# Patient Record
Sex: MALE | Race: WHITE | NOT HISPANIC OR LATINO | Employment: OTHER | URBAN - METROPOLITAN AREA
[De-identification: names, ages, dates, MRNs, and addresses within clinical notes are randomized per-mention and may not be internally consistent; named-entity substitution may affect disease eponyms.]

---

## 2018-05-09 ENCOUNTER — OFFICE VISIT (OUTPATIENT)
Dept: FAMILY MEDICINE CLINIC | Facility: CLINIC | Age: 73
End: 2018-05-09
Payer: MEDICARE

## 2018-05-09 ENCOUNTER — TELEPHONE (OUTPATIENT)
Dept: FAMILY MEDICINE CLINIC | Facility: CLINIC | Age: 73
End: 2018-05-09

## 2018-05-09 VITALS
HEIGHT: 67 IN | BODY MASS INDEX: 25.93 KG/M2 | DIASTOLIC BLOOD PRESSURE: 62 MMHG | TEMPERATURE: 98 F | HEART RATE: 60 BPM | SYSTOLIC BLOOD PRESSURE: 98 MMHG | WEIGHT: 165.2 LBS

## 2018-05-09 DIAGNOSIS — L85.3 DRY SKIN: ICD-10-CM

## 2018-05-09 DIAGNOSIS — M54.50 LUMBAR BACK PAIN: ICD-10-CM

## 2018-05-09 DIAGNOSIS — I10 ESSENTIAL HYPERTENSION: ICD-10-CM

## 2018-05-09 DIAGNOSIS — N40.0 BENIGN PROSTATIC HYPERPLASIA WITHOUT LOWER URINARY TRACT SYMPTOMS: ICD-10-CM

## 2018-05-09 DIAGNOSIS — E78.00 HYPERCHOLESTEROLEMIA: Primary | ICD-10-CM

## 2018-05-09 PROCEDURE — 99203 OFFICE O/P NEW LOW 30 MIN: CPT | Performed by: FAMILY MEDICINE

## 2018-05-09 RX ORDER — ATORVASTATIN CALCIUM 80 MG/1
80 TABLET, FILM COATED ORAL DAILY
COMMUNITY
End: 2018-05-14 | Stop reason: SDUPTHER

## 2018-05-09 RX ORDER — MIRTAZAPINE 15 MG/1
15 TABLET, FILM COATED ORAL
Status: ON HOLD | COMMUNITY
End: 2018-11-14

## 2018-05-09 RX ORDER — AMOXICILLIN 500 MG
CAPSULE ORAL
COMMUNITY
End: 2018-12-06 | Stop reason: HOSPADM

## 2018-05-09 RX ORDER — BUSPIRONE HYDROCHLORIDE 10 MG/1
10 TABLET ORAL 2 TIMES DAILY
Status: ON HOLD | COMMUNITY
End: 2018-11-14

## 2018-05-09 RX ORDER — OLANZAPINE 15 MG/1
15 TABLET ORAL
Status: ON HOLD | COMMUNITY
End: 2018-11-14

## 2018-05-09 RX ORDER — LANOLIN ALCOHOL/MO/W.PET/CERES
1 CREAM (GRAM) TOPICAL 3 TIMES DAILY
COMMUNITY
End: 2018-12-06 | Stop reason: HOSPADM

## 2018-05-09 RX ORDER — SERTRALINE HYDROCHLORIDE 100 MG/1
100 TABLET, FILM COATED ORAL DAILY
COMMUNITY

## 2018-05-09 NOTE — TELEPHONE ENCOUNTER
Patients son called stating he wanted to verify if the patient should continue taking his medication until his next weeks bp check or should he stop taking the medication? Patients son stated if he does not answer the phone to please leave a detailed message

## 2018-05-09 NOTE — TELEPHONE ENCOUNTER
He should cut metoprolol dose in half and keep taking atorvastatin until I call  He should also keep on same dose of zyprexa

## 2018-05-10 NOTE — PROGRESS NOTES
Patient ID: Mary Ann Bains is a 68 y o  male  HPI: 68 y o male presents to get established  He currently follows with psychiatry for OCD and depression  He has hypertension (bp is low and pt is dizzy with positional changes), chronic low back pain and dry skin on legs  He needs updated labs  A year ago he was hospitalized when brought to [place] from [place] and had uncontrolled htn  He had a negative cardiac cath, but was left of 80 mg of atorvastatin and metoprolol 25mg  SUBJECTIVE    Family History   Problem Relation Age of Onset    Diabetes type II Father     Glaucoma Son     Panic disorder Son      Social History     Social History    Marital status:      Spouse name: N/A    Number of children: N/A    Years of education: N/A     Occupational History    Not on file       Social History Main Topics    Smoking status: Not on file    Smokeless tobacco: Not on file    Alcohol use Not on file    Drug use: Unknown    Sexual activity: Not on file     Other Topics Concern    Not on file     Social History Narrative    No narrative on file     Past Medical History:   Diagnosis Date    Anxiety     Chronic low back pain     Depression     Hypercholesterolemia     Hypertension     OCD (obsessive compulsive disorder)      Past Surgical History:   Procedure Laterality Date    HERNIA REPAIR       No Known Allergies    Current Outpatient Prescriptions:     atorvastatin (LIPITOR) 80 mg tablet, Take 80 mg by mouth daily, Disp: , Rfl:     busPIRone (BUSPAR) 10 mg tablet, Take 10 mg by mouth 2 (two) times a day, Disp: , Rfl:     glucosamine-chondroitin 500-400 MG tablet, Take 1 tablet by mouth 3 (three) times a day, Disp: , Rfl:     metoprolol tartrate (LOPRESSOR) 25 mg tablet, Take 25 mg by mouth, Disp: , Rfl:     mirtazapine (REMERON) 15 mg tablet, Take 15 mg by mouth daily at bedtime, Disp: , Rfl:     Multiple Vitamins-Minerals (MULTIVITAMIN MEN 50+) TABS, Take by mouth, Disp: , Rfl:    OLANZapine (ZyPREXA) 15 mg tablet, Take 15 mg by mouth daily at bedtime, Disp: , Rfl:     Omega-3 Fatty Acids (FISH OIL) 1200 MG CAPS, Take by mouth, Disp: , Rfl:     sertraline (ZOLOFT) 100 mg tablet, Take 150 mg by mouth daily, Disp: , Rfl:     Review of Systems  Constitutional:     Denies fever, chills ,fatigue ,weakness ,weight loss, weight gain     ENT: Denies earache ,loss of hearing ,nosebleed, nasal discharge,nasal congestion ,sore throat ,hoarseness  Pulmonary: Denies shortness of breath ,cough  ,dyspnea on exertion, orthopnea  ,PND   Cardiovascular:  Denies bradycardia , tachycardia  ,palpations, lower extremity edema leg, claudication  Breast:  Denies new or changing breast lumps ,nipple discharge ,nipple changes  Abdomen:  Denies abdominal pain , anorexia , indigestion, nausea, vomiting, constipation, diarrhea  Musculoskeletal: Denies myalgias, , joint swelling, joint stiffness , limb pain, limb swelling+ chronic lumbar back pain and weakness of his legs bilaterally  Gu: denies dysuria, polyuria  Skin: Denies skin rash, skin lesion, skin wound, +itchy dry skin  Neuro: Denies headache, numbness, tingling, confusion, loss of consciousness, dizziness, vertigo  Psychiatric: Denies feelings of depression, suicidal ideation, anxiety, sleep disturbances + OCD tendencies not fully controlled    OBJECTIVE    Constitutional:   NAD, well appearing and well nourished      ENT:   Conjunctiva and lids: no injection, edema, or discharge     Pupils and iris: TOOTIE bilaterally    External inspection of ears and nose: normal without deformities or discharge  Otoscopic exam: Canals patent without erythema  Nasal mucosa, septum and turbinates: Normal or edema or discharge         Oropharynx:  Moist mucosa, normal tongue and tonsils without lesions  No erythema        Pulmonary:Respiratory effort normal rate and rhythm, no increased work of breathing   Auscultation of lungs:  Clear bilaterally with no adventitious breath sounds       Cardiovascular: regular rate and rhythm, S1 and S2, no murmur, no edema and/or varicosities of LE      Abdomen: Soft and non-distended     Positive bowel sounds      No heptomegaly or splenomegaly      Gu: no suprapubic tenderness or CVA tenderness, no urethral discharge  Lymphatic:  No anterior or posterior cervical lymphadenopathy         Musculoskeletal:  Gait and station: Normal gait      Digits and nails normal without clubbing or cyanosis       Inspection/palpation of joints, bones, and muscles:  No joint tenderness, swelling, full active and passive range of motion       Skin: Normal skin turgor and no rashes      Neuro:    Normal  CN 2-12     Normal reflexes     Normal sensation    Small tremor visualized at rest in left thumb only; no cogwheel rigidity, no abnormal gait  Psych:   alert and oriented to person, place and time     Obsessive/compulsive behavior visualized  Assessment/Plan:Diagnoses and all orders for this visit:    Hypercholesterolemia  -     Lipid Panel with Direct LDL reflex; Future    Benign prostatic hyperplasia without lower urinary tract symptoms  -     PSA Total, Diagnostic; Future    Essential hypertension  -     Comprehensive metabolic panel; Future    Lumbar back pain  -     XR spine lumbar minimum 4 views non injury; Future    Dry skin  -     TSH, 3rd generation; Future    Other orders  -     metoprolol tartrate (LOPRESSOR) 25 mg tablet; Take 12 5 mg by mouth   -     Omega-3 Fatty Acids (FISH OIL) 1200 MG CAPS; Take by mouth  -     Multiple Vitamins-Minerals (MULTIVITAMIN MEN 50+) TABS; Take by mouth  -     glucosamine-chondroitin 500-400 MG tablet; Take 1 tablet by mouth 3 (three) times a day  -     busPIRone (BUSPAR) 10 mg tablet; Take 10 mg by mouth 2 (two) times a day  -     mirtazapine (REMERON) 15 mg tablet; Take 15 mg by mouth daily at bedtime  -     sertraline (ZOLOFT) 100 mg tablet;  Take 150 mg by mouth daily  -     OLANZapine (ZyPREXA) 15 mg tablet; Take 15 mg by mouth daily at bedtime  -     atorvastatin (LIPITOR) 80 mg tablet; Take 80 mg by mouth daily        Reviewed with patient plan to await labs before deciding to discontinue lipitor  Also, I halved his metoprolol and will recheck in 2 weeks  Also, if bs elevated, will speak to neuro about decreasing zyprexa dose for both tremor and bs reasons  Advised son to use eucerin lotion on patie's legs      Patient instructed to call in 72 hours if not feeling better or if symptoms worsen

## 2018-05-14 DIAGNOSIS — E78.00 PURE HYPERCHOLESTEROLEMIA: Primary | ICD-10-CM

## 2018-05-14 RX ORDER — ATORVASTATIN CALCIUM 80 MG/1
80 TABLET, FILM COATED ORAL DAILY
Qty: 90 TABLET | Refills: 0 | Status: SHIPPED | OUTPATIENT
Start: 2018-05-14 | End: 2018-05-18 | Stop reason: ALTCHOICE

## 2018-05-17 ENCOUNTER — APPOINTMENT (OUTPATIENT)
Dept: LAB | Facility: CLINIC | Age: 73
End: 2018-05-17
Payer: MEDICARE

## 2018-05-17 ENCOUNTER — OFFICE VISIT (OUTPATIENT)
Dept: FAMILY MEDICINE CLINIC | Facility: CLINIC | Age: 73
End: 2018-05-17
Payer: MEDICARE

## 2018-05-17 VITALS — DIASTOLIC BLOOD PRESSURE: 68 MMHG | SYSTOLIC BLOOD PRESSURE: 110 MMHG

## 2018-05-17 DIAGNOSIS — N40.0 BENIGN PROSTATIC HYPERPLASIA WITHOUT LOWER URINARY TRACT SYMPTOMS: ICD-10-CM

## 2018-05-17 DIAGNOSIS — L85.3 DRY SKIN: ICD-10-CM

## 2018-05-17 DIAGNOSIS — E78.00 HYPERCHOLESTEROLEMIA: ICD-10-CM

## 2018-05-17 DIAGNOSIS — I10 ESSENTIAL HYPERTENSION: ICD-10-CM

## 2018-05-17 DIAGNOSIS — I10 HYPERTENSION, UNSPECIFIED TYPE: Primary | ICD-10-CM

## 2018-05-17 LAB
ALBUMIN SERPL BCP-MCNC: 3.8 G/DL (ref 3.5–5)
ALP SERPL-CCNC: 76 U/L (ref 46–116)
ALT SERPL W P-5'-P-CCNC: 45 U/L (ref 12–78)
ANION GAP SERPL CALCULATED.3IONS-SCNC: 6 MMOL/L (ref 4–13)
AST SERPL W P-5'-P-CCNC: 25 U/L (ref 5–45)
BILIRUB SERPL-MCNC: 0.59 MG/DL (ref 0.2–1)
BUN SERPL-MCNC: 8 MG/DL (ref 5–25)
CALCIUM SERPL-MCNC: 8.9 MG/DL (ref 8.3–10.1)
CHLORIDE SERPL-SCNC: 98 MMOL/L (ref 100–108)
CHOLEST SERPL-MCNC: 86 MG/DL (ref 50–200)
CO2 SERPL-SCNC: 28 MMOL/L (ref 21–32)
CREAT SERPL-MCNC: 0.76 MG/DL (ref 0.6–1.3)
GFR SERPL CREATININE-BSD FRML MDRD: 91 ML/MIN/1.73SQ M
GLUCOSE P FAST SERPL-MCNC: 90 MG/DL (ref 65–99)
HDLC SERPL-MCNC: 57 MG/DL (ref 40–60)
LDLC SERPL CALC-MCNC: 11 MG/DL (ref 0–100)
POTASSIUM SERPL-SCNC: 4.3 MMOL/L (ref 3.5–5.3)
PROT SERPL-MCNC: 7.4 G/DL (ref 6.4–8.2)
PSA SERPL-MCNC: 0.2 NG/ML (ref 0–4)
SODIUM SERPL-SCNC: 132 MMOL/L (ref 136–145)
TRIGL SERPL-MCNC: 92 MG/DL
TSH SERPL DL<=0.05 MIU/L-ACNC: 1.42 UIU/ML (ref 0.36–3.74)

## 2018-05-17 PROCEDURE — 84153 ASSAY OF PSA TOTAL: CPT

## 2018-05-17 PROCEDURE — 99211 OFF/OP EST MAY X REQ PHY/QHP: CPT | Performed by: FAMILY MEDICINE

## 2018-05-17 PROCEDURE — 84443 ASSAY THYROID STIM HORMONE: CPT

## 2018-05-17 PROCEDURE — 80061 LIPID PANEL: CPT

## 2018-05-17 PROCEDURE — 36415 COLL VENOUS BLD VENIPUNCTURE: CPT

## 2018-05-17 PROCEDURE — 80053 COMPREHEN METABOLIC PANEL: CPT

## 2018-05-17 NOTE — PROGRESS NOTES
Patient is here for BP check, was low at last office visit and reduced medication  Currently on Metoprolol 12 5mg BID per Dr Amado, patient instructed to continue current dose

## 2018-06-14 ENCOUNTER — OFFICE VISIT (OUTPATIENT)
Dept: FAMILY MEDICINE CLINIC | Facility: CLINIC | Age: 73
End: 2018-06-14
Payer: MEDICARE

## 2018-06-14 VITALS
WEIGHT: 164.4 LBS | DIASTOLIC BLOOD PRESSURE: 68 MMHG | TEMPERATURE: 98.8 F | SYSTOLIC BLOOD PRESSURE: 110 MMHG | HEIGHT: 67 IN | HEART RATE: 60 BPM | BODY MASS INDEX: 25.8 KG/M2

## 2018-06-14 DIAGNOSIS — K59.00 CONSTIPATION, UNSPECIFIED CONSTIPATION TYPE: Primary | ICD-10-CM

## 2018-06-14 DIAGNOSIS — K59.00 CONSTIPATION, UNSPECIFIED CONSTIPATION TYPE: ICD-10-CM

## 2018-06-14 PROBLEM — K59.04 CHRONIC IDIOPATHIC CONSTIPATION: Status: ACTIVE | Noted: 2017-01-09

## 2018-06-14 PROBLEM — F42.9 OCD (OBSESSIVE COMPULSIVE DISORDER): Status: ACTIVE | Noted: 2017-01-09

## 2018-06-14 PROCEDURE — 99213 OFFICE O/P EST LOW 20 MIN: CPT | Performed by: NURSE PRACTITIONER

## 2018-06-14 RX ORDER — POLYETHYLENE GLYCOL 3350 17 G/17G
17 POWDER, FOR SOLUTION ORAL DAILY
Qty: 14 EACH | Refills: 0 | Status: ON HOLD
Start: 2018-06-14 | End: 2018-12-06

## 2018-06-14 NOTE — PROGRESS NOTES
Patient ID: Linda Castillo is a 68 y o  male  HPI: 68 y o male presenting with his son for constipation for one week  Patient denies abdominal pain or discomfort but he knows that he should have a bowel movement every other day and it needs correction  Patient and his son denies any recent medication or diet changes and report that patient keeps himself well hydrated  Patient's son reports that his father is obsessive on his bowel function  SUBJECTIVE    Family History   Problem Relation Age of Onset    Diabetes type II Father     Glaucoma Son     Panic disorder Son      Social History     Social History    Marital status:      Spouse name: N/A    Number of children: N/A    Years of education: N/A     Occupational History    Not on file       Social History Main Topics    Smoking status: Not on file    Smokeless tobacco: Not on file    Alcohol use Not on file    Drug use: Unknown    Sexual activity: Not on file     Other Topics Concern    Not on file     Social History Narrative    No narrative on file     Past Medical History:   Diagnosis Date    Anxiety     Chronic low back pain     Depression     Hypercholesterolemia     Hypertension     OCD (obsessive compulsive disorder)      Past Surgical History:   Procedure Laterality Date    HERNIA REPAIR       No Known Allergies    Current Outpatient Prescriptions:     busPIRone (BUSPAR) 10 mg tablet, Take 10 mg by mouth 2 (two) times a day, Disp: , Rfl:     glucosamine-chondroitin 500-400 MG tablet, Take 1 tablet by mouth 3 (three) times a day, Disp: , Rfl:     metoprolol tartrate (LOPRESSOR) 25 mg tablet, Take 12 5 mg by mouth , Disp: , Rfl:     mirtazapine (REMERON) 15 mg tablet, Take 15 mg by mouth daily at bedtime, Disp: , Rfl:     Multiple Vitamins-Minerals (MULTIVITAMIN MEN 50+) TABS, Take by mouth, Disp: , Rfl:     OLANZapine (ZyPREXA) 15 mg tablet, Take 15 mg by mouth daily at bedtime, Disp: , Rfl:     Omega-3 Fatty Acids (FISH OIL) 1200 MG CAPS, Take by mouth, Disp: , Rfl:     sertraline (ZOLOFT) 100 mg tablet, Take 150 mg by mouth daily, Disp: , Rfl:     docusate sodium (COLACE) 50 mg capsule, Take 1 capsule (50 mg total) by mouth daily for 1 day, Disp: , Rfl: 0    polyethylene glycol (MIRALAX) 17 g packet, Take 17 g by mouth daily, Disp: 14 each, Rfl: 0    Review of Systems    Consitutional:  Denies chills, fatigue, fever, weight gain and weight loss   Pulmonary:  Denies cough, shortness of breath or dyspnea on exertion    Cardiovascular:  Denies chest pain/pressure or peripheral edema   Abdomen:   Positive for constipation for 7 days and was having some problems with it being hard prior to this issue  Denies abdominal pain, nausea or vomiting  Integumentary:  Denies ecchymosis, petechiae, rash or lesions   Neurological:  Denies headaches, dizziness, confusion, loss of consciousness or behavioral changes  Psychological:  Denies anxiety, depression or sleep disturbances      OBJECTIVE    /68   Pulse 60   Temp 98 8 °F (37 1 °C)   Ht 5' 7" (1 702 m)   Wt 74 6 kg (164 lb 6 4 oz)   BMI 25 75 kg/m²     Constitutional:  Well appearing and in no acute distress  ENT:     Pulmonary:  clear to auscultation bilaterally and no crackles, no wheezes, chest expansion normal  Cardiovascular:  S1S2, regular rate and rhythm  Gastrointestinal:  abdomen is soft without significant tenderness with no masses or organomegaly   Lymphatic:  no lymphadenopathy   Skin:  skin color, texture and turgor are normal; no bruising, rashes or lesions noted  Neurologic:  Alert and oriented x 4 and Affect and mood normal      Assessment/Plan:  Diagnoses and all orders for this visit:    Constipation, unspecified constipation type  -     polyethylene glycol (MIRALAX) 17 g packet; Take 17 g by mouth daily  -     docusate sodium (COLACE) 50 mg capsule;  Take 1 capsule (50 mg total) by mouth daily for 1 day      Constipation  Reviewed with patient and his son to take a Colace OTC one tablet daily until bowel movement occurs - usually effective in 12 to 72 hours  Discussed with patient and his son to start using Miralax 17 gram dissolved in 4 to 8 ounces of liquid daily as needed for occasional constipation    Patient instructed to call in 72 hours if not feeling better or if symptoms worsen

## 2018-08-14 ENCOUNTER — HOSPITAL ENCOUNTER (OUTPATIENT)
Dept: RADIOLOGY | Facility: HOSPITAL | Age: 73
Discharge: HOME | End: 2018-08-14
Payer: MEDICARE

## 2018-08-14 DIAGNOSIS — M54.50 LUMBAR BACK PAIN: ICD-10-CM

## 2018-08-14 PROCEDURE — 72110 X-RAY EXAM L-2 SPINE 4/>VWS: CPT

## 2018-08-21 ENCOUNTER — OFFICE VISIT (OUTPATIENT)
Dept: FAMILY MEDICINE CLINIC | Facility: CLINIC | Age: 73
End: 2018-08-21
Payer: MEDICARE

## 2018-08-21 VITALS
HEIGHT: 67 IN | TEMPERATURE: 98 F | WEIGHT: 164.8 LBS | DIASTOLIC BLOOD PRESSURE: 52 MMHG | SYSTOLIC BLOOD PRESSURE: 109 MMHG | HEART RATE: 60 BPM | BODY MASS INDEX: 25.87 KG/M2

## 2018-08-21 DIAGNOSIS — G25.1 TREMOR DUE TO MULTIPLE DRUGS: ICD-10-CM

## 2018-08-21 DIAGNOSIS — M54.16 BILATERAL LUMBAR RADICULOPATHY: Primary | ICD-10-CM

## 2018-08-21 PROCEDURE — 99214 OFFICE O/P EST MOD 30 MIN: CPT | Performed by: FAMILY MEDICINE

## 2018-08-21 NOTE — PROGRESS NOTES
Patient ID: Fauzia Espino is a 68 y o  male  HPI: 68 y o male presents to review results of his xrays of lumbar spine which show bulging discs at two levels and degenerative disk disease of spine  At this time, he has lumbar back pain with bilateral leg radiculopathy right >left  He gets neurogenic claudication as well  He has agreed to have an MRI of lumbar spine  The  Tremor he has is still problematic and he has not spoken with his psychiatrist about this  SUBJECTIVE    Family History   Problem Relation Age of Onset    Diabetes type II Father     Glaucoma Son     Panic disorder Son      Social History     Social History    Marital status:      Spouse name: N/A    Number of children: N/A    Years of education: N/A     Occupational History    Not on file       Social History Main Topics    Smoking status: Not on file    Smokeless tobacco: Not on file    Alcohol use Not on file    Drug use: Unknown    Sexual activity: Not on file     Other Topics Concern    Not on file     Social History Narrative    No narrative on file     Past Medical History:   Diagnosis Date    Anxiety     Chronic low back pain     Depression     Hypercholesterolemia     Hypertension     OCD (obsessive compulsive disorder)      Past Surgical History:   Procedure Laterality Date    HERNIA REPAIR       No Known Allergies    Current Outpatient Prescriptions:     busPIRone (BUSPAR) 10 mg tablet, Take 10 mg by mouth 2 (two) times a day, Disp: , Rfl:     glucosamine-chondroitin 500-400 MG tablet, Take 1 tablet by mouth 3 (three) times a day, Disp: , Rfl:     metoprolol tartrate (LOPRESSOR) 25 mg tablet, Take 12 5 mg by mouth daily , Disp: , Rfl:     mirtazapine (REMERON) 15 mg tablet, Take 15 mg by mouth daily at bedtime, Disp: , Rfl:     Multiple Vitamins-Minerals (MULTIVITAMIN MEN 50+) TABS, Take by mouth, Disp: , Rfl:     OLANZapine (ZyPREXA) 15 mg tablet, Take 15 mg by mouth daily at bedtime, Disp: , Rfl:     Omega-3 Fatty Acids (FISH OIL) 1200 MG CAPS, Take by mouth, Disp: , Rfl:     sertraline (ZOLOFT) 100 mg tablet, Take 100 mg by mouth daily 200 mg daily , Disp: , Rfl:     docusate sodium (COLACE) 50 mg capsule, 1 qd (Patient not taking: Reported on 8/21/2018 ), Disp: 10 capsule, Rfl: 0    polyethylene glycol (MIRALAX) 17 g packet, Take 17 g by mouth daily (Patient not taking: Reported on 8/21/2018 ), Disp: 14 each, Rfl: 0    Review of Systems  Constitutional:     Denies fever, chills ,fatigue ,weakness ,weight loss, weight gain     ENT: Denies earache ,loss of hearing ,nosebleed, nasal discharge,nasal congestion ,sore throat ,hoarseness  Pulmonary: Denies shortness of breath ,cough  ,dyspnea on exertion, orthopnea  ,PND   Cardiovascular:  Denies bradycardia , tachycardia  ,palpations, lower extremity edema leg, claudication  Breast:  Denies new or changing breast lumps ,nipple discharge ,nipple changes  Abdomen:  Denies abdominal pain , anorexia , indigestion, nausea, vomiting, constipation, diarrhea  Musculoskeletal: Denies myalgias, arthralgias, joint swelling, joint stiffness , limb pain, limb swellingLumbar back pain with bilateral leg radiculopathy right >left  [place]: denies dysuria, polyuria  Skin: Denies skin rash, skin lesion, skin wound, itching, dry skin  Neuro: Denies headache, numbness, tingling, confusion, loss of consciousness, dizziness, vertigo + bilat leg weakness with walking + tremor of upper extremities bilaterally  Psychiatric: Denies feelings of depression, suicidal ideation, anxiety, sleep disturbances    OBJECTIVE  /52   Pulse 60   Temp 98 °F (36 7 °C)   Ht 5' 7" (1 702 m)   Wt 74 8 kg (164 lb 12 8 oz)   BMI 25 81 kg/m²   Constitutional:   NAD, well appearing and well nourished      ENT:   Conjunctiva and lids: no injection, edema, or discharge     Pupils and iris: TOOTIE bilaterally    External inspection of ears and nose: normal without deformities or discharge  Otoscopic exam: Canals patent without erythema  Nasal mucosa, septum and turbinates: Normal or edema or discharge         Oropharynx:  Moist mucosa, normal tongue and tonsils without lesions  No erythema        Pulmonary:Respiratory effort normal rate and rhythm, no increased work of breathing  Auscultation of lungs:  Clear bilaterally with no adventitious breath sounds       Cardiovascular: regular rate and rhythm, S1 and S2, no murmur, no edema and/or varicosities of LE      Abdomen: Soft and non-distended     Positive bowel sounds      No heptomegaly or splenomegaly      Gu: no suprapubic tenderness or CVA tenderness, no urethral discharge  Lymphatic:  No anterior or posterior cervical lymphadenopathy         Musculoskeletal:  Gait and station: Normal gait      Digits and nails normal without clubbing or cyanosis       Inspection/palpation of joints, bones, and muscles:  No joint tenderness, swelling, full active and passive range of motion with pain in lumbar spine + SLR on right at 25 degrees       Skin: Normal skin turgor and no rashes      Neuro:      Normal reflexes     Psych:   alert and oriented to person, place and time     normal mood and affect       Assessment/Plan:Diagnoses and all orders for this visit:    Bilateral lumbar radiculopathy  -     MRI lumbar spine wo contrast; Future    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

## 2018-09-27 ENCOUNTER — HOSPITAL ENCOUNTER (OUTPATIENT)
Dept: MRI IMAGING | Facility: HOSPITAL | Age: 73
Discharge: HOME | End: 2018-09-27
Payer: MEDICARE

## 2018-09-27 DIAGNOSIS — M54.16 BILATERAL LUMBAR RADICULOPATHY: ICD-10-CM

## 2018-09-27 PROCEDURE — 72148 MRI LUMBAR SPINE W/O DYE: CPT

## 2018-09-28 ENCOUNTER — TELEPHONE (OUTPATIENT)
Dept: FAMILY MEDICINE CLINIC | Facility: CLINIC | Age: 73
End: 2018-09-28

## 2018-09-28 DIAGNOSIS — M51.26 LUMBAR HERNIATED DISC: Primary | ICD-10-CM

## 2018-10-02 ENCOUNTER — OFFICE VISIT (OUTPATIENT)
Dept: FAMILY MEDICINE CLINIC | Facility: CLINIC | Age: 73
End: 2018-10-02
Payer: MEDICARE

## 2018-10-02 VITALS
DIASTOLIC BLOOD PRESSURE: 60 MMHG | WEIGHT: 163.8 LBS | TEMPERATURE: 98.1 F | HEART RATE: 60 BPM | HEIGHT: 67 IN | BODY MASS INDEX: 25.71 KG/M2 | SYSTOLIC BLOOD PRESSURE: 90 MMHG

## 2018-10-02 DIAGNOSIS — M54.50 LUMBAR BACK PAIN: Primary | ICD-10-CM

## 2018-10-02 DIAGNOSIS — Z23 NEED FOR VACCINATION: ICD-10-CM

## 2018-10-02 DIAGNOSIS — I10 ESSENTIAL HYPERTENSION: ICD-10-CM

## 2018-10-02 PROCEDURE — 90662 IIV NO PRSV INCREASED AG IM: CPT | Performed by: FAMILY MEDICINE

## 2018-10-02 PROCEDURE — 99213 OFFICE O/P EST LOW 20 MIN: CPT | Performed by: FAMILY MEDICINE

## 2018-10-02 PROCEDURE — G0008 ADMIN INFLUENZA VIRUS VAC: HCPCS | Performed by: FAMILY MEDICINE

## 2018-10-03 NOTE — PROGRESS NOTES
Patient ID: Tomas Lamb is a 68 y o  male  HPI: 68 y o male presents for follow up of lumbar back pan  MRI showed bulging discs at L2-3, L3-4, and L4-5 with formainal stenosis  He has elected to try chiropractic treatment first   A copy of the MRI has been provided to the chiropractor so he is aware of the nerve impingement  I have explained to the son and patient that he may need to see pain management for possible epidural treatment if he does not respond to chiropractics  SUBJECTIVE    Family History   Problem Relation Age of Onset    Diabetes type II Father     Glaucoma Son     Panic disorder Son      Social History     Social History    Marital status:      Spouse name: N/A    Number of children: N/A    Years of education: N/A     Occupational History    Not on file       Social History Main Topics    Smoking status: Never Smoker    Smokeless tobacco: Never Used    Alcohol use No    Drug use: No    Sexual activity: Not on file     Other Topics Concern    Not on file     Social History Narrative    No narrative on file     Past Medical History:   Diagnosis Date    Anxiety     Chronic low back pain     Depression     Hypercholesterolemia     Hypertension     OCD (obsessive compulsive disorder)      Past Surgical History:   Procedure Laterality Date    HERNIA REPAIR       No Known Allergies    Current Outpatient Prescriptions:     busPIRone (BUSPAR) 10 mg tablet, Take 10 mg by mouth 2 (two) times a day, Disp: , Rfl:     docusate sodium (COLACE) 50 mg capsule, 1 qd, Disp: 10 capsule, Rfl: 0    glucosamine-chondroitin 500-400 MG tablet, Take 1 tablet by mouth 3 (three) times a day, Disp: , Rfl:     metoprolol tartrate (LOPRESSOR) 25 mg tablet, Take 12 5 mg by mouth daily , Disp: , Rfl:     mirtazapine (REMERON) 15 mg tablet, Take 15 mg by mouth daily at bedtime, Disp: , Rfl:     Multiple Vitamins-Minerals (MULTIVITAMIN MEN 50+) TABS, Take by mouth, Disp: , Rfl:    OLANZapine (ZyPREXA) 15 mg tablet, Take 15 mg by mouth daily at bedtime, Disp: , Rfl:     Omega-3 Fatty Acids (FISH OIL) 1200 MG CAPS, Take by mouth, Disp: , Rfl:     polyethylene glycol (MIRALAX) 17 g packet, Take 17 g by mouth daily, Disp: 14 each, Rfl: 0    sertraline (ZOLOFT) 100 mg tablet, Take 100 mg by mouth daily 200 mg daily , Disp: , Rfl:     Review of Systems  Constitutional:     Denies fever, chills ,fatigue ,weakness ,weight loss, weight gain     ENT: Denies earache ,loss of hearing ,nosebleed, nasal discharge,nasal congestion ,sore throat ,hoarseness  Pulmonary: Denies shortness of breath ,cough  ,dyspnea on exertion, orthopnea  ,PND   Cardiovascular:  Denies bradycardia , tachycardia  ,palpations, lower extremity edema leg, claudication  Breast:  Denies new or changing breast lumps ,nipple discharge ,nipple changes  Abdomen:  Denies abdominal pain , anorexia , indigestion, nausea, vomiting, constipation, diarrhea  Musculoskeletal: Denies myalgias, joint swelling, joint stiffness , limb pain, limb swelling; + chronic lumbar back pain with bilateral leg radiculopathy  Gu: denies dysuria, polyuria  Skin: Denies skin rash, skin lesion, skin wound, itching, dry skin  Neuro: Denies headache, numbness, tingling, confusion, loss of consciousness, dizziness, vertigo  Psychiatric: Denies feelings of depression, suicidal ideation, anxiety, sleep disturbances    OBJECTIVE  BP 90/60   Pulse 60   Temp 98 1 °F (36 7 °C)   Ht 5' 7" (1 702 m)   Wt 74 3 kg (163 lb 12 8 oz)   BMI 25 65 kg/m²   Constitutional:   NAD, well appearing and well nourished      ENT:   Conjunctiva and lids: no injection, edema, or discharge     Pupils and iris: TOOTIE bilaterally    External inspection of ears and nose: normal without deformities or discharge  Otoscopic exam: Canals patent without erythema         Nasal mucosa, septum and turbinates: Normal or edema or discharge         Oropharynx:  Moist mucosa, normal tongue and tonsils without lesions  No erythema        Pulmonary:Respiratory effort normal rate and rhythm, no increased work of breathing  Auscultation of lungs:  Clear bilaterally with no adventitious breath sounds       Cardiovascular: regular rate and rhythm, S1 and S2, no murmur, no edema and/or varicosities of LE      Abdomen: Soft and non-distended     Positive bowel sounds      No heptomegaly or splenomegaly      Gu: no suprapubic tenderness or CVA tenderness, no urethral discharge  Lymphatic:  No anterior or posterior cervical lymphadenopathy         Musculoskeletal:  Gait and station: Normal gait      Digits and nails normal without clubbing or cyanosis     Inspection/palpation of joints, bones, and muscles:  No joint tenderness, swelling, full active and passive range of motion       Skin: Normal skin turgor and no rashes      Neuro:      Normal reflexes     Psych:   alert and oriented to person, place and time     normal mood and affect       Assessment/Plan:Diagnoses and all orders for this visit:    Lumbar back pain    Need for vaccination  -     influenza vaccine, [id number], high-dose, PF 0 5 mL, for patients 65 yr+ (FLUZONE HIGH-DOSE)        Reviewed with patient plan to treat his lumbar back pain with the chiropractor and if symptoms do not improve, he will let me know  His blood pressure is running low , so I have asked pt to wean off his beta blocker by taking 1/2 tab every other day for 10 days and stop  He will come back in 2 weeks for a nurse bp check off meds      Patient instructed to call in 72 hours if not feeling better or if symptoms worsen

## 2018-10-16 ENCOUNTER — CLINICAL SUPPORT (OUTPATIENT)
Dept: FAMILY MEDICINE CLINIC | Facility: CLINIC | Age: 73
End: 2018-10-16
Payer: MEDICARE

## 2018-10-16 VITALS — DIASTOLIC BLOOD PRESSURE: 62 MMHG | SYSTOLIC BLOOD PRESSURE: 104 MMHG

## 2018-10-16 DIAGNOSIS — M47.816 SPONDYLOSIS OF LUMBAR SPINE: Primary | ICD-10-CM

## 2018-10-16 DIAGNOSIS — I95.9 HYPOTENSION, UNSPECIFIED HYPOTENSION TYPE: Primary | ICD-10-CM

## 2018-10-16 PROCEDURE — 99211 OFF/OP EST MAY X REQ PHY/QHP: CPT

## 2018-10-16 NOTE — PROGRESS NOTES
Pt here today for 2 week  BP check , after discontinuing his BP med , Pt's BP is good and to call if any problems ,and to schedule 4 month well check with DR Eduardo Rivera and also the Pt would like to see pain management as suggested by Dr Eduardo Rivera ,I will put order in P'ts chart

## 2018-11-13 ENCOUNTER — HOSPITAL ENCOUNTER (INPATIENT)
Facility: HOSPITAL | Age: 73
LOS: 8 days | DRG: 472 | End: 2018-11-21
Attending: EMERGENCY MEDICINE | Admitting: INTERNAL MEDICINE
Payer: MEDICARE

## 2018-11-13 ENCOUNTER — APPOINTMENT (EMERGENCY)
Dept: RADIOLOGY | Facility: HOSPITAL | Age: 73
DRG: 472 | End: 2018-11-13
Payer: MEDICARE

## 2018-11-13 ENCOUNTER — APPOINTMENT (INPATIENT)
Dept: RADIOLOGY | Facility: HOSPITAL | Age: 73
DRG: 472 | End: 2018-11-13
Payer: MEDICARE

## 2018-11-13 ENCOUNTER — APPOINTMENT (INPATIENT)
Dept: NON INVASIVE DIAGNOSTICS | Facility: HOSPITAL | Age: 73
DRG: 472 | End: 2018-11-13
Payer: MEDICARE

## 2018-11-13 DIAGNOSIS — R94.31 ABNORMAL EKG: ICD-10-CM

## 2018-11-13 DIAGNOSIS — R55 SYNCOPE: ICD-10-CM

## 2018-11-13 DIAGNOSIS — S00.03XA HEMATOMA OF FRONTAL SCALP, INITIAL ENCOUNTER: ICD-10-CM

## 2018-11-13 DIAGNOSIS — I63.9 STROKE (HCC): Primary | ICD-10-CM

## 2018-11-13 DIAGNOSIS — M48.02 CERVICAL STENOSIS OF SPINE: ICD-10-CM

## 2018-11-13 DIAGNOSIS — F32.A DEPRESSION: ICD-10-CM

## 2018-11-13 LAB
ABO GROUP BLD: NORMAL
ANION GAP SERPL CALCULATED.3IONS-SCNC: 4 MMOL/L (ref 4–13)
APTT PPP: 26 SECONDS (ref 26–38)
ATRIAL RATE: 63 BPM
ATRIAL RATE: 74 BPM
BACTERIA UR QL AUTO: NORMAL /HPF
BILIRUB UR QL STRIP: NEGATIVE
BLD GP AB SCN SERPL QL: NEGATIVE
BUN SERPL-MCNC: 9 MG/DL (ref 5–25)
CALCIUM SERPL-MCNC: 7.8 MG/DL (ref 8.3–10.1)
CHLORIDE SERPL-SCNC: 99 MMOL/L (ref 100–108)
CLARITY UR: CLEAR
CO2 SERPL-SCNC: 29 MMOL/L (ref 21–32)
COLOR UR: YELLOW
CREAT SERPL-MCNC: 0.84 MG/DL (ref 0.6–1.3)
ERYTHROCYTE [DISTWIDTH] IN BLOOD BY AUTOMATED COUNT: 13.3 % (ref 11.6–15.1)
GFR SERPL CREATININE-BSD FRML MDRD: 87 ML/MIN/1.73SQ M
GLUCOSE SERPL-MCNC: 78 MG/DL (ref 65–140)
GLUCOSE SERPL-MCNC: 88 MG/DL (ref 65–140)
GLUCOSE UR STRIP-MCNC: NEGATIVE MG/DL
HCT VFR BLD AUTO: 38.1 % (ref 36.5–49.3)
HGB BLD-MCNC: 12.7 G/DL (ref 12–17)
HGB UR QL STRIP.AUTO: ABNORMAL
HYALINE CASTS #/AREA URNS LPF: NORMAL /LPF
INR PPP: 1.01 (ref 0.86–1.17)
KETONES UR STRIP-MCNC: NEGATIVE MG/DL
LEUKOCYTE ESTERASE UR QL STRIP: NEGATIVE
MCH RBC QN AUTO: 31 PG (ref 26.8–34.3)
MCHC RBC AUTO-ENTMCNC: 33.3 G/DL (ref 31.4–37.4)
MCV RBC AUTO: 93 FL (ref 82–98)
NITRITE UR QL STRIP: NEGATIVE
NON-SQ EPI CELLS URNS QL MICRO: NORMAL /HPF
P AXIS: 0 DEGREES
P AXIS: 79 DEGREES
PH UR STRIP.AUTO: 8.5 [PH] (ref 4.5–8)
PLATELET # BLD AUTO: 225 THOUSANDS/UL (ref 149–390)
PMV BLD AUTO: 10.6 FL (ref 8.9–12.7)
POTASSIUM SERPL-SCNC: 4.6 MMOL/L (ref 3.5–5.3)
PR INTERVAL: 200 MS
PR INTERVAL: 228 MS
PROT UR STRIP-MCNC: NEGATIVE MG/DL
PROTHROMBIN TIME: 13.4 SECONDS (ref 11.8–14.2)
QRS AXIS: 53 DEGREES
QRS AXIS: 58 DEGREES
QRSD INTERVAL: 94 MS
QRSD INTERVAL: 96 MS
QT INTERVAL: 424 MS
QT INTERVAL: 446 MS
QTC INTERVAL: 433 MS
QTC INTERVAL: 456 MS
RBC # BLD AUTO: 4.1 MILLION/UL (ref 3.88–5.62)
RBC #/AREA URNS AUTO: NORMAL /HPF
RH BLD: POSITIVE
SODIUM SERPL-SCNC: 132 MMOL/L (ref 136–145)
SP GR UR STRIP.AUTO: 1.01 (ref 1–1.03)
SPECIMEN EXPIRATION DATE: NORMAL
T WAVE AXIS: 102 DEGREES
T WAVE AXIS: 118 DEGREES
TROPONIN I SERPL-MCNC: 0.04 NG/ML
TROPONIN I SERPL-MCNC: 0.08 NG/ML
TROPONIN I SERPL-MCNC: 0.08 NG/ML
UROBILINOGEN UR QL STRIP.AUTO: 0.2 E.U./DL
VENTRICULAR RATE: 63 BPM
VENTRICULAR RATE: 63 BPM
WBC # BLD AUTO: 8.23 THOUSAND/UL (ref 4.31–10.16)
WBC #/AREA URNS AUTO: NORMAL /HPF

## 2018-11-13 PROCEDURE — 70450 CT HEAD/BRAIN W/O DYE: CPT

## 2018-11-13 PROCEDURE — 84484 ASSAY OF TROPONIN QUANT: CPT | Performed by: EMERGENCY MEDICINE

## 2018-11-13 PROCEDURE — 93005 ELECTROCARDIOGRAM TRACING: CPT

## 2018-11-13 PROCEDURE — 70496 CT ANGIOGRAPHY HEAD: CPT

## 2018-11-13 PROCEDURE — 86850 RBC ANTIBODY SCREEN: CPT

## 2018-11-13 PROCEDURE — 86900 BLOOD TYPING SEROLOGIC ABO: CPT

## 2018-11-13 PROCEDURE — 85730 THROMBOPLASTIN TIME PARTIAL: CPT

## 2018-11-13 PROCEDURE — 80048 BASIC METABOLIC PNL TOTAL CA: CPT

## 2018-11-13 PROCEDURE — 99285 EMERGENCY DEPT VISIT HI MDM: CPT

## 2018-11-13 PROCEDURE — 93306 TTE W/DOPPLER COMPLETE: CPT

## 2018-11-13 PROCEDURE — 99291 CRITICAL CARE FIRST HOUR: CPT | Performed by: INTERNAL MEDICINE

## 2018-11-13 PROCEDURE — 36415 COLL VENOUS BLD VENIPUNCTURE: CPT

## 2018-11-13 PROCEDURE — 99291 CRITICAL CARE FIRST HOUR: CPT | Performed by: PSYCHIATRY & NEUROLOGY

## 2018-11-13 PROCEDURE — 93010 ELECTROCARDIOGRAM REPORT: CPT | Performed by: INTERNAL MEDICINE

## 2018-11-13 PROCEDURE — 85027 COMPLETE CBC AUTOMATED: CPT

## 2018-11-13 PROCEDURE — 71045 X-RAY EXAM CHEST 1 VIEW: CPT

## 2018-11-13 PROCEDURE — 82553 CREATINE MB FRACTION: CPT | Performed by: EMERGENCY MEDICINE

## 2018-11-13 PROCEDURE — 92610 EVALUATE SWALLOWING FUNCTION: CPT

## 2018-11-13 PROCEDURE — 81001 URINALYSIS AUTO W/SCOPE: CPT

## 2018-11-13 PROCEDURE — 86901 BLOOD TYPING SEROLOGIC RH(D): CPT

## 2018-11-13 PROCEDURE — 82550 ASSAY OF CK (CPK): CPT | Performed by: EMERGENCY MEDICINE

## 2018-11-13 PROCEDURE — G8997 SWALLOW GOAL STATUS: HCPCS

## 2018-11-13 PROCEDURE — 85610 PROTHROMBIN TIME: CPT

## 2018-11-13 PROCEDURE — 70498 CT ANGIOGRAPHY NECK: CPT

## 2018-11-13 PROCEDURE — 82948 REAGENT STRIP/BLOOD GLUCOSE: CPT

## 2018-11-13 PROCEDURE — G8996 SWALLOW CURRENT STATUS: HCPCS

## 2018-11-13 PROCEDURE — 3E03317 INTRODUCTION OF OTHER THROMBOLYTIC INTO PERIPHERAL VEIN, PERCUTANEOUS APPROACH: ICD-10-PCS | Performed by: EMERGENCY MEDICINE

## 2018-11-13 PROCEDURE — G8998 SWALLOW D/C STATUS: HCPCS

## 2018-11-13 PROCEDURE — 73503 X-RAY EXAM HIP UNI 4/> VIEWS: CPT

## 2018-11-13 PROCEDURE — 84484 ASSAY OF TROPONIN QUANT: CPT | Performed by: NURSE PRACTITIONER

## 2018-11-13 RX ORDER — OXYCODONE HYDROCHLORIDE 5 MG/1
5 TABLET ORAL EVERY 4 HOURS PRN
Status: DISCONTINUED | OUTPATIENT
Start: 2018-11-13 | End: 2018-11-14

## 2018-11-13 RX ORDER — OXYCODONE HYDROCHLORIDE 10 MG/1
10 TABLET ORAL EVERY 4 HOURS PRN
Status: DISCONTINUED | OUTPATIENT
Start: 2018-11-13 | End: 2018-11-14

## 2018-11-13 RX ORDER — AMOXICILLIN 250 MG
1 CAPSULE ORAL 2 TIMES DAILY
Status: DISCONTINUED | OUTPATIENT
Start: 2018-11-13 | End: 2018-11-21 | Stop reason: HOSPADM

## 2018-11-13 RX ORDER — CHLORHEXIDINE GLUCONATE 0.12 MG/ML
15 RINSE ORAL EVERY 12 HOURS SCHEDULED
Status: DISCONTINUED | OUTPATIENT
Start: 2018-11-13 | End: 2018-11-13 | Stop reason: SDUPTHER

## 2018-11-13 RX ORDER — HYDROMORPHONE HCL/PF 1 MG/ML
0.5 SYRINGE (ML) INJECTION
Status: DISCONTINUED | OUTPATIENT
Start: 2018-11-13 | End: 2018-11-14

## 2018-11-13 RX ORDER — ATORVASTATIN CALCIUM 40 MG/1
40 TABLET, FILM COATED ORAL EVERY EVENING
Status: DISCONTINUED | OUTPATIENT
Start: 2018-11-13 | End: 2018-11-13

## 2018-11-13 RX ORDER — OXYCODONE HYDROCHLORIDE 5 MG/1
TABLET ORAL
Status: COMPLETED
Start: 2018-11-13 | End: 2018-11-13

## 2018-11-13 RX ORDER — GINSENG 100 MG
1 CAPSULE ORAL 2 TIMES DAILY
Status: DISCONTINUED | OUTPATIENT
Start: 2018-11-14 | End: 2018-11-21 | Stop reason: HOSPADM

## 2018-11-13 RX ORDER — SERTRALINE HYDROCHLORIDE 100 MG/1
100 TABLET, FILM COATED ORAL DAILY
Status: DISCONTINUED | OUTPATIENT
Start: 2018-11-14 | End: 2018-11-21 | Stop reason: HOSPADM

## 2018-11-13 RX ORDER — ACETAMINOPHEN 325 MG/1
650 TABLET ORAL EVERY 6 HOURS PRN
Status: DISCONTINUED | OUTPATIENT
Start: 2018-11-13 | End: 2018-11-21 | Stop reason: HOSPADM

## 2018-11-13 RX ORDER — ATORVASTATIN CALCIUM 40 MG/1
40 TABLET, FILM COATED ORAL EVERY EVENING
Status: DISCONTINUED | OUTPATIENT
Start: 2018-11-13 | End: 2018-11-13 | Stop reason: SDUPTHER

## 2018-11-13 RX ORDER — CHLORHEXIDINE GLUCONATE 0.12 MG/ML
15 RINSE ORAL EVERY 12 HOURS SCHEDULED
Status: DISCONTINUED | OUTPATIENT
Start: 2018-11-13 | End: 2018-11-14

## 2018-11-13 RX ADMIN — SENNOSIDES AND DOCUSATE SODIUM 1 TABLET: 8.6; 5 TABLET ORAL at 22:01

## 2018-11-13 RX ADMIN — CHLORHEXIDINE GLUCONATE 15 ML: 1.2 RINSE ORAL at 22:02

## 2018-11-13 RX ADMIN — ACETAMINOPHEN 650 MG: 325 TABLET, FILM COATED ORAL at 22:02

## 2018-11-13 RX ADMIN — OXYCODONE HYDROCHLORIDE 5 MG: 5 TABLET ORAL at 22:17

## 2018-11-13 RX ADMIN — ATORVASTATIN CALCIUM 40 MG: 40 TABLET, FILM COATED ORAL at 22:00

## 2018-11-13 RX ADMIN — ALTEPLASE 60.2 MG: KIT at 11:17

## 2018-11-13 RX ADMIN — IOHEXOL 85 ML: 350 INJECTION, SOLUTION INTRAVENOUS at 11:00

## 2018-11-13 RX ADMIN — ALTEPLASE 6.7 MG: KIT at 11:11

## 2018-11-13 RX ADMIN — ACETAMINOPHEN 650 MG: 325 TABLET, FILM COATED ORAL at 15:09

## 2018-11-13 NOTE — ED PROVIDER NOTES
History  Chief Complaint   Patient presents with    STROKE Alert     patient got out of bed and "didnt feel right"  Patient's legs gave out, patient fell  Patient unsure if he syncopized, "hit the floor hard"  As per EMS, patient has right sided weakness  68year-old man with a history of hypertension and hyperlipidemia presents as a prehospital stroke alert  Patient reports getting up from bed to look at the clock at [facility]  He subsequently fell without preceding symptoms, striking his left forehead without reported LOC  No blood thinner use  EMS was called and he was found hypotensive in the 69H systolic with right-sided weakness  Fingerstick >100  Patient says he has been otherwise well without recent fevers, chills, URI symptoms, N/V/D, chest pain, or dyspnea  On arrival, he is normotensive at 113/59 and intermittently bradycardic in the 40s with otherwise normal vitals  Physical exam shows 4/5 strength RUE/RLE with NIHSS of 2  He has a left frontal hematoma without laceration and no other external signs of trauma  Neurology immediately evaluated the patient and recommended thrombolysis with negative CT/A head  Will perform cardiac workup given possible syncope and bradycardia with hypotension responsive to IVF  Will admit patient to the ICU for further management  Prior to Admission Medications   Prescriptions Last Dose Informant Patient Reported? Taking?    Multiple Vitamins-Minerals (MULTIVITAMIN MEN 50+) TABS  Child Yes No   Sig: Take by mouth   OLANZapine (ZyPREXA) 15 mg tablet  Child Yes No   Sig: Take 15 mg by mouth daily at bedtime   Omega-3 Fatty Acids (FISH OIL) 1200 MG CAPS  Child Yes No   Sig: Take by mouth   busPIRone (BUSPAR) 10 mg tablet  Child Yes No   Sig: Take 10 mg by mouth 2 (two) times a day   docusate sodium (COLACE) 50 mg capsule  Child No No   Si qd   glucosamine-chondroitin 500-400 MG tablet  Child Yes No   Sig: Take 1 tablet by mouth 3 (three) times a day metoprolol tartrate (LOPRESSOR) 25 mg tablet  Child Yes No   Sig: Take 12 5 mg by mouth daily    mirtazapine (REMERON) 15 mg tablet  Child Yes No   Sig: Take 15 mg by mouth daily at bedtime   polyethylene glycol (MIRALAX) 17 g packet  Child No No   Sig: Take 17 g by mouth daily   sertraline (ZOLOFT) 100 mg tablet  Child Yes No   Sig: Take 100 mg by mouth daily 200 mg daily       Facility-Administered Medications: None       Past Medical History:   Diagnosis Date    Anxiety     Chronic low back pain     Depression     Hypercholesterolemia     Hypertension     OCD (obsessive compulsive disorder)        Past Surgical History:   Procedure Laterality Date    HERNIA REPAIR         Family History   Problem Relation Age of Onset    Diabetes type II Father     Glaucoma Son     Panic disorder Son      I have reviewed and agree with the history as documented  Social History   Substance Use Topics    Smoking status: Never Smoker    Smokeless tobacco: Never Used    Alcohol use No        Review of Systems   Constitutional: Negative for chills and fever  HENT: Negative for rhinorrhea and sore throat  Eyes: Negative for photophobia and visual disturbance  Respiratory: Negative for cough and shortness of breath  Cardiovascular: Negative for chest pain and leg swelling  Gastrointestinal: Negative for abdominal pain, diarrhea, nausea and vomiting  Genitourinary: Negative for dysuria, frequency and hematuria  Musculoskeletal: Negative for back pain and neck pain  Skin: Negative for rash and wound  Neurological: Positive for tremors and weakness  Negative for dizziness, seizures, syncope, facial asymmetry, speech difficulty, light-headedness, numbness and headaches  Hematological: Does not bruise/bleed easily         Physical Exam  ED Triage Vitals   Temperature Pulse Respirations Blood Pressure SpO2   11/13/18 1101 11/13/18 1054 11/13/18 1054 11/13/18 1054 11/13/18 1054   (!) 97 4 °F (36 3 °C) 55 16 123/66 98 %      Temp Source Heart Rate Source Patient Position - Orthostatic VS BP Location FiO2 (%)   11/13/18 1101 11/13/18 1115 11/13/18 1054 11/13/18 1054 --   Oral Monitor Lying Right arm       Pain Score       11/13/18 1054       No Pain           Orthostatic Vital Signs  Vitals:    11/13/18 1530 11/13/18 1600 11/13/18 1630 11/13/18 1700   BP: 111/66 139/63 125/65 117/61   Pulse: 64 68 66 68   Patient Position - Orthostatic VS:           Physical Exam   Constitutional: He is oriented to person, place, and time  He appears well-developed and well-nourished  No distress  HENT:   Left frontal hematoma without laceration, no signs of basilar skull fracture   Eyes: Conjunctivae and EOM are normal  No scleral icterus  Anisocoria with right 2mm and left 3mm, reactive bilaterally, visual fields intact   Neck: Neck supple  No JVD present  C-collar in place, no midline tenderness   Cardiovascular: Normal rate, regular rhythm and normal heart sounds  Exam reveals no gallop and no friction rub  No murmur heard  Pulmonary/Chest: Effort normal and breath sounds normal  No respiratory distress  He has no wheezes  He has no rales  Abdominal: Soft  He exhibits no distension  There is no tenderness  There is no rebound and no guarding  Musculoskeletal: He exhibits no edema or tenderness (No T/L/S spine tenderness, no pain with pelvic rock, no ant/post chest wall tenderness, no extremity tenderness)  Neurological: He is alert and oriented to person, place, and time  No cranial nerve deficit  GCS 15, motor 4/5 RUE/RLE worst with hand intrinsics, motor 5/5 LUE/LLE, no gross sensory deficit to light touch, no cerebellar signs, rapid alternating movements limited secondary to right hand weakness, no neglect   Skin: Skin is warm and dry  He is not diaphoretic  Psychiatric: He has a normal mood and affect  His behavior is normal  Thought content normal    Vitals reviewed        ED Medications  Medications atorvastatin (LIPITOR) tablet 40 mg (not administered)   chlorhexidine (PERIDEX) 0 12 % oral rinse 15 mL (not administered)   sertraline (ZOLOFT) tablet 100 mg (not administered)   senna-docusate sodium (SENOKOT S) 8 6-50 mg per tablet 1 tablet (not administered)   acetaminophen (TYLENOL) tablet 650 mg (650 mg Oral Given 11/13/18 1509)   iohexol (OMNIPAQUE) 350 MG/ML injection (MULTI-DOSE) 85 mL (85 mL Intravenous Given 11/13/18 1100)   alteplase (ACTIVASE) bolus 6 7 mg (6 7 mg Intravenous Given 11/13/18 1111)   alteplase (ACTIVASE) infusion 60 2 mg (60 2 mg Intravenous Given 11/13/18 1117)       Diagnostic Studies  Results Reviewed     Procedure Component Value Units Date/Time    Troponin I [[id number]]  (Abnormal) Collected:  11/13/18 1609    Lab Status:  Final result Specimen:  Blood from Arm, Right Updated:  11/13/18 1640     Troponin I 0 08 (H) ng/mL     Troponin I [[id number]]     Lab Status:  No result Specimen:  Blood     Urine Microscopic [[id number]]  (Normal) Collected:  11/13/18 1202    Lab Status:  Final result Specimen:  Urine from Urine, Other Updated:  11/13/18 1233     RBC, UA None Seen /hpf      WBC, UA None Seen /hpf      Epithelial Cells None Seen /hpf      Bacteria, UA None Seen /hpf      Hyaline Casts, UA None Seen /lpf     Troponin I [[id number]]  (Normal) Collected:  11/13/18 1127    Lab Status:  Final result Specimen:  Blood from Arm, Right Updated:  11/13/18 1208     Troponin I 0 04 ng/mL     POCT urinalysis dipstick [[id number]]  (Abnormal) Resulted:  11/13/18 1206    Lab Status:  Final result Specimen:  Urine Updated:  11/13/18 1206    ED Urine Macroscopic [[id number]]  (Abnormal) Collected:  11/13/18 1202    Lab Status:  Final result Specimen:  Urine Updated:  11/13/18 1202     Color, UA Yellow     Clarity, UA Clear     pH, UA 8 5 (H)     Leukocytes, UA Negative     Nitrite, UA Negative     Protein, UA Negative mg/dl      Glucose, UA Negative mg/dl      Ketones, UA Negative mg/dl Urobilinogen, UA 0 2 E U /dl      Bilirubin, UA Negative     Blood, UA Trace (A)     Specific [place], UA 1 015    Narrative:       CLINITEK RESULT    Fingerstick Glucose (POCT) [[id number]]  (Normal) Collected:  11/13/18 1131    Lab Status:  Final result Updated:  11/13/18 1132     POC Glucose 78 mg/dl     Basic metabolic panel [[id number]]  (Abnormal) Collected:  11/13/18 1055    Lab Status:  Final result Specimen:  Blood from Arm, Right Updated:  11/13/18 1129     Sodium 132 (L) mmol/L      Potassium 4 6 mmol/L      Chloride 99 (L) mmol/L      CO2 29 mmol/L      ANION GAP 4 mmol/L      BUN 9 mg/dL      Creatinine 0 84 mg/dL      Glucose 88 mg/dL      Calcium 7 8 (L) mg/dL      eGFR 87 ml/min/1 73sq m     Narrative:         National Kidney Disease Education Program recommendations are as follows:  GFR calculation is accurate only with a steady state creatinine  Chronic Kidney disease less than 60 ml/min/1 73 sq  meters  Kidney failure less than 15 ml/min/1 73 sq  meters  APTT [[id number]]  (Normal) Collected:  11/13/18 1055    Lab Status:  Final result Specimen:  Blood from Arm, Right Updated:  11/13/18 1111     PTT 26 seconds     Protime-INR [[id number]]  (Normal) Collected:  11/13/18 1055    Lab Status:  Final result Specimen:  Blood from Arm, Right Updated:  11/13/18 1111     Protime 13 4 seconds      INR 1 01    CBC [[id number]]  (Normal) Collected:  11/13/18 1055    Lab Status:  Final result Specimen:  Blood from Arm, Right Updated:  11/13/18 1103     WBC 8 23 Thousand/uL      RBC 4 10 Million/uL      Hemoglobin 12 7 g/dL      Hematocrit 38 1 %      MCV 93 fL      MCH 31 0 pg      MCHC 33 3 g/dL      RDW 13 3 %      Platelets 941 Thousands/uL      MPV 10 6 fL                  XR hip/pelv 4+ vw left if performed   Final Result by Delia Manning MD (11/13 1557)      No acute osseous abnormality              Workstation performed: [id number]         XR hip/pelvis 4+ vw right if performed   Final Result by Delia Manning MD (11/13 1555)      No acute osseous abnormality  Workstation performed: [id number]         XR stroke alert portable chest   ED Interpretation by Naila Mckeon MD (11/13 1210)   No acute disease      Final Result by Jayden Lopez DO (11/13 1253)   Cardiomegaly  Diminished inspiration  No acute cardiopulmonary disease  Workstation performed: [id number]         CTA stroke alert (head/neck)   Final Result by Geni Mena MD (11/13 1131)      Normal CTA of the head and neck  No large vessel flow restrictive disease within the head or neck  * I personally telephoned this result to Elly Mingo on 11/13/2018 11:04 AM                   Workstation performed: [id number]         CT stroke alert brain   Final Result by Geni Mena MD (11/13 1112)      No acute intracranial disease  Supraorbital left frontal scalp hematoma      Findings were directly discussed with Mary Beth Fernandez on 11/13/2018 11:04 AM       Workstation performed: [id number]         MRI Inpatient Order    (Results Pending)   CT head wo contrast    (Results Pending)         Procedures  Procedures      Phone Consults  ED Phone Contact    ED Course  ED Course as of Nov 13 1758   Tue Nov 13, 2018   1208 Patient's son reports he was recently taken off of Lopressor given low blood pressure which has been running in low 100s  1219 Procedure Note: EKG  Date/Time: 11/13/18 12:19 PM   Indications / Diagnosis: Bradycardia  ECG reviewed by me, the ED Provider: yes   The EKG demonstrates:  Rhythm: normal sinus  Intervals: DC interval 228ms  Axis: normal axis  QRS/Blocks: 1st degree AV block  ST Changes: No acute ST Changes, no STD/EMMA      Biphasic T wave lead V3, T wave inversions lateral leads                                  MDM  Number of Diagnoses or Management Options  Abnormal EKG:   Hematoma of frontal scalp, initial encounter:   Stroke [facility]):   Syncope:   Diagnosis management comments: Initial CT/A head unremarkable  Patient with persistent right upper and lower extremity weakness with NIHSS of 2  The decision was made in conjunction with neurology to administer thrombolysis  Patient was counseled regarding risks including bleeding and chose to undergo treatment  Remainder of workup remarkable for biphasic T waves in the precordium and flipped T waves in the lateral leads  Troponin negative and asymptomatic  Hypotension resolved with IVF  Patient was admitted to the ICU for further management  CritCare Time    Disposition  Final diagnoses:   Syncope   Stroke ([facility] [address] )   Abnormal EKG   Hematoma of frontal scalp, initial encounter     Time reflects when diagnosis was documented in both MDM as applicable and the Disposition within this note     Time User Action Codes Description Comment    11/13/2018 10:48 AM Claudene Keener A Add [R55] Syncope     11/13/2018 12:29 PM Roro Dueñas [I63 9] Stroke ([facility] [address] )     11/13/2018 12:29 PM Henok Dueñas Add [R94 31] Abnormal EKG     11/13/2018 12:30 PM Henok Dueñas Add [S00 03XA] Hematoma of frontal scalp, initial encounter     11/13/2018 12:30 PM Henok Dueñas Modify [R55] Syncope     11/13/2018 12:30 PM Deleonguerrero, Stefano Felty [I63 9] Stroke [facility])       ED Disposition     ED Disposition Condition Comment    Admit  Case was discussed with Dr Maura Chu and the patient's admission status was agreed to be Admission Status: inpatient status to the service of Dr Maura Chu  Follow-up Information    None         Patient's Medications   Discharge Prescriptions    No medications on file     No discharge procedures on file  ED Provider  Attending physically available and evaluated August Laquita FARRELL managed the patient along with the ED Attending      Electronically Signed by         Bharti Riggins MD  11/13/18 [id number]

## 2018-11-13 NOTE — ED NOTES
Ace wrap applied over dressing to forehead as hematoma has increased in size   Ice pack also applied       José Miguel Bae RN  11/13/18 9602

## 2018-11-13 NOTE — CONSULTS
Duplicate order, please refer to initial neurology consult during stroke alert earlier today (11/13/2018) and subsequent progress notes forthcoming

## 2018-11-13 NOTE — H&P
History and Physical - Critical Care   Maurizio Higuera 68 y o  male MRN: [id number]  Unit/Bed#: ED 13 Encounter: [id number]    Reason for Admission / Chief Complaint: stroke    History of Present Illness:  Maurizio Higuera is a 68 y o  male w/ PMH of HTN, HLD, MDD/SHAQUILLE and OCD, Lumbar stenosis, tremors 2/2 polypharmacy brought to \[facility]\"" by AMS with his oldest son for stroke alert  Per son, the son heard a noise in his father's room at 10am today, came to the patient's room and found the patient down on the floor with left face and right sided of body/arm on the floor  The patient did not lose consciousness, was not confused but was anxious and did not know what happened with him  Per pt, he just woke up, tried to  some stuff on the table and suddenly found himself on the floor  Denies aura, seizures, CP, palpitation, SOB, HA, abd pain, weakness, numbness/tingling prior to the fall  Denies BM and urinary incontinence  The son called 46 and brought the pt to B  No h/o infection POA  Currently, pt c/o Right hand  weak, Right arm pain, Right hip and thigh pain  Chronic low back pain  No blurry vision, HA  History obtained from chart review and the patient      Past Medical History:  Past Medical History:   Diagnosis Date    Anxiety     Chronic low back pain     Depression     Hypercholesterolemia     Hypertension     OCD (obsessive compulsive disorder)      Past Surgical History:  Past Surgical History:   Procedure Laterality Date    HERNIA REPAIR         Past Family History:  Family History   Problem Relation Age of Onset    Diabetes type II Father     Glaucoma Son     Panic disorder Son        Social History:  History   Smoking Status    Never Smoker   Smokeless Tobacco    Never Used     History   Alcohol Use No     History   Drug Use No     Marital Status:     Medications:  Current Facility-Administered Medications   Medication Dose Route Frequency    acetaminophen (TYLENOL) tablet 650 mg  650 mg Oral Q6H PRN    atorvastatin (LIPITOR) tablet 40 mg  40 mg Oral QPM    chlorhexidine (PERIDEX) 0 12 % oral rinse 15 mL  15 mL Swish & Spit Q12H [facility] & longterm    senna-docusate sodium (SENOKOT S) 8 6-50 mg per tablet 1 tablet  1 tablet Oral BID    [START ON 11/14/2018] sertraline (ZOLOFT) tablet 100 mg  100 mg Oral Daily     Home medications:  Prior to Admission medications    Medication Sig Start Date End Date Taking? Authorizing Provider   busPIRone (BUSPAR) 10 mg tablet Take 10 mg by mouth 2 (two) times a day    Historical Provider, MD   docusate sodium (COLACE) 50 mg capsule 1 qd 6/14/18   TAZ Workman   glucosamine-chondroitin 500-400 MG tablet Take 1 tablet by mouth 3 (three) times a day    Historical Provider, MD   metoprolol tartrate (LOPRESSOR) 25 mg tablet Take 12 5 mg by mouth daily  1/10/17   Historical Provider, MD   mirtazapine (REMERON) 15 mg tablet Take 15 mg by mouth daily at bedtime    Historical Provider, MD   Multiple Vitamins-Minerals (MULTIVITAMIN MEN 50+) TABS Take by mouth    Historical Provider, MD   OLANZapine (ZyPREXA) 15 mg tablet Take 15 mg by mouth daily at bedtime    Historical Provider, MD   Omega-3 Fatty Acids (FISH OIL) 1200 MG CAPS Take by mouth    Historical Provider, MD   polyethylene glycol (MIRALAX) 17 g packet Take 17 g by mouth daily 6/14/18   TAZ Workman   sertraline (ZOLOFT) 100 mg tablet Take 100 mg by mouth daily 200 mg daily     Historical Provider, MD     Allergies:  No Known Allergies    ROS:   Review of Systems   Constitutional: Negative for chills, diaphoresis, fatigue and fever  Eyes: Negative for visual disturbance  Respiratory: Negative for cough, shortness of breath and wheezing  Cardiovascular: Negative for chest pain and palpitations  Gastrointestinal: Negative for abdominal pain, constipation, diarrhea, nausea and vomiting  Genitourinary: Negative for difficulty urinating     Musculoskeletal: Positive for back pain, gait problem and myalgias  Negative for joint swelling, neck pain and neck stiffness  Skin: Negative for pallor and rash  Neurological: Positive for tremors and weakness  Negative for dizziness, seizures, facial asymmetry, speech difficulty, light-headedness, numbness and headaches  Psychiatric/Behavioral: Negative for agitation, confusion and hallucinations  The patient is nervous/anxious  Vitals:  Vitals:    18 1218 18 1230 18 1245 18 1300   BP: 119/50 120/58 119/60 114/59   BP Location:       Pulse: 62 64 64 64   Resp:    Temp:       TempSrc:       SpO2: 97% 96% 98% 98%   Weight:         Temperature:   Temp (24hrs), Av 4 °F (36 3 °C), Min:97 4 °F (36 3 °C), Max:97 4 °F (36 3 °C)    Current Temperature: (!) 97 4 °F (36 3 °C)    Weights:   IBW: -88 kg  Body mass index is 26 45 kg/m²  Non-Invasive/Invasive Ventilation Settings:  Respiratory    Lab Data (Last 4 hours)    None         O2/Vent Data (Last 4 hours)    None                   Physical Exam:  Physical Exam   Constitutional: He is oriented to person, place, and time  He appears well-developed and well-nourished  He appears distressed (mild distress)  HENT:   Left-sided of frontal hematoma, extending to lateral and superior of left periorbital  Difficulty to spontaneously open left eye due to pain and hematoma/edema of soft tissue  Right pupils reactive 2-3mm   Neck: Normal range of motion  Cardiovascular: Normal rate, regular rhythm and normal heart sounds  Exam reveals no friction rub  No murmur heard  Pulmonary/Chest: Effort normal and breath sounds normal  No respiratory distress  He has no wheezes  He has no rales  He exhibits no tenderness  Abdominal: Soft  Bowel sounds are normal  He exhibits no distension  There is no tenderness  There is no rebound and no guarding     Musculoskeletal:   Left hip tenderness and lateral-sided of left upper thigh mild tenderness  Homans' signs negative b/l  Pulses strong all exts   Cold in touch all exts  No bleeding or lesion/ exts   Neurological: He is alert and oriented to person, place, and time  No cranial nerve deficit (except for left -sided vision unable to access due to left-frontal and periorital hematoma)  Right hand  3/5, Left hand  5/5  Right LE 4/5, Left LE 5/5   Skin: He is not diaphoretic  Psychiatric:   anxious   Nursing note and vitals reviewed  Labs:    Results from last 7 days  Lab Units 11/13/18  1055   WBC Thousand/uL 8 23   HEMOGLOBIN g/dL 12 7   HEMATOCRIT % 38 1   PLATELETS Thousands/uL 225      Results from last 7 days  Lab Units 11/13/18  1055   POTASSIUM mmol/L 4 6   CHLORIDE mmol/L 99*   CO2 mmol/L 29   BUN mg/dL 9   CREATININE mg/dL 0 84   CALCIUM mg/dL 7 8*                Results from last 7 days  Lab Units 11/13/18  1055   INR  1 01   PTT seconds 26           0  Lab Value Date/Time   TROPONINI 0 04 11/13/2018 1127       Imaging:  I have personally reviewed pertinent reports  EKG: no acute changes    ______________________________________________________________________    Assessment:   69 yo M w/ PMH of HTN, HLD, MDD/SHAQUILLE, Lumbar stenosis w/ stroke-like symptoms s/p tPA  Hemodynamically stable  Plan:      Neuro:   1  Stroke-like symptoms s/p tPA  · CT/CTA head showed no hemorrhage or pathology, started tPA at 11a today  · Pending Neurology c/s, stroke alert pathway  NIHSS 2-3  · Will start Lipitor 40 mg QD after passing swallowing test, swallowing test, neurocheck Q1, tele x48 hours  · Pending MRI, repeated CT head 24 hours after tPA, pending Echo, Lipid panel, A1C  2  MDD/SHAQUILLE  · Resume Zoloft 100 mg QD after passing swallowing test  3  Sedation/Analgesics: no sedation; Tylenol PRN for pain  4  H/O tremor from polypharmacy and frequent fall:  Need full geriatric A/P as appropriate, fall precautions   5  Delirium precautions     CV:   1   H/O HTN not on PTA antihypertensive medications  · Permissive , Tele x48 hours  2  HLD:  · Pending Lipid panel  Will start Lipitor 40 mg QD after passing swallowing test     Pulm: no acute issues     GI: no acute issues  H/O constipation: sennokot and miralax prn     : no acute issues, monitor I/O's     F/E/N: no acute issues, replace electrolytes prn, I/O's     ID: no acute issues     Heme:   1  Left periorbital hematoma 2/2 trauma: pain mgmt, consider to consult Ophthalmology     Endo: no acute issues, accucheck     Msk/Skin:   1  Right hip pain s/p a fall  · Pending XR hip b/l, pain mgmt  2  PT/OT/OOB as appropriate     Disposition: ICU stay    ________________________________________    VTE Pharmacologic Prophylaxis: s/p tPA   VTE Mechanical Prophylaxis: sequential compression device    Invasive lines and devices: Invasive Devices     Peripheral Intravenous Line            Peripheral IV Left Hand -- days    Peripheral IV 11/13/18 Right Antecubital less than 1 day                Code Status: Level 1 - Full Code  POA:   POLST:  pending, Ashley Campos - oldest son, per pt    Given critical illness, patient length of stay will require greater than two midnights  Portions of the record may have been created with voice recognition software  Occasional wrong word or "sound a like" substitutions may have occurred due to the inherent limitations of voice recognition software  Read the chart carefully and recognize, using context, where substitutions have occurred        Anika Suarez MD

## 2018-11-13 NOTE — SPEECH THERAPY NOTE
Speech Language/Pathology  Speech/Language Pathology  Assessment    Patient Name: Dominic Daly  QYCIN'W Date: 11/13/2018     Problem List  Patient Active Problem List   Diagnosis    Chronic idiopathic constipation    OCD (obsessive compulsive disorder)     Past Medical History  Past Medical History:   Diagnosis Date    Anxiety     Chronic low back pain     Depression     Hypercholesterolemia     Hypertension     OCD (obsessive compulsive disorder)      Past Surgical History  Past Surgical History:   Procedure Laterality Date    HERNIA REPAIR          Bedside Swallow Evaluation:    History:  Dominic Daly is a 68 y o  male w/ PMH of HTN, HLD, MDD/SHAQUILLE and OCD, Lumbar stenosis, tremors 2/2 polypharmacy brought to B by AMS with his oldest son for stroke alert  Per son, the son heard a noise in his father's room at 10am today, came to the patient's room and found the patient down on the floor with left face and right sided of body/arm on the floor  The patient did not lose consciousness, was not confused but was anxious and did not know what happened with him  Per pt, he just woke up, tried to  some stuff on the table and suddenly found himself on the floor  Denies aura, seizures, CP, palpitation, SOB, HA, abd pain, weakness, numbness/tingling prior to the fall  Denies BM and urinary incontinence  The son called 46 and brought the pt to [facility]  No h/o infection POA      Currently, pt c/o Right hand  weak, Right arm pain, Right hip and thigh pain  Chronic low back pain  No blurry vision, HA  History obtained from chart review and the patient  CTA stroke alert (head & neck):  Normal CTA of the head and neck   No large vessel flow restrictive disease within the head or neck  CT head and MRI ordered    Summary:  Pt presents w/ functional oropharyngeal swallow  No coughing or throat clear noted   Pt's son present in room & stated his father will not eat past 3pm  He also stated his father only wants to eat fiber b/c he is concerned about having bowel movements daily  Recommendations:  Diet: regular   Liquid: thin   Meds: whole w/ thin   Supervision: assist w/ feeding   Positioning:Upright  Strategies: Pt to take PO/Meds only when fully alert and upright  Oral care: frequently     Eval only, No f/u tx indicated  Patient's goal: not stated    Reason for consult:  R/o aspiration  Determine safest and least restrictive diet  New neuro event      Current diet:  NPO    Premorbid diet[de-identified]  Regular w/ thin     Previous VBS:  -  O2 requirement:  RA  Voice/Speech:  Intelligible & clear    Social:  Lives at home independently, however son stated hes at his house 90% of the time  Follows commands:   yes                    Cognitive Status:  Alert & cooperative    Oral mech exam:  Full dentition  Full symmetry      Items administered:  Puree, soft solid, hard solid, nectar thick liquid, thin liquids, Liquids were taken by straw/cup  Oral stage:  Lip closure: wfl   Mastication: wfl  Bolus formation: wfl  Bolus control: wfl  Transfer: wfl  Oral residue: not noted  Pocketing: not noted    Pharyngeal stage:  Swallow promptness: prompt    Laryngeal rise: judged to be adequate per palpation  Wet voice: not noted  Throat clear: not noted  Cough: not noted  Secondary swallows: not noted  Audible swallows: not noted  No s/s aspiration: w/ all consistencies    Esophageal stage:  No s/s reported    Results d/w:  Pt, nursing, family    Goal(s):  Pt will tolerate least restrictive diet w/out s/s aspiration or oral/pharyngeal difficulties

## 2018-11-13 NOTE — CONSULTS
Consultation - Stroke   Robe Allred 68 y o  male MRN: [id number]  Unit/Bed#: ED 13 Encounter: [id number]      Assessment/Plan   Assessment:   68year old male with history of hypertension, hyperlipidemia, chronic low back pain with lumbar disc desiccation/disc herniations who presents as stroke alert this morning following acute onset of right arm/leg weakness upon beginning to ambulate out of bed  Pre-hospital stroke alert activated  NIH of 2-3 given symptoms (right facial asymmetry, distal right upper extremity weakness, right lower extremity weakness)  Patient overall low risk for tPA administration given his low NIH (and given patient is right-handed with impaired manual dexterity upon initial evaluation); given this he was deemed appropriate candidate for tPA which was given  CT head and CTA head/neck negative for acute hemorrhage or large vessel occlusion for endovascular intervention respectively  Concern for possible subcortical left hemisphere infarction  TPA Decision: TPA given this admission  After a discussion of risks and benefits reviewing inclusion and exclusion criteria the decision was made to proceed with thrombolytic therapy  Verbal consent was obtained from  the patient  Plan:   1  Initiate stroke pathway with post tPA protocol   -CT head during stroke alert negative for acute intracranial pathology   -CTA head/neck with no large vessel occlusion for endovascular intervention   -Repeat CT head at 24 hours post tPA   -MRI brain   -Echocardiogram   -Holding antiplatelet and anticoagulation since tPA given    If repeat neuroimaging at 24 hours post tPA is negative for hemorrhagic conversion, can initiate antiplatelet therapy and pharmacologic DVT prophylaxis    -Check hemoglobin A1C and lipid panel   -Frequent neurologic checks   -Telemetry monitoring   -PT/OT/Speech therapy/PMR evaluations  Discussed plan of care with attending neurologist   Continued close monitoring of neurologic status during workup      History of Present Illness     Reason for Consult / Principal Problem: Stroke alert, R facial droop, R sided weakness  Patient last known well: Between 8:00 and 9:00, closer to 9 AM per patient  Stroke alert called: 10:39 (prehospital)  Neurology time of arrival: 10:40  HPI: Aj Fall is a 68 y o  male who presents with acute L hemisphere stroke symptoms  This morning patient awoke and was able to get out of bed without symptoms, he took several steps and suddenly felt "like I got shot"  He experienced acute onset of R arm and leg weakness  Due to these symptoms, he ultimately fell down at home (patient denies LOC with fall, no clear syncope)  EMS was called and due to symptoms pre-hospital stroke alert was activated  Was hypotensive initially prior to arrival  NIH initially calculated as 3 due to R facial asymmetry, R distal UE weakness, R LE weakness  CT head and CTA head/neck negative for intracranial pathology and large vessel occlusion respectively  Following discusion with patient (reviewed contraindications, risk/benefit of thrombolytics), was deemed an appropriate candidate for tPA which was administered  Patient is not a current smoker, no history of cardiac arrhythmia, no family history of stoke or hypercoagulable states  Consult to Neurology  Consult performed by: Joel Dougherty ordered by: EMERGENCY, TRIAGE PROTOCOL          Review of Systems  ROS not formally assessed due to acuity of evaluation/treatment  Denies any headache, vision or speech changes, focal sensory changes, chest pain, SOB, abdominal pain, N/V       Historical Information   Past Medical History:   Diagnosis Date    Anxiety     Chronic low back pain     Depression     Hypercholesterolemia     Hypertension     OCD (obsessive compulsive disorder)      Past Surgical History:   Procedure Laterality Date    HERNIA REPAIR       Social History   History   Alcohol Use No     History Drug Use No     History   Smoking Status    Never Smoker   Smokeless Tobacco    Never Used     Family History:   Family History   Problem Relation Age of Onset    Diabetes type II Father     Glaucoma Son     Panic disorder Son        Review of previous medical records was completed  Meds/Allergies   current meds:   Current Facility-Administered Medications   Medication Dose Route Frequency    alteplase (ACTIVASE) infusion 60 2 mg  0 81 mg/kg Intravenous Once    and PTA meds:   Prior to Admission Medications   Prescriptions Last Dose Informant Patient Reported? Taking? Multiple Vitamins-Minerals (MULTIVITAMIN MEN 50+) TABS  Child Yes No   Sig: Take by mouth   OLANZapine (ZyPREXA) 15 mg tablet  Child Yes No   Sig: Take 15 mg by mouth daily at bedtime   Omega-3 Fatty Acids (FISH OIL) 1200 MG CAPS  Child Yes No   Sig: Take by mouth   busPIRone (BUSPAR) 10 mg tablet  Child Yes No   Sig: Take 10 mg by mouth 2 (two) times a day   docusate sodium (COLACE) 50 mg capsule  Child No No   Si qd   glucosamine-chondroitin 500-400 MG tablet  Child Yes No   Sig: Take 1 tablet by mouth 3 (three) times a day   metoprolol tartrate (LOPRESSOR) 25 mg tablet  Child Yes No   Sig: Take 12 5 mg by mouth daily    mirtazapine (REMERON) 15 mg tablet  Child Yes No   Sig: Take 15 mg by mouth daily at bedtime   polyethylene glycol (MIRALAX) 17 g packet  Child No No   Sig: Take 17 g by mouth daily   sertraline (ZOLOFT) 100 mg tablet  Child Yes No   Sig: Take 100 mg by mouth daily 200 mg daily       Facility-Administered Medications: None       No Known Allergies    Objective   Vitals:Blood pressure 123/66, pulse 55, temperature (!) 97 4 °F (36 3 °C), temperature source Oral, resp  rate 16, weight 76 6 kg (168 lb 14 oz), SpO2 98 %  ,Body mass index is 26 45 kg/m²  No intake or output data in the 24 hours ending 18 1141    Invasive Devices:    Invasive Devices     Peripheral Intravenous Line            Peripheral IV Left Hand -- days    Peripheral IV 11/13/18 Right Antecubital less than 1 day                Physical Exam   Constitutional: He is oriented to person, place, and time  He appears well-developed and well-nourished  HENT:   Head: Normocephalic and atraumatic  R periorbital swelling  Forehead abrasion   Eyes: Pupils are equal, round, and reactive to light  Conjunctivae and EOM are normal    Neck: Normal range of motion  Neck supple  Cervical collar placed   Cardiovascular: Normal rate and regular rhythm  Pulmonary/Chest: Effort normal and breath sounds normal    Abdominal: Soft  Bowel sounds are normal    Neurological: He is alert and oriented to person, place, and time  He has a normal Finger-Nose-Finger Test    Skin: Skin is warm and dry  Neurologic Exam     Mental Status   Oriented to person, place, and time  Cranial Nerves     CN III, IV, VI   Pupils are equal, round, and reactive to light  Extraocular motions are normal    Diplopia: none  Ophthalmoparesis: none    CN V   Facial sensation intact  CN VII   Right facial weakness: R NL Fold decrase with smile  CN VIII   CN VIII normal      CN IX, X   CN IX normal    CN X normal      CN XI   CN XI normal      CN XII   CN XII normal      Motor Exam   No gross drift with R proximal UE, but 4-,4/5 weakness with  strength/bicep/tricep on R  Minimal drift downwards with R leg against gravity  4+ R hip flexor, slightly weaker than full L hip flexor  4, 4+ with R dorsi/plantarflexion  Full L UE/L LE strength  Sensory Exam   Light touch normal      No extinction with double simultaneous tactile stimulation  Gait, Coordination, and Reflexes     Coordination   Finger to nose coordination: normal    Tremor   Resting tremor: absent  Intention tremor: absent    Reflexes   Right plantar: upgoing  Left plantar: normal  Right ankle clonus: absent  Left ankle clonus: absent      NIHSS:  1a Level of Consciousness: 0 = Alert   1b   LOC Questions: 0 = Answers both correctly   1c  LOC Commands: 0 = Obeys both correctly   2  Best Gaze: 0 = Normal   3  Visual: 0 = No visual field loss   4  Facial Palsy: 1=Minor paralysis (flattened nasolabial fold, asymmetric on smiling)   5a  Motor Right Arm: 1=Drift, limb holds 90 (or 45) degrees but drifts down before full 10 seconds: does not hit bed   5b  Motor Left Arm: 0=No drift, limb holds 90 (or 45) degrees for full 10 seconds   6a  Motor Right Le=Drift, limb holds 90 (or 45) degrees but drifts down before full 10 seconds: does not hit bed   6b  Motor Left Le=No drift, limb holds 90 (or 45) degrees for full 10 seconds   7  Limb Ataxia:  0=Absent   8  Sensory: 0=Normal; no sensory loss   9  Best Language:  0=No aphasia, normal   10  Dysarthria: 0=Normal articulation   11  Extinction and Inattention (formerly Neglect): 0=No abnormality   Total Score: 3     Time NIHSS was completed: 10:46    Lab Results:   CBC:   Results from last 7 days  Lab Units 18  1055   WBC Thousand/uL 8 23   RBC Million/uL 4 10   HEMOGLOBIN g/dL 12 7   HEMATOCRIT % 38 1   MCV fL 93   PLATELETS Thousands/uL 225   , BMP/CMP:   Results from last 7 days  Lab Units 18  1055   SODIUM mmol/L 132*   POTASSIUM mmol/L 4 6   CHLORIDE mmol/L 99*   CO2 mmol/L 29   BUN mg/dL 9   CREATININE mg/dL 0 84   CALCIUM mg/dL 7 8*   EGFR ml/min/1 73sq m 87   , Vitamin B12:   , Coagulation:   Results from last 7 days  Lab Units 18  1055   INR  1 01   , Lipid Profile:     Imaging Studies: I have personally reviewed pertinent films in PACS   CT head stroke alert 18:No acute intracranial disease  Supraorbital left frontal scalp hematoma    CTA head/neck 18: Normal CTA of the head and neck  No large vessel flow restrictive disease within the head or neck  EKG, Pathology, and Other Studies: I have personally reviewed pertinent reports         VTE Prophylaxis: Reason for no pharmacologic prophylaxis tPA given    Code Status: No Order    Counseling / Coordination of Care  Total time spent today 60 minutes  Greater than 50% of total time was spent with the patient and / or family counseling and / or coordination of care   A description of the counseling / coordination of care: Discussed plan of care with patient and ED team

## 2018-11-14 ENCOUNTER — APPOINTMENT (INPATIENT)
Dept: RADIOLOGY | Facility: HOSPITAL | Age: 73
DRG: 472 | End: 2018-11-14
Payer: MEDICARE

## 2018-11-14 PROBLEM — F32.A DEPRESSION: Status: ACTIVE | Noted: 2018-11-14

## 2018-11-14 PROBLEM — F41.9 ANXIETY: Status: ACTIVE | Noted: 2018-11-14

## 2018-11-14 LAB
ALBUMIN SERPL BCP-MCNC: 2.9 G/DL (ref 3.5–5)
ALP SERPL-CCNC: 66 U/L (ref 46–116)
ALT SERPL W P-5'-P-CCNC: 31 U/L (ref 12–78)
ANION GAP SERPL CALCULATED.3IONS-SCNC: 4 MMOL/L (ref 4–13)
AST SERPL W P-5'-P-CCNC: 29 U/L (ref 5–45)
ATRIAL RATE: 59 BPM
BASOPHILS # BLD AUTO: 0 THOUSANDS/ΜL (ref 0–0.1)
BASOPHILS NFR BLD AUTO: 0 % (ref 0–1)
BILIRUB SERPL-MCNC: 0.51 MG/DL (ref 0.2–1)
BUN SERPL-MCNC: 8 MG/DL (ref 5–25)
CALCIUM SERPL-MCNC: 8 MG/DL (ref 8.3–10.1)
CHLORIDE SERPL-SCNC: 104 MMOL/L (ref 100–108)
CHOLEST SERPL-MCNC: 148 MG/DL (ref 50–200)
CK MB SERPL-MCNC: 1.5 % (ref 0–2.5)
CK MB SERPL-MCNC: 7.2 NG/ML (ref 0–5)
CK SERPL-CCNC: 466 U/L (ref 39–308)
CO2 SERPL-SCNC: 27 MMOL/L (ref 21–32)
CREAT SERPL-MCNC: 0.79 MG/DL (ref 0.6–1.3)
EOSINOPHIL # BLD AUTO: 0.06 THOUSAND/ΜL (ref 0–0.61)
EOSINOPHIL NFR BLD AUTO: 1 % (ref 0–6)
ERYTHROCYTE [DISTWIDTH] IN BLOOD BY AUTOMATED COUNT: 13.4 % (ref 11.6–15.1)
EST. AVERAGE GLUCOSE BLD GHB EST-MCNC: 88 MG/DL
GFR SERPL CREATININE-BSD FRML MDRD: 89 ML/MIN/1.73SQ M
GLUCOSE SERPL-MCNC: 85 MG/DL (ref 65–140)
HBA1C MFR BLD: 4.7 % (ref 4.2–6.3)
HCT VFR BLD AUTO: 35.7 % (ref 36.5–49.3)
HDLC SERPL-MCNC: 51 MG/DL (ref 40–60)
HGB BLD-MCNC: 12.1 G/DL (ref 12–17)
IMM GRANULOCYTES # BLD AUTO: 0.03 THOUSAND/UL (ref 0–0.2)
IMM GRANULOCYTES NFR BLD AUTO: 0 % (ref 0–2)
LDLC SERPL CALC-MCNC: 84 MG/DL (ref 0–100)
LYMPHOCYTES # BLD AUTO: 1.47 THOUSANDS/ΜL (ref 0.6–4.47)
LYMPHOCYTES NFR BLD AUTO: 16 % (ref 14–44)
MAGNESIUM SERPL-MCNC: 2.2 MG/DL (ref 1.6–2.6)
MCH RBC QN AUTO: 31.3 PG (ref 26.8–34.3)
MCHC RBC AUTO-ENTMCNC: 33.9 G/DL (ref 31.4–37.4)
MCV RBC AUTO: 92 FL (ref 82–98)
MONOCYTES # BLD AUTO: 0.9 THOUSAND/ΜL (ref 0.17–1.22)
MONOCYTES NFR BLD AUTO: 10 % (ref 4–12)
NEUTROPHILS # BLD AUTO: 6.57 THOUSANDS/ΜL (ref 1.85–7.62)
NEUTS SEG NFR BLD AUTO: 73 % (ref 43–75)
NRBC BLD AUTO-RTO: 0 /100 WBCS
P AXIS: 92 DEGREES
PHOSPHATE SERPL-MCNC: 2.9 MG/DL (ref 2.3–4.1)
PLATELET # BLD AUTO: 187 THOUSANDS/UL (ref 149–390)
PMV BLD AUTO: 11 FL (ref 8.9–12.7)
POTASSIUM SERPL-SCNC: 4.5 MMOL/L (ref 3.5–5.3)
PR INTERVAL: 188 MS
PROT SERPL-MCNC: 6.2 G/DL (ref 6.4–8.2)
QRS AXIS: 59 DEGREES
QRSD INTERVAL: 100 MS
QT INTERVAL: 421 MS
QTC INTERVAL: 417 MS
RBC # BLD AUTO: 3.87 MILLION/UL (ref 3.88–5.62)
SODIUM SERPL-SCNC: 135 MMOL/L (ref 136–145)
T WAVE AXIS: 168 DEGREES
TRIGL SERPL-MCNC: 63 MG/DL
TROPONIN I SERPL-MCNC: 0.09 NG/ML
VENTRICULAR RATE: 59 BPM
WBC # BLD AUTO: 9.03 THOUSAND/UL (ref 4.31–10.16)

## 2018-11-14 PROCEDURE — G8978 MOBILITY CURRENT STATUS: HCPCS

## 2018-11-14 PROCEDURE — G8987 SELF CARE CURRENT STATUS: HCPCS

## 2018-11-14 PROCEDURE — 72141 MRI NECK SPINE W/O DYE: CPT

## 2018-11-14 PROCEDURE — 93005 ELECTROCARDIOGRAM TRACING: CPT

## 2018-11-14 PROCEDURE — 84484 ASSAY OF TROPONIN QUANT: CPT | Performed by: EMERGENCY MEDICINE

## 2018-11-14 PROCEDURE — G8988 SELF CARE GOAL STATUS: HCPCS

## 2018-11-14 PROCEDURE — 70450 CT HEAD/BRAIN W/O DYE: CPT

## 2018-11-14 PROCEDURE — G8979 MOBILITY GOAL STATUS: HCPCS

## 2018-11-14 PROCEDURE — 70551 MRI BRAIN STEM W/O DYE: CPT

## 2018-11-14 PROCEDURE — 83735 ASSAY OF MAGNESIUM: CPT | Performed by: NURSE PRACTITIONER

## 2018-11-14 PROCEDURE — 80061 LIPID PANEL: CPT | Performed by: NURSE PRACTITIONER

## 2018-11-14 PROCEDURE — 84100 ASSAY OF PHOSPHORUS: CPT | Performed by: NURSE PRACTITIONER

## 2018-11-14 PROCEDURE — 97167 OT EVAL HIGH COMPLEX 60 MIN: CPT

## 2018-11-14 PROCEDURE — 80053 COMPREHEN METABOLIC PANEL: CPT | Performed by: NURSE PRACTITIONER

## 2018-11-14 PROCEDURE — 99233 SBSQ HOSP IP/OBS HIGH 50: CPT | Performed by: PSYCHIATRY & NEUROLOGY

## 2018-11-14 PROCEDURE — 97163 PT EVAL HIGH COMPLEX 45 MIN: CPT

## 2018-11-14 PROCEDURE — 99233 SBSQ HOSP IP/OBS HIGH 50: CPT | Performed by: INTERNAL MEDICINE

## 2018-11-14 PROCEDURE — 93010 ELECTROCARDIOGRAM REPORT: CPT | Performed by: INTERNAL MEDICINE

## 2018-11-14 PROCEDURE — 99222 1ST HOSP IP/OBS MODERATE 55: CPT | Performed by: PHYSICAL MEDICINE & REHABILITATION

## 2018-11-14 PROCEDURE — 85025 COMPLETE CBC W/AUTO DIFF WBC: CPT | Performed by: NURSE PRACTITIONER

## 2018-11-14 PROCEDURE — 93306 TTE W/DOPPLER COMPLETE: CPT | Performed by: INTERNAL MEDICINE

## 2018-11-14 PROCEDURE — 83036 HEMOGLOBIN GLYCOSYLATED A1C: CPT | Performed by: NURSE PRACTITIONER

## 2018-11-14 RX ORDER — OXYCODONE HYDROCHLORIDE 5 MG/1
5 TABLET ORAL EVERY 4 HOURS PRN
Status: DISCONTINUED | OUTPATIENT
Start: 2018-11-14 | End: 2018-11-21 | Stop reason: HOSPADM

## 2018-11-14 RX ORDER — SODIUM CHLORIDE, SODIUM GLUCONATE, SODIUM ACETATE, POTASSIUM CHLORIDE, MAGNESIUM CHLORIDE, SODIUM PHOSPHATE, DIBASIC, AND POTASSIUM PHOSPHATE .53; .5; .37; .037; .03; .012; .00082 G/100ML; G/100ML; G/100ML; G/100ML; G/100ML; G/100ML; G/100ML
75 INJECTION, SOLUTION INTRAVENOUS CONTINUOUS
Status: DISCONTINUED | OUTPATIENT
Start: 2018-11-14 | End: 2018-11-14

## 2018-11-14 RX ORDER — HEPARIN SODIUM 5000 [USP'U]/ML
5000 INJECTION, SOLUTION INTRAVENOUS; SUBCUTANEOUS EVERY 8 HOURS SCHEDULED
Status: DISCONTINUED | OUTPATIENT
Start: 2018-11-14 | End: 2018-11-18

## 2018-11-14 RX ORDER — ASPIRIN 81 MG/1
81 TABLET, CHEWABLE ORAL DAILY
Status: DISCONTINUED | OUTPATIENT
Start: 2018-11-14 | End: 2018-11-18

## 2018-11-14 RX ORDER — BUSPIRONE HYDROCHLORIDE 10 MG/1
10 TABLET ORAL 2 TIMES DAILY
Status: DISCONTINUED | OUTPATIENT
Start: 2018-11-14 | End: 2018-11-14

## 2018-11-14 RX ORDER — POLYETHYLENE GLYCOL 3350 17 G/17G
17 POWDER, FOR SOLUTION ORAL DAILY
Status: DISCONTINUED | OUTPATIENT
Start: 2018-11-14 | End: 2018-11-21 | Stop reason: HOSPADM

## 2018-11-14 RX ORDER — ATORVASTATIN CALCIUM 40 MG/1
40 TABLET, FILM COATED ORAL
Status: DISCONTINUED | OUTPATIENT
Start: 2018-11-14 | End: 2018-11-21 | Stop reason: HOSPADM

## 2018-11-14 RX ORDER — GINSENG 100 MG
CAPSULE ORAL
Status: COMPLETED
Start: 2018-11-14 | End: 2018-11-14

## 2018-11-14 RX ADMIN — HEPARIN SODIUM 5000 UNITS: 5000 INJECTION INTRAVENOUS; SUBCUTANEOUS at 13:20

## 2018-11-14 RX ADMIN — SERTRALINE HYDROCHLORIDE 100 MG: 100 TABLET ORAL at 13:25

## 2018-11-14 RX ADMIN — OXYCODONE HYDROCHLORIDE 5 MG: 5 TABLET ORAL at 03:15

## 2018-11-14 RX ADMIN — BACITRACIN ZINC 1 LARGE APPLICATION: 500 OINTMENT TOPICAL at 09:34

## 2018-11-14 RX ADMIN — ACETAMINOPHEN 650 MG: 325 TABLET, FILM COATED ORAL at 06:26

## 2018-11-14 RX ADMIN — OXYCODONE HYDROCHLORIDE 5 MG: 5 TABLET ORAL at 05:10

## 2018-11-14 RX ADMIN — BACITRACIN ZINC 1 LARGE APPLICATION: 500 OINTMENT TOPICAL at 00:05

## 2018-11-14 RX ADMIN — SENNOSIDES AND DOCUSATE SODIUM 1 TABLET: 8.6; 5 TABLET ORAL at 09:33

## 2018-11-14 RX ADMIN — POLYETHYLENE GLYCOL 3350 17 G: 17 POWDER, FOR SOLUTION ORAL at 11:22

## 2018-11-14 RX ADMIN — HEPARIN SODIUM 5000 UNITS: 5000 INJECTION INTRAVENOUS; SUBCUTANEOUS at 21:29

## 2018-11-14 RX ADMIN — ATORVASTATIN CALCIUM 40 MG: 40 TABLET, FILM COATED ORAL at 16:20

## 2018-11-14 RX ADMIN — ACETAMINOPHEN 650 MG: 325 TABLET, FILM COATED ORAL at 21:34

## 2018-11-14 RX ADMIN — SODIUM CHLORIDE, SODIUM GLUCONATE, SODIUM ACETATE, POTASSIUM CHLORIDE, MAGNESIUM CHLORIDE, SODIUM PHOSPHATE, DIBASIC, AND POTASSIUM PHOSPHATE 75 ML/HR: .53; .5; .37; .037; .03; .012; .00082 INJECTION, SOLUTION INTRAVENOUS at 01:33

## 2018-11-14 RX ADMIN — OXYCODONE HYDROCHLORIDE 5 MG: 5 TABLET ORAL at 09:33

## 2018-11-14 RX ADMIN — ACETAMINOPHEN 650 MG: 325 TABLET, FILM COATED ORAL at 15:34

## 2018-11-14 RX ADMIN — OXYCODONE HYDROCHLORIDE 5 MG: 5 TABLET ORAL at 21:34

## 2018-11-14 RX ADMIN — ASPIRIN 81 MG 81 MG: 81 TABLET ORAL at 13:20

## 2018-11-14 RX ADMIN — OXYCODONE HYDROCHLORIDE 5 MG: 5 TABLET ORAL at 13:19

## 2018-11-14 NOTE — PROGRESS NOTES
Progress Note - Neurology   Ailyn Pacheco 68 y o  male MRN: [id number]  Unit/Bed#: CW -01 Encounter: [id number]    Assessment/Plan  68year old male with history of hypertension, hyperlipidemia, chronic low back pain with lumbar disc desiccation/disc herniations who presents as stroke alert yesterday following acute onset of right arm/leg weakness upon beginning to ambulate out of bed  Pre-hospital stroke alert activated  NIH of 2-3 given symptoms (right facial asymmetry, distal right upper extremity weakness, right lower extremity weakness)  Status post TPA  CT head and CTA head/neck negative for acute hemorrhage or large vessel occlusion for endovascular intervention respectively  Concern for possible subcortical left hemisphere infarction      Plan:   1  Continue stroke pathway with post tPA protocol              -CT head during stroke alert negative for acute intracranial pathology              -CTA head/neck with no large vessel occlusion for endovascular intervention              -Repeat CT head at 24 hours post tPA              -MRI brain, MRI of cervical spine              -Echocardiogram - pending              -Holding antiplatelet and anticoagulation since tPA given, repeat CT of head is pending                -Check hemoglobin A1C and lipid panel              -Frequent neurologic checks, stat CT of head with any change in examination              -Telemetry monitoring              -PT/OT/Speech therapy/PMR evaluations    Subjective:  Leg and right arm pain reported  The patient reports that he awoken yesterday, he felt weakness in both legs, went to turn around, and feel to the ground, he did not have loss of consciousness, he now reports right sided weakness, however, pain through out both limbs  No paraesthesia, no neck pain, no saddle anesthesia, no problems with speech or vision, no trouble swallowing, he does report he feels foggy with his thinking       He is somewhat a poor historian  ROS:    As per hpi  Vitals: Blood pressure 107/55, pulse 60, temperature 98 7 °F (37 1 °C), temperature source Oral, resp  rate 13, height 5' 7" (1 702 m), weight 75 3 kg (166 lb 0 1 oz), SpO2 98 %  ,Body mass index is 26 kg/m²  Current Facility-Administered Medications:  acetaminophen 650 mg Oral Q6H PRN TAZ Saldana    bacitracin 1 large application Topical BID Can L Espland, DO    chlorhexidine 15 mL Swish & Spit Q12H [address] Can L Espland, DO    HYDROmorphone 0 5 mg Intravenous Q3H PRN Can L Espland, DO    multi-electrolyte 75 mL/hr Intravenous Continuous Can L Espland, DO Last Rate: 75 mL/hr (11/14/18 0133)   oxyCODONE 10 mg Oral Q4H PRN Can L Espland, DO    oxyCODONE 5 mg Oral Q4H PRN Can L Espland, DO    senna-docusate sodium 1 tablet Oral BID TAZ Beltran    sertraline 100 mg Oral Daily TAZ Saldana      Physical Exam:     Constitutional - in NAD, lying in bed, pleasant and cooperative  HEENT - + left face echymosis, pupils are equal, tongue midline with no exudate noted  Cardiac - No murmur noted, rate regular  Lungs - Clear to auscultation bilaterally, no wheezing noted  Abdomen - Soft and non tender, non distended  Extremities - No edema noted    Neurological    Mental status - the patient is awake alert oriented x 3, with no evidence of aphasia or dysarthria, patient is able to follow simple commands, is able to follow complex commands  No paraphasic errors noted  He is a poor historian  Cranial nerves 2 through 12 are intact, mild decrease in left nasolabial fold     Motor - the strength in the right upper extremity is 4/5,  strength is 4/5, he does have weakness in finger abduction abduction, finger flexion and extension, right lower extremity is 5/5 except for decreased range of motion in the dorsi and plantar flexion 3-4/5, left upper appears to be full strength, left lower appears to be full strength      No tremor or fixed deficit noted    Sensation - nonfocal to touch (grossly)  Coordination -  no ataxia noted on finger-to-nose or heel-to-shin    Reflexes are equal - in uppers, brisk in lowers    Toes are downgoing bilaterally    No evidence of clonus  No evidence of seizure activity    Lab, Imaging and other studies:   I have personally reviewed pertinent reports  , CBC:   Results from last 7 days  Lab Units 11/14/18  0519 11/13/18  1055   WBC Thousand/uL 9 03 8 23   RBC Million/uL 3 87* 4 10   HEMOGLOBIN g/dL 12 1 12 7   HEMATOCRIT % 35 7* 38 1   MCV fL 92 93   PLATELETS Thousands/uL 187 225   , BMP/CMP:   Results from last 7 days  Lab Units 11/14/18  0519 11/13/18  1055   SODIUM mmol/L 135* 132*   POTASSIUM mmol/L 4 5 4 6   CHLORIDE mmol/L 104 99*   CO2 mmol/L 27 29   BUN mg/dL 8 9   CREATININE mg/dL 0 79 0 84   CALCIUM mg/dL 8 0* 7 8*   AST U/L 29  --    ALT U/L 31  --    ALK PHOS U/L 66  --    EGFR ml/min/1 73sq m 89 87   , Vitamin B12:   , HgBA1C:   Results from last 7 days  Lab Units 11/14/18  0519   HEMOGLOBIN A1C % 4 7   , TSH:   , Coagulation:   Results from last 7 days  Lab Units 11/13/18  1055   INR  1 01   , Lipid Profile:   Results from last 7 days  Lab Units 11/14/18  0519   HDL mg/dL 51   LDL CALC mg/dL 84   TRIGLYCERIDES mg/dL 63   , Ammonia:   , Urinalysis:   Results from last 7 days  Lab Units 11/13/18  1202   COLOR UA  Yellow   CLARITY UA  Clear   SPEC GRAV UA  1 015   PH UA  8 5*   LEUKOCYTES UA  Negative   NITRITE UA  Negative   GLUCOSE UA mg/dl Negative   KETONES UA mg/dl Negative   BILIRUBIN UA  Negative   BLOOD UA  Trace*   , Drug Screen:          Procedure: Ct Stroke Alert Brain    Result Date: 11/13/2018  Narrative: CT BRAIN - STROKE ALERT PROTOCOL INDICATION:   Right-sided weakness  COMPARISON:  None  TECHNIQUE:  CT examination of the brain was performed  In addition to axial images, coronal reformatted images were created and submitted for interpretation    Radiation dose length product (DLP) for this visit:  1088 mGy-cm   This examination, like all CT scans performed in the [facility], was performed utilizing techniques to minimize radiation dose exposure, including the use of iterative reconstruction and automated exposure control  IMAGE QUALITY:  Diagnostic  FINDINGS:  PARENCHYMA:  No intracranial mass, mass effect or midline shift  No acute intracranial hemorrhage  No CT signs of acute infarction  Age-appropriate volume loss  VENTRICLES AND EXTRA-AXIAL SPACES:  Normal for patient's age  VISUALIZED ORBITS AND PARANASAL SINUSES:  Trace sinus mucosal disease  CALVARIUM AND EXTRACRANIAL SOFT TISSUES:  Hematoma left frontal scalp  Impression: No acute intracranial disease  Supraorbital left frontal scalp hematoma Findings were directly discussed with Cate Ramirez on 11/13/2018 11:04 AM  Workstation performed: [id number]       Procedure: Cta Stroke Alert (head/neck)    Result Date: 11/13/2018  Narrative: CTA NECK AND BRAIN WITH CONTRAST INDICATION: stroke right-sided weakness COMPARISON:   Contemporary CT TECHNIQUE: Post contrast imaging was performed after administration of iodinated contrast through the neck and brain  Post contrast axial 0 625 mm images timed to opacify the arterial system  3D rendering was performed on an independent workstation  MIP reconstructions performed  Coronal reconstructions were performed of the noncontrast portion of the brain  Radiation dose length product (DLP) for this visit:  601 mGy-cm   This examination, like all CT scans performed in the [facility], was performed utilizing techniques to minimize radiation dose exposure, including the use of iterative reconstruction and automated exposure control  IV Contrast:  85 mL of iohexol (OMNIPAQUE)  IMAGE QUALITY:   Diagnostic FINDINGS: CERVICAL VASCULATURE AORTIC ARCH AND GREAT VESSELS:  Normal aortic arch and great vessel origins   Normal visualized subclavian vessels  RIGHT VERTEBRAL ARTERY CERVICAL SEGMENT:  Normal origin  The vessel is normal in caliber throughout the neck  Diameter the vessel affected slightly by osteoarthritic changes of the facets and Luschka joints  LEFT VERTEBRAL ARTERY CERVICAL SEGMENT:  Normal origin  The vessel is normal in caliber throughout the neck  Diameter of the vessel effect slightly by osteoarthritic changes of the facets and Luschka joints  RIGHT EXTRACRANIAL CAROTID SEGMENT:  Normal caliber common carotid artery  Normal bifurcation and cervical internal carotid artery  No stenosis or dissection  Trace calcification within the bifurcation  LEFT EXTRACRANIAL CAROTID SEGMENT:  Normal caliber common carotid artery  Normal bifurcation and cervical internal carotid artery  No stenosis or dissection  Trace calcification within the bifurcation  NASCET criteria was used to determine the degree of internal carotid artery diameter stenosis  INTRACRANIAL VASCULATURE INTERNAL CAROTID ARTERIES:  Normal enhancement of the intracranial portions of the internal carotid arteries  Normal ophthalmic artery origins  Normal ICA terminus  ANTERIOR CIRCULATION:  Symmetric A1 segments and anterior cerebral arteries with normal enhancement  Normal anterior communicating artery  MIDDLE CEREBRAL ARTERY CIRCULATION:  M1 segment and middle cerebral artery branches demonstrate normal enhancement bilaterally  DISTAL VERTEBRAL ARTERIES:  Normal distal vertebral arteries  Posterior inferior cerebellar artery origins are normal  Normal vertebral basilar junction  BASILAR ARTERY:  Basilar artery is normal in caliber  Normal superior cerebellar arteries  POSTERIOR CEREBRAL ARTERIES: Both posterior cerebral arteries arises from the basilar tip  Both arteries demonstrate normal enhancement  DURAL VENOUS SINUSES:  Normal  NON VASCULAR ANATOMY BONY STRUCTURES:  No acute osseous abnormality    Cervical spondylosis and osteoarthritis, sclerosis and retrolisthesis of C3 on C4  Loss of disc height throughout the cervical spine  SOFT TISSUES OF THE NECK:  Unremarkable  THORACIC INLET:  Unremarkable  Impression: Normal CTA of the head and neck  No large vessel flow restrictive disease within the head or neck  * I personally telephoned this result to J Carlos Wang on 11/13/2018 11:04 AM  Workstation performed: [id number]     Counseling / Coordination of Care  Total time spent today 20 minutes  Greater than 50% of total time was spent with the patient and / or family counseling and / or coordination of care  A description of the counseling / coordination of care: floor time, coordinating care

## 2018-11-14 NOTE — OCCUPATIONAL THERAPY NOTE
[address]  Evaluation     Patient Name: Keren Cason  FPAZU'V Date: 11/14/2018  Problem List  Patient Active Problem List   Diagnosis    Chronic idiopathic constipation    OCD (obsessive compulsive disorder)    CVA [facility]cerebral [facility]) ([facility] [address] )    Depression    Anxiety     Past Medical History  Past Medical History:   Diagnosis Date    Anxiety     Chronic low back pain     Depression     Hypercholesterolemia     Hypertension     OCD (obsessive compulsive disorder)      Past Surgical History  Past Surgical History:   Procedure Laterality Date    HERNIA REPAIR             11/14/18 1153   Note Type   Note type Eval/Treat   Restrictions/Precautions   Weight Bearing Precautions Per Order No   Other Precautions Cognitive; Chair Alarm;Multiple lines;Telemetry; Fall Risk;Pain   Pain Assessment   Pain Assessment 0-10   Pain Score 4   Pain Type Acute pain   Pain Location Arm;Leg   Pain Orientation Other (Comment)  (bilateral)   Patient's Stated Pain Goal No pain   Hospital Pain Intervention(s) Repositioned; Ambulation/increased activity; Emotional support   Response to Interventions tolerated   Home Living   Type of Home Apartment   Home Layout Two level;Bed/bath upstairs  (0 EMMA, 16 inside to 2nd floor)   Bathroom Shower/Tub Tub/shower unit   Bathroom Toilet Standard   Bathroom Equipment Other (Comment)  (denies any)   Home Equipment Other (Comment)  (no DME)   Additional Comments Pt reports living in 2 floor apt w/ 0 EMMA and FF to 2nd floor bed/bath   Prior Function   Level of [place] Independent with ADLs and functional mobility  (but w/ difficulty 2* to increased pain)   Lives With Son   Receives Help From Family   ADL Assistance Independent   IADLs Needs assistance   Falls in the last 6 months 1 to 4   Vocational Retired   Comments Pt reports being I w/ ADLS, has assist for IADLS and is I w/ transfers and functional mobility PTA   Lifestyle   Autonomy Pt reports being I w/ ADLS, has assist for IADLS and is I w/ transfers and functional mobility PTA   Reciprocal Relationships Pt lives w/ son who works a few hours at night but otherwise is home to assist   Service to Others Pt is retired   Intrinsic Gratification pt enjoys sports- playing and watching   Psychosocial   Psychosocial (WDL) [address]  4  Minimal Assistance   Grooming Assistance 3  Moderate Assistance   [address] 3  Moderate Assistance   LB [address] 2  Maximal [address] 4  [address] 2  Maximal [address]  4  [facility] 3  Moderate Assistance   Functional Deficit Steadying;Verbal cueing;Supervision/safety; Increased time to complete   Bed Mobility   Supine to Sit 4  Minimal assistance   Additional items Assist x 1; Increased time required;LE management;Verbal cues;HOB elevated   Sit to Supine Unable to assess   Additional Comments Pt went from supine to sit w/ Min A x1 for LE management, UB support and HOB elevated for assist   Transfers   Sit to Stand 3  Moderate assistance   Additional items Assist x 1; Increased time required;Verbal cues   Stand to Sit 3  Moderate assistance   Additional items Assist x 1; Increased time required;Verbal cues   Additional Comments Pt performed sit-stand from EOB w/ Mod A x1 for moderate force production into standing and SBA of 2nd person for safety   Functional Mobility   Functional Mobility 3  Moderate assistance   Additional Comments Pt ambulated short household distance w/ Mod A x1 for safety/balance and use of RW for support/stability   SBA of 2nd person for safety and chair follow for assist    Additional items Rolling walker  (w/ chair follow )   Balance   Static Sitting Fair   Static Standing Poor +   Ambulatory Poor   Activity Tolerance   Activity Tolerance Patient limited by fatigue   Medical Staff Made Aware PTAdwoa and Adelina Kapoor Nurse Made Aware yes, Milena   RUE Assessment   RUE Assessment X   RUE Overall AROM   R Shoulder Flexion (3-/5)   R Elbow Flexion WFL   R Elbow Extension WFL   R Wrist Flexion limited ROM   R Wrist Extension limited ROM   R Mass Grasp impaired   R Finger Flexion digits resting in flexion   R Finger Extension unable to actively extend digits   R Finger ABduction noted some slight AROM w/ abduction/adduction   LUE Assessment   LUE Assessment WFL  (grossly 4/5)   Hand Function   Gross Motor Coordination Impaired  (w/ RUE)   Fine Motor Coordination Impaired  (w/ RUE)   Sensation   Light Touch No apparent deficits   Vision - Complex Assessment   Ocular Range of Motion WFL   Head Position WDL   Tracking Able to track stimulus in all quads without difficulty   Acuity Able to read employee name badge without difficulty   Additional Comments No apparent visual deficits- L eye is swollen and bruised   Cognition   Overall Cognitive Status WFL   Arousal/Participation Responsive; Cooperative   Attention Within functional limits   Orientation Level Oriented X4   Memory Within functional limits   Following Commands Follows all commands and directions without difficulty   Comments Pt is pleasant and cooperative   Assessment   Limitation Decreased ADL status; Decreased UE ROM; Decreased UE strength;Decreased endurance;Decreased self-care trans;Decreased high-level ADLs; Decreased fine motor control   Prognosis Fair   Assessment Pt is a 67 y/o male seen for OT eval s/p adm to [facility] after being found down on the floor w/ L face and R side of body/arm on the floor  CT head and CTA head/neck negative for acute hemorrhage or large vessel occlusion for endovascular intervention Pt is dx'd w/ CVA  Comorbidities include a h/o anxiety, chronic low back pain, depression, hypercholesterolemia, HTN, OCD  Pt with active OT orders and up with assistance  orders  Pt lives with son in 2 floor apt w/ 0 EMMA, 16 steps to 2nd floor bed/bath   Pt was I w/ ADLS, has assist for IADLS, can drive but has not been lately, & required no use of DME PTA  Pt is currently demonstrating the following occupational deficits: Min/Mod A UB ADLS, Max A LB ADLS, Mod A transfers and functional mobility w/ RW and chair follow  These deficits that are impacting pt's baseline areas of occupation are a result of the following impairments: pain, endurance, activity tolerance, functional mobility, balance, functional standing tolerance, unsupportive home environment, decreased I w/ ADLS/IADLS, strength, ROM, decreased safety awareness and impaired fine motor skills  The following Occupational Performance Areas to address include: eating, grooming, bathing/shower, toilet hygiene, dressing, socialization, health maintenance, functional mobility and clothing management  Pt scored overall 35/100 on the Barthel Index  Based on the aforementioned OT evaluation, functional performance deficits, and assessments, pt has been identified as a high complexity evaluation  Recommend STR upon D/C when medically stable  Pt to continue to benefit from acute immediate OT services to address the following goals 3-5x/week to  w/in 7-10 days:    Goals   Patient Goals to be able to use R hand again and decrease chronic pain to increase independence in ADLS   LTG Time Frame 7-10   Long Term Goal #1 see below listed goals   Plan   Treatment Interventions ADL retraining;Functional transfer training;UE strengthening/ROM; Endurance training;Patient/family training;Equipment evaluation/education; Fine motor coordination activities;Continued evaluation; Energy conservation; Activityengagement   Goal Expiration Date 18   OT Frequency 3-5x/wk   Recommendation   Recommendation Physiatry Consult   OT Discharge Recommendation Short Term Rehab   OT - OK to Discharge Yes  (when medically stable)   Barthel Index   Feeding 5   Bathing 0   Grooming Score 0   Dressing Score 0   Bladder Score 10   Bowels Score 10   Toilet Use Score 5   Transfers (Bed/Chair) Score 5   Mobility (Level Surface) Score 0   Stairs Score 0   Barthel Index Score 35   Modified [place] Scale   Modified [place] Scale 4     GOALS    1) Pt will improve activity tolerance to G for min 30 min txment sessions    2) Pt will complete ADLs/self care w/ mod I and use of AD/DME as needed to increase independence in functional tasks    3) Pt will complete toileting w/ mod I w/ G hygiene/thoroughness using DME PRN    4) Pt will improve fx'l tfers on/off all surfaces using dME PRN w/ G balance/safety including toileting w/ mod I    5) Pt will improve fx'l mobility during I/ADl/leisure tasks using DME PRN w/ G balance/safety w/ mod I    6) Pt will be attentive 100% of the time during ongoing functional cognitive assessment w/ G participation to A w/ safe d/c planning/recommendations    7) Pt will improve UB fx'l use/ROM to [facility] and strength 1/2 MMT via AROM/AAROM/PROM in all planes as tolerated s/p skilled education of HEP w/o cues for carryover    8) Pt will demonstrate 100% carryover of one handed dressing techniques s/p skilled education w/o cues to increase independence in functional tasks    Rj Smith MS, OTR/L

## 2018-11-14 NOTE — PROGRESS NOTES
Son stated via phone call:  Father took atorvastatin 80 mg bid, but has been off this medicine since august

## 2018-11-14 NOTE — PHYSICAL THERAPY NOTE
Physical Therapy Evaluation     Patient's Name: Cameron Mai    Admitting Diagnosis  Syncope [R55]  Abnormal EKG [R94 31]  Stroke ([facility] [address] ) [I63 9]  Stroke (cerebrum) ([facility] [address] ) [I63 9]  Hematoma of frontal scalp, initial encounter [S00 03XA]    Problem List  Patient Active Problem List   Diagnosis    Chronic idiopathic constipation    OCD (obsessive compulsive disorder)    CVA [facility]cerebral vascular accident) ([facility] [address] )    Depression    Anxiety       Past Medical History  Past Medical History:   Diagnosis Date    Anxiety     Chronic low back pain     Depression     Hypercholesterolemia     Hypertension     OCD (obsessive compulsive disorder)        Past Surgical History  Past Surgical History:   Procedure Laterality Date    HERNIA REPAIR        11/14/18 1155   Note Type   Note type Eval/Treat   Pain Assessment   Pain Assessment No/denies pain   Pain Score 5   Pain Type Acute pain   Pain Location Arm;Leg   Hospital Pain Intervention(s) Repositioned   Response to Interventions tolerated   Home Living   Type of Home Apartment   Home Layout Two level  (0 EMMA)   Bathroom Shower/Tub Tub/shower unit   Prior Function   Level of [place] Independent with ADLs and functional mobility   Lives With Son   Receives Help From Family   ADL Assistance Independent   IADLs Needs assistance   Falls in the last 6 months 1 to 4   Vocational Retired   Comments Son works a few hours a night   Restrictions/Precautions   [place] Bearing Precautions Per Order No   Other Precautions Cognitive; Chair Alarm; Bed Alarm;Multiple lines; Fall Risk;Pain   General   Family/Caregiver Present No   Cognition   Orientation Level Oriented X4   RLE Assessment   RLE Assessment (grossly at least 3+/5 with movement)   LLE Assessment   LLE Assessment (grossly at least 4/5 with movement)   Coordination   Movements are Fluid and Coordinated 0   Coordination and Movement Description ataxic RLE   Bed Mobility   Supine to Sit 4  Minimal assistance   Additional items Assist x 1; Increased time required;HOB elevated;Verbal cues   Transfers   Sit to Stand 3  Moderate assistance   Additional items Assist x 1; Increased time required   Stand to Sit 3  Moderate assistance   Additional items Assist x 1; Increased time required   Ambulation/Elevation   Gait pattern Excessively slow; Inconsistent javon; Ataxia   Gait Assistance 3  Moderate assist   Additional items Assist x 1   Assistive Device Rolling walker   Distance 35   Balance   Static Sitting Fair   Static Standing Poor +   Ambulatory Poor   Endurance Deficit   Endurance Deficit Yes   Endurance Deficit Description limited by severe fatigue   Activity Tolerance   Activity Tolerance Patient limited by fatigue   Medical Staff Made Aware OT Ceasar Klinefelter for D/C recommendations   Nurse Made Aware yes, Layo Phillips gave clearance to work with pt   Assessment   Prognosis Good   Problem List Decreased strength;Decreased endurance; Impaired balance;Decreased mobility; Decreased safety awareness;Pain;Decreased coordination;Decreased range of motion   Assessment Pt is 68 y o  male seen for PT evaluation s/p admit to [facility] on 11/13/2018 w/ CVA (cerebral vascular accident) ([facility] [address] )  PT consulted to assess pt's functional mobility and d/c needs  Order placed for PT eval and tx, w/ up w/ A order  Comorbidities affecting pt's physical performance at time of assessment include:multiple falls, pain  PTA, pt was ambulates household distances and lives in multi-level home  Personal factors affecting pt at time of IE include: lives in 2 story house, ambulating w/ assistive device, inability to ambulate household distances, positive fall history, unable to perform physical activity, inability to perform IADLs, inability to perform ADLs and inability to live alone   Please find objective findings from PT assessment regarding body systems outlined above with impairments and limitations including weakness, decreased ROM, impaired balance, decreased endurance, impaired coordination, gait deviations, pain, decreased activity tolerance, decreased functional mobility tolerance, decreased safety awareness and fall risk  Pt required increased time to complete bed mobility with A to scoot EOB  Tolerated sitting with fair balance and minimal dizziness  Required hand placement instruction for transfers  Ambulated with ataxic LLE  Steppage gait  Frequent instruction for gait technique  High risk of additional falls with absent knowledge of current limitations  The following objective measures performed on IE also reveal limitations: Barthel Index: 35/100  Pt's clinical presentation is currently unstable/unpredictable seen in pt's presentation of critical care monitoring, pain  Pt to benefit from continued PT tx to address deficits as defined above and maximize level of functional independent mobility and consistency  From PT/mobility standpoint, recommendation at time of d/c would be IP rehab pending progress in order to facilitate return to PLOF  Barriers to Discharge Inaccessible home environment   Goals   Patient Goals To decrease pain and use my R hand again   STG Expiration Date 11/26/18   Short Term Goal #1 1  Complete bed mobility and transfers I to decrease need for caregiver in home  2  Ambulate 300' I to complete household and community mobility without A  3  Improve dynamic balance to good to decrease need for UE support during ambulation  4  Be educated & demonstate 12 steps to be able to enter home without A  Plan   Treatment/Interventions Functional transfer training;LE strengthening/ROM; Endurance training;Patient/family training;Bed mobility;Gait training;Spoke to nursing;OT   PT Frequency (4-6x/wk)   Recommendation   Recommendation Post acute IP rehab   Equipment Recommended Walker   PT - OK to Discharge Yes  (to rehab when medically stable )   Barthel Index   Feeding 5   Bathing 0   Grooming Score 0   Dressing Score 0   Bladder Score 10   Bowels Score 10 Toilet Use Score 5   Transfers (Bed/Chair) Score 5   Mobility (Level Surface) Score 0   Stairs Score 0   Barthel Index Score 35           Shelda Los, PT

## 2018-11-14 NOTE — UTILIZATION REVIEW
Initial Clinical Review    Admission: Date/Time/Statement: 11/13/18 @ 1229     Orders Placed This Encounter   Procedures    Inpatient Admission (expected length of stay for this patient is greater than two midnights)     Standing Status:   Standing     Number of Occurrences:   1     Order Specific Question:   Admitting Physician     Answer:   Tanya Vega [[id number]]     Order Specific Question:   Level of Care     Answer:   Critical Care [15]     Order Specific Question:   Estimated length of stay     Answer:   More than 2 Midnights     Order Specific Question:   Certification     Answer:   I certify that inpatient services are medically necessary for this patient for a duration of greater than two midnights  See H&P and MD Progress Notes for additional information about the patient's course of treatment  ED: Date/Time/Mode of Arrival:   ED Arrival Information     Expected Arrival Acuity Means of Arrival Escorted By Service Admission Type    - 11/13/2018 10:44 Immediate Ambulance [facility]/ICU Emergency    Arrival Complaint    stroke alert          Chief Complaint:   Chief Complaint   Patient presents with   [facility] STROKE Alert     patient got out of bed and "didnt feel right"  Patient's legs gave out, patient fell  Patient unsure if he syncopized, "hit the floor hard"  As per EMS, patient has right sided weakness  History of Illness:  68year old male   Son heard father fall at  10 am today  Patient does not know what happened  On arrival right hand  is weak, painful right arm, hip and thigh  Pre-hospital stroke alert activated  NIH of 2-3 given symptoms (right facial asymmetry, distal right upper extremity weakness, right lower extremity weakness)    Patient overall low risk for tPA administration given his low NIH (and given patient is right-handed with impaired manual dexterity upon initial evaluation); given this he was deemed appropriate candidate for tPA which was given      ED Vital Signs:   18 1101 18 1054 18 1054 18 1054 18 1054   (!) 97 4 °F (36 3 °C) 55 16 123/66 98 %      No Pain       18 75 3 kg (166 lb 0 1 oz)         Abnormal Labs/Diagnostic Test Results:    Left-sided of frontal hematoma, extending to lateral and superior of left periorbital  Difficulty to spontaneously open left eye due to pain and hematoma/edema of soft tissue  Right hand  3/5,   Right LE 4/5,    NIHSS  4  Facial Palsy: 1=Minor paralysis (flattened nasolabial fold, asymmetric on smiling)   5a  Motor Right Arm: 1=Drift, limb holds 90 (or 45) degrees but drifts down before full 10 seconds: does not hit bed   6a  Motor Right Le=Drift, limb holds 90 (or 45) degrees but drifts down before full 10 seconds: does not hit bed       CT head stroke alert 18:No acute intracranial disease   Supraorbital left frontal scalp hematoma     CTA head/neck 18: Normal CTA of the head and neck   No large vessel flow restrictive disease within the head or neck      MRI  brain  No acute finding    MRI cervic1  Disc and facet osteophytosis resulting in severe central canal stenosis and chronic mass effect on the cord at C3-4 and C4-5    Severe bilateral neural foraminal narrowing from C2-3 to C4-5al spine     ED Treatment:   Medication Administration from 2018 1037 to 2018 2101       Date/Time Order Dose Route     2018 1111 alteplase (ACTIVASE) bolus 6 7 mg 6 7 mg Intravenous     2018 1117 alteplase (ACTIVASE) infusion 60 2 mg 60 2 mg Intravenous     2018 1509 acetaminophen (TYLENOL) tablet 650 mg 650 mg Oral       Past Medical/Surgical History:    Chronic idiopathic constipation 2017    OCD (obsessive compulsive disorder) 2017           Admitting Diagnosis:     Syncope [R55]  Abnormal EKG [R94 31]  Stroke ([address] ) [I63 9]  Stroke (cerebrum) ([address] ) [I63 9]  Hematoma of frontal scalp, initial encounter [S00 03XA]    Age/Sex: 68 y o  male admitted to critical care for  stroke-like symptoms s/p tPA  Requiring frequent neuro checks  Assessment/Plan:                Neuro:   1  Stroke-like symptoms s/p tPA  · CT/CTA head showed no hemorrhage or pathology, started tPA at 11a today  · Pending Neurology c/s, stroke alert pathway  NIHSS 2-3  · Will start Lipitor 40 mg QD after passing swallowing test, swallowing test, neurocheck Q1, tele x48 hours  · Pending MRI, repeated CT head 24 hours after tPA, pending Echo, Lipid panel, A1C  2  MDD/SHAQUILLE  · Resume Zoloft 100 mg QD after passing swallowing test  3  Sedation/Analgesics: no sedation; Tylenol PRN for pain  4  H/O tremor from polypharmacy and frequent fall:  Need full geriatric A/P as appropriate, fall precautions   5  Delirium precautions                 CV:   1  H/O HTN not on PTA antihypertensive medications  · Permissive , Tele x48 hours  2  HLD:  · Pending Lipid panel    Will start Lipitor 40 mg QD after passing swallowing test      Consult neurosurgery for central canal stenosis with 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-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 -c5   ·   Patient will likely need surgical intervention given deteriorating motor and sensory exam   Consider C3-C6 PCDF with C2-7 fusionmission Orders:      Admission orders       Neuro checks  q1 hr  Npo  Dysphagia assessment   PT and OT eval and treat  Consult neurology  Consult cardiology  Orthostatic blood pressure   Consult neuro surgery   Mri brain   Mri cervical spine       Scheduled Meds:     acetaminophen 650 mg Oral Q6H PRN   atorvastatin 40 mg Oral Daily With Dinner   bacitracin 1 large application Topical BID   oxyCODONE 5 mg Oral Q4H PRN   polyethylene glycol 17 g Oral Daily   senna-docusate sodium 1 tablet Oral BID   sertraline 100 mg Oral Daily

## 2018-11-14 NOTE — PLAN OF CARE
Problem: PHYSICAL THERAPY ADULT  Goal: Performs mobility at highest level of function for planned discharge setting  See evaluation for individualized goals  Treatment/Interventions: Functional transfer training, LE strengthening/ROM, Endurance training, Patient/family training, Bed mobility, Gait training, Spoke to nursing, OT  Equipment Recommended: Maryana Mata       See flowsheet documentation for full assessment, interventions and recommendations  Prognosis: Good  Problem List: Decreased strength, Decreased endurance, Impaired balance, Decreased mobility, Decreased safety awareness, Pain, Decreased coordination, Decreased range of motion  Assessment: Pt is 68 y o  male seen for PT evaluation s/p admit to [facility] on 11/13/2018 w/ CVA (cerebral vascular accident) ([facility] [address] )  PT consulted to assess pt's functional mobility and d/c needs  Order placed for PT eval and tx, w/ up w/ A order  Comorbidities affecting pt's physical performance at time of assessment include:multiple falls, pain  PTA, pt was ambulates household distances and lives in multi-level home  Personal factors affecting pt at time of IE include: lives in 2 story house, ambulating w/ assistive device, inability to ambulate household distances, positive fall history, unable to perform physical activity, inability to perform IADLs, inability to perform ADLs and inability to live alone  Please find objective findings from PT assessment regarding body systems outlined above with impairments and limitations including weakness, decreased ROM, impaired balance, decreased endurance, impaired coordination, gait deviations, pain, decreased activity tolerance, decreased functional mobility tolerance, decreased safety awareness and fall risk  Pt required increased time to complete bed mobility with A to scoot EOB  Tolerated sitting with fair balance and minimal dizziness  Required hand placement instruction for transfers  Ambulated with ataxic LLE  Steppage gait  Frequent instruction for gait technique  High risk of additional falls with absent knowledge of current limitations  The following objective measures performed on IE also reveal limitations: Barthel Index: 35/100  Pt's clinical presentation is currently unstable/unpredictable seen in pt's presentation of critical care monitoring, pain  Pt to benefit from continued PT tx to address deficits as defined above and maximize level of functional independent mobility and consistency  From PT/mobility standpoint, recommendation at time of d/c would be IP rehab pending progress in order to facilitate return to PLOF  Barriers to Discharge: Inaccessible home environment     Recommendation: Post acute IP rehab     PT - OK to Discharge: Yes (to rehab when medically stable )    See flowsheet documentation for full assessment

## 2018-11-14 NOTE — PROGRESS NOTES
Progress Note - ICU Transfer to SD/MS tele   Sera Martin 68 y o  male MRN: [id number]  [address]   Unit/Bed#: [address] -01 Encounter: [id number]    Code Status: Level 1 - Full Code    Reason for ICU admission: stroke-like symptoms    Active problems:   Principal Problem:    CVA (cerebral vascular accident) ([address] )  Active Problems:    OCD (obsessive compulsive disorder)    Depression    Anxiety  Resolved Problems:    * No resolved hospital problems  *      Consultants: Neurology    History of Present Illness:   Sera Martin is a 68 y o  male w/ PMH of HTN, HLD, MDD/SHAQUILLE and OCD, Lumbar stenosis, tremors 2/2 polypharmacy brought [facility] by AMS with his oldest son for stroke alert  Per son, the son heard a noise in his father's room at 10am today, came to the patient's room and found the patient down on the floor with left face and right sided of body/arm on the floor  The patient did not lose consciousness, was not confused but was anxious and did not know what happened with him  Per pt, he just woke up, tried to  some stuff on the table and suddenly found himself on the floor  Denies aura, seizures, CP, palpitation, SOB, HA, abd pain, weakness, numbness/tingling prior to the fall  Denies BM and urinary incontinence  The son called 46 and brought the pt to B  No h/o infection POA      Currently, pt c/o Right hand  weak, Right arm pain, Right hip and thigh pain  Chronic low back pain  No blurry vision, HA  History obtained from chart review and the patient  Summary of clinical course:   PT underwent CT/CTA head which showed no hemorrhage or pathology, was started tPA at ER, admitted to ICU for stroke-alert pathway  CT head 24 hours s/p tPA showed no acute intracranial abnormality, Echocardiography assuring  Overnight, Right hand  and Right LE weakness slightly improved, L exts have remained 5/5    Neurology was consulted, started ASA, Statins, and scq Heparin for DVT ppx  Pending MRI/MRA for brain and C-spine and spot EEG  Pt also had left frontal hematoma which has been stable  XR of hip showed no fracture  Stable to be transferred to [facility] with 24 hours of Tele  Pt needs full geriatric assessment and plan given frequent falls  Initial Physical Therapy Recommendations: pending  Initial Occupational Therapy Recommendations: pending    Home medications that are not reordered and reason why: Metoprolol tartrate 12 5 QD     Sepsis: no  Heart Failure:  no  Hyperglycemia: no    Spoke with Dr Kendrick Perera regarding transfer         Anika Alfredo MD

## 2018-11-14 NOTE — PROGRESS NOTES
Progress Note - Critical Care   Dominic Daly 68 y o  male MRN: [id number]  Unit/Bed#: [address] -01 Encounter: [id number]    Attending Physician: Tim Saini MD      ______________________________________________________________________  Assessment and Plan:   Principal Problem:    CVA (cerebral vascular accident) [facility])  Resolved Problems:    * No resolved hospital problems  *      69 yo M w/ PMH of HTN, HLD, MDD/SHAQUILLE, Lumbar stenosis w/ stroke-like symptoms s/p tPA  Hemodynamically stable      Plan:                  Neuro:   1  Stroke-like symptoms s/p tPA  · CT/CTA head showed no hemorrhage or pathology, started tPA at 11a yesterday, due for repeated CT s/p tPA at 11a today  · Pending Neurology c/s, stroke alert pathway  NIHSS 2  · Continue Lipitor 40 mg QD, neurocheck Q1, tele x48 hours  · Pending MRI, Echo  2  MDD/SHAQUILLE  · Continue PTA Zoloft 100 mg QD  3  Sedation/Analgesics: no sedation; Tylenol and Oxycodone 5 mg x2  Consider to schedule Tylenol 650 Q4 and PRN oxycodone  4  H/O tremor from polypharmacy and frequent fall:  Need full geriatric A/P as appropriate, fall precautions   5  Delirium precautions                 CV:   1  H/O HTN not on PTA antihypertensive medications  · Permissive , Tele x48 hours  2  HLD:  · Cho 148, TG 63, HDL 51, LDL 84  Continue Lipitor 40 mg QD                 Pulm: no acute issues                 GI: no acute issues  H/O chronic constipation: sennokot and miralax prn, encourage to drink enough water                 :   Suspected BPH: monitor symptoms and I/O's, bladder scan if needed  If persistent, need an initiation of medication                 F/E/N: no acute issues, replace electrolytes prn, I/O's   Na 135, K 4 5, Ca 8, Mg/Phos WNL, Cr 0 79                ID: no acute issues  WBC 9                 Heme:   1  Left frontal hematoma 2/2 trauma: pain mgmt, improved with cold compress  2   Stroke-like symptoms s/p tPA: mgmt discussed above Endo:  A1C 4 7  Stopped accucheck  Tolerate oral well                 Msk/Skin:   1  Right hip pain s/p a fall  · Most likely from muscle strain and soft tissue injury: XR hip b/l no fractures, pain mgmt and ice pack prn  2  PT/OT/OOB as appropriate                 Disposition: ICU stay tonight    Code Status: Level 1 - Full Code  ___________________________________________________    Chief Complaint: "I am doing better, but still have pain on my legs and Right arm"    24 Hour Events:   Leg and right arm pain, required oxycodone 5 mg x 2, pain subsided, currently at 6/10  Suspected oligouria, bladder scan 300+cc but able to void after that  UOP 500cc overnight  Passed swallowing test  ______________________________________________________________________    Physical Exam:   GEN NAD, comfortably lying down on the bed  HEENT lateral-sided of left forehead hematoma, tenderness, mild edema improved than yesterday, able to open left eye spontaneously and more widely, PERRLA  Heart RRR, no m/r/g  Lungs CTAB, no w/r  ABD soft, bs+, ntnd  EXT no edema or ecchymoses or rashes  R wrist, b/l hip nontenderness, hip ROM WNL, R wrist limited at extension  Neuro AAOx3, motor strength: R hand  3/5, R LE 4/5, L EXTs 5/5, sensation intact and symmetric    ______________________________________________________________________  Vitals:    18 0300 18 0400 18 0500 18 0600   BP: 103/59 (!) 97/48 101/53 104/56   BP Location: Left arm Left arm Left arm Left arm   Pulse: 56 56 60 56   Resp: (!) 11 17 21 15   Temp:  98 °F (36 7 °C)     TempSrc:  Oral     SpO2:  97% 97% 97%   Weight:       Height:           Temperature:   Temp (24hrs), Av 1 °F (36 7 °C), Min:97 4 °F (36 3 °C), Max:98 7 °F (37 1 °C)    Current Temperature: 98 °F (36 7 °C)  Weights:   IBW: 66 1 kg    Body mass index is 26 kg/m²    Weight (last 2 days)     Date/Time   Weight    18 2104  75 3 (166 01)    18 1110  76 6 (168 87) Non-Invasive/Invasive Ventilation Settings:  Respiratory    Lab Data (Last 4 hours)    None         O2/Vent Data (Last 4 hours)    None                Intake and Outputs:  I/O       11/12 0701 - 11/13 0700 11/13 0701 - 11/14 0700    P  O   240    I V  (mL/kg)  393 8 (5 2)    Total Intake(mL/kg)  633 8 (8 4)    Urine (mL/kg/hr)  730    Total Output   730    Net   -96 3                Nutrition:        Diet Orders            Start     Ordered    11/13/18 2244  Diet Regular; Regular House  Diet effective now     Question Answer Comment   Diet Type Regular    Regular Regular House    RD to adjust diet per protocol?  Yes        11/13/18 2249          Labs:     Results from last 7 days  Lab Units 11/14/18  0519 11/13/18  1055   WBC Thousand/uL 9 03 8 23   HEMOGLOBIN g/dL 12 1 12 7   HEMATOCRIT % 35 7* 38 1   PLATELETS Thousands/uL 187 225   NEUTROS PCT % 73  --    MONOS PCT % 10  --        Results from last 7 days  Lab Units 11/14/18  0519 11/13/18  1055   POTASSIUM mmol/L 4 5 4 6   CHLORIDE mmol/L 104 99*   CO2 mmol/L 27 29   BUN mg/dL 8 9   CREATININE mg/dL 0 79 0 84   CALCIUM mg/dL 8 0* 7 8*   ALK PHOS U/L 66  --    ALT U/L 31  --    AST U/L 29  --        Results from last 7 days  Lab Units 11/14/18  0519   MAGNESIUM mg/dL 2 2     Lab Results   Component Value Date    PHOS 2 9 11/14/2018        Results from last 7 days  Lab Units 11/13/18  1055   INR  1 01   PTT seconds 26       0  Lab Value Date/Time   TROPONINI 0 09 (H) 11/14/2018 0519   TROPONINI 0 08 (H) 11/13/2018 1913   TROPONINI 0 08 (H) 11/13/2018 1609   TROPONINI 0 04 11/13/2018 1127           Micro:  No results found for: Lucia Andersoniver, WOUNDCULT, SPUTUMCULTUR  Allergies: No Known Allergies  Medications:   Scheduled Meds:  Current Facility-Administered Medications:  acetaminophen 650 mg Oral Q6H PRN , CRNP    bacitracin 1 large application Topical BID Can L Espland, DO    chlorhexidine 15 mL Swish & Spit Q12H [address] Acn L Espland, DO    HYDROmorphone 0 5 mg Intravenous Q3H PRN Can L Espland, DO    multi-electrolyte 75 mL/hr Intravenous Continuous Can L Espland, DO Last Rate: 75 mL/hr (11/14/18 0133)   oxyCODONE 10 mg Oral Q4H PRN Can L Espland, DO    oxyCODONE 5 mg Oral Q4H PRN Can L Espland, DO    senna-docusate sodium 1 tablet Oral BID TAZ Olguin    sertraline 100 mg Oral Daily TAZ Olguin      Continuous Infusions:  multi-electrolyte 75 mL/hr Last Rate: 75 mL/hr (11/14/18 0133)     PRN Meds:    acetaminophen 650 mg Q6H PRN   HYDROmorphone 0 5 mg Q3H PRN   oxyCODONE 10 mg Q4H PRN   oxyCODONE 5 mg Q4H PRN     VTE Pharmacologic Prophylaxis: s/p tPA  consider to start Hep SCQ for DVT ppx  VTE Mechanical Prophylaxis: sequential compression device  Invasive lines and devices: Invasive Devices     Peripheral Intravenous Line            Peripheral IV Left Hand -- days    Peripheral IV 11/13/18 Right Antecubital less than 1 day                     Portions of the record may have been created with voice recognition software  Occasional wrong word or "sound a like" substitutions may have occurred due to the inherent limitations of voice recognition software  Read the chart carefully and recognize, using context, where substitutions have occurred      Anika Mejias MD

## 2018-11-14 NOTE — PLAN OF CARE
Problem: OCCUPATIONAL THERAPY ADULT  Goal: Performs self-care activities at highest level of function for planned discharge setting  See evaluation for individualized goals  Treatment Interventions: ADL retraining, Functional transfer training, UE strengthening/ROM, Endurance training, Patient/family training, Equipment evaluation/education, Fine motor coordination activities, Continued evaluation, Energy conservation, Activityengagement          See flowsheet documentation for full assessment, interventions and recommendations  Limitation: Decreased ADL status, Decreased UE ROM, Decreased UE strength, Decreased endurance, Decreased self-care trans, Decreased high-level ADLs, Decreased fine motor control  Prognosis: Fair  Assessment: Pt is a 69 y/o male seen for OT eval s/p adm to [facility] after being found down on the floor w/ L face and R side of body/arm on the floor  CT head and CTA head/neck negative for acute hemorrhage or large vessel occlusion for endovascular intervention Pt is dx'd w/ CVA  Comorbidities include a h/o anxiety, chronic low back pain, depression, hypercholesterolemia, HTN, OCD  Pt with active OT orders and up with assistance  orders  Pt lives with son in 2 floor apt w/ 0 EMMA, 16 steps to 2nd floor bed/bath  Pt was I w/ ADLS, has assist for IADLS, can drive but has not been lately, & required no use of DME PTA  Pt is currently demonstrating the following occupational deficits: Min/Mod A UB ADLS, Max A LB ADLS, Mod A transfers and functional mobility w/ RW and chair follow  These deficits that are impacting pt's baseline areas of occupation are a result of the following impairments: pain, endurance, activity tolerance, functional mobility, balance, functional standing tolerance, unsupportive home environment, decreased I w/ ADLS/IADLS, strength, ROM, decreased safety awareness and impaired fine motor skills  The following Occupational Performance Areas to address include: eating, grooming, bathing/shower, toilet hygiene, dressing, socialization, health maintenance, functional mobility and clothing management  Pt scored overall 35/100 on the Barthel Index  Based on the aforementioned OT evaluation, functional performance deficits, and assessments, pt has been identified as a high complexity evaluation  Recommend STR upon D/C when medically stable   Pt to continue to benefit from acute immediate OT services to address the following goals 3-5x/week to  w/in 7-10 days:   Recommendation: Physiatry Consult  OT Discharge Recommendation: Short Term Rehab  OT - OK to Discharge: Yes (when medically stable)      Comments: Corrine Linda MS, OTR/L

## 2018-11-15 PROBLEM — M48.02 CERVICAL SPINAL STENOSIS: Status: ACTIVE | Noted: 2018-11-15

## 2018-11-15 LAB
ANION GAP SERPL CALCULATED.3IONS-SCNC: 6 MMOL/L (ref 4–13)
BUN SERPL-MCNC: 12 MG/DL (ref 5–25)
CALCIUM SERPL-MCNC: 8.3 MG/DL (ref 8.3–10.1)
CHLORIDE SERPL-SCNC: 102 MMOL/L (ref 100–108)
CO2 SERPL-SCNC: 25 MMOL/L (ref 21–32)
CREAT SERPL-MCNC: 0.73 MG/DL (ref 0.6–1.3)
ERYTHROCYTE [DISTWIDTH] IN BLOOD BY AUTOMATED COUNT: 13.5 % (ref 11.6–15.1)
GFR SERPL CREATININE-BSD FRML MDRD: 92 ML/MIN/1.73SQ M
GLUCOSE SERPL-MCNC: 81 MG/DL (ref 65–140)
HCT VFR BLD AUTO: 37.2 % (ref 36.5–49.3)
HGB BLD-MCNC: 12.2 G/DL (ref 12–17)
MCH RBC QN AUTO: 30.7 PG (ref 26.8–34.3)
MCHC RBC AUTO-ENTMCNC: 32.8 G/DL (ref 31.4–37.4)
MCV RBC AUTO: 94 FL (ref 82–98)
PLATELET # BLD AUTO: 191 THOUSANDS/UL (ref 149–390)
PMV BLD AUTO: 11.2 FL (ref 8.9–12.7)
POTASSIUM SERPL-SCNC: 4.2 MMOL/L (ref 3.5–5.3)
RBC # BLD AUTO: 3.98 MILLION/UL (ref 3.88–5.62)
SODIUM SERPL-SCNC: 133 MMOL/L (ref 136–145)
WBC # BLD AUTO: 9.29 THOUSAND/UL (ref 4.31–10.16)

## 2018-11-15 PROCEDURE — 97116 GAIT TRAINING THERAPY: CPT

## 2018-11-15 PROCEDURE — 97535 SELF CARE MNGMENT TRAINING: CPT

## 2018-11-15 PROCEDURE — 99232 SBSQ HOSP IP/OBS MODERATE 35: CPT | Performed by: PHYSICAL MEDICINE & REHABILITATION

## 2018-11-15 PROCEDURE — 99232 SBSQ HOSP IP/OBS MODERATE 35: CPT | Performed by: PSYCHIATRY & NEUROLOGY

## 2018-11-15 PROCEDURE — 99223 1ST HOSP IP/OBS HIGH 75: CPT | Performed by: NEUROLOGICAL SURGERY

## 2018-11-15 PROCEDURE — 85027 COMPLETE CBC AUTOMATED: CPT | Performed by: NURSE PRACTITIONER

## 2018-11-15 PROCEDURE — 99232 SBSQ HOSP IP/OBS MODERATE 35: CPT | Performed by: INTERNAL MEDICINE

## 2018-11-15 PROCEDURE — 80048 BASIC METABOLIC PNL TOTAL CA: CPT | Performed by: NURSE PRACTITIONER

## 2018-11-15 PROCEDURE — 97530 THERAPEUTIC ACTIVITIES: CPT

## 2018-11-15 RX ADMIN — HEPARIN SODIUM 5000 UNITS: 5000 INJECTION INTRAVENOUS; SUBCUTANEOUS at 05:53

## 2018-11-15 RX ADMIN — POLYETHYLENE GLYCOL 3350 17 G: 17 POWDER, FOR SOLUTION ORAL at 07:58

## 2018-11-15 RX ADMIN — SERTRALINE HYDROCHLORIDE 100 MG: 100 TABLET ORAL at 07:57

## 2018-11-15 RX ADMIN — BACITRACIN ZINC 1 LARGE APPLICATION: 500 OINTMENT TOPICAL at 22:46

## 2018-11-15 RX ADMIN — ATORVASTATIN CALCIUM 40 MG: 40 TABLET, FILM COATED ORAL at 16:49

## 2018-11-15 RX ADMIN — ASPIRIN 81 MG 81 MG: 81 TABLET ORAL at 07:56

## 2018-11-15 RX ADMIN — SENNOSIDES AND DOCUSATE SODIUM 1 TABLET: 8.6; 5 TABLET ORAL at 16:49

## 2018-11-15 RX ADMIN — SENNOSIDES AND DOCUSATE SODIUM 1 TABLET: 8.6; 5 TABLET ORAL at 07:57

## 2018-11-15 RX ADMIN — HEPARIN SODIUM 5000 UNITS: 5000 INJECTION INTRAVENOUS; SUBCUTANEOUS at 13:42

## 2018-11-15 RX ADMIN — ACETAMINOPHEN 650 MG: 325 TABLET, FILM COATED ORAL at 07:56

## 2018-11-15 RX ADMIN — OXYCODONE HYDROCHLORIDE 5 MG: 5 TABLET ORAL at 06:14

## 2018-11-15 NOTE — PROGRESS NOTES
PM&R Consult Follow Up Note  Zeina Cannon 68 y o  male MRN: [id number]  Unit/Bed#: [facility] 730-01 Encounter: [id number]     Assessment and Recommendations:  Mr Zeina Cannon is a 68 y o  male with PMH HTN, HLD, Lumbar Stenosis presenting after fall, found to have R sided weakness, now s/p tPA  PM&R consulted for rehabilitation recommendations       HTN  HLD  Lumbar Stenosis  Major Depressive Disorder  General Anxiety Disorder  OCD  R sided weakness    Impairments:  Impaired functional mobility and ability to perform ADL's    Recommendations:  - Continue PT/OT/SLP while inpatient  - Agree with evaluation by Geriatrics  - Continue senna/docusate  If no BM by today, give miralax or suppository tonight  - At this time, based on patient's current functional status he may be a good candidate for subacute rehab given his limited medical needs  Goal would be to get him to a [place] level with discharge to home with the help of his son  He should trial stairs during his inpatient stay as he has 15 steps to enter his home  Thank you for allowing the PM&R service to participate in the care of Mr Zeina Cannon  We will continue to follow he progress with you  Please do not hesitate to call with questions or concerns  Interval History:    - Transferred to floor 11/14/18    Subjective: He is feeling better  The pain in his legs has improved  He is anxious about having to use the rolling walker  He denies new numbness/weakness/tingling, new bowel/bladder dysfunction  He has not yet had a BM for several days  He denies headaches, changes in vision, difficulty swallowing, N/V, fevers, chills  He is sleeping well       Objective:    Physical Therapy:      Bed Mobility   Supine to Sit 4  Minimal assistance   Additional items Assist x 1; Increased time required;HOB elevated;Verbal cues   Transfers   Sit to Stand 3  Moderate assistance   Additional items Assist x 1; Increased time required   Stand to Sit 3  Moderate assistance Additional items Assist x 1; Increased time required   Ambulation/Elevation   Gait pattern Excessively slow; Inconsistent javon; Ataxia   Gait Assistance 3  Moderate assist   Additional items Assist x 1   Assistive Device Rolling walker   Distance 35       Occupational Therapy:      ADL   Eating Assistance 4  Minimal Assistance   Grooming Assistance 3  Moderate Assistance   UB Bathing Assistance 3  Moderate Assistance   LB Bathing Assistance 2  Maximal [address] 4  Minimal Assistance   LB Dressing Assistance 2  Maximal [address]  4  Minimal Assistance   Functional Assistance 3  Moderate Assistance   Functional Deficit Steadying;Verbal cueing;Supervision/safety; Increased time to complete       Vital Signs:      Temp:  [98 °F (36 7 °C)-98 5 °F (36 9 °C)] 98 3 °F (36 8 °C)  HR:  [56-66] 62  Resp:  [10-25] 16  BP: ()/(52-60) 118/60   Intake/Output Summary (Last 24 hours) at 11/15/18 1127  Last data filed at 11/15/18 0900   Gross per 24 hour   Intake              360 ml   Output              775 ml   Net             -415 ml        Laboratory:      Lab Results   Component Value Date    HGB 12 2 11/15/2018    HCT 37 2 11/15/2018    WBC 9 29 11/15/2018     Lab Results   Component Value Date    BUN 12 11/15/2018    K 4 2 11/15/2018     11/15/2018    CREATININE 0 73 11/15/2018     Lab Results   Component Value Date    PROTIME 13 4 11/13/2018    INR 1 01 11/13/2018        Gen: No acute distress, Well-nourished, well-appearing  HEENT: Moist mucus membranes, Normocephalic  Cardiovascular: Regular rate, rhythm, Distal pulses palpable  Heme/Extr: No edema/clubbing/cyanosis  Pulmonary: Non-labored breathing  : No cristina  GI: Soft, non-tender, non-distended  MSK: No effusions or deformities  Bulk is symmetric  He has distal greater than proximal weakness, and RUE>LUE weakness  Integumentary: Skin is warm, dry   He has large ecchymoses swellign and abrasion Left frontal/periorbital region  Neuro: AAOx3, Speech is intact  Appropriate to questioning  Tone is normal    Psych: Normal mood and affect  Imaging Reviewed:  11/14/18 MRI C-Spine:  1  Disc and facet osteophytosis resulting in severe central canal stenosis and chronic mass effect on the cord at C3-4 and C4-5  Severe bilateral neural foraminal narrowing from C2-3 to C4-5   2   No evidence of cord signal abnormality      11/14/18 MRI Brain:  No MR evidence of acute ischemia    Left frontal scalp soft tissue swelling only partially visualized, correlate clinically

## 2018-11-15 NOTE — SOCIAL WORK
Met with pt to discuss his discharge plan  Informed him of therapy's recommendation for inpt acute rehab  Discussed acute rehab locations and pt preferred [facility] referral sent for same  SNF rehab list provided to pt to review for alternate rehab options

## 2018-11-15 NOTE — PROGRESS NOTES
Progress Note - Kam Keys 1945, 68 y o  male MRN: [id number]    [facility] Encounter: [id number]    Primary Care Provider: Susanna Csatrejon DO   Date and time admitted to hospital: 2018 10:45 AM        * CVA (cerebral vascular accident) [facility])   Assessment & Plan    Stroke status post tPA  Continue aspirin statin  Neurology following  Discussed with Neurology     Cervical spinal stenosis   Assessment & Plan    Neurosurgery consulted  Physical therapy     Anxiety   Assessment & Plan    Continue anxiolytics         VTE Pharmacologic Prophylaxis:   Pharmacologic: Heparin  Mechanical VTE Prophylaxis in Place: Yes    Patient Centered Rounds: I have performed bedside rounds with nursing staff today  Discussions with Specialists or Other Care Team Provider:  Neurology    Education and Discussions with Family / Patient: pt, called and left a message for carline Acevedo over phone    Time Spent for Care: 30 minutes  More than 50% of total time spent on counseling and coordination of care as described above  Current Length of Stay: 2 day(s)    Current Patient Status: Inpatient   Certification Statement: The patient will continue to require additional inpatient hospital stay due to As mentioned    Discharge Plan:  [address] Neurology Neurosurgery inputs likely needs rehab bed discharge discussed with case management    Code Status: Level 1 - Full Code      Subjective:     Comfortably sitting up in chair  Reports right upper extremity weakness  History chart labs medications reviewed      Objective:     Vitals:   Temp (24hrs), Av 1 °F (36 7 °C), Min:98 °F (36 7 °C), Max:98 3 °F (36 8 °C)    Temp:  [98 °F (36 7 °C)-98 3 °F (36 8 °C)] 98 3 °F (36 8 °C)  HR:  [59-70] 70  Resp:  [16-18] 18  BP: ()/(54-60) 132/57  SpO2:  [97 %-100 %] 100 %  Body mass index is 23 21 kg/m²  Input and Output Summary (last 24 hours):        Intake/Output Summary (Last 24 hours) at 11/15/18 4171  Last data filed at 11/15/18 1544   Gross per 24 hour   Intake              600 ml   Output             1125 ml   Net             -525 ml       Physical Exam:     Physical Exam    Comfortably sitting up in chair  Neck supple  Lungs diminished breath sounds bilaterally  Heart sounds S1-S2 noted  Abdomen soft  Awake obeys simple commands  Right upper extremity weakness noted  Pulses noted  No rash  No pedal edema    Additional Data:     Labs:      Results from last 7 days  Lab Units 11/15/18  0526 11/14/18  0519   WBC Thousand/uL 9 29 9 03   HEMOGLOBIN g/dL 12 2 12 1   HEMATOCRIT % 37 2 35 7*   PLATELETS Thousands/uL 191 187   NEUTROS PCT %  --  73   LYMPHS PCT %  --  16   MONOS PCT %  --  10   EOS PCT %  --  1       Results from last 7 days  Lab Units 11/15/18  0526 11/14/18  0519   SODIUM mmol/L 133* 135*   POTASSIUM mmol/L 4 2 4 5   CHLORIDE mmol/L 102 104   CO2 mmol/L 25 27   BUN mg/dL 12 8   CREATININE mg/dL 0 73 0 79   ANION GAP mmol/L 6 4   CALCIUM mg/dL 8 3 8 0*   ALBUMIN g/dL  --  2 9*   TOTAL BILIRUBIN mg/dL  --  0 51   ALK PHOS U/L  --  66   ALT U/L  --  31   AST U/L  --  29   GLUCOSE RANDOM mg/dL 81 85       Results from last 7 days  Lab Units 11/13/18  1055   INR  1 01       Results from last 7 days  Lab Units 11/13/18  1131   POC GLUCOSE mg/dl 78       Results from last 7 days  Lab Units 11/14/18  0519   HEMOGLOBIN A1C % 4 7               * I Have Reviewed All Lab Data Listed Above  * Additional Pertinent Lab Tests Reviewed:  Epif[address] 66 Admission Reviewed    Imaging:    Imaging Reports Reviewed Today Include:  Imaging studies reviewed  Imaging Personally Reviewed by Myself Includes:     Recent Cultures (last 7 days):           Last 24 Hours Medication List:     Current Facility-Administered Medications:  acetaminophen 650 mg Oral Q6H PRN TAZ Casillas   aspirin 81 mg Oral Daily Anika Villegas MD   atorvastatin 40 mg Oral Daily With TAZ Samuel   bacitracin 1 large application Topical BID Can Padilla DO   heparin (porcine) 5,000 Units Subcutaneous Q8H [address] TAZ Workman   oxyCODONE 5 mg Oral Q4H PRN TAZ Ray   polyethylene glycol 17 g Oral Daily TAZ Ray   senna-docusate sodium 1 tablet Oral BID TAZ Ray   sertraline 100 mg Oral Daily TAZ Ray        Today, Patient Was Seen By: Gelacio Miramontes MD    ** Please Note: Dictation voice to text software may have been used in the creation of this document   **

## 2018-11-15 NOTE — PLAN OF CARE
Problem: PHYSICAL THERAPY ADULT  Goal: Performs mobility at highest level of function for planned discharge setting  See evaluation for individualized goals  Treatment/Interventions: Functional transfer training, LE strengthening/ROM, Endurance training, Patient/family training, Bed mobility, Gait training, Spoke to nursing, OT  Equipment Recommended: Jen Oliveira       See flowsheet documentation for full assessment, interventions and recommendations  Outcome: Progressing  Prognosis: Good  Problem List: Decreased strength, Decreased range of motion, Decreased endurance, Impaired balance, Decreased mobility, Decreased safety awareness, Pain  Assessment: Pt seen for physical therapy treatment focused on therapeutic activity and gait training  Pt is supine at start of session and agreeable to therapy  Pt expresses concern about the reason for his fall and perseverates on the cause of his fall throughout session  Pt required Flavia for supine <> sit  Pt performed seated exercise as noted above  Pt transferred sit <> stand with modA, required verbal and tactile cues for motor planning and safe management of RW  Pt ambulated 100 ft x2 with Flavia and frequent cues for path finding, safe management of RW, increased YEN, and thoracic extension  Pt required x1 standing rest break during ambulation  Pt remained seated in bedside chair with chair alarm in place and all needs within reach  PT to recommend inpt rehab to maximize safety with functional mobility and decrease risk for further falls  PT to follow up   Barriers to Discharge: Inaccessible home environment     Recommendation: Post acute IP rehab     PT - OK to Discharge: Yes (To rehab when medically stable)    See flowsheet documentation for full assessment         Comments: Kwabena Herman, PT, DPT

## 2018-11-15 NOTE — PHYSICAL THERAPY NOTE
PHYSICAL THERAPY NOTE          Patient Name: Kirt Brito  [id number]'U Date: 11/15/2018     11/15/18 1014   Pain Assessment   Pain Assessment 0-10   Pain Score 5   Pain Type Acute pain   Pain Location Knee   Pain Orientation Right   Patient's Stated Pain Goal No pain   Hospital Pain Intervention(s) Ambulation/increased activity;Repositioned   Response to Interventions Pt tolerated treatment   Restrictions/Precautions   Weight Bearing Precautions Per Order No   Other Precautions Cognitive; Chair Alarm;Telemetry; Fall Risk;Pain   General   Chart Reviewed Yes   Family/Caregiver Present No   Cognition   Overall Cognitive Status Impaired  (Pt perseverates on reason for his fall throughout session)   Arousal/Participation Cooperative   Attention Attends with cues to redirect   Orientation Level Oriented X4   Following Commands Follows one step commands without difficulty   Subjective   Subjective "I need to know what caused my fall"   Bed Mobility   Supine to Sit 4  Minimal assistance   Additional items Assist x 1; Increased time required;Verbal cues   Transfers   Sit to Stand 3  Moderate assistance   Additional items Assist x 1; Increased time required;Verbal cues   Stand to Sit 3  Moderate assistance   Additional items Assist x 1; Increased time required;Verbal cues   Ambulation/Elevation   Gait pattern Improper Weight shift; Forward Flexion;Decreased foot clearance; Excessively slow; Short stride; Step to   Gait Assistance 4  Minimal assist   Additional items Assist x 1;Verbal cues; Tactile cues   Assistive Device Rolling walker   Distance 100 ft x2  (x1 standing rest break)   Balance   Static Sitting Fair   Dynamic Sitting Fair -   Static Standing Fair   Dynamic Standing Fair   Ambulatory Fair -   Endurance Deficit   Endurance Deficit Yes   Endurance Deficit Description SOB, fatigue, pain   Activity Tolerance   Activity Tolerance Patient tolerated treatment well;Patient limited by fatigue   Nurse Made Aware Yes, per RN Shannon Ragland pt appropriate for PT   Exercises   Hip Abduction Sitting;10 reps;AROM   Knee AROM Long Arc Quad Sitting;10 reps;AROM   Ankle Pumps Sitting;10 reps;AROM   Assessment   Prognosis Good   Problem List Decreased strength;Decreased range of motion;Decreased endurance; Impaired balance;Decreased mobility; Decreased safety awareness;Pain   Assessment Pt seen for physical therapy treatment focused on therapeutic activity and gait training  Pt is supine at start of session and agreeable to therapy  Pt expresses concern about the reason for his fall and perseverates on the cause of his fall throughout session  Pt required Flavia for supine <> sit  Pt performed seated exercise as noted above  Pt transferred sit <> stand with modA, required verbal and tactile cues for motor planning and safe management of RW  Pt ambulated 100 ft x2 with Flavia and frequent cues for path finding, safe management of RW, increased YEN, and thoracic extension  Pt required x1 standing rest break during ambulation  Pt remained seated in bedside chair with chair alarm in place and all needs within reach  PT to recommend inpt rehab to maximize safety with functional mobility and decrease risk for further falls  PT to follow up    Barriers to Discharge Inaccessible home environment   Goals   Patient Goals To feel better   Treatment Day 1   Plan   Treatment/Interventions Functional transfer training;LE strengthening/ROM; Therapeutic exercise; Endurance training;Bed mobility;Gait training   Progress Progressing toward goals   PT Frequency (4-6x/wk)   Recommendation   Recommendation Post acute IP rehab   Equipment Recommended Walker   PT - OK to Discharge Yes  (To rehab when medically stable)       Tiesha Teague, PT, DPT

## 2018-11-15 NOTE — OCCUPATIONAL THERAPY NOTE
Occupational Therapy Treatment Note     11/15/18 5411   Restrictions/Precautions   Weight Bearing Precautions Per Order No   Other Precautions Cognitive; Bed Alarm;Telemetry; Fall Risk;Pain   Lifestyle   Autonomy Pt reports being I w/ ADLS, has assist for IADLS and is I w/ transfers and functional mobility PTA   Reciprocal Relationships Pt lives w/ son who works a few hours at night but otherwise is home to assist   Service to Others Pt is retired   Intrinsic Gratification pt enjoys sports- playing and watching   Pain Assessment   Pain Assessment 0-10   Pain Score No Pain   ADL   Where Assessed Edge of bed   LB Dressing Assistance 2  Maximal Assistance   LB Dressing Deficit Thread RLE into pants; Thread LLE into pants;Pull up over hips   Bed Mobility   Supine to Sit 3  Moderate assistance   Additional items Assist x 1   Transfers   Sit to Stand 3  Moderate assistance   Additional items Assist x 1   Stand to Sit 4  Minimal assistance   Functional Mobility   Functional Mobility 3  Moderate assistance   Additional items Rolling walker   Cognition   Overall Cognitive Status Impaired  (Pt perseverates on reason for his fall throughout session)   Arousal/Participation Cooperative   Attention Attends with cues to redirect   Orientation Level Oriented X4   Memory Within functional limits   Following Commands Follows one step commands without difficulty   Assessment   Assessment Pt participated in occupational therapy with focus on activity tolerance,  bed mob, unsupported sitting balance and tolerance for pt engagement in functional self-care task/LB self-care/LB dressing  Pt cleared by RN/Holly for pt participation in occupational therapy  Pt received HOB raised/supine pt sitting upright and agreeable to therapy following pt Identifiers confirmed  Pt reported his goal today " to get back to normal and live a normal live again"    Pt requires assist for functional transfers and LB self-care/LB dressing 2* pt decreased strength, coordination and balance  Pt will require in-pt rehab to continue to address pt above noted deficits which currently impair pt ADL and functional mob      Plan   Treatment Interventions ADL retraining   Goal Expiration Date 11/24/18   Treatment Day 1   OT Frequency 3-5x/wk   Recommendation   OT Discharge Recommendation Short Term Rehab   Barthel Index   Feeding 5   Bathing 0   Grooming Score 0   Dressing Score 0   Bladder Score 10   Bowels Score 10   Toilet Use Score 5   Transfers (Bed/Chair) Score 5   Mobility (Level Surface) Score 0   Stairs Score 0   Barthel Index Score 35   Modified Betty Scale   Modified [place] Scale 4       Mario Petit  ANTOINE/L

## 2018-11-15 NOTE — PROGRESS NOTES
Progress Note - Neurology   Salvador Mir 68 y o  male MRN: [id number]  Unit/Bed#: [facility] 730-01 Encounter: [id number]    Assessment:  78-year-old male with past medical history of hypertension, hyperlipidemia, chronic low back pain with lumbar disc desiccation/herniations presenting as stroke alert on 11/13 following acute onset right arm/leg weakness and manual dexterity issues  NIH of 2/3 on initial exam, administered tPA  Neuro imaging negative for acute stroke (repeat CT head and MRI brain) MRI of C-spine suggestive of severe cervical disease/CSF effacement  Plan:  1  Stroke pathway ongoing status post tPA:   -MRI brain negative for acute infarction   -CTA head/neck negative for large vessel occlusion for endovascular intervention   -repeat CT head negative for acute intracranial pathology  -echocardiogram with EF 60%, no regional wall motion abnormalities, mild right atrial dilation    -continuing aspirin and statin therapy  -hemoglobin A1c of, lipid panel with LDL of   -frequent neuro checks    -telemetry monitoring    -stroke education provided  -PT/OT/speech therapy/PMR evaluations  2  MRI cervical spine obtained which revealed significant cervical disease/canal stenosis and CSF effacement  Given this would recommend neurosurgery evaluation in regards to any potential surgical intervention or additional recommendations  Discussed with primary team   3  Medical management per primary team:   -normotensive/euglycemic goals    4  Fall precautions  5  Will further discuss plan of care with attending neurologist     Subjective:   Patient sitting in bedside chair, doing okay today  Overall notes some improvement with dexterity with the left hand and left upper and lower extremity weakness, but still impaired and weaker than his left side  Denies any acute changes otherwise any new neurologic symptoms  Per nursing staff no acute changes overnight or new neurologic symptoms exhibited      Vitals: Blood pressure 118/60, pulse 62, temperature 98 3 °F (36 8 °C), temperature source Oral, resp  rate 16, height 5' 7" (1 702 m), weight 67 2 kg (148 lb 3 2 oz), SpO2 98 %  ,Body mass index is 23 21 kg/m²  Physical Exam:  Physical Exam   Constitutional: He is oriented to person, place, and time  He appears well-developed and well-nourished  HENT:   Head: Normocephalic and atraumatic  Left Forehead abrasion   Eyes: Pupils are equal, round, and reactive to light  Conjunctivae and EOM are normal    Periorbital ecchymosis   Neck: Normal range of motion  Neck supple  Cardiovascular: Normal rate and regular rhythm  Pulmonary/Chest: Effort normal and breath sounds normal    Abdominal: Soft  Bowel sounds are normal    Musculoskeletal: Normal range of motion  Neurological: He is alert and oriented to person, place, and time  He has a normal Finger-Nose-Finger Test and a normal Heel to [employer]    Skin: Skin is warm and dry  Psychiatric: His speech is normal       Neurologic Exam     Mental Status   Oriented to person, place, and time  Follows 2 step commands  Attention: normal  Concentration: normal    Speech: speech is normal   Able to read  Able to repeat  Normal comprehension  Cranial Nerves     CN II   Visual fields full to confrontation  CN III, IV, VI   Pupils are equal, round, and reactive to light  Extraocular motions are normal    Diplopia: none  Ophthalmoparesis: none  Conjugate gaze: present    CN V   Facial sensation intact  CN VII   Facial expression full, symmetric  CN VIII   CN VIII normal      CN IX, X   CN IX normal    CN X normal      CN XI   CN XI normal      CN XII   CN XII normal      Motor Exam   Muscle bulk: normal  Overall muscle tone: normal  Remains with intact an age-appropriate strength in the left upper and lower extremity  Continues with 4-distal right upper extremity weakness and 4/5 right lower extremity weakness throughout       Sensory Exam   Light touch normal      Gait, Coordination, and Reflexes     Coordination   Finger to nose coordination: normal  Heel to shin coordination: normal    Tremor   Resting tremor: absent  Intention tremor: absent    Reflexes   Right plantar: upgoing  Left plantar: equivocal  Left ankle clonus: absentIntact/mildly brisk patellar DTRs, more so in the right than left  Lab, Imaging and other studies:   MRI brain 11/14/2018:No MR evidence of acute ischemia      Left frontal scalp soft tissue swelling only partially visualized, correlate clinically  MRI cervical spine 11/14/2018:1  Disc and facet osteophytosis resulting in severe central canal stenosis and chronic mass effect on the cord at C3-4 and C4-5  Severe bilateral neural foraminal narrowing from C2-3 to C4-5   2   No evidence of cord signal abnormality      CBC:   Results from last 7 days  Lab Units 11/15/18  0526 11/14/18  0519 11/13/18  1055   WBC Thousand/uL 9 29 9 03 8 23   RBC Million/uL 3 98 3 87* 4 10   HEMOGLOBIN g/dL 12 2 12 1 12 7   HEMATOCRIT % 37 2 35 7* 38 1   MCV fL 94 92 93   PLATELETS Thousands/uL 191 187 225   , BMP/CMP:   Results from last 7 days  Lab Units 11/15/18  0526 11/14/18  0519 11/13/18  1055   SODIUM mmol/L 133* 135* 132*   POTASSIUM mmol/L 4 2 4 5 4 6   CHLORIDE mmol/L 102 104 99*   CO2 mmol/L 25 27 29   BUN mg/dL 12 8 9   CREATININE mg/dL 0 73 0 79 0 84   CALCIUM mg/dL 8 3 8 0* 7 8*   AST U/L  --  29  --    ALT U/L  --  31  --    ALK PHOS U/L  --  66  --    EGFR ml/min/1 73sq m 92 89 87   , Vitamin B12:   , HgBA1C:   Results from last 7 days  Lab Units 11/14/18  0519   HEMOGLOBIN A1C % 4 7   , TSH:   , Coagulation:   Results from last 7 days  Lab Units 11/13/18  1055   INR  1 01   , Lipid Profile:   Results from last 7 days  Lab Units 11/14/18  0519   HDL mg/dL 51   LDL CALC mg/dL 84   TRIGLYCERIDES mg/dL 63   , Ammonia:   , Urinalysis:   Results from last 7 days  Lab Units 11/13/18  1202   COLOR UA  Yellow   CLARITY UA  Clear   SPEC GRAV UA  1 015   PH UA  8 5*   LEUKOCYTES UA  Negative   NITRITE UA  Negative   GLUCOSE UA mg/dl Negative   KETONES UA mg/dl Negative   BILIRUBIN UA  Negative   BLOOD UA  Trace*   , Drug Screen:   , Medication Drug Levels:       Invalid input(s): CARBAMAZEPINE,  PHENOBARB, LACOSAMIDE, OXCARBAZEPINE  VTE Prophylaxis: Sequential compression device (Venodyne)  and Heparin    Counseling / Coordination of Care  Total time spent today 20 minutes  Greater than 50% of total time was spent with the patient and / or family counseling and / or coordination of care   A description of the counseling / coordination of care: Discussed plan of care with patient and primary team

## 2018-11-15 NOTE — PLAN OF CARE
Activity Intolerance/Impaired Mobility     Mobility/activity is maintained at optimum level for patient Progressing        Communication Impairment     Ability to express needs and understand communication Gustavo Terry Discharge to home or other facility with appropriate resources Progressing        DISCHARGE PLANNING - CARE MANAGEMENT     Discharge to post-acute care or home with appropriate resources Progressing        Knowledge Deficit     Patient/family/caregiver demonstrates understanding of disease process, treatment plan, medications, and discharge instructions Progressing        Neurological Deficit     Neurological status is stable or improving Progressing        Nutrition     Nutrition/Hydration status is improving Progressing        PAIN - ADULT     Verbalizes/displays adequate comfort level or baseline comfort level Progressing        Potential for Aspiration     Non-ventilated patient's risk of aspiration is minimized Progressing        Potential for Falls     Patient will remain free of falls Progressing        Prexisting or High Potential for Compromised Skin Integrity     Skin integrity is maintained or improved Progressing        SAFETY ADULT     Maintain or return to baseline ADL function Progressing     Maintain or return mobility status to optimal level Progressing

## 2018-11-15 NOTE — CONSULTS
Consultation - Neurosurgery   Sera Martin 68 y o  male MRN: [id number]  Unit/Bed#: [facility] 730-01 Encounter: [id number]  Consult completed on 11/15/2018 at 2:00pm    Inpatient consult to Neurosurgery  Consult performed by: Paty Members ordered by: Florencio Bales          Assessment/Plan     Assessment:  1  Severe central canal stenosis and chronic mass effect on the cord at C3-5 and C4-5  2  Severe bilateral neural foraminal narrowing at C2-3 to C4-5  3  Status post fall  4  CVA - tPA administered  5  Anxiety  6  Depression  7  Constipation  8  OCD    Plan:  · Exam:  Alert and oriented x 3, moving all extremities with mild weakness in the right upper extremity, decreased sensation to pinprick along left upper extremity except for hand and left foot and posterior leg, +right Yeni, no drift  · Imaging reviewed personally and with attending  Results are as follows:  · MRI cervical spine without contrast 11/14/2018:  Disc and facet osteophytosis resulting in severe central canal stenosis and chronic mass effect on the cord at C3-4 and C4-5  Severe bilateral neural foraminal narrowing from C2-3 to C4-5  No evidence of cord signal abnormality  · MRI brain without contrast 11/14/2018:  No acute ischemia  Left frontal scalp soft tissue swelling  · CT head without contrast 11/14/2018:  No acute intracranial abnormality  · CTA stroke head and neck 11/13/2018:  Normal CTA of the head and neck  · CT stroke alert brain 11/13/2018:  No acute intracranial disease  Supraorbital left frontal scalp hematoma  · Medical management per primary team  · DVT ppx:  Heparin, SCDs  · Mobilize as tolerated with assistance  · Physical therapy and occupational therapy  · Neurosurgery will continue to follow  Patient will likely need surgical intervention given deteriorating motor and sensory exam   Consider C3-C6 PCDF with C2-7 fusion    Please call with questions or concerns    History of Present Illness HPI: Maurizio Higuera is a 68y o  year old male with PMH including anxiety, depression, OCD who presented to the ED status post fall with possible loss of consciousness  MRI of cervical spine showed severe central canal stenosis and chronic mass effect on the cord at C3-4 and C4-5 along with severe bilateral neural foraminal narrowing from the C2-3 to C4-5  The patient is right-handed  Patient states he got up from bed to check the time lost balance and fell to the ground  He is unsure if he lost consciousness but only remembers waking up to find EMS personnel surrounding him  He complains of full body pain  All extremities are a 6/10 dull pain  Low back pain is also 6/10 dull pain  He is having trouble opening his right hand and fine motor skills  He complains of numbness and weakness in extremities  Patient denies headache, dizziness, bowel or bladder problems, abdominal pain, confusion, changes in vision or hearing, neck pain  Patient reports he has been struggling with low back pain for a long time was scheduled for a epidural shot?  this week to alleviate symptoms  Patient is unsure if he takes blood thinners at home  He is concerned because he has not had a bowel movement since Sunday  He admits to frequent falls stating he fell least 2 times in the past few months  Review of Systems   Constitutional: Negative for chills and fever  HENT: Negative for hearing loss, tinnitus and trouble swallowing  Eyes: Negative for visual disturbance  Respiratory: Negative for chest tightness  Cardiovascular: Negative for chest pain and palpitations  Gastrointestinal: Negative for abdominal pain, diarrhea, nausea and vomiting  Genitourinary: Negative for difficulty urinating  Musculoskeletal: Positive for back pain and myalgias  Negative for neck pain  Skin: Positive for wound ( left eye)  Allergic/Immunologic: Negative for environmental allergies and food allergies     Neurological: Positive for weakness, light-headedness and numbness  Negative for headaches  Hematological: Does not bruise/bleed easily  Psychiatric/Behavioral: Negative for confusion  Historical Information   Past Medical History:   Diagnosis Date    Anxiety     Chronic low back pain     Depression     Hypercholesterolemia     Hypertension     OCD (obsessive compulsive disorder)      Past Surgical History:   Procedure Laterality Date    HERNIA REPAIR       History   Alcohol Use No     History   Drug Use No     History   Smoking Status    Never Smoker   Smokeless Tobacco    Never Used     Family History   Problem Relation Age of Onset    Diabetes type II Father     Glaucoma Son     Panic disorder Son        Meds/Allergies   all current active meds have been reviewed, current meds:   Current Facility-Administered Medications   Medication Dose Route Frequency    acetaminophen (TYLENOL) tablet 650 mg  650 mg Oral Q6H PRN    aspirin chewable tablet 81 mg  81 mg Oral Daily    atorvastatin (LIPITOR) tablet 40 mg  40 mg Oral Daily With Dinner    bacitracin topical ointment 1 large application  1 large application Topical BID    heparin (porcine) subcutaneous injection 5,000 Units  5,000 Units Subcutaneous Q8H [address]    oxyCODONE (ROXICODONE) IR tablet 5 mg  5 mg Oral Q4H PRN    polyethylene glycol (MIRALAX) packet 17 g  17 g Oral Daily    senna-docusate sodium (SENOKOT S) 8 6-50 mg per tablet 1 tablet  1 tablet Oral BID    sertraline (ZOLOFT) tablet 100 mg  100 mg Oral Daily    and PTA meds:   Prior to Admission Medications   Prescriptions Last Dose Informant Patient Reported? Taking?    Multiple Vitamins-Minerals (MULTIVITAMIN MEN 50+) TABS 2018 at Unknown time Child Yes Yes   Sig: Take by mouth   Omega-3 Fatty Acids (FISH OIL) 1200 MG CAPS 2018 at Unknown time Child Yes Yes   Sig: Take by mouth   docusate sodium (COLACE) 50 mg capsule Not Taking at Unknown time Child No No   Si qd   Patient not taking: Reported on 11/14/2018    glucosamine-chondroitin 500-400 MG tablet 11/13/2018 at Unknown time Child Yes Yes   Sig: Take 1 tablet by mouth 3 (three) times a day   metoprolol tartrate (LOPRESSOR) 25 mg tablet Not Taking at Unknown time Child Yes No   Sig: Take 12 5 mg by mouth daily    polyethylene glycol (MIRALAX) 17 g packet Not Taking at Unknown time Child No No   Sig: Take 17 g by mouth daily   Patient not taking: Reported on 11/14/2018    sertraline (ZOLOFT) 100 mg tablet 11/13/2018 at Unknown time Child Yes Yes   Sig: Take 100 mg by mouth daily 200 mg daily       Facility-Administered Medications: None     No Known Allergies    Objective   I/O       11/13 0701 - 11/14 0700 11/14 0701 - 11/15 0700 11/15 0701 - 11/16 0700    P  O  240 240 600    I V  (mL/kg) 393 8 (5 2) 337 5 (5)     Total Intake(mL/kg) 633 8 (8 4) 577 5 (8 6) 600 (8 9)    Urine (mL/kg/hr) 880 1175 (0 7) 600 (0 9)    Total Output 880 1175 600    Net -246 3 -597 5 0           Unmeasured Urine Occurrence  0 x     Unmeasured Stool Occurrence  0 x           Physical Exam   Constitutional: He is oriented to person, place, and time  Vital signs are normal  He appears well-developed and well-nourished  He is cooperative  HENT:   Head: Normocephalic  Eyes: Pupils are equal, round, and reactive to light  Conjunctivae and EOM are normal    Left eye ecchymosis and hematoma above the left eyebrow   Cardiovascular: Normal rate  Pulmonary/Chest: Effort normal  No respiratory distress  Neurological: He is alert and oriented to person, place, and time  He has a normal Finger-Nose-Finger Test    Reflex Scores:       Tricep reflexes are 2+ on the right side and 2+ on the left side  Bicep reflexes are 2+ on the right side and 2+ on the left side  Brachioradialis reflexes are 2+ on the right side and 2+ on the left side  Patellar reflexes are 2+ on the right side and 2+ on the left side         Achilles reflexes are 2+ on the right side and 2+ on the left side  Skin: Skin is warm and dry  Left eye ecchymosis with hematoma above the left eyebrow   Psychiatric: He has a normal mood and affect  His speech is normal and behavior is normal  Judgment and thought content normal  Cognition and memory are normal      Neurologic Exam     Mental Status   Oriented to person, place, and time  Registration: recalls 3 of 3 objects  Recall at 5 minutes: recalls 1 of 3 objects  Follows 1 step commands  Attention: normal  Concentration: normal    Speech: speech is normal   Level of consciousness: alert  Knowledge: good  Able to perform simple calculations  Able to name object  Able to repeat  Normal comprehension  Cranial Nerves     CN II   Visual fields full to confrontation  CN III, IV, VI   Pupils are equal, round, and reactive to light  Extraocular motions are normal    CN III: no CN III palsy  CN VI: no CN VI palsy  Nystagmus: none   Diplopia: none  Ophthalmoparesis: none  Upgaze: normal  Downgaze: normal  Conjugate gaze: present    CN V   Right facial sensation deficit: none  Left facial sensation deficit: none    CN VII   Right facial weakness: none  Left facial weakness: none    CN VIII   Hearing: intact    CN IX, X   CN IX normal    CN X normal      CN XI   Right trapezius strength: normal  Left trapezius strength: normal    CN XII   CN XII normal      Motor Exam   Muscle bulk: normal  Overall muscle tone: normal  Right arm pronator drift: absent  Left arm pronator drift: absent    Strength   Strength 5/5 except as noted  RUE:  4-/5 biceps, 4-/5 triceps, 3/5 deltoids  LLE:  4+/5 deltoid  BLE:  L4-5 plantar flexion     Sensory Exam   Light touch normal    Proprioception normal    Right arm pinprick: normal  Left arm pinprick: Decreased pinprick from shoulders to wrist   Right leg pinprick: normal  Left leg pinprick: Decreased pinprick posterior leg and foot    DST intact     Gait, Coordination, and Reflexes     Coordination Finger to nose coordination: normal    Tremor   Resting tremor: absent  Intention tremor: absent  Action tremor: absent    Reflexes   Right brachioradialis: 2+  Left brachioradialis: 2+  Right biceps: 2+  Left biceps: 2+  Right triceps: 2+  Left triceps: 2+  Right patellar: 2+  Left patellar: 2+  Right achilles: 2+  Left achilles: 2+  Right : 2+  Left : 2+  Right Cruz: present  Left Cruz: absent  Right ankle clonus: absent  Left ankle clonus: absent      Vitals:Blood pressure 132/57, pulse 70, temperature 98 3 °F (36 8 °C), temperature source Oral, resp  rate 18, height 5' 7" (1 702 m), weight 67 2 kg (148 lb 3 2 oz), SpO2 100 %  ,Body mass index is 23 21 kg/m²  Lab Results:     Results from last 7 days  Lab Units 11/15/18  0526 11/14/18  0519 11/13/18  1055   WBC Thousand/uL 9 29 9 03 8 23   HEMOGLOBIN g/dL 12 2 12 1 12 7   HEMATOCRIT % 37 2 35 7* 38 1   PLATELETS Thousands/uL 191 187 225   NEUTROS PCT %  --  73  --    MONOS PCT %  --  10  --        Results from last 7 days  Lab Units 11/15/18  0526 11/14/18  0519 11/13/18  1055   POTASSIUM mmol/L 4 2 4 5 4 6   CHLORIDE mmol/L 102 104 99*   CO2 mmol/L 25 27 29   BUN mg/dL 12 8 9   CREATININE mg/dL 0 73 0 79 0 84   CALCIUM mg/dL 8 3 8 0* 7 8*   ALK PHOS U/L  --  66  --    ALT U/L  --  31  --    AST U/L  --  29  --        Results from last 7 days  Lab Units 11/14/18  0519   MAGNESIUM mg/dL 2 2       Results from last 7 days  Lab Units 11/14/18  0519   PHOSPHORUS mg/dL 2 9       Results from last 7 days  Lab Units 11/13/18  1055   INR  1 01   PTT seconds 26     No results found for: TROPONINT  ABG:No results found for: PHART, CGK2XUV, PO2ART, AIY7BMC, J3VQXXGU, BEART, SOURCE    Imaging Studies: I have personally reviewed pertinent reports  and I have personally reviewed pertinent films in PACS    Ct Head Wo Contrast    Result Date: 11/14/2018  Impression: No acute intracranial abnormality   Workstation performed: Ada [id number] 5 Contrast    Result Date: 11/14/2018  Impression: No MR evidence of acute ischemia  Left frontal scalp soft tissue swelling only partially visualized, correlate clinically  Workstation performed: [id number]     Mri Cervical Spine Wo Contrast    Result Date: 11/15/2018  Impression: 1  Disc and facet osteophytosis resulting in severe central canal stenosis and chronic mass effect on the cord at C3-4 and C4-5  Severe bilateral neural foraminal narrowing from C2-3 to C4-5  2   No evidence of cord signal abnormality  Workstation performed: [id number]     Xr Stroke Alert Portable Chest    Result Date: 11/13/2018  Impression: Cardiomegaly  Diminished inspiration  No acute cardiopulmonary disease  Workstation performed: [id number]     Ct Stroke Alert Brain    Result Date: 11/13/2018  Impression: No acute intracranial disease  Supraorbital left frontal scalp hematoma Findings were directly discussed with Jerrod Morel on 11/13/2018 11:04 AM  Workstation performed: [id number]     Xr Hip/pelv 4+ Vw Left If Performed    Result Date: 11/13/2018  Impression: No acute osseous abnormality  Workstation performed: [id number]     Xr Hip/pelvis 4+ Vw Right If Performed    Result Date: 11/13/2018  Impression: No acute osseous abnormality  Workstation performed: [id number]     Cta Stroke Alert (head/neck)    Result Date: 11/13/2018  Impression: Normal CTA of the head and neck  No large vessel flow restrictive disease within the head or neck  * I personally telephoned this result to Sarai Sabillon on 11/13/2018 11:04 AM  Workstation performed: [id number]     EKG, Pathology, and Other Studies: I have personally reviewed pertinent reports  VTE Prophylaxis: Heparin    Code Status: Level 1 - Full Code  Advance Directive and Living Will:      Power of :    POLST:      Counseling / Coordination of Care  I spent 45 minutes with the patient

## 2018-11-15 NOTE — PLAN OF CARE
Problem: OCCUPATIONAL THERAPY ADULT  Goal: Performs self-care activities at highest level of function for planned discharge setting  See evaluation for individualized goals  Treatment Interventions: ADL retraining, Functional transfer training, UE strengthening/ROM, Endurance training, Patient/family training, Equipment evaluation/education, Fine motor coordination activities, Continued evaluation, Energy conservation, Activityengagement          See flowsheet documentation for full assessment, interventions and recommendations  Outcome: Progressing  Limitation: Decreased ADL status, Decreased UE ROM, Decreased UE strength, Decreased endurance, Decreased self-care trans, Decreased high-level ADLs, Decreased fine motor control  Prognosis: Fair  Assessment: Pt participated in occupational therapy with focus on activity tolerance,  bed mob, unsupported sitting balance and tolerance for pt engagement in functional self-care task/LB self-care/LB dressing  Pt cleared by BENJAMÍN/Holly for pt participation in occupational therapy  Pt received HOB raised/supine pt sitting upright and agreeable to therapy following pt Identifiers confirmed  Pt reported his goal today " to get back to normal and live a normal live again"   Pt requires assist for functional transfers and LB self-care/LB dressing 2* pt decreased strength, coordination and balance  Pt will require in-pt rehab to continue to address pt above noted deficits which currently impair pt ADL and functional mob     Recommendation: Physiatry Consult  OT Discharge Recommendation: Short Term Rehab  OT - OK to Discharge: Yes (when medically stable)

## 2018-11-16 PROBLEM — E87.1 HYPONATREMIA: Status: ACTIVE | Noted: 2018-11-16

## 2018-11-16 PROBLEM — M48.02 CERVICAL STENOSIS OF SPINE: Status: ACTIVE | Noted: 2018-11-13

## 2018-11-16 LAB
APTT PPP: 28 SECONDS (ref 26–38)
INR PPP: 1.05 (ref 0.86–1.17)
PROTHROMBIN TIME: 13.8 SECONDS (ref 11.8–14.2)

## 2018-11-16 PROCEDURE — 97530 THERAPEUTIC ACTIVITIES: CPT

## 2018-11-16 PROCEDURE — 97116 GAIT TRAINING THERAPY: CPT

## 2018-11-16 PROCEDURE — 97110 THERAPEUTIC EXERCISES: CPT

## 2018-11-16 PROCEDURE — 99232 SBSQ HOSP IP/OBS MODERATE 35: CPT | Performed by: INTERNAL MEDICINE

## 2018-11-16 PROCEDURE — 87081 CULTURE SCREEN ONLY: CPT | Performed by: PHYSICIAN ASSISTANT

## 2018-11-16 PROCEDURE — 85730 THROMBOPLASTIN TIME PARTIAL: CPT | Performed by: PHYSICIAN ASSISTANT

## 2018-11-16 PROCEDURE — 85610 PROTHROMBIN TIME: CPT | Performed by: PHYSICIAN ASSISTANT

## 2018-11-16 RX ORDER — CHLORHEXIDINE GLUCONATE 0.12 MG/ML
15 RINSE ORAL ONCE
Status: COMPLETED | OUTPATIENT
Start: 2018-11-19 | End: 2018-11-18

## 2018-11-16 RX ORDER — SODIUM CHLORIDE 9 MG/ML
125 INJECTION, SOLUTION INTRAVENOUS CONTINUOUS
Status: DISCONTINUED | OUTPATIENT
Start: 2018-11-19 | End: 2018-11-19

## 2018-11-16 RX ADMIN — BACITRACIN ZINC 1 LARGE APPLICATION: 500 OINTMENT TOPICAL at 08:46

## 2018-11-16 RX ADMIN — ASPIRIN 81 MG 81 MG: 81 TABLET ORAL at 08:45

## 2018-11-16 RX ADMIN — SENNOSIDES AND DOCUSATE SODIUM 1 TABLET: 8.6; 5 TABLET ORAL at 08:45

## 2018-11-16 RX ADMIN — SENNOSIDES AND DOCUSATE SODIUM 1 TABLET: 8.6; 5 TABLET ORAL at 17:18

## 2018-11-16 RX ADMIN — HEPARIN SODIUM 5000 UNITS: 5000 INJECTION INTRAVENOUS; SUBCUTANEOUS at 05:23

## 2018-11-16 RX ADMIN — HEPARIN SODIUM 5000 UNITS: 5000 INJECTION INTRAVENOUS; SUBCUTANEOUS at 14:14

## 2018-11-16 RX ADMIN — HEPARIN SODIUM 5000 UNITS: 5000 INJECTION INTRAVENOUS; SUBCUTANEOUS at 21:09

## 2018-11-16 RX ADMIN — BACITRACIN ZINC 1 LARGE APPLICATION: 500 OINTMENT TOPICAL at 17:18

## 2018-11-16 RX ADMIN — ATORVASTATIN CALCIUM 40 MG: 40 TABLET, FILM COATED ORAL at 17:18

## 2018-11-16 RX ADMIN — SERTRALINE HYDROCHLORIDE 100 MG: 100 TABLET ORAL at 08:45

## 2018-11-16 RX ADMIN — POLYETHYLENE GLYCOL 3350 17 G: 17 POWDER, FOR SOLUTION ORAL at 08:45

## 2018-11-16 NOTE — SOCIAL WORK
LM spoke with son Az Sheehan who was at pt bedside  Discussed plan of care for possible SNF and reviewed choices  Aislinn Quiroz states that he will do additional research but states he prefers [address] for when his dad is ready for discharge  Aislinn Quiroz states that the pt had a psychiatric admission in January of 2017 at [facility] and has "severe OCD "  [facility]

## 2018-11-16 NOTE — PHYSICAL THERAPY NOTE
Physical Therapy Progress Note     11/16/18 1005   Pain Assessment   Pain Assessment 0-10   Pain Score 5   Pain Location Neck   Restrictions/Precautions   Weight Bearing Precautions Per Order No   Other Precautions Chair Alarm;Pain; Fall Risk   General   Chart Reviewed Yes   Family/Caregiver Present No   Cognition   Overall Cognitive Status WFL   Arousal/Participation Alert; Cooperative   Subjective   Subjective Pt reports neck pain and agrees to participate  Bed Mobility   Supine to Sit 4  Minimal assistance   Additional items Assist x 1;HOB elevated;LE management;Verbal cues   Transfers   Sit to Stand 4  Minimal assistance   Additional items Assist x 1;Verbal cues;Armrests   Stand to Sit 4  Minimal assistance   Additional items Assist x 1;Verbal cues   Ambulation/Elevation   Gait pattern Poor UE support;Narrow YEN; Foward flexed; Short stride  (slow)   Gait Assistance 4  Minimal assist   Additional items Assist x 1;Verbal cues   Assistive Device Rolling walker   Distance 100 feet x 4   Stair Management Assistance Not tested   Balance   Static Sitting Fair   Static Standing Fair -   Dynamic Standing Poor +   Ambulatory Poor +   Endurance Deficit   Endurance Deficit Yes   Endurance Deficit Description weakness and pain   Activity Tolerance   Activity Tolerance Patient limited by pain   Nurse [address] to see per BENJAMÍN Alicia   Exercises   Glute Sets Sitting;10 reps   Hip Flexion Sitting;15 reps;AROM; Bilateral   Knee AROM Long Arc Quad Sitting;20 reps;AROM; Bilateral   Ankle Pumps Sitting;15 reps;AROM; Bilateral   Assessment   Prognosis Fair   Problem List Decreased strength;Decreased endurance; Impaired balance;Decreased mobility;Pain   Assessment Pt is making steady progress with the use of a rolling walker  Limited by pain and weakness however eager to walk today  Requires min assist for all mobility  Gait is slow and unsteady at times however without loss of balance    Requires rest breaks between ambulation trials  Pt expresses frustration regarding current medical status stating "it's frustrating and I want to get back to normal "  Completed seated exercises to conclude session  Pt would benefit from continued physical therapy to maximize functional mobility and safety  Goals   Patient Goals To get back to where I was   STG Expiration Date 11/26/18   Treatment Day 2   Plan   Treatment/Interventions Functional transfer training;LE strengthening/ROM; Elevations; Therapeutic exercise; Endurance training;Patient/family training;Bed mobility;Gait training;Spoke to nursing   Progress Progressing toward goals   PT Frequency (4-6x/week)   Recommendation   Recommendation Post acute IP rehab   Equipment Recommended Walker  (GAGE)     Mendel Mcintyre, PTA

## 2018-11-16 NOTE — ORTHOTIC NOTE
Orthotic Note            Date: 11/16/2018      Patient Name: Kirt Brito            Reason for Consult:  Patient Active Problem List   Diagnosis    Chronic idiopathic constipation    OCD (obsessive compulsive disorder)    CVA [facility]cerebral vascular accident) ([facility] [address] )    Depression    Anxiety    Cervical spinal stenosis     Ortho Tech educated/measured/fit/donned pt with an [employer] while supine in bed  Molded bilateral sides/flares and anterior chin plate set to level 2 for optimal fit/comfort  Pt tolerated fitting well, but states he does not like to [employer]  Re-educated pt multiple times regarding the MD's orders and the importance of wearing the [employer] at all times per MD's order  BENJAMÍN Owens aware of pt stating he does not like wearing the [employer]  Reviewed instructions x10, replacement vista pads and jya shower collar left at bedside  Ortho Tech will continue daily follow up  Recommendations:  Please contact Ortho Tech Ack [phone] regarding bracing instructions/adjustments  Thank you!     Bronson Torres BS, Restorative Technician, [employer]

## 2018-11-16 NOTE — PLAN OF CARE
Problem: PHYSICAL THERAPY ADULT  Goal: Performs mobility at highest level of function for planned discharge setting  See evaluation for individualized goals  Treatment/Interventions: Functional transfer training, LE strengthening/ROM, Endurance training, Patient/family training, Bed mobility, Gait training, Spoke to nursing, OT  Equipment Recommended: Lory Treviño       See flowsheet documentation for full assessment, interventions and recommendations  Outcome: Progressing  Prognosis: Fair  Problem List: Decreased strength, Decreased endurance, Impaired balance, Decreased mobility, Pain  Assessment: Pt is making steady progress with the use of a rolling walker  Limited by pain and weakness however eager to walk today  Requires min assist for all mobility  Gait is slow and unsteady at times however without loss of balance  Requires rest breaks between ambulation trials  Pt expresses frustration regarding current medical status stating "it's frustrating and I want to get back to normal "  Completed seated exercises to conclude session  Pt would benefit from continued physical therapy to maximize functional mobility and safety  Barriers to Discharge: Inaccessible home environment     Recommendation: Post acute IP rehab     PT - OK to Discharge: Yes (To rehab when medically stable)    See flowsheet documentation for full assessment

## 2018-11-16 NOTE — CONSULTS
This is a duplicate order  Please see progress note from 11/15/18  Mor Nunez MD  Physical Medicine and Rehabilitation    Addendum: Per Neurosurgery note plan for possible intervention for myelopathy  Will follow-up with patient after definitive treatment

## 2018-11-16 NOTE — SOCIAL WORK
CM spoke with patient regarding discharge plan  Pt was unable to make choices for facilities  States his son is supposed to come in to see him  Permission to outreach to son regarding choices for STR  LM also spoke with son Stephanie Rousseau  At [phone] who states he is on his way in to the hospital and will speak with CM at that time  Ivan aware of list of STR choices at pt bedside

## 2018-11-16 NOTE — RESTORATIVE TECHNICIAN NOTE
Restorative Specialist Mobility Note       Activity: Ambulate in cisneros, Ambulate in room, Bathroom privileges, Chair, Dangle, Stand at bedside (Educated/encouraged pt to ambulate with assistance 3-4 x's/day  Bed alarm on   Pt callbell, phone/tray within reach )     Assistive Device: Front wheel walker          Priyanka MARTINEZ, Restorative Technician, [employer]

## 2018-11-16 NOTE — PROGRESS NOTES
Progress Note - Neurosurgery   Hayley Alfaro 68 y o  male MRN: [id number]  Unit/Bed#: [facility] 730-01 Encounter: [id number]    Assessment:  1  Severe central canal stenosis and chronic mass effect on the cord at C3-5 and C4-5  2  Severe bilateral neural foraminal narrowing at C2-3 to C4-5  3  Status post fall  4  CVA - tPA administered  5  Anxiety  6  Depression  7  Constipation  8  OCD     Plan:  · Exam:  Alert and oriented x 3, moving all extremities with mild weakness in the right upper extremity, decreased sensation to pinprick along the left upper and lower extremities, no Yein or clonus noted, no drift, abnormal finger-to-nose on the right but able to self correct, no neck or back tenderness  · Imaging reviewed personally and with attending  Results are as follows:  ? MRI cervical spine without contrast 11/14/2018:  Disc and facet osteophytosis resulting in severe central canal stenosis and chronic mass effect on the cord at C3-4 and C4-5  Severe bilateral neural foraminal narrowing from C2-3 to C4-5  No evidence of cord signal abnormality  Cord signal change noted when reviewed with attendings  ? MRI brain without contrast 11/14/2018:  No acute ischemia  Left frontal scalp soft tissue swelling  ? CT head without contrast 11/14/2018:  No acute intracranial abnormality  ? CTA stroke head and neck 11/13/2018:  Normal CTA of the head and neck  ? CT stroke alert brain 11/13/2018:  No acute intracranial disease  Supraorbital left frontal scalp hematoma  · [place] Romayor collar ordered to be worn at all times to protect c-spine while patient decides on surgery  · Medical management per primary team  · DVT ppx:  Heparin, SCDs  · Mobilize as tolerated with assistance  ? Physical therapy and occupational therapy  · Discussed imaging results with the patient    Informed him of the importance of surgical intervention to decompress cervical spine and that no surgical intervention will result in continued or worsening motor and sensory function along with possible further deterioration or paralysis if patient falls  Patient is hesitant to agree to surgery  He would like more information on the procedure  · Neurosurgery will continue to follow  Patient will likely need surgical intervention given deteriorating motor and sensory exam and the presence of cord signal change  Consider C3-C6 PCDF with C2-7 fusion  Patient is still unsure about surgical intervention, intervention pending  Please call with questions or concerns  Subjective/Objective   Chief Complaint: "I am nervous to do surgery  " / follow-up cervical stenosis    Subjective:  Patient complains of 6/10 extremity x4 pain described as throbbing and worse with movement  He denies any neck pain  He expresses confusion about surgical intervention on C-spine given most of his pain is located in his low back and currently he has no neck pain  Patient still has not had a bowel movement  He is urinating for baseline  He denies headache, dizziness, chest pain, shortness of breath, abdominal pain, nausea or vomiting, bowel or bladder problems, changes in weakness numbness or tingling  Objective:  Lying in bed, NAD    I/O       11/14 0701 - 11/15 0700 11/15 0701 - 11/16 0700 11/16 0701 - 11/17 0700    P  O  240 820 240    I V  (mL/kg) 337 5 (5)      Total Intake(mL/kg) 577 5 (8 6) 820 (12 2) 240 (3 6)    Urine (mL/kg/hr) 1175 (0 7) 1175 (0 7) 450 (1 4)    Total Output 1175 1175 450    Net -597 5 -355 -210           Unmeasured Urine Occurrence 0 x      Unmeasured Stool Occurrence 0 x 0 x           Invasive Devices     Peripheral Intravenous Line            Peripheral IV 11/13/18 Right Antecubital 3 days                Physical Exam:  Vitals: Blood pressure 118/59, pulse 55, temperature 98 6 °F (37 °C), temperature source Oral, resp  rate 18, height 5' 7" (1 702 m), weight 67 2 kg (148 lb 3 2 oz), SpO2 97 %  ,Body mass index is 23 21 kg/m²      General appearance: alert, appears stated age, cooperative and no distress  Head: Normocephalic, without obvious abnormality, hematoma above left eye  Eyes: EOMI, PERRL, bilateral ecchymosis around eyes, worse in left eye  Neck: supple, symmetrical, trachea midline and NT  Back: no kyphosis present, no tenderness to percussion or palpation  Lungs: non labored breathing  Heart: regular heart rate  Neurologic:   Mental status: Alert, oriented x3 , thought content appropriate  Cranial nerves: grossly intact (Cranial nerves II-XII)  Sensory:  Normal to light touch, decreased pinprick along the lateral and medial aspect of left arm with normal sensation in left hand, decreased pinprick along entire left leg, DST intact  Motor: moving all extremities with some weakness in BUE, worse in the right    Strength 5/5 except   RUE:  4-/5 throughout, 3/5  strength   LLE:  4/5  strength   BLE:  4/5 DF  Reflexes: 2+ and symmetric  Coordination:  Finger to nose abnormal on the right but able to self correct, normal finger-to-nose on the left, no drift bilaterally    Lab Results:    Results from last 7 days  Lab Units 11/15/18  0526 11/14/18  0519 11/13/18  1055   WBC Thousand/uL 9 29 9 03 8 23   HEMOGLOBIN g/dL 12 2 12 1 12 7   HEMATOCRIT % 37 2 35 7* 38 1   PLATELETS Thousands/uL 191 187 225   NEUTROS PCT %  --  73  --    MONOS PCT %  --  10  --        Results from last 7 days  Lab Units 11/15/18  0526 11/14/18  0519 11/13/18  1055   POTASSIUM mmol/L 4 2 4 5 4 6   CHLORIDE mmol/L 102 104 99*   CO2 mmol/L 25 27 29   BUN mg/dL 12 8 9   CREATININE mg/dL 0 73 0 79 0 84   CALCIUM mg/dL 8 3 8 0* 7 8*   ALK PHOS U/L  --  66  --    ALT U/L  --  31  --    AST U/L  --  29  --        Results from last 7 days  Lab Units 11/14/18  0519   MAGNESIUM mg/dL 2 2       Results from last 7 days  Lab Units 11/14/18  0519   PHOSPHORUS mg/dL 2 9       Results from last 7 days  Lab Units 11/13/18  1055   INR  1 01   PTT seconds 26     No results found for: [address] [facility]  ABG:No results found for: PHART, RUT9UXP, PO2ART, XJG8SVI, S0FOVFBI, BEART, SOURCE    Imaging Studies: I have personally reviewed pertinent reports  and I have personally reviewed pertinent films in PACS    Ct Head Wo Contrast    Result Date: 11/14/2018  Impression: No acute intracranial abnormality  Workstation performed: [id number]     Mri Brain Wo Contrast    Result Date: 11/14/2018  Impression: No MR evidence of acute ischemia  Left frontal scalp soft tissue swelling only partially visualized, correlate clinically  Workstation performed: [id number]     Mri Cervical Spine Wo Contrast    Result Date: 11/15/2018  Impression: 1  Disc and facet osteophytosis resulting in severe central canal stenosis and chronic mass effect on the cord at C3-4 and C4-5  Severe bilateral neural foraminal narrowing from C2-3 to C4-5  2   No evidence of cord signal abnormality  Workstation performed: [id number]     Xr Stroke Alert Portable Chest    Result Date: 11/13/2018  Impression: Cardiomegaly  Diminished inspiration  No acute cardiopulmonary disease  Workstation performed: [id number]     Ct Stroke Alert Brain    Result Date: 11/13/2018  Impression: No acute intracranial disease  Supraorbital left frontal scalp hematoma Findings were directly discussed with King Tapia on 11/13/2018 11:04 AM  Workstation performed: [id number]     Xr Hip/pelv 4+ Vw Left If Performed    Result Date: 11/13/2018  Impression: No acute osseous abnormality  Workstation performed: [id number]     Xr Hip/pelvis 4+ Vw Right If Performed    Result Date: 11/13/2018  Impression: No acute osseous abnormality  Workstation performed: [id number]     Cta Stroke Alert (head/neck)    Result Date: 11/13/2018  Impression: Normal CTA of the head and neck  No large vessel flow restrictive disease within the head or neck   * I personally telephoned this result to Gail Mcclure on 11/13/2018 11:04 AM  Workstation performed: [id number] EKG, Pathology, and Other Studies: I have personally reviewed pertinent reports        VTE Pharmacologic Prophylaxis: Heparin    VTE Mechanical Prophylaxis: sequential compression device

## 2018-11-16 NOTE — PROGRESS NOTES
Progress Note - Kam Keys 1945, 68 y o  male MRN: [id number]    Unit/Bed#: [facility] 730-01 Encounter: [id number]    Primary Care Provider: Susanna Castrejon DO   Date and time admitted to hospital: 2018 10:45 AM        CVA (cerebral vascular accident) [facility])   Assessment & Plan    Stroke status post tPA  Continue aspirin statin  Neurology following       * Cervical spinal stenosis   Assessment & Plan    Neurosurgery consulted  Physical therapy     Hyponatremia   Assessment & Plan    Monitor sodium levels      Anxiety   Assessment & Plan    Continue anxiolytics       VTE Pharmacologic Prophylaxis:   Pharmacologic: Heparin  Mechanical VTE Prophylaxis in Place: Yes    Patient Centered Rounds: I have performed bedside rounds with nursing staff today  Discussions with Specialists or Other Care Team Provider:    Education and Discussions with Family / Patient:  Discussed with the patient, called [phone] and left a message for carline Grider Duty over phone    Time Spent for Care: 30 minutes  More than 50% of total time spent on counseling and coordination of care as described above  Current Length of Stay: 3 day(s)    Current Patient Status: Inpatient   Certification Statement: The patient will continue to require additional inpatient hospital stay due to As mentioned    Discharge Plan:  Awaiting Neurosurgery inputs, physical therapy recommends inpatient rehab pending placement discussed with case management    Code Status: Level 1 - Full Code      Subjective:     Comfortably in bed      Objective:     Vitals:   Temp (24hrs), Av 6 °F (37 °C), Min:98 4 °F (36 9 °C), Max:98 8 °F (37 1 °C)    Temp:  [98 4 °F (36 9 °C)-98 8 °F (37 1 °C)] 98 4 °F (36 9 °C)  HR:  [55-70] 59  Resp:  [16-18] 18  BP: (118-132)/(57-61) 130/61  SpO2:  [95 %-100 %] 95 %  Body mass index is 23 21 kg/m²  Input and Output Summary (last 24 hours):        Intake/Output Summary (Last 24 hours) at 18 1554  Last data filed at 11/16/18 1201   Gross per 24 hour   Intake              700 ml   Output             1325 ml   Net             -625 ml       Physical Exam:     Physical Exam    Comfortably lying in bed  Neck supple  Lungs diminished breath sounds bilaterally  Heart sounds S1-S2 noted  Awake obeys simple commands  Right upper extremity weakness noted  Pulses noted  No rash  No pedal edema    Additional Data:     Labs:      Results from last 7 days  Lab Units 11/15/18  0526 11/14/18  0519   WBC Thousand/uL 9 29 9 03   HEMOGLOBIN g/dL 12 2 12 1   HEMATOCRIT % 37 2 35 7*   PLATELETS Thousands/uL 191 187   NEUTROS PCT %  --  73   LYMPHS PCT %  --  16   MONOS PCT %  --  10   EOS PCT %  --  1       Results from last 7 days  Lab Units 11/15/18  0526 11/14/18  0519   SODIUM mmol/L 133* 135*   POTASSIUM mmol/L 4 2 4 5   CHLORIDE mmol/L 102 104   CO2 mmol/L 25 27   BUN mg/dL 12 8   CREATININE mg/dL 0 73 0 79   ANION GAP mmol/L 6 4   CALCIUM mg/dL 8 3 8 0*   ALBUMIN g/dL  --  2 9*   TOTAL BILIRUBIN mg/dL  --  0 51   ALK PHOS U/L  --  66   ALT U/L  --  31   AST U/L  --  29   GLUCOSE RANDOM mg/dL 81 85       Results from last 7 days  Lab Units 11/13/18  1055   INR  1 01       Results from last 7 days  Lab Units 11/13/18  1131   POC GLUCOSE mg/dl 78       Results from last 7 days  Lab Units 11/14/18  0519   HEMOGLOBIN A1C % 4 7               * I Have Reviewed All Lab Data Listed Above  * Additional Pertinent Lab Tests Reviewed:  All Labs Within Last 24 Hours Reviewed    Imaging:    Imaging Reports Reviewed Today Include:   Imaging Personally Reviewed by Myself Includes:     Recent Cultures (last 7 days):           Last 24 Hours Medication List:     Current Facility-Administered Medications:  acetaminophen 650 mg Oral Q6H PRN TAZ Brito   aspirin 81 mg Oral Daily Anika Delgado MD   atorvastatin 40 mg Oral Daily With TAZ Samuel   bacitracin 1 large application Topical BID Can Padilla DO heparin (porcine) 5,000 Units Subcutaneous Q8H [facility] TAZ Dewey   oxyCODONE 5 mg Oral Q4H PRN TAZ Dewey   polyethylene glycol 17 g Oral Daily TAZ Dewey   senna-docusate sodium 1 tablet Oral BID TAZ Dewey   sertraline 100 mg Oral Daily TAZ Dewey        Today, Patient Was Seen By: Dylon Watts MD    ** Please Note: Dictation voice to text software may have been used in the creation of this document   **

## 2018-11-17 LAB
ABO GROUP BLD: NORMAL
BLD GP AB SCN SERPL QL: NEGATIVE
RH BLD: POSITIVE
SPECIMEN EXPIRATION DATE: NORMAL

## 2018-11-17 PROCEDURE — 99232 SBSQ HOSP IP/OBS MODERATE 35: CPT | Performed by: PHYSICIAN ASSISTANT

## 2018-11-17 PROCEDURE — 86850 RBC ANTIBODY SCREEN: CPT | Performed by: PHYSICIAN ASSISTANT

## 2018-11-17 PROCEDURE — 86900 BLOOD TYPING SEROLOGIC ABO: CPT | Performed by: PHYSICIAN ASSISTANT

## 2018-11-17 PROCEDURE — 86901 BLOOD TYPING SEROLOGIC RH(D): CPT | Performed by: PHYSICIAN ASSISTANT

## 2018-11-17 PROCEDURE — 99232 SBSQ HOSP IP/OBS MODERATE 35: CPT | Performed by: INTERNAL MEDICINE

## 2018-11-17 RX ADMIN — SENNOSIDES AND DOCUSATE SODIUM 1 TABLET: 8.6; 5 TABLET ORAL at 08:36

## 2018-11-17 RX ADMIN — BACITRACIN ZINC 1 LARGE APPLICATION: 500 OINTMENT TOPICAL at 08:36

## 2018-11-17 RX ADMIN — ATORVASTATIN CALCIUM 40 MG: 40 TABLET, FILM COATED ORAL at 18:26

## 2018-11-17 RX ADMIN — HEPARIN SODIUM 5000 UNITS: 5000 INJECTION INTRAVENOUS; SUBCUTANEOUS at 05:31

## 2018-11-17 RX ADMIN — HEPARIN SODIUM 5000 UNITS: 5000 INJECTION INTRAVENOUS; SUBCUTANEOUS at 21:01

## 2018-11-17 RX ADMIN — SERTRALINE HYDROCHLORIDE 100 MG: 100 TABLET ORAL at 08:36

## 2018-11-17 RX ADMIN — HEPARIN SODIUM 5000 UNITS: 5000 INJECTION INTRAVENOUS; SUBCUTANEOUS at 13:14

## 2018-11-17 RX ADMIN — BACITRACIN ZINC 1 LARGE APPLICATION: 500 OINTMENT TOPICAL at 18:26

## 2018-11-17 RX ADMIN — ASPIRIN 81 MG 81 MG: 81 TABLET ORAL at 08:36

## 2018-11-17 RX ADMIN — POLYETHYLENE GLYCOL 3350 17 G: 17 POWDER, FOR SOLUTION ORAL at 08:36

## 2018-11-17 NOTE — PROGRESS NOTES
Progress Note - Tomas Zainab 1945, 68 y o  male MRN: [id number]    Unit/Bed#: [facility] 730-01 Encounter: [id number]    Primary Care Provider: Anmol Jackson DO   Date and time admitted to hospital: 2018 10:45 AM        CVA [facility]cerebral vascular accident) [facility])   Assessment & Plan    Stroke status post tPA  Continue aspirin statin  Neurology following       * Cervical spinal stenosis   Assessment & Plan    Neurosurgery input noted  Possible surgery on Monday  Cervical collar      Hyponatremia   Assessment & Plan    Monitor sodium levels      Anxiety   Assessment & Plan    Continue anxiolytics         VTE Pharmacologic Prophylaxis:   Pharmacologic: Heparin  Mechanical VTE Prophylaxis in Place: Yes    Patient Centered Rounds: I have performed bedside rounds with nursing staff today  Discussions with Specialists or Other Care Team Provider:     Education and Discussions with Family / Patient: pt, called and updated son Sue Zapata over phone    Time Spent for Care: 30 minutes  More than 50% of total time spent on counseling and coordination of care as described above  Current Length of Stay: 4 day(s)    Current Patient Status: Inpatient   Certification Statement: The patient will continue to require additional inpatient hospital stay due to As mentioned    Discharge Plan:  Awaiting neurosurgical procedure likely on Monday    Code Status: Level 1 - Full Code      Subjective:     Comfortably lying in bed  Reports feeling tired    Objective:     Vitals:   Temp (24hrs), Av 2 °F (36 8 °C), Min:98 1 °F (36 7 °C), Max:98 4 °F (36 9 °C)    Temp:  [98 1 °F (36 7 °C)-98 4 °F (36 9 °C)] 98 1 °F (36 7 °C)  HR:  [59-64] 64  Resp:  [18] 18  BP: (113-130)/(52-61) 118/57  SpO2:  [95 %-99 %] 99 %  Body mass index is 23 21 kg/m²  Input and Output Summary (last 24 hours):        Intake/Output Summary (Last 24 hours) at 18 1408  Last data filed at 18 1317   Gross per 24 hour   Intake 480 ml   Output             1275 ml   Net             -795 ml       Physical Exam:     Physical Exam    Comfortably lying in bed  Bruises noted periorbital left side  Lungs diminished breath sounds bilaterally  Heart sounds S1 and S2 noted  Abdomen soft  Awake obeys simple commands  Right upper extremity weakness noted  No rash  Pulses noted    Additional Data:     Labs:      Results from last 7 days  Lab Units 11/15/18  0526 11/14/18  0519   WBC Thousand/uL 9 29 9 03   HEMOGLOBIN g/dL 12 2 12 1   HEMATOCRIT % 37 2 35 7*   PLATELETS Thousands/uL 191 187   NEUTROS PCT %  --  73   LYMPHS PCT %  --  16   MONOS PCT %  --  10   EOS PCT %  --  1       Results from last 7 days  Lab Units 11/15/18  0526 11/14/18  0519   SODIUM mmol/L 133* 135*   POTASSIUM mmol/L 4 2 4 5   CHLORIDE mmol/L 102 104   CO2 mmol/L 25 27   BUN mg/dL 12 8   CREATININE mg/dL 0 73 0 79   ANION GAP mmol/L 6 4   CALCIUM mg/dL 8 3 8 0*   ALBUMIN g/dL  --  2 9*   TOTAL BILIRUBIN mg/dL  --  0 51   ALK PHOS U/L  --  66   ALT U/L  --  31   AST U/L  --  29   GLUCOSE RANDOM mg/dL 81 85       Results from last 7 days  Lab Units 11/16/18  1659   INR  1 05       Results from last 7 days  Lab Units 11/13/18  1131   POC GLUCOSE mg/dl 78       Results from last 7 days  Lab Units 11/14/18  0519   HEMOGLOBIN A1C % 4 7               * I Have Reviewed All Lab Data Listed Above  * Additional Pertinent Lab Tests Reviewed:  All Labs Within Last 24 Hours Reviewed    Imaging:    Imaging Reports Reviewed Today Include:   Imaging Personally Reviewed by Myself Includes:     Recent Cultures (last 7 days):           Last 24 Hours Medication List:     Current Facility-Administered Medications:  acetaminophen 650 mg Oral Q6H PRN TAZ Aquino   aspirin 81 mg Oral Daily Anika Purvis MD   atorvastatin 40 mg Oral Daily With TAZ Samuel   bacitracin 1 large application Topical BID Can Padilla DO   [START ON 11/19/2018] cefazolin 1,000 mg Intravenous On Call To SHANTA Lora   [START ON 11/19/2018] chlorhexidine 15 mL Swish & Spit Once Kalli Kang PA-C   heparin (porcine) 5,000 Units Subcutaneous [facility] TAZ Ray   oxyCODONE 5 mg Oral Q4H PRN TAZ Ray   polyethylene glycol 17 g Oral Daily TAZ Ray   senna-docusate sodium 1 tablet Oral BID TAZ Ray   sertraline 100 mg Oral Daily TAZ Ray   [START ON 11/19/2018] sodium chloride 125 mL/hr Intravenous Continuous Kalli Kagn PA-C        Today, Patient Was Seen By: Gelacio Miramontes MD    ** Please Note: Dictation voice to text software may have been used in the creation of this document   **

## 2018-11-17 NOTE — PROGRESS NOTES
Progress Note - Neurosurgery   August Coelho 68 y o  male MRN: [id number]  [facility] Encounter: [id number]    Assessment:  1  Severe central canal stenosis and chronic mass effect on the cord at C3-5 and C4-5  2  Severe bilateral neural foraminal narrowing at C2-3 to C4-5  3  Status post fall  4  CVA - tPA administered  5  Anxiety  6  Depression  7  Constipation  8  OCD      Plan:  -refusing to wear the collar in bed  -possible surgical intervention earlier in the week  -I convinced him to wear the collar when out of bed and ambulating    Subjective/Objective     Other than collar issues no other complaints      Intake/Output       11/17/18 0701 - 11/18/18 0700      [id number] 1842-7660 Total       Intake    P O   480  -- 480    Total Intake 480 -- 480       Output    Urine  400  -- 400    Urine 400 -- 400    Total Output 400 -- 400       Net I/O     80 -- 80          Invasive Devices     Peripheral Intravenous Line            Peripheral IV 11/13/18 Right Antecubital 4 days                Physical Exam:    Vitals: Blood pressure 118/57, pulse 64, temperature 98 1 °F (36 7 °C), temperature source Oral, resp  rate 18, height 5' 7" (1 702 m), weight 67 2 kg (148 lb 3 2 oz), SpO2 99 %  ,Body mass index is 23 21 kg/m²  Awake and alert  Bilateral periorbital ecchymosis  No midline neck tenderness  Some weakness in the right upper extremity   Lungs clear bilateral  Heart regular rate  Abdomen soft    Lab Results: I have personally reviewed pertinent results  Imaging Studies: I have personally reviewed pertinent reports          VTE Pharmacologic Prophylaxis: Heparin    VTE Mechanical Prophylaxis: sequential compression device

## 2018-11-17 NOTE — NURSING NOTE
Went to go check on patient and his c-coller is off  I advised patient that he is required to wear that  Patient states " I can't wear that, it's smothering me "  Advised him I would call and advise the dr's that you are not wearing it

## 2018-11-17 NOTE — NURSING NOTE
Spoke with Neuro sx Dr Juan Lopez regarding patient refusing to wear aspen collar   He advised me to chart it and he will speak to the patient in the am

## 2018-11-18 LAB
ANION GAP SERPL CALCULATED.3IONS-SCNC: 8 MMOL/L (ref 4–13)
BUN SERPL-MCNC: 14 MG/DL (ref 5–25)
CALCIUM SERPL-MCNC: 8.2 MG/DL (ref 8.3–10.1)
CHLORIDE SERPL-SCNC: 102 MMOL/L (ref 100–108)
CO2 SERPL-SCNC: 24 MMOL/L (ref 21–32)
CREAT SERPL-MCNC: 0.6 MG/DL (ref 0.6–1.3)
GFR SERPL CREATININE-BSD FRML MDRD: 100 ML/MIN/1.73SQ M
GLUCOSE SERPL-MCNC: 80 MG/DL (ref 65–140)
MRSA NOSE QL CULT: NORMAL
POTASSIUM SERPL-SCNC: 4 MMOL/L (ref 3.5–5.3)
SODIUM SERPL-SCNC: 134 MMOL/L (ref 136–145)

## 2018-11-18 PROCEDURE — 80048 BASIC METABOLIC PNL TOTAL CA: CPT | Performed by: INTERNAL MEDICINE

## 2018-11-18 PROCEDURE — 97535 SELF CARE MNGMENT TRAINING: CPT

## 2018-11-18 PROCEDURE — 99232 SBSQ HOSP IP/OBS MODERATE 35: CPT | Performed by: INTERNAL MEDICINE

## 2018-11-18 PROCEDURE — 99232 SBSQ HOSP IP/OBS MODERATE 35: CPT | Performed by: PHYSICIAN ASSISTANT

## 2018-11-18 PROCEDURE — 97530 THERAPEUTIC ACTIVITIES: CPT

## 2018-11-18 PROCEDURE — 87081 CULTURE SCREEN ONLY: CPT | Performed by: NEUROLOGICAL SURGERY

## 2018-11-18 RX ADMIN — SODIUM CHLORIDE 125 ML/HR: 0.9 INJECTION, SOLUTION INTRAVENOUS at 23:51

## 2018-11-18 RX ADMIN — ASPIRIN 81 MG 81 MG: 81 TABLET ORAL at 08:48

## 2018-11-18 RX ADMIN — BACITRACIN ZINC 1 LARGE APPLICATION: 500 OINTMENT TOPICAL at 17:38

## 2018-11-18 RX ADMIN — ATORVASTATIN CALCIUM 40 MG: 40 TABLET, FILM COATED ORAL at 17:36

## 2018-11-18 RX ADMIN — CHLORHEXIDINE GLUCONATE 15 ML: 1.2 RINSE ORAL at 23:51

## 2018-11-18 RX ADMIN — SENNOSIDES AND DOCUSATE SODIUM 1 TABLET: 8.6; 5 TABLET ORAL at 08:53

## 2018-11-18 RX ADMIN — BACITRACIN ZINC 1 LARGE APPLICATION: 500 OINTMENT TOPICAL at 09:07

## 2018-11-18 RX ADMIN — SERTRALINE HYDROCHLORIDE 100 MG: 100 TABLET ORAL at 08:48

## 2018-11-18 RX ADMIN — HEPARIN SODIUM 5000 UNITS: 5000 INJECTION INTRAVENOUS; SUBCUTANEOUS at 05:22

## 2018-11-18 NOTE — ED ATTENDING ATTESTATION
Justin Nieto MD, saw and evaluated the patient  I have discussed the patient with the resident/non-physician practitioner and agree with the resident's/non-physician practitioner's findings, Plan of Care, and MDM as documented in the resident's/non-physician practitioner's note, except where noted  All available labs and Radiology studies were reviewed  At this point I agree with the current assessment done in the Emergency Department  I have conducted an independent evaluation of this patient a history and physical is as follows:    Patient here as pre-hospital stroke alert  This is a 26-year-old male who was getting out of bed and felt in his usual state of health  He took a step, and then felt weak, falling to the ground  Patient states that he feels like he cannot move his right side  Patient denies prior complaints  He states that he felt well yesterday  Denies headache, fevers, chills, nausea, or vomiting  No chest pain  Review of systems negative in 12 systems reviewed  Exam the patient has decreased fine finger movements on the right, slightly decreased strength on the right upper and lower extremity  Is able to lift his leg off the bed, but unable to stand  The patient has an otherwise unremarkable exam   He had transient hypotension to the emergency department which improved with fluids  Patient immediately to CT, CT CTA performed  Neurology at the bedside  Patient with low NIH stroke score, but significant disability due to lack of fine motor  Discussed with patient and family, decided to proceed with lytics  Patient given thrombolytics, admitted to ICU for stroke  Patient required 45 minutes of bedside critical care time outside of separately billable procedures    Critical Care Time  CritCare Time    Procedures

## 2018-11-18 NOTE — PROGRESS NOTES
Progress Note - Gertrude Murillo 1945, 68 y o  male MRN: [id number]    Unit/Bed#: [facility] 730-01 Encounter: [id number]    Primary Care Provider: Martir Pham,    Date and time admitted to hospital: 2018 10:45 AM        CVA (cerebral vascular accident) [facility])   Assessment & Plan    Stroke status post tPA  Continue aspirin statin  Neurology following       * Cervical spinal stenosis   Assessment & Plan    Neurosurgery input noted  Possible surgery on Monday  Cervical collar      Hyponatremia   Assessment & Plan    Monitor sodium levels      Anxiety   Assessment & Plan    Continue anxiolytics         VTE Pharmacologic Prophylaxis:   Pharmacologic: Vena dynes  Mechanical VTE Prophylaxis in Place: Yes    Patient Centered Rounds: I have performed bedside rounds with nursing staff today  Discussions with Specialists or Other Care Team Provider:     Education and Discussions with Family / Patient:  Discussed with the patient    Time Spent for Care: 20 minutes  More than 50% of total time spent on counseling and coordination of care as described above  Current Length of Stay: 5 day(s)    Current Patient Status: Inpatient   Certification Statement: The patient will continue to require additional inpatient hospital stay due to As mentioned    Discharge Plan:  Awaiting neuro surgical procedure likely Monday    Code Status: Level 1 - Full Code      Subjective:     Comfortably sitting up in chair  Reports feeling okay    Objective:     Vitals:   Temp (24hrs), Av °F (36 7 °C), Min:97 8 °F (36 6 °C), Max:98 3 °F (36 8 °C)    Temp:  [97 8 °F (36 6 °C)-98 3 °F (36 8 °C)] 98 3 °F (36 8 °C)  HR:  [58-63] 63  Resp:  [18-20] 18  BP: (129-143)/(50-54) 140/50  SpO2:  [92 %-97 %] 96 %  Body mass index is 23 21 kg/m²  Input and Output Summary (last 24 hours):        Intake/Output Summary (Last 24 hours) at 18 1410  Last data filed at 18 0812   Gross per 24 hour   Intake              213 ml Output              975 ml   Net             -762 ml       Physical Exam:     Physical Exam      Comfortably sitting up in chair  Cervical collar in place  Jazmine orbital ecchymosis noted  Lungs diminished breath sounds bilaterally  Heart sounds S1-S2 noted  Abdomen soft  Awake obeys simple commands  No rash  Pulses noted      Additional Data:     Labs:      Results from last 7 days  Lab Units 11/15/18  0526 11/14/18  0519   WBC Thousand/uL 9 29 9 03   HEMOGLOBIN g/dL 12 2 12 1   HEMATOCRIT % 37 2 35 7*   PLATELETS Thousands/uL 191 187   NEUTROS PCT %  --  73   LYMPHS PCT %  --  16   MONOS PCT %  --  10   EOS PCT %  --  1       Results from last 7 days  Lab Units 11/18/18  0544  11/14/18  0519   SODIUM mmol/L 134*  < > 135*   POTASSIUM mmol/L 4 0  < > 4 5   CHLORIDE mmol/L 102  < > 104   CO2 mmol/L 24  < > 27   BUN mg/dL 14  < > 8   CREATININE mg/dL 0 60  < > 0 79   ANION GAP mmol/L 8  < > 4   CALCIUM mg/dL 8 2*  < > 8 0*   ALBUMIN g/dL  --   --  2 9*   TOTAL BILIRUBIN mg/dL  --   --  0 51   ALK PHOS U/L  --   --  66   ALT U/L  --   --  31   AST U/L  --   --  29   GLUCOSE RANDOM mg/dL 80  < > 85   < > = values in this interval not displayed  Results from last 7 days  Lab Units 11/16/18  1659   INR  1 05       Results from last 7 days  Lab Units 11/13/18  1131   POC GLUCOSE mg/dl 78       Results from last 7 days  Lab Units 11/14/18  0519   HEMOGLOBIN A1C % 4 7               * I Have Reviewed All Lab Data Listed Above  * Additional Pertinent Lab Tests Reviewed:  All Labs Within Last 24 Hours Reviewed    Imaging:    Imaging Reports Reviewed Today Include:   Imaging Personally Reviewed by Myself Includes:     Recent Cultures (last 7 days):           Last 24 Hours Medication List:     Current Facility-Administered Medications:  acetaminophen 650 mg Oral Q6H PRN TAZ Smith   atorvastatin 40 mg Oral Daily With TAZ Samuel   bacitracin 1 large application Topical BID Can BOWDEN DO Valerie   [START ON 11/19/2018] cefazolin 1,000 mg Intravenous On Call To SHANTA Lora   [START ON 11/19/2018] chlorhexidine 15 mL Swish & Spit Once Tristan Merino PA-C   oxyCODONE 5 mg Oral Q4H PRN TAZ De La Garza   polyethylene glycol 17 g Oral Daily TAZ De La Garza   senna-docusate sodium 1 tablet Oral BID TAZ De L aGarza   sertraline 100 mg Oral Daily TAZ De La Garza   [START ON 11/19/2018] sodium chloride 125 mL/hr Intravenous Continuous Tristan Merino PA-C        Today, Patient Was Seen By: Karen Del Real MD    ** Please Note: Dictation voice to text software may have been used in the creation of this document   **

## 2018-11-18 NOTE — PLAN OF CARE
Problem: OCCUPATIONAL THERAPY ADULT  Goal: Performs self-care activities at highest level of function for planned discharge setting  See evaluation for individualized goals  Treatment Interventions: ADL retraining, Functional transfer training, UE strengthening/ROM, Endurance training, Patient/family training, Equipment evaluation/education, Fine motor coordination activities, Continued evaluation, Energy conservation, Activityengagement          See flowsheet documentation for full assessment, interventions and recommendations  Outcome: Progressing  Limitation: Decreased ADL status, Decreased UE ROM, Decreased UE strength, Decreased endurance, Decreased self-care trans, Decreased high-level ADLs, Decreased fine motor control  Prognosis: Fair  Assessment: Pt was seen this date for OT tx session focusing on bed mobility, LB dressing, trasnfers funcitonal mbolity and overall activity tolerance  Pt requires SBA for bed mobility with HOB elevated and increased time seated EOB approx 15 mins pt is anxious about upcoming sx and required emotional support and distraction for participatin in therapy with frequent redirect to task  Attempts LB dressing task requires max A for same  Min A for sTS from EOB at [address] [place] [address] and [facility] SPT and funcitonal mobility rw level with decreased balance  Pt agreeable t owear c/s collar followinh extensive education on same  Pt would benefit from continued OT Tx to improve overall funcitonal abilities  Continue to follow with current POC    Recommendation: Physiatry Consult  OT Discharge Recommendation: Short Term Rehab  OT - OK to Discharge: Yes (when medically stable)  ARASH Zacarias

## 2018-11-18 NOTE — PROGRESS NOTES
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-5  2  Severe bilateral neural foraminal narrowing at C2-3 to C4-5  3  Status post fall  4  CVA - tPA administered  5  Anxiety  6  Depression  7  Constipation  8  OCD      Plan:  -scheduled for cervical decompression fixation and fusion tomorrow  -not wearing  the collar while in bed      Subjective/Objective     No new complaints      Intake/Output       11/18/18 0701 - 11/19/18 0700      [id number] 2178-0040 Total       Intake    P O   213  -- 213    Total Intake 213 -- 213       Output    Urine  100  -- 100    Urine 100 -- 100    Total Output 100 -- 100       Net I/O     113 -- 113          Invasive Devices     Peripheral Intravenous Line            Peripheral IV 11/13/18 Right Antecubital 4 days                Physical Exam:    Vitals: Blood pressure 140/50, pulse 63, temperature 98 3 °F (36 8 °C), temperature source Oral, resp  rate 18, height 5' 7" (1 702 m), weight 67 2 kg (148 lb 3 2 oz), SpO2 96 %  ,Body mass index is 23 21 kg/m²  Awake and alert  Moves all extremities  Right shoulder strength 4/5  Right  strength 4/5  Bilateral periorbital ecchymosis  Hip flexor 5/5 bilateral  Anterior tibial 5/5 bilateral  Sensation intact  Lungs clear  Heart regular rate  Abdomen soft        Lab Results: I have personally reviewed pertinent results  Imaging Studies: I have personally reviewed pertinent reports          VTE Pharmacologic Prophylaxis: Heparin    VTE Mechanical Prophylaxis: sequential compression device

## 2018-11-18 NOTE — OCCUPATIONAL THERAPY NOTE
Occupational Therapy Treatment Note:       11/18/18 1000   Restrictions/Precautions   Weight Bearing Precautions Per Order No   Braces or Orthoses C/S Collar   Other Precautions Chair Alarm; Fall Risk   Pain Assessment   Pain Score No Pain   ADL   Where Assessed Edge of bed   LB Dressing Assistance 2  Maximal Assistance   LB Dressing Deficit Don/doff R sock; Don/doff L sock   LB Dressing Comments Attempted, un successful, requires max A   Functional Standing Tolerance   Time ~7 mins   Activity static standing   Comments rw level, decreased balance   Bed Mobility   Supine to Sit 5  Supervision   Additional items Assist x 1; Increased time required;HOB elevated   Additional Comments Pt in chair at end of session   Transfers   Sit to Stand 4  Minimal assistance   Additional items Assist x 1   Stand to Sit 4  Minimal assistance   Additional items Assist x 1   Stand pivot 4  Minimal assistance   Additional items Assist x 1   Functional Mobility   Functional Mobility 4  Minimal assistance   Additional Comments household distances   Additional items Rolling walker   Cognition   Overall Cognitive Status [facility]   Arousal/Participation Alert; Cooperative   Attention Attends with cues to redirect   Orientation Level Oriented X4   Memory Decreased short term memory   Following Commands Follows one step commands with increased time or repetition   Comments Pt presents not wearing c/s collar educated on use of same reporting "its connfinig I dont understand why I have to wear it" Extensive time spent eduating on same, pt agreeable to wear c/s coallr   Activity Tolerance   Activity Tolerance Patient tolerated treatment well   Medical Staff Made Aware Nsg aware   Assessment   Assessment Pt was seen this date for OT tx session focusing on bed mobility, LB dressing, trasnfers funcitonal mbolity and overall activity tolerance   Pt requires SBA for bed mobility with HOB elevated and increased time seated EOB approx 15 mins pt is anxious about upcoming sx and required emotional support and distraction for participatin in therapy with frequent redirect to task  Attempts LB dressing task requires max A for same  Min A for sTS from EOB [facility] SPT and funcitonal mobility rw level with decreased balance  Pt agreeable t owear c/s collar followinh extensive education on same  Pt would benefit from continued OT Tx to improve overall funcitonal abilities  Continue to follow with current POC     Plan   Treatment Interventions ADL retraining   Goal Expiration Date 11/24/18   Treatment Day 2   OT Frequency 3-5x/wk   Recommendation   OT Discharge Recommendation Short Term [address] [facility], [facility]

## 2018-11-19 ENCOUNTER — APPOINTMENT (INPATIENT)
Dept: RADIOLOGY | Facility: HOSPITAL | Age: 73
DRG: 472 | End: 2018-11-19
Payer: MEDICARE

## 2018-11-19 ENCOUNTER — ANESTHESIA (INPATIENT)
Dept: PERIOP | Facility: HOSPITAL | Age: 73
DRG: 472 | End: 2018-11-19
Payer: MEDICARE

## 2018-11-19 ENCOUNTER — ANESTHESIA EVENT (INPATIENT)
Dept: PERIOP | Facility: HOSPITAL | Age: 73
DRG: 472 | End: 2018-11-19
Payer: MEDICARE

## 2018-11-19 PROBLEM — R53.1 WEAKNESS: Status: ACTIVE | Noted: 2018-11-13

## 2018-11-19 LAB
GLUCOSE SERPL-MCNC: 107 MG/DL (ref 65–140)
MRSA NOSE QL CULT: NORMAL

## 2018-11-19 PROCEDURE — 63015 REMOVE SPINE LAMINA >2 CRVCL: CPT | Performed by: NEUROLOGICAL SURGERY

## 2018-11-19 PROCEDURE — 22600 ARTHRD PST TQ 1NTRSPC CRV: CPT | Performed by: NEUROLOGICAL SURGERY

## 2018-11-19 PROCEDURE — C1713 ANCHOR/SCREW BN/BN,TIS/BN: HCPCS | Performed by: NEUROLOGICAL SURGERY

## 2018-11-19 PROCEDURE — 97530 THERAPEUTIC ACTIVITIES: CPT

## 2018-11-19 PROCEDURE — 99232 SBSQ HOSP IP/OBS MODERATE 35: CPT | Performed by: FAMILY MEDICINE

## 2018-11-19 PROCEDURE — 72040 X-RAY EXAM NECK SPINE 2-3 VW: CPT

## 2018-11-19 PROCEDURE — 22614 ARTHRD PST TQ 1NTRSPC EA ADD: CPT | Performed by: NEUROLOGICAL SURGERY

## 2018-11-19 PROCEDURE — 0RG2071 FUSION OF 2 OR MORE CERVICAL VERTEBRAL JOINTS WITH AUTOLOGOUS TISSUE SUBSTITUTE, POSTERIOR APPROACH, POSTERIOR COLUMN, OPEN APPROACH: ICD-10-PCS | Performed by: NEUROLOGICAL SURGERY

## 2018-11-19 PROCEDURE — 99231 SBSQ HOSP IP/OBS SF/LOW 25: CPT | Performed by: NEUROLOGICAL SURGERY

## 2018-11-19 PROCEDURE — 00NW0ZZ RELEASE CERVICAL SPINAL CORD, OPEN APPROACH: ICD-10-PCS | Performed by: NEUROLOGICAL SURGERY

## 2018-11-19 PROCEDURE — 20936 SP BONE AGRFT LOCAL ADD-ON: CPT | Performed by: NEUROLOGICAL SURGERY

## 2018-11-19 PROCEDURE — 95940 IONM IN OPERATNG ROOM 15 MIN: CPT | Performed by: NEUROLOGICAL SURGERY

## 2018-11-19 PROCEDURE — 22842 INSERT SPINE FIXATION DEVICE: CPT | Performed by: NEUROLOGICAL SURGERY

## 2018-11-19 PROCEDURE — 20930 SP BONE ALGRFT MORSEL ADD-ON: CPT | Performed by: NEUROLOGICAL SURGERY

## 2018-11-19 PROCEDURE — 97110 THERAPEUTIC EXERCISES: CPT

## 2018-11-19 PROCEDURE — 82948 REAGENT STRIP/BLOOD GLUCOSE: CPT

## 2018-11-19 PROCEDURE — 97116 GAIT TRAINING THERAPY: CPT

## 2018-11-19 DEVICE — ROD 7753745 PRE-CUT 3.5MM X 45MM
Type: IMPLANTABLE DEVICE | Site: POSTERIOR CERVICAL | Status: FUNCTIONAL
Brand: VERTEX® RECONSTRUCTION SYSTEM

## 2018-11-19 DEVICE — SET SCREW 3600315 STANDARD
Type: IMPLANTABLE DEVICE | Site: POSTERIOR CERVICAL | Status: FUNCTIONAL
Brand: INFINITY™ OCCIPITOCERVICAL UPPER THORACIC SYSTEM

## 2018-11-19 DEVICE — DBM 006005 PROGENIX PLUS 5CC
Type: IMPLANTABLE DEVICE | Site: POSTERIOR CERVICAL | Status: FUNCTIONAL
Brand: PROGENIX® PUTTY AND PROGENIX® PLUS

## 2018-11-19 RX ORDER — HEPARIN SODIUM 5000 [USP'U]/ML
5000 INJECTION, SOLUTION INTRAVENOUS; SUBCUTANEOUS EVERY 8 HOURS SCHEDULED
Status: DISCONTINUED | OUTPATIENT
Start: 2018-11-20 | End: 2018-11-21 | Stop reason: HOSPADM

## 2018-11-19 RX ORDER — PROPOFOL 10 MG/ML
INJECTION, EMULSION INTRAVENOUS AS NEEDED
Status: DISCONTINUED | OUTPATIENT
Start: 2018-11-19 | End: 2018-11-19 | Stop reason: SURG

## 2018-11-19 RX ORDER — SODIUM CHLORIDE 9 MG/ML
INJECTION, SOLUTION INTRAVENOUS CONTINUOUS PRN
Status: DISCONTINUED | OUTPATIENT
Start: 2018-11-19 | End: 2018-11-19 | Stop reason: SURG

## 2018-11-19 RX ORDER — BISACODYL 10 MG
10 SUPPOSITORY, RECTAL RECTAL DAILY PRN
Status: DISCONTINUED | OUTPATIENT
Start: 2018-11-19 | End: 2018-11-21 | Stop reason: HOSPADM

## 2018-11-19 RX ORDER — GLYCOPYRROLATE 0.2 MG/ML
INJECTION INTRAMUSCULAR; INTRAVENOUS AS NEEDED
Status: DISCONTINUED | OUTPATIENT
Start: 2018-11-19 | End: 2018-11-19 | Stop reason: SURG

## 2018-11-19 RX ORDER — HYDROMORPHONE HCL/PF 1 MG/ML
0.5 SYRINGE (ML) INJECTION
Status: DISCONTINUED | OUTPATIENT
Start: 2018-11-19 | End: 2018-11-19 | Stop reason: HOSPADM

## 2018-11-19 RX ORDER — MAGNESIUM HYDROXIDE 1200 MG/15ML
LIQUID ORAL AS NEEDED
Status: DISCONTINUED | OUTPATIENT
Start: 2018-11-19 | End: 2018-11-19 | Stop reason: HOSPADM

## 2018-11-19 RX ORDER — LIDOCAINE HYDROCHLORIDE 10 MG/ML
INJECTION, SOLUTION INFILTRATION; PERINEURAL AS NEEDED
Status: DISCONTINUED | OUTPATIENT
Start: 2018-11-19 | End: 2018-11-19 | Stop reason: HOSPADM

## 2018-11-19 RX ORDER — LIDOCAINE HYDROCHLORIDE AND EPINEPHRINE 10; 10 MG/ML; UG/ML
INJECTION, SOLUTION INFILTRATION; PERINEURAL AS NEEDED
Status: DISCONTINUED | OUTPATIENT
Start: 2018-11-19 | End: 2018-11-19 | Stop reason: HOSPADM

## 2018-11-19 RX ORDER — DOCUSATE SODIUM 100 MG/1
100 CAPSULE, LIQUID FILLED ORAL 2 TIMES DAILY
Status: DISCONTINUED | OUTPATIENT
Start: 2018-11-19 | End: 2018-11-21 | Stop reason: HOSPADM

## 2018-11-19 RX ORDER — SODIUM CHLORIDE 9 MG/ML
100 INJECTION, SOLUTION INTRAVENOUS CONTINUOUS
Status: DISCONTINUED | OUTPATIENT
Start: 2018-11-19 | End: 2018-11-20

## 2018-11-19 RX ORDER — FLUMAZENIL 0.1 MG/ML
INJECTION, SOLUTION INTRAVENOUS AS NEEDED
Status: DISCONTINUED | OUTPATIENT
Start: 2018-11-19 | End: 2018-11-19 | Stop reason: SURG

## 2018-11-19 RX ORDER — FENTANYL CITRATE 50 UG/ML
INJECTION, SOLUTION INTRAMUSCULAR; INTRAVENOUS AS NEEDED
Status: DISCONTINUED | OUTPATIENT
Start: 2018-11-19 | End: 2018-11-19 | Stop reason: SURG

## 2018-11-19 RX ORDER — LIDOCAINE HYDROCHLORIDE 10 MG/ML
INJECTION, SOLUTION INFILTRATION; PERINEURAL AS NEEDED
Status: DISCONTINUED | OUTPATIENT
Start: 2018-11-19 | End: 2018-11-19 | Stop reason: SURG

## 2018-11-19 RX ORDER — SUCCINYLCHOLINE CHLORIDE 20 MG/ML
INJECTION INTRAMUSCULAR; INTRAVENOUS AS NEEDED
Status: DISCONTINUED | OUTPATIENT
Start: 2018-11-19 | End: 2018-11-19 | Stop reason: SURG

## 2018-11-19 RX ORDER — MEPERIDINE HYDROCHLORIDE 25 MG/ML
25 INJECTION INTRAMUSCULAR; INTRAVENOUS; SUBCUTANEOUS AS NEEDED
Status: DISCONTINUED | OUTPATIENT
Start: 2018-11-19 | End: 2018-11-19 | Stop reason: HOSPADM

## 2018-11-19 RX ORDER — CHLORHEXIDINE GLUCONATE 0.12 MG/ML
15 RINSE ORAL EVERY 12 HOURS SCHEDULED
Status: DISCONTINUED | OUTPATIENT
Start: 2018-11-19 | End: 2018-11-19 | Stop reason: HOSPADM

## 2018-11-19 RX ORDER — EPHEDRINE SULFATE 50 MG/ML
INJECTION, SOLUTION INTRAVENOUS AS NEEDED
Status: DISCONTINUED | OUTPATIENT
Start: 2018-11-19 | End: 2018-11-19 | Stop reason: SURG

## 2018-11-19 RX ORDER — MIDAZOLAM HYDROCHLORIDE 1 MG/ML
INJECTION INTRAMUSCULAR; INTRAVENOUS AS NEEDED
Status: DISCONTINUED | OUTPATIENT
Start: 2018-11-19 | End: 2018-11-19 | Stop reason: SURG

## 2018-11-19 RX ORDER — CHLORHEXIDINE GLUCONATE 0.12 MG/ML
RINSE ORAL
Status: DISPENSED
Start: 2018-11-19 | End: 2018-11-20

## 2018-11-19 RX ORDER — SODIUM CHLORIDE, SODIUM LACTATE, POTASSIUM CHLORIDE, CALCIUM CHLORIDE 600; 310; 30; 20 MG/100ML; MG/100ML; MG/100ML; MG/100ML
INJECTION, SOLUTION INTRAVENOUS CONTINUOUS PRN
Status: DISCONTINUED | OUTPATIENT
Start: 2018-11-19 | End: 2018-11-19 | Stop reason: SURG

## 2018-11-19 RX ORDER — OXYCODONE HYDROCHLORIDE 5 MG/1
10 TABLET ORAL EVERY 4 HOURS PRN
Status: DISCONTINUED | OUTPATIENT
Start: 2018-11-19 | End: 2018-11-21 | Stop reason: HOSPADM

## 2018-11-19 RX ORDER — PROPOFOL 10 MG/ML
INJECTION, EMULSION INTRAVENOUS CONTINUOUS PRN
Status: DISCONTINUED | OUTPATIENT
Start: 2018-11-19 | End: 2018-11-19 | Stop reason: SURG

## 2018-11-19 RX ORDER — ONDANSETRON 2 MG/ML
4 INJECTION INTRAMUSCULAR; INTRAVENOUS EVERY 6 HOURS PRN
Status: DISCONTINUED | OUTPATIENT
Start: 2018-11-19 | End: 2018-11-21 | Stop reason: HOSPADM

## 2018-11-19 RX ORDER — KETAMINE HYDROCHLORIDE 50 MG/ML
INJECTION, SOLUTION, CONCENTRATE INTRAMUSCULAR; INTRAVENOUS AS NEEDED
Status: DISCONTINUED | OUTPATIENT
Start: 2018-11-19 | End: 2018-11-19 | Stop reason: SURG

## 2018-11-19 RX ORDER — ALBUMIN, HUMAN INJ 5% 5 %
SOLUTION INTRAVENOUS CONTINUOUS PRN
Status: DISCONTINUED | OUTPATIENT
Start: 2018-11-19 | End: 2018-11-19 | Stop reason: SURG

## 2018-11-19 RX ORDER — BUPIVACAINE HYDROCHLORIDE AND EPINEPHRINE 5; 5 MG/ML; UG/ML
INJECTION, SOLUTION EPIDURAL; INTRACAUDAL; PERINEURAL AS NEEDED
Status: DISCONTINUED | OUTPATIENT
Start: 2018-11-19 | End: 2018-11-19 | Stop reason: HOSPADM

## 2018-11-19 RX ORDER — ONDANSETRON 2 MG/ML
INJECTION INTRAMUSCULAR; INTRAVENOUS AS NEEDED
Status: DISCONTINUED | OUTPATIENT
Start: 2018-11-19 | End: 2018-11-19 | Stop reason: SURG

## 2018-11-19 RX ORDER — FENTANYL CITRATE 50 UG/ML
25 INJECTION, SOLUTION INTRAMUSCULAR; INTRAVENOUS
Status: DISCONTINUED | OUTPATIENT
Start: 2018-11-19 | End: 2018-11-21 | Stop reason: HOSPADM

## 2018-11-19 RX ADMIN — ALBUMIN HUMAN: 0.05 INJECTION, SOLUTION INTRAVENOUS at 15:58

## 2018-11-19 RX ADMIN — SODIUM CHLORIDE 100 ML/HR: 0.9 INJECTION, SOLUTION INTRAVENOUS at 18:20

## 2018-11-19 RX ADMIN — ONDANSETRON 4 MG: 2 INJECTION INTRAMUSCULAR; INTRAVENOUS at 14:07

## 2018-11-19 RX ADMIN — Medication 0.4 MCG/KG/HR: at 14:40

## 2018-11-19 RX ADMIN — MIDAZOLAM 1 MG: 1 INJECTION INTRAMUSCULAR; INTRAVENOUS at 14:05

## 2018-11-19 RX ADMIN — MIDAZOLAM 2 MG: 1 INJECTION INTRAMUSCULAR; INTRAVENOUS at 14:02

## 2018-11-19 RX ADMIN — DOCUSATE SODIUM 100 MG: 100 CAPSULE, LIQUID FILLED ORAL at 20:48

## 2018-11-19 RX ADMIN — SODIUM CHLORIDE: 0.9 INJECTION, SOLUTION INTRAVENOUS at 14:40

## 2018-11-19 RX ADMIN — POLYETHYLENE GLYCOL 3350 17 G: 17 POWDER, FOR SOLUTION ORAL at 09:08

## 2018-11-19 RX ADMIN — SERTRALINE HYDROCHLORIDE 100 MG: 100 TABLET ORAL at 09:08

## 2018-11-19 RX ADMIN — EPHEDRINE SULFATE 5 MG: 50 INJECTION, SOLUTION INTRAMUSCULAR; INTRAVENOUS; SUBCUTANEOUS at 14:54

## 2018-11-19 RX ADMIN — SODIUM CHLORIDE 125 ML/HR: 0.9 INJECTION, SOLUTION INTRAVENOUS at 07:04

## 2018-11-19 RX ADMIN — ALBUMIN HUMAN: 0.05 INJECTION, SOLUTION INTRAVENOUS at 15:01

## 2018-11-19 RX ADMIN — CHLORHEXIDINE GLUCONATE 15 ML: 1.2 RINSE ORAL at 12:45

## 2018-11-19 RX ADMIN — KETAMINE HYDROCHLORIDE 20 MG: 50 INJECTION, SOLUTION INTRAMUSCULAR; INTRAVENOUS at 14:12

## 2018-11-19 RX ADMIN — FLUMAZENIL 0.1 MG: 0.1 INJECTION, SOLUTION INTRAVENOUS at 17:14

## 2018-11-19 RX ADMIN — BACITRACIN ZINC 1 LARGE APPLICATION: 500 OINTMENT TOPICAL at 09:09

## 2018-11-19 RX ADMIN — PROPOFOL 100 MCG/KG/MIN: 10 INJECTION, EMULSION INTRAVENOUS at 14:40

## 2018-11-19 RX ADMIN — FENTANYL CITRATE 50 MCG: 50 INJECTION, SOLUTION INTRAMUSCULAR; INTRAVENOUS at 14:12

## 2018-11-19 RX ADMIN — SODIUM CHLORIDE, SODIUM LACTATE, POTASSIUM CHLORIDE, AND CALCIUM CHLORIDE: .6; .31; .03; .02 INJECTION, SOLUTION INTRAVENOUS at 14:00

## 2018-11-19 RX ADMIN — MIDAZOLAM 1 MG: 1 INJECTION INTRAMUSCULAR; INTRAVENOUS at 14:07

## 2018-11-19 RX ADMIN — SUCCINYLCHOLINE CHLORIDE 100 MG: 20 INJECTION, SOLUTION INTRAMUSCULAR; INTRAVENOUS at 14:12

## 2018-11-19 RX ADMIN — FLUMAZENIL 0.2 MG: 0.1 INJECTION, SOLUTION INTRAVENOUS at 17:09

## 2018-11-19 RX ADMIN — NOREPINEPHRINE BITARTRATE 5 MCG/MIN: 1 INJECTION, SOLUTION, CONCENTRATE INTRAVENOUS at 14:54

## 2018-11-19 RX ADMIN — KETAMINE HYDROCHLORIDE 10 MG: 50 INJECTION, SOLUTION INTRAMUSCULAR; INTRAVENOUS at 15:05

## 2018-11-19 RX ADMIN — HYDROMORPHONE HYDROCHLORIDE 0.5 MG: 1 INJECTION, SOLUTION INTRAMUSCULAR; INTRAVENOUS; SUBCUTANEOUS at 18:05

## 2018-11-19 RX ADMIN — PROPOFOL 180 MG: 10 INJECTION, EMULSION INTRAVENOUS at 14:12

## 2018-11-19 RX ADMIN — EPHEDRINE SULFATE 5 MG: 50 INJECTION, SOLUTION INTRAMUSCULAR; INTRAVENOUS; SUBCUTANEOUS at 15:00

## 2018-11-19 RX ADMIN — FENTANYL CITRATE 50 MCG: 50 INJECTION, SOLUTION INTRAMUSCULAR; INTRAVENOUS at 15:26

## 2018-11-19 RX ADMIN — LIDOCAINE HYDROCHLORIDE 50 MG: 10 INJECTION, SOLUTION INFILTRATION; PERINEURAL at 14:12

## 2018-11-19 RX ADMIN — DEXAMETHASONE SODIUM PHOSPHATE 10 MG: 10 INJECTION INTRAMUSCULAR; INTRAVENOUS at 14:12

## 2018-11-19 RX ADMIN — SENNOSIDES AND DOCUSATE SODIUM 1 TABLET: 8.6; 5 TABLET ORAL at 09:08

## 2018-11-19 RX ADMIN — KETAMINE HYDROCHLORIDE 10 MG: 50 INJECTION, SOLUTION INTRAMUSCULAR; INTRAVENOUS at 16:53

## 2018-11-19 RX ADMIN — KETAMINE HYDROCHLORIDE 10 MG: 50 INJECTION, SOLUTION INTRAMUSCULAR; INTRAVENOUS at 15:20

## 2018-11-19 RX ADMIN — GLYCOPYRROLATE 0.1 MG: 0.2 INJECTION, SOLUTION INTRAMUSCULAR; INTRAVENOUS at 14:07

## 2018-11-19 RX ADMIN — CEFAZOLIN SODIUM 2000 MG: 1 SOLUTION INTRAVENOUS at 14:20

## 2018-11-19 RX ADMIN — CEFAZOLIN SODIUM 1000 MG: 1 SOLUTION INTRAVENOUS at 15:19

## 2018-11-19 RX ADMIN — SODIUM CHLORIDE: 0.9 INJECTION, SOLUTION INTRAVENOUS at 14:20

## 2018-11-19 NOTE — PROGRESS NOTES
Progress Note - Tomas Zainab 1945, 68 y o  male MRN: [id number]    Unit/Bed#: [facility] Encounter: [id number]    Primary Care Provider: Anmol Jackson DO   Date and time admitted to hospital: 2018 10:45 AM        Hyponatremia   Assessment & Plan    Monitor sodium levels      Anxiety   Assessment & Plan    Continue anxiolytics     Depression   Assessment & Plan    Monitor     Weakness   Assessment & Plan    Stroke status post tPA  Continue aspirin statin  Neurology following  Negative for CVA on MRI       OCD (obsessive compulsive disorder)   Assessment & Plan    Monitor for now     * Cervical spinal stenosis   Assessment & Plan    Neurosurgery input noted  For OR today  Cervical collar        VTE Pharmacologic Prophylaxis:   Pharmacologic: Heparin  Mechanical VTE Prophylaxis in Place: Yes    Patient Centered Rounds: I have performed bedside rounds with nursing staff today  Discussions with Specialists or Other Care Team Provider:     Education and Discussions with Family / Patient: patient    Time Spent for Care: 30 minutes  More than 50% of total time spent on counseling and coordination of care as described above  Current Length of Stay: 6 day(s)    Current Patient Status: Inpatient   Certification Statement: The patient will continue to require additional inpatient hospital stay due to for or today    Discharge Plan:     Code Status: Level 1 - Full Code      Subjective:   Patient seen and examined,c/o pain but refuses pain meds, reported that he just wants to  Walk after the surgery    Objective:     Vitals:   Temp (24hrs), Av 1 °F (36 7 °C), Min:97 3 °F (36 3 °C), Max:99 5 °F (37 5 °C)    Temp:  [97 3 °F (36 3 °C)-99 5 °F (37 5 °C)] 97 3 °F (36 3 °C)  HR:  [58-72] 72  Resp:  [15-21] 21  BP: (124-128)/(56-68) 124/56  SpO2:  [99 %-100 %] 100 %  Body mass index is 23 51 kg/m²  Input and Output Summary (last 24 hours):        Intake/Output Summary (Last 24 hours) at 18 [address] filed at 11/19/18 1720   Gross per 24 hour   Intake          1545 83 ml   Output              975 ml   Net           570 83 ml       Physical Exam:     Physical Exam   Constitutional: He appears well-developed and well-nourished  HENT:   Head: Normocephalic  Ecchymosis noted on the face bilaterally   Eyes: Pupils are equal, round, and reactive to light  Neck:   Collar present   Cardiovascular: Normal rate and regular rhythm  No murmur heard  Pulmonary/Chest: Effort normal and breath sounds normal  No respiratory distress  Abdominal: Soft  Bowel sounds are normal  He exhibits no distension  Musculoskeletal: Normal range of motion  He exhibits no edema  Neurological: He is alert  No cranial nerve deficit  Skin: Skin is warm and dry  No erythema  Additional Data:     Labs:      Results from last 7 days  Lab Units 11/15/18  0526 11/14/18  0519   WBC Thousand/uL 9 29 9 03   HEMOGLOBIN g/dL 12 2 12 1   HEMATOCRIT % 37 2 35 7*   PLATELETS Thousands/uL 191 187   NEUTROS PCT %  --  73   LYMPHS PCT %  --  16   MONOS PCT %  --  10   EOS PCT %  --  1       Results from last 7 days  Lab Units 11/18/18  0544  11/14/18  0519   POTASSIUM mmol/L 4 0  < > 4 5   CHLORIDE mmol/L 102  < > 104   CO2 mmol/L 24  < > 27   BUN mg/dL 14  < > 8   CREATININE mg/dL 0 60  < > 0 79   CALCIUM mg/dL 8 2*  < > 8 0*   ALK PHOS U/L  --   --  66   ALT U/L  --   --  31   AST U/L  --   --  29   < > = values in this interval not displayed  Results from last 7 days  Lab Units 11/16/18  1659   INR  1 05       * I Have Reviewed All Lab Data Listed Above  * Additional Pertinent Lab Tests Reviewed:  [facility]    Imaging:    Imaging Reports Reviewed Today Include: mri brain and cervical spine  Imaging Personally Reviewed by Myself Includes:      Recent Cultures (last 7 days):           Last 24 Hours Medication List:     Current Facility-Administered Medications:  [MAR Hold] acetaminophen 650 mg Oral Q6H PRN [place] TAZ Barnhart    [facility] Hold] atorvastatin 40 mg Oral Daily With [address] Ray, TAZ    [facility] Hold] bacitracin 1 large application Topical BID Can Padilla DO    cefazolin 2,000 mg Intravenous Once Lobo Marcos MD    chlorhexidine        chlorhexidine 15 mL Swish & Spit Q12H [address] Lobo Marcos MD    HYDROmorphone 0 5 mg Intravenous Q3 min PRN Nga Velasco MD    meperidine 25 mg Intravenous PRN Nga Velasco MD    [facility] Hold] oxyCODONE 5 mg Oral Q4H PRN TAZ Gould    [facility] Hold] polyethylene glycol 17 g Oral Daily TAZ Gould    [facility] Hold] senna-docusate sodium 1 tablet Oral BID TAZ Gould    [facility] Hold] sertraline 100 mg Oral Daily TAZ Gould    sodium chloride 125 mL/hr Intravenous Continuous Chris Rangel PA-C Last Rate: 125 mL/hr (11/19/18 0704)   sodium chloride 100 mL/hr Intravenous Continuous Lobo Marcos MD         Today, Patient Was Seen By: Yenny Nowak MD    ** Please Note: Dictation voice to text software may have been used in the creation of this document   **

## 2018-11-19 NOTE — PERIOPERATIVE NURSING NOTE
Upon arrival to PACU, pt with ETT and on 60% t-piece  RR adequate and o2 sat 100%  Will wait to extubate when pt more awake  Dr Bailey Handsome at bedside to evaluate pt

## 2018-11-19 NOTE — PHYSICAL THERAPY NOTE
Physical Therapy Progress Note     18 1118   Pain Assessment   Pain Assessment 0-10   Pain Score 7   Pain Location Back   Pain Orientation Lower   Restrictions/Precautions   Weight Bearing Precautions Per Order No   Braces or Orthoses C/S Collar   Other Precautions Chair Alarm; Fall Risk;Pain;Telemetry   General   Family/Caregiver Present No   Cognition   Overall Cognitive Status WFL   Arousal/Participation Alert; Cooperative   Subjective   Subjective Pt reports back pain and agrees to participate  Bed Mobility   Supine to Sit 5  Supervision   Additional items Assist x 1;HOB elevated;Verbal cues   Transfers   Sit to Stand (Contact guard to min assist)   Additional items Assist x 1;Verbal cues;Armrests   Stand to Sit 5  Supervision   Additional items Assist x 1;Verbal cues   Ambulation/Elevation   Gait pattern Poor UE support; Foward flexed; Short stride  (slow)   Gait Assistance (contact guard to min assist)   Additional items Assist x 1;Verbal cues   Assistive Device Rolling walker   Distance 100 feet x 2   Stair Management Assistance Not tested   Balance   Static Sitting Fair   Dynamic Sitting Fair -   Static Standing Fair   Dynamic Standing Fair -   Ambulatory Fair -   Endurance Deficit   Endurance Deficit Yes   Endurance Deficit Description weakness and pain   Activity Tolerance   Activity Tolerance Patient limited by pain   Nurse [facility] to see per RN Otelia Apgar   Exercises   Hip Flexion Sitting;15 reps;AROM; Bilateral   Knee AROM Long Arc Quad Sitting;15 reps;AROM; Bilateral   Ankle Pumps Sitting;15 reps;AROM; Bilateral   Assessment   Prognosis Fair   Problem List Decreased strength;Decreased endurance; Impaired balance;Decreased mobility; Decreased coordination;Decreased safety awareness;Pain   Assessment Pt is making steady progress with the use of a rolling walker  Requires encouragement to don aspen collar  Gait is slow and mildly unsteady however without loss of balance    Pt continues to be concerned with overall mobility after having his scheduled procedure today  Required 3 min rest break between ambulation trials  Limited by pain and weakness  Completed seated exercises to conclude session  Encouraged aspen collar when out of bed as ordered by neurosurgery  Pt would benefit from continued physical therapy to maximize functional mobility and safety  Goals   Patient Goals I want to get back to normal   STG Expiration Date 11/26/18   Treatment Day 3   Plan   Treatment/Interventions Functional transfer training;LE strengthening/ROM; Elevations; Therapeutic exercise; Endurance training;Patient/family training;Bed mobility;Gait training;Spoke to nursing   Progress Progressing toward goals   PT Frequency (4-6x/week)   Recommendation   Recommendation Post acute IP rehab   Equipment Recommended Blane  (GAGE)     Rivas Webster, PTA

## 2018-11-19 NOTE — OP NOTE
OPERATIVE REPORT  PATIENT NAME: Salvador Mir    :  1945  MRN: [id number]  Pt Location: BE OR ROOM 17    SURGERY DATE: 2018    Surgeon(s) and Role:     * Chico Sullivan MD - Primary    Preop Diagnosis:  Cervical stenosis of spine [M48 02]    Post-Op Diagnosis Codes:     * Cervical stenosis of spine [M48 02]    Procedure(s) (LRB):  Posterior cervical decompressive laminectomy and instrumented posterior lateral fusion fixation C3-6 (Bilateral)    Specimen(s):  None    Estimated Blood Loss:   Minimal    Drains:  Closed/Suction Drain Posterior;Right Neck Bulb 7 Fr  (Active)   Number of days: 0       Urethral Catheter Latex 16 Fr  (Active)   Number of days: 0       External Urinary Catheter Small (Active)   Number of days: 1       Anesthesia Type:   General    Operative Indications:  Cervical stenosis of spine [M48 02]    Operative Findings:  Cervical spinal stenosis with facet arthropathy    Complications:   None    Procedure and Technique: The head was placed into 3 point head fixation after the patient had been placed in adequate general endotracheal anesthesia  The patient was turned prone and the head and neck were clipped free of hair  This was then prepped with Betadine soap then DuraPrep  Double layer drapes were placed in normal fashion and a Betadine impregnated sticky drape was placed over these  A time-out was called and all parameters a time-out were followed  The the skin in the midline centered over C3 spinous process to the C6 spinous process was injected with 1% lidocaine with 1 100,000 epinephrine  A 15  Blade was used to incise the skin  Bovie and bipolar were used throughout the procedure to maintain hemostasis  The Bovie and a cutting setting was used to divide the tissues to the dorsal fascia  A subperiosteal dissection was carried out of the spinous processes lamina and lateral masses of C3 through C6    Self-retaining retractors retractors were used to maintain operative exposure  Under fluoroscopic guidance vertex ([employer]) lateral mass screws were placed into the lateral masses of C3 through C6  Next, 45 mm rods were cut to the appropriate length and introduced into the tulips of the aforementioned screws  Caps were placed and tightened to the appropriate torque  The cartilaginous material in the facets was carefully debrided and the bone was lightly decorticated  This was done so as to facilitate later posterior lateral fusion  A trough was then drilled at the lateral aspect of the lamina and medial to the lateral masses from C C3 through C6  The interspinous ligament was released with the use of the Bovie, bipolar and scissors  Elverna  was then used to hold the spinous process and elevate the spinous process and laminar an interspinous ligament elements off the underlying dura  As this was done epidural veins were coagulated and divided  Copious quantities of irrigation were used to cleanse out the region  FloSeal was placed in the lateral gutters as well as bone wax were appropriate  Copious quantities of antibiotic irrigation were used to cleanse out the region  Locally harvested bone from the spinous process and lamina was debrided of soft tissue and then placed the bone mill and morcellated  This was mixed with demineralized bone matrix and then placed in the posterior lateral position so as to allow for a posterior lateral fusion to be performed  1 g of vancomycin powder was placed in this region  The muscle was then approximated with interrupted 0 Vicryl suture  The fascia was closed with interrupted 0 Vicryl suture  The fat was approximated with interrupted inverted to 0 Vicryl suture the skin was closed with staples  A 7 mm flat Ravindra-Graves drain was placed in the epidural space  Clean sterile dressings were placed  The patient was taken out of pins, turned supine      There were no significant alterations in the EMG the, somatosensory-evoked potentials were motor-evoked potentials in this case     I was present for the entire procedure    Patient Disposition:  PACU     SIGNATURE: Rylie Aguero MD  DATE: November 19, 2018  TIME: 5:11 PM

## 2018-11-19 NOTE — PLAN OF CARE
Problem: PHYSICAL THERAPY ADULT  Goal: Performs mobility at highest level of function for planned discharge setting  See evaluation for individualized goals  Treatment/Interventions: Functional transfer training, LE strengthening/ROM, Endurance training, Patient/family training, Bed mobility, Gait training, Spoke to nursing, OT  Equipment Recommended: Joelle Cerna       See flowsheet documentation for full assessment, interventions and recommendations  Outcome: Progressing  Prognosis: Fair  Problem List: Decreased strength, Decreased endurance, Impaired balance, Decreased mobility, Decreased coordination, Decreased safety awareness, Pain  Assessment: Pt is making steady progress with the use of a rolling walker  Requires encouragement to don aspen collar  Gait is slow and mildly unsteady however without loss of balance  Pt continues to be concerned with overall mobility after having his scheduled procedure today  Required 3 min rest break between ambulation trials  Limited by pain and weakness  Completed seated exercises to conclude session  Encouraged aspen collar when out of bed as ordered by neurosurgery  Pt would benefit from continued physical therapy to maximize functional mobility and safety  Barriers to Discharge: Inaccessible home environment     Recommendation: Post acute IP rehab     PT - OK to Discharge: Yes (To rehab when medically stable)    See flowsheet documentation for full assessment

## 2018-11-19 NOTE — RESTORATIVE TECHNICIAN NOTE
Restorative Specialist Mobility Note       Activity: Ambulate in cisneros, Ambulate in room, Bathroom privileges, Chair, Stand at bedside (Educated/encouraged pt to ambulate with assistance 3-4 x's/day  Chair alarm on   Pt callbell, phone/tray within reach )     Assistive Device: Front wheel walker ([place] Collar on)    Ciro MARTINEZ, Restorative Technician, [employer]

## 2018-11-19 NOTE — ANESTHESIA POSTPROCEDURE EVALUATION
Post-Op Assessment Note      CV Status:  Stable    Post-procedure mental status: Pt  placed on T-piece  Spont  Vent  Sleepy  Hydration Status:  Euvolemic    PONV Controlled:  None    Airway Patency:  Patent    Post Op Vitals Reviewed: Yes          Staff: CRNA       Comments: Pt  to Pacu  Report given  Course uneventful  VSS m Placed on T-piece  Swallowing reflex intact            /63 (11/19/18 1731)    Temp (!) 97 3 °F (36 3 °C) (11/19/18 1728)    Pulse 67 (11/19/18 1731)   Resp 15 (11/19/18 1731)    SpO2 100 % (11/19/18 1731)

## 2018-11-19 NOTE — PERIOPERATIVE NURSING NOTE
At [phone] pt extubated by Dr Marisol Ny and placed on 6LFM  O2 sat 100% and pt following commands

## 2018-11-19 NOTE — PERIOPERATIVE NURSING NOTE
Condition stable post op  VSS  Pt following commands and resting comfortably   Pt ready for D/C from the PACU

## 2018-11-19 NOTE — ANESTHESIA PROCEDURE NOTES
Arterial Line Insertion  Date/Time: 11/19/2018 2:39 PM  Performed by: Kavitha Colbert  Authorized by: Germain Griffith   Consent: Verbal consent obtained  Written consent obtained  Risks and benefits: risks, benefits and alternatives were discussed  Consent given by: patient  Patient understanding: patient states understanding of the procedure being performed  Patient consent: the patient's understanding of the procedure matches consent given  Procedure consent: procedure consent matches procedure scheduled  Relevant documents: relevant documents present and verified  Test results: test results available and properly labeled  Patient identity confirmed: arm band and hospital-assigned identification number  Time out: Immediately prior to procedure a "time out" was called to verify the correct patient, procedure, equipment, support staff and site/side marked as required  Preparation: Patient was prepped and draped in the usual sterile fashion  Indications: hemodynamic monitoring  Orientation:  Left  Location: radial artery  Sedation:  Patient sedated: yes (GETA )  Sedatives: propofol  Analgesia: fentanyl  Sedation start date/time: 11/19/2018 3:21 PM  Sedation end date/time: 11/19/2018 3:21 PM  Vitals: Vital signs were monitored during sedation      Procedure Details:  Needle gauge: 22  Seldinger technique: Seldinger technique used  Number of attempts: 2    Post-procedure:  Post-procedure: dressing applied  Waveform: good waveform and waveform confirmed  Patient tolerance: Patient tolerated the procedure well with no immediate complications

## 2018-11-19 NOTE — ANESTHESIA PREPROCEDURE EVALUATION
Review of Systems/Medical History  Patient summary reviewed  Chart reviewed  No history of anesthetic complications     Cardiovascular  EKG reviewed, Hyperlipidemia, Hypertension ,   Comment: Echo 60%EF, mild AI and mild MR,  Pulmonary  Negative pulmonary ROS        GI/Hepatic  Negative GI/hepatic ROS          Negative  ROS        Endo/Other  Negative endo/other ROS      GYN       Hematology  Negative hematology ROS      Musculoskeletal    Comment: S/P fall, has cervical neuropathy       Neurology    CVA ,    Psychology   Anxiety, Depression ,              Physical Exam    Airway    Mallampati score: II  TM Distance: >3 FB  Neck ROM: full     Dental   No notable dental hx     Cardiovascular      Pulmonary      Other Findings        Anesthesia Plan  ASA Score- 3     Anesthesia Type- general with ASA Monitors  Additional Monitors:   Airway Plan:         Plan Factors-  Patient did not smoke on day of surgery  Induction- intravenous  Postoperative Plan- Plan for postoperative opioid use  Informed Consent- Anesthetic plan and risks discussed with patient  I personally reviewed this patient with the CRNA  Discussed and agreed on the Anesthesia Plan with the CRNA  Dennis Orellana

## 2018-11-19 NOTE — PROGRESS NOTES
Patient seen and examined    Briefly this is a 51-year-old gentleman with a spinal cord injury secondary to a fall and critical cervical spinal stenosis  On MRI imaging there is spinal stenosis from C3 through C6  There is cord signal change also signifying an injury specifically to the cord  This is produced weakness of the legs with poor position sense and hyper reflexia, with weakness and apraxia of his upper extremities  Is for each of these reasons that the below surgical intervention is indicated  Posterior cervical decompressive laminectomy and lateral mass fixation fusion C3 through C6  The risks, benefits and complications of surgery were explained in detail to [facility]  including hemorrhage, infection, CSF leakage, wound problems, pain, weakness, numbness, dysesthesiae, paralysis, coma, and death  Also, the possibility  of further surgery being required was emphasized  Other potential medical complications were outlined, including deep venous thrombosis, pulmonary embolism, pneumonia, urinary tract infection, myocardial infarction,  and stroke  The need for physical therapy, occupational therapy, and rehabilitation was also mentioned  The alternatives to surgery were discussed  [facility] asked relevant questions and asked that we proceed with arrangements for surgery

## 2018-11-20 ENCOUNTER — APPOINTMENT (INPATIENT)
Dept: RADIOLOGY | Facility: HOSPITAL | Age: 73
DRG: 472 | End: 2018-11-20
Attending: NEUROLOGICAL SURGERY
Payer: MEDICARE

## 2018-11-20 LAB
ANION GAP SERPL CALCULATED.3IONS-SCNC: 8 MMOL/L (ref 4–13)
BUN SERPL-MCNC: 10 MG/DL (ref 5–25)
CALCIUM SERPL-MCNC: 8 MG/DL (ref 8.3–10.1)
CHLORIDE SERPL-SCNC: 103 MMOL/L (ref 100–108)
CO2 SERPL-SCNC: 23 MMOL/L (ref 21–32)
CREAT SERPL-MCNC: 0.63 MG/DL (ref 0.6–1.3)
ERYTHROCYTE [DISTWIDTH] IN BLOOD BY AUTOMATED COUNT: 13.3 % (ref 11.6–15.1)
GFR SERPL CREATININE-BSD FRML MDRD: 98 ML/MIN/1.73SQ M
GLUCOSE SERPL-MCNC: 79 MG/DL (ref 65–140)
HCT VFR BLD AUTO: 29.7 % (ref 36.5–49.3)
HGB BLD-MCNC: 10 G/DL (ref 12–17)
MCH RBC QN AUTO: 31.1 PG (ref 26.8–34.3)
MCHC RBC AUTO-ENTMCNC: 33.7 G/DL (ref 31.4–37.4)
MCV RBC AUTO: 92 FL (ref 82–98)
PLATELET # BLD AUTO: 180 THOUSANDS/UL (ref 149–390)
PLATELET # BLD AUTO: 190 THOUSANDS/UL (ref 149–390)
PMV BLD AUTO: 11.9 FL (ref 8.9–12.7)
PMV BLD AUTO: 12 FL (ref 8.9–12.7)
POTASSIUM SERPL-SCNC: 4.2 MMOL/L (ref 3.5–5.3)
RBC # BLD AUTO: 3.22 MILLION/UL (ref 3.88–5.62)
SODIUM SERPL-SCNC: 134 MMOL/L (ref 136–145)
WBC # BLD AUTO: 11.2 THOUSAND/UL (ref 4.31–10.16)

## 2018-11-20 PROCEDURE — G8988 SELF CARE GOAL STATUS: HCPCS

## 2018-11-20 PROCEDURE — 97164 PT RE-EVAL EST PLAN CARE: CPT

## 2018-11-20 PROCEDURE — 99232 SBSQ HOSP IP/OBS MODERATE 35: CPT | Performed by: PHYSICAL MEDICINE & REHABILITATION

## 2018-11-20 PROCEDURE — 80048 BASIC METABOLIC PNL TOTAL CA: CPT | Performed by: NEUROLOGICAL SURGERY

## 2018-11-20 PROCEDURE — 97168 OT RE-EVAL EST PLAN CARE: CPT

## 2018-11-20 PROCEDURE — 99222 1ST HOSP IP/OBS MODERATE 55: CPT | Performed by: PSYCHIATRY & NEUROLOGY

## 2018-11-20 PROCEDURE — 99024 POSTOP FOLLOW-UP VISIT: CPT | Performed by: PHYSICIAN ASSISTANT

## 2018-11-20 PROCEDURE — G8979 MOBILITY GOAL STATUS: HCPCS

## 2018-11-20 PROCEDURE — G8978 MOBILITY CURRENT STATUS: HCPCS

## 2018-11-20 PROCEDURE — 85027 COMPLETE CBC AUTOMATED: CPT | Performed by: NEUROLOGICAL SURGERY

## 2018-11-20 PROCEDURE — 85049 AUTOMATED PLATELET COUNT: CPT | Performed by: NEUROLOGICAL SURGERY

## 2018-11-20 PROCEDURE — 72040 X-RAY EXAM NECK SPINE 2-3 VW: CPT

## 2018-11-20 PROCEDURE — 99232 SBSQ HOSP IP/OBS MODERATE 35: CPT | Performed by: INTERNAL MEDICINE

## 2018-11-20 PROCEDURE — G8987 SELF CARE CURRENT STATUS: HCPCS

## 2018-11-20 RX ORDER — CEFAZOLIN SODIUM 1 G/50ML
SOLUTION INTRAVENOUS
Status: DISPENSED
Start: 2018-11-20 | End: 2018-11-20

## 2018-11-20 RX ADMIN — SODIUM CHLORIDE 100 ML/HR: 0.9 INJECTION, SOLUTION INTRAVENOUS at 13:18

## 2018-11-20 RX ADMIN — OXYCODONE HYDROCHLORIDE 10 MG: 10 TABLET ORAL at 04:15

## 2018-11-20 RX ADMIN — OXYCODONE HYDROCHLORIDE 10 MG: 10 TABLET ORAL at 14:07

## 2018-11-20 RX ADMIN — CEFAZOLIN SODIUM 1000 MG: 1 SOLUTION INTRAVENOUS at 00:48

## 2018-11-20 RX ADMIN — HEPARIN SODIUM 5000 UNITS: 5000 INJECTION INTRAVENOUS; SUBCUTANEOUS at 21:48

## 2018-11-20 RX ADMIN — SERTRALINE HYDROCHLORIDE 100 MG: 100 TABLET ORAL at 08:29

## 2018-11-20 RX ADMIN — POLYETHYLENE GLYCOL 3350 17 G: 17 POWDER, FOR SOLUTION ORAL at 08:29

## 2018-11-20 RX ADMIN — DOCUSATE SODIUM 100 MG: 100 CAPSULE, LIQUID FILLED ORAL at 10:33

## 2018-11-20 RX ADMIN — DOCUSATE SODIUM 100 MG: 100 CAPSULE, LIQUID FILLED ORAL at 18:19

## 2018-11-20 RX ADMIN — CEFAZOLIN SODIUM 1000 MG: 1 SOLUTION INTRAVENOUS at 07:56

## 2018-11-20 RX ADMIN — BACITRACIN ZINC 1 LARGE APPLICATION: 500 OINTMENT TOPICAL at 08:30

## 2018-11-20 RX ADMIN — ATORVASTATIN CALCIUM 40 MG: 40 TABLET, FILM COATED ORAL at 18:19

## 2018-11-20 RX ADMIN — OXYCODONE HYDROCHLORIDE 10 MG: 10 TABLET ORAL at 08:32

## 2018-11-20 RX ADMIN — FENTANYL CITRATE 25 MCG: 50 INJECTION, SOLUTION INTRAMUSCULAR; INTRAVENOUS at 06:31

## 2018-11-20 RX ADMIN — SENNOSIDES AND DOCUSATE SODIUM 1 TABLET: 8.6; 5 TABLET ORAL at 08:29

## 2018-11-20 RX ADMIN — BACITRACIN ZINC 1 LARGE APPLICATION: 500 OINTMENT TOPICAL at 18:18

## 2018-11-20 RX ADMIN — SODIUM CHLORIDE 100 ML/HR: 0.9 INJECTION, SOLUTION INTRAVENOUS at 02:40

## 2018-11-20 RX ADMIN — SENNOSIDES AND DOCUSATE SODIUM 1 TABLET: 8.6; 5 TABLET ORAL at 18:19

## 2018-11-20 RX ADMIN — OXYCODONE HYDROCHLORIDE 5 MG: 5 TABLET ORAL at 18:18

## 2018-11-20 NOTE — PROGRESS NOTES
Progress Note - Neurosurgery   Dana Christine 68 y o  male MRN: [id number]  Unit/Bed#: [facility] 730-01 Encounter: [id number]    Assessment:  1  POD#1 posterior cervical decompressive laminectomy and instrumented posterior lateral fusion fixation C3-6 bilateral  1  Severe central canal stenosis and chronic mass effect on the cord at C3-5 and C4-5  2  Severe bilateral neural foraminal narrowing at C2-3 to C4-5  2  Status post fall  3  CVA - tPA administered 11/13/2018  4  Anxiety  5  Depression  6  Constipation  7  OCD     Plan:  · Exam: GCS 14 (-1 some confusion), and appropriately answers questions and is slow to answer at times, moving all extremities with some weakness in the bilateral upper extremities right worse than left, decreased sensation to pinprick along the left upper and lower extremities, no Yeni or clonus noted, no drift, abnormal finger-to-nose on the right but able to self correct, no neck or back tenderness  · Imaging reviewed personally and with attending  Results are as follows:  ? X-ray spine cervical 11/28/2018:  Status post posterior spinal hardware fusion from C3-C6 with intact hardware   ? MRI cervical spine without contrast 11/14/2018:  Disc and facet osteophytosis resulting in severe central canal stenosis and chronic mass effect on the cord at C3-4 and C4-5  Severe bilateral neural foraminal narrowing from C2-3 to C4-5  No evidence of cord signal abnormality  Cord signal change noted when reviewed with attendings  ? MRI brain without contrast 11/14/2018:  No acute ischemia  Left frontal scalp soft tissue swelling  ? CT head without contrast 11/14/2018:  No acute intracranial abnormality  ? CTA stroke head and neck 11/13/2018:  Normal CTA of the head and neck  ? CT stroke alert brain 11/13/2018:  No acute intracranial disease  Supraorbital left frontal scalp hematoma    · [place] collar ordered to be worn at all times, Lynne collar for showering  · GOSIA drain:  85cc/24hrs with addition 70cc/12hrs - will remain in for now until output is < 100cc/24hrs or < 30cc/8hrs  · Medical management per primary team  ? Recommend discontinuing fentanyl  · DVT ppx:  Heparin, SCDs  · Mobilize as tolerated with assistance  ? PT:  Okay to discharge to rehab when medically stable  ? OT:  Okay to discharge to short-term rehab when medically stable  · Neurosurgery will continue to follow  Patient may need some time to recover from anesthesia, will reassess patient tomorrow  Please call with questions or concerns  Subjective/Objective   Chief Complaint: "I have excruciating neck pain " / postop day 1 posterior cervical decompressive laminectomy and instrumented posterior lateral fusion fixation C3-C6 bilateral    Subjective:  Patient complains of 20/10 sharp neck pain located by the incision  He cannot tell if pain is radiating into his arm but states that his arms feel "very tight"  He notes improvements with right hand movement and feels he can straighten it more  He denies new numbness tingling or weakness  The patient was able to have a bowel movement yesterday but states he had an accident as well where he did not make it to the bathroom  He is passing gas  Has mild low back pain but states it is not as bad as neck pain  He reports some bladder issues and expresses desire to have condom cath placed  States bladder issues have been present since prior to surgery  It is unclear if patient is having incontinence at this time  Per nursing staff, patient is not currently at his baseline and is fixating heavily on urinary issues  Nursing staff states he is not complaining of any pain  Objective:  Sitting in chair, NAD  I/O       11/18 0701 - 11/19 0700 11/19 0701 - 11/20 0700 11/20 0701 - 11/21 0700    P  O  213  240    I V  (mL/kg) 145 8 (2 1) 1931 7 (28 7) 1035 (15 4)    IV Piggyback  500 50    Total Intake(mL/kg) 358 8 (5 3) 2431 7 (36 1) 1325 (19 7)    Urine (mL/kg/hr) 875 (0 5) 2325 (1 4) 600 (1 4)    Drains  85 70    Blood  100     Total Output 875 2510 670    Net -516 2 -78 3 +655           Unmeasured Urine Occurrence 1 x            Invasive Devices     Peripheral Intravenous Line            Peripheral IV 11/17/18 Right Forearm 2 days    Peripheral IV 11/19/18 Right Wrist less than 1 day          Drain            Closed/Suction Drain Posterior;Right Neck Bulb 7 Fr  less than 1 day    External Urinary Catheter Small less than 1 day                Physical Exam:  Vitals: Blood pressure 117/62, pulse 64, temperature 98 9 °F (37 2 °C), temperature source Oral, resp  rate 16, height 5' 7" (1 702 m), weight 67 3 kg (148 lb 5 9 oz), SpO2 96 %  ,Body mass index is 23 24 kg/m²      Hemodynamic Monitoring: MAP: Arterial Line MAP (mmHg): 88 mmHg,     General appearance: alert, appears stated age, cooperative and no distress  Head: Normocephalic, without obvious abnormality, atraumatic  Eyes: EOMI, PERRL  Neck: supple, symmetrical, trachea midline and NT, c-spine collar on, dressing clean dry and intact  Back: no kyphosis present, no tenderness to percussion or palpation  Lungs: non labored breathing  Heart: regular heart rate  Neurologic:   Mental status:  GCS 14 (-1 some confusion), does not answer questions appropriately and is slow to answer at times, able to name 3/3 objects, thought content appropriate  Cranial nerves: grossly intact (Cranial nerves II-XII)  Sensory: normal to LT, decreased to pinprick on left upper extremity and left lower extremity excluding left hand, right upper and lower extremity sensation intact, JPS 3/3, DST intact  Motor: moving all extremities with some weakness in RUE >LUE, strength 5/5 except:   RUE:  4-/5  strength, 3/5 I/O, 3/5 WF/WE, 4/5 biceps/triceps, 4/5 shoulder   LUE:  4/5  strength, 4-4+/5 I/O, 4/5 WF/WE, 4+/5 biceps/triceps, 4+/5 shoulder  Reflexes: 2+ and symmetric, +right sy, no clonus noted  Coordination: finger to nose normal bilaterally, no drift bilaterally    Lab Results:    Results from last 7 days  Lab Units 11/20/18  0624 11/15/18  0526 11/14/18  0519   WBC Thousand/uL 11 20* 9 29 9 03   HEMOGLOBIN g/dL 10 0* 12 2 12 1   HEMATOCRIT % 29 7* 37 2 35 7*   PLATELETS Thousands/uL 190  180 191 187   NEUTROS PCT %  --   --  73   MONOS PCT %  --   --  10       Results from last 7 days  Lab Units 11/20/18  0624 11/18/18  0544 11/15/18  0526 11/14/18  0519   POTASSIUM mmol/L 4 2 4 0 4 2 4 5   CHLORIDE mmol/L 103 102 102 104   CO2 mmol/L 23 24 25 27   BUN mg/dL 10 14 12 8   CREATININE mg/dL 0 63 0 60 0 73 0 79   CALCIUM mg/dL 8 0* 8 2* 8 3 8 0*   ALK PHOS U/L  --   --   --  66   ALT U/L  --   --   --  31   AST U/L  --   --   --  29       Results from last 7 days  Lab Units 11/14/18  0519   MAGNESIUM mg/dL 2 2       Results from last 7 days  Lab Units 11/14/18  0519   PHOSPHORUS mg/dL 2 9       Results from last 7 days  Lab Units 11/16/18  1659   INR  1 05   PTT seconds 28     No results found for: TROPONINT  ABG:No results found for: PHART, DIB2LYK, PO2ART, ACP4RRK, U6FOJMPK, BEART, SOURCE    Imaging Studies: I have personally reviewed pertinent reports  and I have personally reviewed pertinent films in PACS    Ct Head Wo Contrast    Result Date: 11/14/2018  Impression: No acute intracranial abnormality  Workstation performed: [id number]     Mri Brain Wo Contrast    Result Date: 11/14/2018  Impression: No MR evidence of acute ischemia  Left frontal scalp soft tissue swelling only partially visualized, correlate clinically  Workstation performed: [id number]     Mri Cervical Spine Wo Contrast    Result Date: 11/15/2018  Impression: 1  Disc and facet osteophytosis resulting in severe central canal stenosis and chronic mass effect on the cord at C3-4 and C4-5  Severe bilateral neural foraminal narrowing from C2-3 to C4-5  2   No evidence of cord signal abnormality   Workstation performed: [id number]     Xr Stroke Alert Portable Chest    Result Date: 11/13/2018  Impression: Cardiomegaly  Diminished inspiration  No acute cardiopulmonary disease  Workstation performed: [id number]     Ct Stroke Alert Brain    Result Date: 11/13/2018  Impression: No acute intracranial disease  Supraorbital left frontal scalp hematoma Findings were directly discussed with Lizbeth Campoverde on 11/13/2018 11:04 AM  Workstation performed: [id number]     Xr Hip/pelv 4+ Vw Left If Performed    Result Date: 11/13/2018  Impression: No acute osseous abnormality  Workstation performed: [id number]     Xr Hip/pelvis 4+ Vw Right If Performed    Result Date: 11/13/2018  Impression: No acute osseous abnormality  Workstation performed: [id number]     Cta Stroke Alert (head/neck)    Result Date: 11/13/2018  Impression: Normal CTA of the head and neck  No large vessel flow restrictive disease within the head or neck  * I personally telephoned this result to Kina Ruiz on 11/13/2018 11:04 AM  Workstation performed: [id number]     EKG, Pathology, and Other Studies: I have personally reviewed pertinent reports        VTE Pharmacologic Prophylaxis: Heparin    VTE Mechanical Prophylaxis: sequential compression device

## 2018-11-20 NOTE — PLAN OF CARE
Problem: PHYSICAL THERAPY ADULT  Goal: Performs mobility at highest level of function for planned discharge setting  See evaluation for individualized goals  Treatment/Interventions: Functional transfer training, LE strengthening/ROM, Elevations, Therapeutic exercise, Endurance training, Patient/family training, Equipment eval/education, Bed mobility, Gait training  Equipment Recommended: Carol Beckett       See flowsheet documentation for full assessment, interventions and recommendations  Prognosis: Fair  Problem List: Decreased strength, Decreased endurance, Impaired balance, Decreased mobility, Decreased coordination, Decreased cognition, Decreased safety awareness, Impaired judgement, Pain, Orthopedic restrictions  Assessment: Pt seen for re-evaluation, as pt now s/p posterior cervical decompressive laminectomy and instrumented posterior lateral funsion fixation C3-6 done yesterday  Pt presents w/ decreased functional mob, standing balance, endurance, B LE strength and coordination, barriers at home  will benefit from skilled PT to correct for the above problems  Recommend rehab at d/c  Barriers to Discharge: Inaccessible home environment     Recommendation:  (recommend rehab at d/c)     PT - OK to Discharge: (S) Yes (to rehab when stable)    See flowsheet documentation for full assessment

## 2018-11-20 NOTE — OCCUPATIONAL THERAPY NOTE
OT CANCELLATION NOTE    Attempted to see pt for OT re-evaluation, however he is currently off the floor at X-ray  Will re-attempt at a later time           Amelia Maddox, OTR/L

## 2018-11-20 NOTE — PROGRESS NOTES
PM&R Consult Follow Up Note  Femi Simmons 68 y o  male MRN: [id number]  Unit/Bed#: [facility] 730-01 Encounter: [id number]     Assessment and Recommendations:  Mr Femi iSmmons is a 68 y o  male with PMH HTN, HLD, Lumbar Stenosis presenting after fall, found to have R sided weakness, now s/p tPA  PM&R consulted for rehabilitation recommendations  His course has since been complicated by findings on MRI C-spine of severe central canal stenosis and chronic mass effect on the cord at C3-5, and C4-5, with severe bilateral neural foraminal narrowing at C2-3 to C4-5 now s/p PCDF C3-C6 on 11/19/18      - Cervical stenosis/myelopathy now s/p PCDF  - On stroke pathway s/p tPA  - Post-surgical pain (improving)  - HTN  - Neurogenic bladder  - R > L sided weakness     Impairments:  Impaired functional mobility and ability to perform ADL's    Recommendations:  - Continue PT/OT while inpatient  Will follow-up post-op therapy evaluations  - Pain: Tylenol PRN, Oxycodone 10mg Q4hr PRN, Oxycodone 5mg Q4hr PRN  - Bowel: On colace 100mg BID, and also Senokot 1 tablet 2 times daily which also has colace  Would discontinue Senokot and switch to Senna 2 tabs at noon  - Bladder: Condom cath in place  Will monitor urinary function for incontinence  - Skin: Frequent weight shifts  - C-Spine stable  Collar in place at all times per Neurosurgery  - Patient would benefit from inpatient rehab to maximize his functional independence, as he needs to be at a [place] level prior to discharge home  He may benefit from acute rehab to work with PT/OT and to receive 24/7 Rehab physician/nursing care to monitor and manage his bladder/bowel function, monitor his cardiopulmonary status given his hypertension and increased energy expenditure in therapy, and manage his post-operative pain, monitor for spasticity, as well as monitor for any neurologic decline or symptoms concerning for complication related to his myelopathy   Will continue to follow his functional/medical progress  Thank you for allowing the PM&R service to participate in the care of Mr Vanesa Rubio  We will continue to follow he progress with you  Please do not hesitate to call with questions or concerns  Interval History:   PCDF C3-C4 with Dr Julio C De León on 11/19/18    Subjective: He is anxious that he will have to wear the collar for the rest of his life, and worried he won't be able to walk again  He is very motivated to participate in therapy, and wants to be fairly independent  He denies any N/V, new numbness/tingling/weakness  He feels his hand function has been improving  He reports muscle tightness diffusely  His pain is controlled on his current regimen  His last BM was yesterday, and he is currently using a condom cath       Objective:    Physical Therapy: 11/19/18     Bed Mobility   Supine to Sit 5  Supervision   Additional items Assist x 1;HOB elevated;Verbal cues   Transfers   Sit to Stand (Contact guard to min assist)   Additional items Assist x 1;Verbal cues;Armrests   Stand to Sit 5  Supervision   Additional items Assist x 1;Verbal cues   Ambulation/Elevation   Gait pattern Poor UE support; Foward flexed; Short stride  (slow)   Gait Assistance (contact guard to min assist)   Additional items Assist x 1;Verbal cues   Assistive Device Rolling walker   Distance 100 feet x 2       Occupational Therapy: 11/18/18  ADL   Where Assessed Edge of bed   LB Dressing Assistance 2  Maximal Assistance   LB Dressing Deficit Don/doff R sock; Don/doff L sock   LB Dressing Comments Attempted, un successful, requires max A           Vital Signs:      Temp:  [97 2 °F (36 2 °C)-99 5 °F (37 5 °C)] 98 9 °F (37 2 °C)  HR:  [62-74] 64  Resp:  [14-21] 16  BP: (113-135)/(50-68) 117/62  Arterial Line BP: (148-158)/(58-61) 158/58  FiO2 (%):  [60] 60   Intake/Output Summary (Last 24 hours) at 11/20/18 0945  Last data filed at 11/20/18 0756   Gross per 24 hour   Intake          3008  34 ml   Output 2510 ml   Net           498 34 ml        Laboratory:      Lab Results   Component Value Date    HGB 10 0 (L) 11/20/2018    HCT 29 7 (L) 11/20/2018    WBC 11 20 (H) 11/20/2018     Lab Results   Component Value Date    BUN 10 11/20/2018    K 4 2 11/20/2018     11/20/2018    CREATININE 0 63 11/20/2018     Lab Results   Component Value Date    PROTIME 13 8 11/16/2018    INR 1 05 11/16/2018        Gen: No acute distress, Well-nourished, well-appearing  HEENT: Moist mucus membranes, Normocephalic  Has periorbital ecchymoses  Cardiovascular: Regular rate, rhythm, S1/S2  Distal pulses palpable  Heme/Extr: No edema/clubbing/cyanosis  Pulmonary: Non-labored breathing  Lungs CTAB  : No cristina  GI: Soft, non-tender, non-distended  BS+  MSK: AROM is WFL in all extremities  No effusions or deformities  Bulk is symmetric  See below for MMT scores  Integumentary: Skin is warm, dry  No rashes or ulcers  Neuro: AAOx3, CN 2-12 intact  Speech is intact  Appropriate to questioning  Tone is normal     DTRs: 3+ in his patella, otherwise 2+ throughout No signs of overflow, or cross/adductor resopnse  Plantar  response is extensor on the right, flexor on the left  Yeni's is present on the right, absent on the left  No  clonus  MMT:   Strength:   Right  Left  Site  Right  Left  Site    4- 4+  S Ab: Shoulder Abductors  4+  4+  HF: Hip Flexors    4 4+  EF: Elbow Flexors  -  - KF: Knee Flexors    4-  4+  EE: Elbow Extensors  5  5  KE: Knee Extensors    3  4+  WE: Wrist Extensors  4  5  DR: Dorsi Flexors    3  4+  FF: Finger Flexors  4  5  PF: Plantar Flexors    3  4+  HI: Hand Intrinsics  4  5  EHL: Extensor Hallucis Longus   Psych: Normal mood and affect

## 2018-11-20 NOTE — PHYSICAL THERAPY NOTE
Physical Therapy Re-Evaluation   11/20/18 1250   Note Type   Note type Re-eval   Pain Assessment   Pain Assessment 0-10   Pain Score 7   Pain Type Acute pain;Surgical pain   Pain Location Neck   Pain Orientation Bilateral   Patient's Stated Pain Goal No pain   Hospital Pain Intervention(s) Ambulation/increased activity   Response to Interventions unchanged   Home Living   Type of Home Apartment   Additional Comments please refer to initial eval for details   Prior Function   Falls in the last 6 months 1 to 4   Restrictions/Precautions   Braces or Orthoses C/S Collar   Other Precautions Pain;Multiple lines; Fall Risk; Chair Alarm; Bed Alarm;Cognitive   General   Family/Caregiver Present No   Cognition   Overall Cognitive Status Impaired   Arousal/Participation Responsive   Attention Attends with cues to redirect   Orientation Level Oriented X4   Memory Unable to assess   Following Commands Follows one step commands without difficulty   RLE Assessment   RLE Assessment (strength grossly 3+ to 4-/5- assessed w/ mobility)   LLE Assessment   LLE Assessment (strength grossly 3+ to 4-/5- assessed w/ mobility)   Coordination   Movements are Fluid and Coordinated 0   Coordination and Movement Description B LE ataxia   Transfers   Sit to Stand 4  Minimal assistance   Additional items Assist x 1   Stand to Sit 4  Minimal assistance   Additional items Assist x 1   Ambulation/Elevation   Gait pattern (slow, ataxia, increased RW advancement)   Gait Assistance 4  Minimal assist   Additional items Assist x 1   Assistive Device Rolling walker   Distance 75'x2, seated rest in between trials   Balance   Static Sitting Good   Dynamic Sitting Fair -   Static Standing Poor +   Dynamic Standing Poor +   Ambulatory Poor +   Endurance Deficit   Endurance Deficit Yes   Endurance Deficit Description fatigue, weakness, pain   Activity Tolerance   Activity Tolerance Patient limited by fatigue;Patient limited by pain;Treatment limited secondary to medical complications (Comment)   Nurse Made Aware yes   Assessment   Prognosis Fair   Problem List Decreased strength;Decreased endurance; Impaired balance;Decreased mobility; Decreased coordination;Decreased cognition;Decreased safety awareness; Impaired judgement;Pain;Orthopedic restrictions   Assessment Pt seen for re-evaluation, as pt now s/p posterior cervical decompressive laminectomy and instrumented posterior lateral funsion fixation C3-6 done yesterday  Pt presents w/ decreased functional mob, standing balance, endurance, B LE strength and coordination, barriers at home  will benefit from skilled PT to correct for the above problems  Recommend rehab at d/c   Goals   Patient Goals I want to be able to walk again   STG Expiration Date 12/04/18   Short Term Goal #1 1-2 wks: bed mob and transfers w/ indep, standing balance to good/normal w/ device as needed, ambulate 200-300 ft w/ RW and mod I, increase B LE strength by 1/2 -1 grade, ambulate 1 flight of stairs w/ indep   Treatment Day 4   Plan   Treatment/Interventions Functional transfer training;LE strengthening/ROM; Elevations; Therapeutic exercise; Endurance training;Patient/family training;Equipment eval/education; Bed mobility;Gait training   PT Frequency (4-6x/wk)   Recommendation   Recommendation (recommend rehab at d/c)   Equipment Recommended Jovita Bearded   PT - OK to Discharge Yes  (to rehab when stable)   Modified Bryan Scale   Modified Bryan Scale 4   Barthel Index   Feeding 10   Bathing 0   Grooming Score 5   Dressing Score 5   Bladder Score 0   Bowels Score 10   Toilet Use Score 5   Transfers (Bed/Chair) Score 5   Mobility (Level Surface) Score 0   Stairs Score 0   Barthel Index Score 40   Deion Gibbons PT, DPT CSRS

## 2018-11-20 NOTE — UTILIZATION REVIEW
Continued Stay Review    Date: 18      Vital Signs: Temp (24hrs), Av 2 °F (36 8 °C), Min:98 1 °F (36 7 °C), Max:98 4 °F (36 9 °C)     Temp:  [98 1 °F (36 7 °C)-98 4 °F (36 9 °C)] 98 1 °F (36 7 °C)  HR:  [59-64] 64  Resp:  [18] 18  BP: (113-130)/(52-61) 118/57  SpO2:  [95 %-99 %] 99 %  Body mass index is 23 21 kg/m²       Medications:     acetaminophen 650 mg Oral Q6H PRN   aspirin 81 mg Oral Daily   atorvastatin 40 mg Oral Daily With Dinner   bacitracin 1 large application Topical BID   [START ON 2018] cefazolin 1,000 mg Intravenous On Call To OR   [START ON 2018] chlorhexidine 15 mL Swish & Spit Once   heparin (porcine) 5,000 Units Subcutaneous Q8H [address]   oxyCODONE 5 mg Oral Q4H PRN   polyethylene glycol 17 g Oral Daily   senna-docusate sodium 1 tablet Oral BID   sertraline 100 mg Oral Daily   [START ON 2018] sodium chloride 125 mL/hr Intravenous Continuous            Age/Sex: 68 y o  male   Awaiting neurosurgical procedure likely on Monday    Assessment/Plan      INTERNAL MEDICINE   CVA (cerebral vascular accident) [facility])   Assessment & Plan     Stroke status post tPA  Continue aspirin statin  Neurology following         * Cervical spinal stenosis   Assessment & Plan     Neurosurgery input noted  Possible surgery on Monday  Cervical collar            NEURO SURGERY   Assessment:  1  Severe central canal stenosis and chronic mass effect on the cord at C3-5 and C4-5  2  Severe bilateral neural foraminal narrowing at C2-3 to C4-5  3  Status post fall  4  CVA - tPA administered  5  Anxiety  6  Depression  7  Constipation  8   OCD        Plan:  -refusing to wear the collar in bed  -possible surgical intervention earlier in the week          Discharge Plan: TO BE DETERMINED POST OP

## 2018-11-20 NOTE — RESTORATIVE TECHNICIAN NOTE
Restorative Specialist Mobility Note       Activity: Ambulate in cisneros, Ambulate in room, Bathroom privileges, Chair, Dangle, Stand at bedside (Educated/encouraged pt to ambulate with assistance 3-4 x's/day  Chair alarm on   Pt callbell, phone/tray within reach )     Assistive Device: Front wheel walker ([place] [employer] Collar on)          Ciro MARTINEZ, Restorative Technician, [employer]

## 2018-11-20 NOTE — PROGRESS NOTES
Progress Note - Brody Nearing 1945, 68 y o  male MRN: [id number]    Unit/Bed#: [facility] 730-01 Encounter: [id number]    Primary Care Provider: Dami Otoole DO   Date and time admitted to hospital: 2018 10:45 AM        * Cervical spinal stenosis   Assessment & Plan      Status post OR  Management as per Neurosurgery     Hyponatremia   Assessment & Plan    Monitor sodium levels      Anxiety   Assessment & Plan    Continue anxiolytics     Depression   Assessment & Plan    Patient will need psychiatric evaluation for placement  Discussed with case management  Weakness   Assessment & Plan    Stroke status post tPA  Continue aspirin statin  Neurology following  Negative for CVA on MRI       OCD (obsessive compulsive disorder)   Assessment & Plan    Monitor for now       VTE Pharmacologic Prophylaxis:   Pharmacologic: Heparin  Mechanical VTE Prophylaxis in Place: No    Patient Centered Rounds: I have performed bedside rounds with nursing staff today  Time Spent for Care: 15 minutes  More than 50% of total time spent on counseling and coordination of care as described above  Current Length of Stay: 7 day(s)    Current Patient Status: Inpatient   Certification Statement: The patient will continue to require additional inpatient hospital stay due to Need to monitor symptoms  *    Code Status: Level 1 - Full Code      Subjective:   nad    Objective:     Vitals:   Temp (24hrs), Av 1 °F (36 7 °C), Min:97 2 °F (36 2 °C), Max:99 5 °F (37 5 °C)    Temp:  [97 2 °F (36 2 °C)-99 5 °F (37 5 °C)] 98 9 °F (37 2 °C)  HR:  [62-74] 64  Resp:  [14-21] 16  BP: (113-135)/(50-68) 117/62  SpO2:  [95 %-100 %] 96 %  Body mass index is 23 24 kg/m²  Input and Output Summary (last 24 hours):        Intake/Output Summary (Last 24 hours) at 18 1118  Last data filed at 18 1017   Gross per 24 hour   Intake          3483 34 ml   Output             2980 ml   Net           503 34 ml Physical Exam:     Physical Exam   Constitutional: He is oriented to person, place, and time  He appears well-developed and well-nourished  HENT:   Head: Normocephalic and atraumatic  Cardiovascular: Normal rate and regular rhythm  No murmur heard  Pulmonary/Chest: Effort normal and breath sounds normal  No respiratory distress  He has no wheezes  Abdominal: Soft  Bowel sounds are normal  He exhibits no distension  Musculoskeletal: Normal range of motion  He exhibits no edema  Neurological: He is alert and oriented to person, place, and time  No cranial nerve deficit  Skin: Skin is warm and dry  No erythema  Psychiatric: He has a normal mood and affect  Additional Data:     Labs:      Results from last 7 days  Lab Units 11/20/18 0624 11/14/18  0519   WBC Thousand/uL 11 20*  < > 9 03   HEMOGLOBIN g/dL 10 0*  < > 12 1   HEMATOCRIT % 29 7*  < > 35 7*   PLATELETS Thousands/uL 190  180  < > 187   NEUTROS PCT %  --   --  73   LYMPHS PCT %  --   --  16   MONOS PCT %  --   --  10   EOS PCT %  --   --  1   < > = values in this interval not displayed  Results from last 7 days  Lab Units 11/20/18 0624 11/14/18  0519   POTASSIUM mmol/L 4 2  < > 4 5   CHLORIDE mmol/L 103  < > 104   CO2 mmol/L 23  < > 27   BUN mg/dL 10  < > 8   CREATININE mg/dL 0 63  < > 0 79   CALCIUM mg/dL 8 0*  < > 8 0*   ALK PHOS U/L  --   --  66   ALT U/L  --   --  31   AST U/L  --   --  29   < > = values in this interval not displayed  Results from last 7 days  Lab Units 11/16/18  1659   INR  1 05       * I Have Reviewed All Lab Data Listed Above  * Additional Pertinent Lab Tests Reviewed:  All Labs Within Last 24 Hours Reviewed      Recent Cultures (last 7 days):           Last 24 Hours Medication List:     Current Facility-Administered Medications:  acetaminophen 650 mg Oral Q6H PRN Eda MirTAZ be    atorvastatin 40 mg Oral Daily With TAZ Samuel    bacitracin 1 large application Topical BID Can Padilla DO    bisacodyl 10 mg Rectal Daily PRN Thao Lee MD    ceFAZolin        docusate sodium 100 mg Oral BID Thao Lee MD    fentanyl citrate (PF) 25 mcg Intravenous Q1H PRN Thao Lee MD    heparin (porcine) 5,000 Units Subcutaneous [facility] Thao Lee MD    ondansetron 4 mg Intravenous Q6H PRN Thao Lee MD    oxyCODONE 10 mg Oral Q4H PRN Thao Lee MD    oxyCODONE 5 mg Oral Q4H PRN Meribeth Motts, CRNP    polyethylene glycol 17 g Oral Daily Meribeth Motts, CRNP    senna-docusate sodium 1 tablet Oral BID Meribeth Motts, CRNP    sertraline 100 mg Oral Daily Meribeth Motts, CRNP    sodium chloride 100 mL/hr Intravenous Continuous Thao Lee MD Last Rate: 100 mL/hr (11/20/18 0240)        Today, Patient Was Seen By: Haley Lala DO    ** Please Note: Dictation voice to text software may have been used in the creation of this document   **

## 2018-11-20 NOTE — PLAN OF CARE
Problem: OCCUPATIONAL THERAPY ADULT  Goal: Performs self-care activities at highest level of function for planned discharge setting  See evaluation for individualized goals  Treatment Interventions: ADL retraining, Functional transfer training, UE strengthening/ROM, Endurance training, Patient/family training, Equipment evaluation/education, Fine motor coordination activities, Continued evaluation, Energy conservation, Activityengagement          See flowsheet documentation for full assessment, interventions and recommendations  Outcome: Progressing  Limitation: Decreased ADL status, Decreased UE ROM, Decreased UE strength, Decreased Safe judgement during ADL, Decreased endurance, Decreased cognition, Decreased self-care trans, Decreased high-level ADLs  Prognosis: Good  Assessment: Pt is a 68 y o  male who was admitted to [facility] on 11/13/2018 with Cervical spinal stenosis  Pt seen for re-evaluation 2* s/p " posterior cervical decompressive laminectomy and instrumented posterior lateral fusion fixation C3-6 bilateral " Pt w/ spinal precautions and C/S collar  Please see initial eval for PMH and home s/u  Currently pt requires mod A for overall ADLS and min Ax1 for functional mobility/transfers w/ RW  Pt w/ mild confusion and c/o pain t/o session  He did not recall that he was wearing a C/S collar and was unable to recall precautions  Educated pt on spinal precautions and use of brace  Pt currently presents with impairments in the following categories -limited home support, difficulty performing ADLS, difficulty performing IADLS, limited insight into deficits, activity tolerance, endurance, standing balance/tolerance, sitting balance/tolerance, UE ROM, memory, insight and safety    These impairments, as well as pt's fatigue, pain, spinal precautions and risk for falls  limit pt's ability to safely engage in all baseline areas of occupation, including grooming, bathing, dressing, toileting, functional mobility/transfers and leisure activities  From OT standpoint, recommend inpatient rehab upon D/C  OT will continue to follow to address the below stated goals  Recommendation: Physiatry Consult  OT Discharge Recommendation: Short Term Rehab  OT - OK to Discharge:  Yes

## 2018-11-20 NOTE — PROGRESS NOTES
Pt is mary happy with condom cath in place, currently OOB in chair, tolerating this quite well and has ambulated x 2 so far today, will continue to monitor

## 2018-11-20 NOTE — OCCUPATIONAL THERAPY NOTE
[address] Re-Evaluation     Patient Name: Mary Zapien  LCGEU'F Date: 11/20/2018  Problem List  Patient Active Problem List   Diagnosis    Chronic idiopathic constipation    OCD (obsessive compulsive disorder)    Weakness    Depression    Anxiety    Cervical spinal stenosis    Cervical stenosis of spine    Hyponatremia     Past Medical History  Past Medical History:   Diagnosis Date    Anxiety     Chronic low back pain     Depression     Hypercholesterolemia     Hypertension     OCD (obsessive compulsive disorder)      Past Surgical History  Past Surgical History:   Procedure Laterality Date    CERVICAL FUSION Bilateral 11/19/2018    Procedure: Posterior cervical decompressive laminectomy and instrumented posterior lateral fusion fixation C3-6;  Surgeon: Macy Russell MD;  [facility]: [facility];  Service: Neurosurgery    HERNIA REPAIR           11/20/18 6368   Note Type   Note type Re-eval   Restrictions/Precautions   Weight Bearing Precautions Per Order No   Braces or Orthoses C/S Collar   Other Precautions Pain; Fall Risk;Bed Alarm; Chair Alarm;Cognitive;Spinal precautions   Pain Assessment   Pain Assessment 0-10   Pain Score Worst Possible Pain   Pain Type Acute pain   Pain Location Neck   Home Living   Type of Home Apartment   Home Layout Two level;Bed/bath upstairs   Bathroom Shower/Tub Tub/shower unit   Bathroom Toilet Standard   Additional Comments Lives in 2nd floor apt; no elevator   Prior Function   Level of [place] Independent with ADLs and functional mobility   Lives With Son   Receives Help From Family   ADL Assistance Independent   IADLs Needs assistance   Falls in the last 6 months 1 to 4   Vocational Retired   Lifestyle   Autonomy Pt reports being I w/ ADLS, has assist for IADLS and is I w/ transfers and functional mobility PTA   Reciprocal Relationships Pt lives w/ son who works a few hours at night but otherwise is home to assist   Service to Others Pt is retired   Intrinsic Gratification pt enjoys sports- playing and watching   Psychosocial   Psychosocial (WDL) WDL   ADL   Where Assessed Chair   Eating Assistance 5  Supervision/Setup   Eating Deficit Setup   Grooming Assistance 4  Minimal Assistance   [address] 4  Minimal Assistance   [address] 3  Moderate Assistance   [address] 4  Minimal Assistance    [facility] [address] 3  Moderate [address]  3  Moderate Assistance   Functional Assistance 3  Moderate Assistance   Bed Mobility   Sit to Supine 4  Minimal assistance   Additional items Assist x 1;LE management   Transfers   Sit to Stand 4  Minimal assistance   Additional items Assist x 1   Stand to Sit 4  Minimal assistance   Additional items Assist x 1   Stand pivot 4  Minimal assistance   Additional items Assist x 1   Functional Mobility   Additional items Rolling walker   Balance   Static Sitting Fair +   Dynamic Sitting Fair +   Static Standing Fair   Dynamic Standing Fair -   Activity Tolerance   Activity Tolerance Patient limited by pain   Nurse Made Aware Okay to see per RN Maxwell Assessment   RUE Assessment X   LUE Assessment   LUE Assessment WFL   Hand Function   Gross Motor Coordination Functional   Fine Motor Coordination Functional   Cognition   Overall Cognitive Status Impaired   Arousal/Participation Responsive; Cooperative   Attention Attends with cues to redirect   Orientation Level Oriented X4   Memory Decreased recall of precautions   Following Commands Follows one step commands with increased time or repetition   Comments Pt pleasant but confused  He did not realize that he was wearing a cervical brace  He also required redirection during conversation and some repetition of commands  Assessment   Limitation Decreased ADL status; Decreased UE ROM; Decreased UE strength;Decreased Safe judgement during ADL;Decreased endurance;Decreased cognition;Decreased self-care trans;Decreased high-level ADLs   Prognosis Good   Assessment Pt is a 68 y o  male who was admitted to [facility] on 11/13/2018 with Cervical spinal stenosis  Pt seen for re-evaluation 2* s/p " posterior cervical decompressive laminectomy and instrumented posterior lateral fusion fixation C3-6 bilateral " Pt w/ spinal precautions and C/S collar  Please see initial eval for PMH and home s/u  Currently pt requires mod A for overall ADLS and min Ax1 for functional mobility/transfers w/ RW  Pt w/ mild confusion and c/o pain t/o session  He did not recall that he was wearing a C/S collar and was unable to recall precautions  Educated pt on spinal precautions and use of brace  Pt currently presents with impairments in the following categories -limited home support, difficulty performing ADLS, difficulty performing IADLS, limited insight into deficits, activity tolerance, endurance, standing balance/tolerance, sitting balance/tolerance, UE ROM, memory, insight and safety   These impairments, as well as pt's fatigue, pain, spinal precautions and risk for falls  limit pt's ability to safely engage in all baseline areas of occupation, including grooming, bathing, dressing, toileting, functional mobility/transfers and leisure activities  From OT standpoint, recommend inpatient rehab upon D/C  OT will continue to follow to address the below stated goals  Goals   Patient Goals to get better and have less pain   LTG Time Frame 7-10   Long Term Goal see below goals    Plan   Treatment Interventions ADL retraining;Functional transfer training; Endurance training;Patient/family training;Equipment evaluation/education; Compensatory technique education; Energy conservation; Activityengagement   Goal Expiration Date 11/30/18   OT Frequency 3-5x/wk   Recommendation   OT Discharge Recommendation Short Term Rehab   OT - OK to Discharge Yes   Barthel Index   Feeding 10   Bathing 0   Grooming Score 0   Dressing Score 5   Bladder Score 0   Bowels Score 10   Toilet Use Score 5   Transfers (Bed/Chair) Score 10   Mobility (Level Surface) Score 0   Stairs Score 0   Barthel Index Score 40   Modified Sherman Scale   Modified Sherman Scale 4     LTG (7-10 DAYS)  Pt will improve activity tolerance to G for min 30 min txment sessions to increase participation in ADLs    Pt will complete ADLs/self care w/ mod I using adaptive device and DME as needed    Pt will complete toileting w/ mod I w/ G hygiene/thoroughness using DME as needed    Pt will improve functional transfers on/off all surfaces using DME as needed w/ G balance/safety including w/ mod I    Pt will improve functional mobility during ADL/IADL/leisure tasks using DME as needed w/ G balance/safety w/ mod I    Pt will engage in ongoing cognitive assessment w/ G participation w/ mod I to assist w/ safe d/c planning/recommendations    Pt will demonstrate 100% carryover of energy conservation techniques w/ mod I t/o functional I/ADL/leisure tasks w/o cues s/p skilled education    Pt will demonstrate good carry over of RW safety, C-spine precautions, and use of C/S collar during  I/ADL/leisure tasks          Veronica Guzman, OTR/L

## 2018-11-20 NOTE — CONSULTS
Consultation - Cheko Pradip [age] y o  male MRN: [id number]  [facility] Encounter: [id number]      Chief Complaint: " I feel disgusted with myself"    History of Present Illness   Physician Requesting Consult: Daija Fischer DO  Reason for Consult / Principal Problem:  Depression    Steffanie Lamb is a 68 y o  male with obsessive-compulsive disorder, hypertension, HLD, lumbar stenosis, tremors presents with stroke alert after his son found him in the floor face down  Patient has obsessive compulsive disorder have a psych admission in 2017 and needs psychiatric clearance to go to inpatient rehabilitation  Patient have obsessions about his medical condition at this moment  He denies any symptoms of depressions or anxiety, he denies any psychotic symptoms, he denies any suicidal thoughts plans or intent  He has been in Zoloft after the admission to the psychiatric unit in 2017  He has poor historian and the medical record have been review  Psychiatric Review Of Systems:  sleep: no  appetite changes: no  weight changes: no  energy/anergy: yes  interest/pleasure/anhedonia: no  somatic symptoms: no  anxiety/panic: yes  jona: no  guilty/hopeless: no  self injurious behavior/risky behavior: no    Historical Information   Past Psychiatric History:   He had prior psych admission apparently in [place] and he had 1 inpatient psych admission in January 2017 at [facility]  Currently in treatment with primary physician  Past Suicide attempts:  None  Past Violent behavior:  None  Past Psychiatric medication trial:  Zoloft    Substance Abuse History:  He denies any drugs or alcohol history     I have assessed this patient for substance use within the past 12 months     History of IP/OP rehabilitation program:  None  Smoking history:  Never smoked  Family Psychiatric History:    According to the record the son see psychiatrist    Social History  Education: high school diploma/GED  Learning Disabilities: None  Marital history:   Living arrangement, social support: He live with his son  Occupational History: retired  Functioning Relationships: good support system  Other Pertinent History: He went to the [employer] only for few weeks and have a medical discharge    Traumatic History:   Abuse: Denies any  Other Traumatic Events: None    Past Medical History:   Diagnosis Date    Anxiety     Chronic low back pain     Depression     Hypercholesterolemia     Hypertension     OCD (obsessive compulsive disorder)        Medical Review Of Systems:  Review of Systems - Negative except neck pain, difficulty ambulating, obsessive thoughts, all other systems reviewed were negative      Meds/Allergies   current meds:   Current Facility-Administered Medications   Medication Dose Route Frequency    acetaminophen (TYLENOL) tablet 650 mg  650 mg Oral Q6H PRN    atorvastatin (LIPITOR) tablet 40 mg  40 mg Oral Daily With Dinner    bacitracin topical ointment 1 large application  1 large application Topical BID    bisacodyl (DULCOLAX) rectal suppository 10 mg  10 mg Rectal Daily PRN    ceFAZolin (ANCEF) 1000 mg IVPB (premix) **ADS Override Pull**        docusate sodium (COLACE) capsule 100 mg  100 mg Oral BID    fentanyl citrate (PF) 100 MCG/2ML 25 mcg  25 mcg Intravenous Q1H PRN    heparin (porcine) subcutaneous injection 5,000 Units  5,000 Units Subcutaneous Q8H [address]    ondansetron (ZOFRAN) injection 4 mg  4 mg Intravenous Q6H PRN    oxyCODONE (ROXICODONE) immediate release tablet 10 mg  10 mg Oral Q4H PRN    oxyCODONE (ROXICODONE) IR tablet 5 mg  5 mg Oral Q4H PRN    polyethylene glycol (MIRALAX) packet 17 g  17 g Oral Daily    senna-docusate sodium (SENOKOT S) 8 6-50 mg per tablet 1 tablet  1 tablet Oral BID    sertraline (ZOLOFT) tablet 100 mg  100 mg Oral Daily    sodium chloride 0 9 % infusion  100 mL/hr Intravenous Continuous     No Known Allergies    Objective   Vital signs in last 24 hours:  Temp:  [97 2 °F (36 2 °C)-99 5 °F (37 5 °C)] 98 9 °F (37 2 °C)  HR:  [62-74] 64  Resp:  [14-21] 16  BP: (113-135)/(50-68) 117/62  Arterial Line BP: (148-158)/(58-61) 158/58  FiO2 (%):  [60] 60      Intake/Output Summary (Last 24 hours) at 11/20/18 1240  Last data filed at 11/20/18 1201   Gross per 24 hour   Intake          3483 34 ml   Output             3080 ml   Net           403 34 ml       Mental Status Evaluation:  Appearance:  age appropriate and disheveled   Behavior:  normal   Speech:  normal pitch and normal volume   Mood:  anxious   Affect:  constricted   Language: naming objects and repeating phrases   Thought Process:  perserverative   Associations: perseveration   Thought Content:  obsessions   Perceptual Disturbances: None   Risk Potential: He denies any suicidal ideation plan or intent   Sensorium:  person, place and time/date   Memory:  recent and remote memory grossly intact   Cognition:  grossly intact   Consciousness:  alert and awake    Attention: attention span appeared shorter than expected for age   Intellect: normal   Fund of Knowledge: awareness of current events: Fair, past history: Fair and vocabulary: Fair   Insight:  fair   Judgment: fair   Muscle Strength and Tone: Within normal limits   Gait/Station: Walks with a walker   Motor Activity: no abnormal movements     Lab Results:    Lab Results   Component Value Date    WBC 11 20 (H) 11/20/2018    HGB 10 0 (L) 11/20/2018    HCT 29 7 (L) 11/20/2018    MCV 92 11/20/2018     11/20/2018     11/20/2018     Lab Results   Component Value Date    K 4 2 11/20/2018     11/20/2018    CO2 23 11/20/2018    BUN 10 11/20/2018    CREATININE 0 63 11/20/2018    GLUF 90 05/17/2018    CALCIUM 8 0 (L) 11/20/2018    AST 29 11/14/2018    ALT 31 11/14/2018    ALKPHOS 66 11/14/2018    EGFR 98 11/20/2018         Code Status: )Level 1 - Full Code    Assessment/Plan     Assessment:  Christian North is a 68 y o  male without obsessive compulsive disorders need to go to inpatient rehabilitations and needs psychiatric clearance  At this moment patient mood is stable, he denies any suicidal thoughts plans or intent, he denies any psychotic symptoms, he has some obsession about his medical issues but they are not interfering with his medical treatment  Patient is a baseline  Diagnosis:  Obsessive-compulsive disorder  Plan:   Continue medical management  Continue Zoloft 100 mg p o  Daily  Patient is psychiatrically cleared to go to inpatient rehabilitation  No other intervention at this moment  I will sign off  Risks, benefits and possible side effects of Medications:   Risks, benefits, and possible side effects of medications explained to patient and patient verbalizes understanding             Solo Gallagher MD

## 2018-11-21 ENCOUNTER — HOSPITAL ENCOUNTER (INPATIENT)
Facility: HOSPITAL | Age: 73
LOS: 15 days | Discharge: HOME HEALTH | DRG: 560 | End: 2018-12-06
Payer: MEDICARE

## 2018-11-21 VITALS
OXYGEN SATURATION: 97 % | BODY MASS INDEX: 26.16 KG/M2 | DIASTOLIC BLOOD PRESSURE: 62 MMHG | HEIGHT: 67 IN | WEIGHT: 166.67 LBS | RESPIRATION RATE: 16 BRPM | TEMPERATURE: 98.6 F | HEART RATE: 68 BPM | SYSTOLIC BLOOD PRESSURE: 123 MMHG

## 2018-11-21 DIAGNOSIS — Z98.1 S/P CERVICAL SPINAL FUSION: ICD-10-CM

## 2018-11-21 DIAGNOSIS — M48.02 CERVICAL SPINAL STENOSIS: ICD-10-CM

## 2018-11-21 DIAGNOSIS — R52 PAIN: ICD-10-CM

## 2018-11-21 DIAGNOSIS — K59.00 CONSTIPATION, UNSPECIFIED CONSTIPATION TYPE: ICD-10-CM

## 2018-11-21 DIAGNOSIS — M48.02 CERVICAL STENOSIS OF SPINE: ICD-10-CM

## 2018-11-21 DIAGNOSIS — G95.9 CERVICAL MYELOPATHY (HCC): ICD-10-CM

## 2018-11-21 DIAGNOSIS — G47.00 INSOMNIA: ICD-10-CM

## 2018-11-21 DIAGNOSIS — F42.9 OBSESSIVE-COMPULSIVE DISORDER, UNSPECIFIED TYPE: ICD-10-CM

## 2018-11-21 DIAGNOSIS — K59.04 CHRONIC IDIOPATHIC CONSTIPATION: Primary | ICD-10-CM

## 2018-11-21 DIAGNOSIS — F41.9 ANXIETY: ICD-10-CM

## 2018-11-21 DIAGNOSIS — S06.9X9D TRAUMATIC BRAIN INJURY WITH LOSS OF CONSCIOUSNESS, SUBSEQUENT ENCOUNTER: ICD-10-CM

## 2018-11-21 DIAGNOSIS — E87.1 HYPONATREMIA: ICD-10-CM

## 2018-11-21 DIAGNOSIS — Z98.890 POST-OPERATIVE STATE: ICD-10-CM

## 2018-11-21 DIAGNOSIS — R42 POSITIONAL LIGHTHEADEDNESS: ICD-10-CM

## 2018-11-21 PROCEDURE — 99238 HOSP IP/OBS DSCHRG MGMT 30/<: CPT | Performed by: INTERNAL MEDICINE

## 2018-11-21 PROCEDURE — 99223 1ST HOSP IP/OBS HIGH 75: CPT

## 2018-11-21 PROCEDURE — 99024 POSTOP FOLLOW-UP VISIT: CPT | Performed by: PHYSICIAN ASSISTANT

## 2018-11-21 RX ORDER — GABAPENTIN 100 MG/1
100 CAPSULE ORAL 3 TIMES DAILY
Status: DISCONTINUED | OUTPATIENT
Start: 2018-11-21 | End: 2018-11-26

## 2018-11-21 RX ORDER — ACETAMINOPHEN 325 MG/1
650 TABLET ORAL EVERY 6 HOURS PRN
Status: DISCONTINUED | OUTPATIENT
Start: 2018-11-21 | End: 2018-11-27

## 2018-11-21 RX ORDER — MIRTAZAPINE 15 MG/1
7.5 TABLET, FILM COATED ORAL
Status: DISCONTINUED | OUTPATIENT
Start: 2018-11-21 | End: 2018-12-06 | Stop reason: HOSPADM

## 2018-11-21 RX ORDER — GINSENG 100 MG
1 CAPSULE ORAL 2 TIMES DAILY
Status: CANCELLED | OUTPATIENT
Start: 2018-11-21

## 2018-11-21 RX ORDER — DOCUSATE SODIUM 100 MG/1
100 CAPSULE, LIQUID FILLED ORAL 2 TIMES DAILY
Status: CANCELLED | OUTPATIENT
Start: 2018-11-21

## 2018-11-21 RX ORDER — BISACODYL 10 MG
10 SUPPOSITORY, RECTAL RECTAL DAILY PRN
Status: DISCONTINUED | OUTPATIENT
Start: 2018-11-21 | End: 2018-12-06 | Stop reason: HOSPADM

## 2018-11-21 RX ORDER — ATORVASTATIN CALCIUM 40 MG/1
40 TABLET, FILM COATED ORAL
Status: CANCELLED | OUTPATIENT
Start: 2018-11-21

## 2018-11-21 RX ORDER — ONDANSETRON 2 MG/ML
4 INJECTION INTRAMUSCULAR; INTRAVENOUS EVERY 6 HOURS PRN
Status: CANCELLED | OUTPATIENT
Start: 2018-11-21

## 2018-11-21 RX ORDER — OLANZAPINE 5 MG/1
5 TABLET ORAL
Status: DISCONTINUED | OUTPATIENT
Start: 2018-11-21 | End: 2018-11-26

## 2018-11-21 RX ORDER — POLYETHYLENE GLYCOL 3350 17 G/17G
17 POWDER, FOR SOLUTION ORAL ONCE
Status: DISCONTINUED | OUTPATIENT
Start: 2018-11-21 | End: 2018-12-06 | Stop reason: HOSPADM

## 2018-11-21 RX ORDER — BISACODYL 10 MG
10 SUPPOSITORY, RECTAL RECTAL DAILY PRN
Status: CANCELLED | OUTPATIENT
Start: 2018-11-21

## 2018-11-21 RX ORDER — ACETAMINOPHEN 325 MG/1
650 TABLET ORAL EVERY 6 HOURS PRN
Status: CANCELLED | OUTPATIENT
Start: 2018-11-21

## 2018-11-21 RX ORDER — AMOXICILLIN 250 MG
1 CAPSULE ORAL 2 TIMES DAILY
Status: CANCELLED | OUTPATIENT
Start: 2018-11-21

## 2018-11-21 RX ORDER — GINSENG 100 MG
1 CAPSULE ORAL 2 TIMES DAILY
Status: DISCONTINUED | OUTPATIENT
Start: 2018-11-21 | End: 2018-12-06 | Stop reason: HOSPADM

## 2018-11-21 RX ORDER — OLANZAPINE 5 MG/1
5 TABLET ORAL ONCE
Status: COMPLETED | OUTPATIENT
Start: 2018-11-21 | End: 2018-11-21

## 2018-11-21 RX ORDER — ATORVASTATIN CALCIUM 40 MG/1
40 TABLET, FILM COATED ORAL
Status: DISCONTINUED | OUTPATIENT
Start: 2018-11-21 | End: 2018-11-30

## 2018-11-21 RX ORDER — POLYETHYLENE GLYCOL 3350 17 G/17G
17 POWDER, FOR SOLUTION ORAL DAILY
Status: CANCELLED | OUTPATIENT
Start: 2018-11-22

## 2018-11-21 RX ORDER — SERTRALINE HYDROCHLORIDE 100 MG/1
100 TABLET, FILM COATED ORAL DAILY
Status: DISCONTINUED | OUTPATIENT
Start: 2018-11-22 | End: 2018-12-06 | Stop reason: HOSPADM

## 2018-11-21 RX ORDER — SERTRALINE HYDROCHLORIDE 100 MG/1
100 TABLET, FILM COATED ORAL DAILY
Status: CANCELLED | OUTPATIENT
Start: 2018-11-22

## 2018-11-21 RX ORDER — BISACODYL 10 MG
10 SUPPOSITORY, RECTAL RECTAL ONCE
Status: DISCONTINUED | OUTPATIENT
Start: 2018-11-22 | End: 2018-12-06 | Stop reason: HOSPADM

## 2018-11-21 RX ORDER — DOCUSATE SODIUM 100 MG/1
100 CAPSULE, LIQUID FILLED ORAL 2 TIMES DAILY
Status: DISCONTINUED | OUTPATIENT
Start: 2018-11-21 | End: 2018-12-06 | Stop reason: HOSPADM

## 2018-11-21 RX ORDER — OXYCODONE HYDROCHLORIDE 5 MG/1
5 TABLET ORAL EVERY 4 HOURS PRN
Status: DISCONTINUED | OUTPATIENT
Start: 2018-11-21 | End: 2018-12-06 | Stop reason: HOSPADM

## 2018-11-21 RX ORDER — POLYETHYLENE GLYCOL 3350 17 G/17G
17 POWDER, FOR SOLUTION ORAL DAILY
Status: DISCONTINUED | OUTPATIENT
Start: 2018-11-22 | End: 2018-12-06 | Stop reason: HOSPADM

## 2018-11-21 RX ORDER — AMOXICILLIN 250 MG
1 CAPSULE ORAL 2 TIMES DAILY
Status: DISCONTINUED | OUTPATIENT
Start: 2018-11-21 | End: 2018-12-06 | Stop reason: HOSPADM

## 2018-11-21 RX ORDER — HEPARIN SODIUM 5000 [USP'U]/ML
5000 INJECTION, SOLUTION INTRAVENOUS; SUBCUTANEOUS EVERY 8 HOURS SCHEDULED
Status: DISCONTINUED | OUTPATIENT
Start: 2018-11-21 | End: 2018-12-06 | Stop reason: HOSPADM

## 2018-11-21 RX ADMIN — DOCUSATE SODIUM 100 MG: 100 CAPSULE, LIQUID FILLED ORAL at 08:50

## 2018-11-21 RX ADMIN — BACITRACIN ZINC 1 LARGE APPLICATION: 500 OINTMENT TOPICAL at 08:52

## 2018-11-21 RX ADMIN — HEPARIN SODIUM 5000 UNITS: 5000 INJECTION INTRAVENOUS; SUBCUTANEOUS at 15:49

## 2018-11-21 RX ADMIN — SENNOSIDES AND DOCUSATE SODIUM 1 TABLET: 8.6; 5 TABLET ORAL at 08:50

## 2018-11-21 RX ADMIN — GABAPENTIN 100 MG: 100 CAPSULE ORAL at 21:22

## 2018-11-21 RX ADMIN — OXYCODONE HYDROCHLORIDE 5 MG: 5 TABLET ORAL at 15:49

## 2018-11-21 RX ADMIN — OXYCODONE HYDROCHLORIDE 5 MG: 5 TABLET ORAL at 08:50

## 2018-11-21 RX ADMIN — MIRTAZAPINE 7.5 MG: 15 TABLET ORAL at 21:22

## 2018-11-21 RX ADMIN — SERTRALINE HYDROCHLORIDE 100 MG: 100 TABLET ORAL at 08:50

## 2018-11-21 RX ADMIN — SENNOSIDES AND DOCUSATE SODIUM 1 TABLET: 8.6; 5 TABLET ORAL at 17:09

## 2018-11-21 RX ADMIN — OLANZAPINE 5 MG: 5 TABLET, FILM COATED ORAL at 17:07

## 2018-11-21 RX ADMIN — OLANZAPINE 5 MG: 5 TABLET, FILM COATED ORAL at 21:22

## 2018-11-21 RX ADMIN — POLYETHYLENE GLYCOL 3350 17 G: 17 POWDER, FOR SOLUTION ORAL at 08:49

## 2018-11-21 RX ADMIN — HEPARIN SODIUM 5000 UNITS: 5000 INJECTION INTRAVENOUS; SUBCUTANEOUS at 04:44

## 2018-11-21 RX ADMIN — ATORVASTATIN CALCIUM 40 MG: 40 TABLET, FILM COATED ORAL at 17:06

## 2018-11-21 RX ADMIN — DOCUSATE SODIUM 100 MG: 100 CAPSULE, LIQUID FILLED ORAL at 17:07

## 2018-11-21 RX ADMIN — HEPARIN SODIUM 5000 UNITS: 5000 INJECTION INTRAVENOUS; SUBCUTANEOUS at 21:22

## 2018-11-21 RX ADMIN — OXYCODONE HYDROCHLORIDE 10 MG: 10 TABLET ORAL at 02:00

## 2018-11-21 NOTE — DISCHARGE SUMMARY
Discharge- Janet Thomas 1945, 68 y o  male MRN: [id number]    Unit/Bed#: [facility] 730-01 Encounter: [id number]    Primary Care Provider: Chivo Cardenas DO   Date and time admitted to hospital: 11/13/2018 10:45 AM        * Cervical spinal stenosis   Assessment & Plan      Status post OR  Management as per Neurosurgery     Hyponatremia   Assessment & Plan    Monitor sodium levels      Anxiety   Assessment & Plan    Continue anxiolytics     Depression   Assessment & Plan    Patient will need psychiatric evaluation for placement  Discussed with case management  Weakness   Assessment & Plan    Stroke status post tPA  Continue aspirin statin  Neurology following  Negative for CVA on MRI       OCD (obsessive compulsive disorder)   Assessment & Plan    Monitor for now                 Resolved Problems  Date Reviewed: 11/19/2018    None          Admission Date:   Admission Orders     Ordered        11/13/18 1229  Inpatient Admission (expected length of stay for this patient is greater than two midnights)  Once               Admitting Diagnosis: Syncope [R55]  Abnormal EKG [R94 31]  Stroke ([address] ) [I63 9]  Stroke (cerebrum) ([address] ) [I63 9]  Hematoma of frontal scalp, initial encounter [S00 03XA]        Procedures Performed: No orders of the defined types were placed in this encounter  Summary of Hospital Course: This is a 77-year-old gentleman with known history of susceptible to disease hypertension hyperlipidemia lumbar stenosis who presents to the hospital as a stroke alert  The patient was reportedly found face down by the son  After further review however, the patient was noted to have a cervical spine findings concerning for severe central canal stenosis with associated mass effect on cord see 3 -5 and C4-5  Patient was also have severe bilateral severe bilateral neural foraminal narrowing at C2-3 to C4-5  Consequently he is postoperative day 2   Posterior cervical decompressive laminectomy and instrumented posterior lateral fusion fixation fixation C3-5 and C4-5  The time of discharge the patient is to be released to acute rehab    Condition at Discharge: fair         Discharge instructions/Information to patient and family:   See after visit summary for information provided to patient and family  Provisions for Follow-Up Care:  See after visit summary for information related to follow-up care and any pertinent home health orders  PCP: Jayashree Coleman DO    Disposition: Home    Planned Readmission: No    Discharge Statement   I spent 35 minutes discharging the patient  This time was spent on the day of discharge  I had direct contact with the patient on the day of discharge  Additional documentation is required if more than 30 minutes were spent on discharge  Discharge Medications:  See after visit summary for reconciled discharge medications provided to patient and family

## 2018-11-21 NOTE — PROGRESS NOTES
Progress Note - Neurosurgery   Zeina Cannon 68 y o  male MRN: [id number]  Unit/Bed#: [facility] 973-01 Encounter: [id number]    Assessment:  1  POD#2 posterior cervical decompressive laminectomy and instrumented posterior lateral fusion fixation C3-6 bilateral  1  Severe central canal stenosis and chronic mass effect on the cord at C3-5 and C4-5  2  Severe bilateral neural foraminal narrowing at C2-3 to C4-5  2  Status post fall  3  CVA - tPA administered 11/13/2018  4  Anxiety  5  Depression  6  Constipation  7  OCD     Plan:  · Exam: GCS 15, alert and oriented x 3, still slightly slow to answer questions, moving all extremities with slight weakness in RUE, decreased sensation to pinprick along the left upper and lower extremities and right upper extremity, no Yeni or clonus noted, no drift, no neck tenderness, incision clean dry intact with new dressing placed, drain still in place  · Imaging reviewed personally and with attending  Results are as follows:  ? X-ray spine cervical 11/28/2018:  Status post posterior spinal hardware fusion from C3-C6 with intact hardware   ? MRI cervical spine without contrast 11/14/2018:  Disc and facet osteophytosis resulting in severe central canal stenosis and chronic mass effect on the cord at C3-4 and C4-5  Severe bilateral neural foraminal narrowing from C2-3 to C4-5  No evidence of cord signal abnormality  Cord signal change noted when reviewed with attendings  ? MRI brain without contrast 11/14/2018:  No acute ischemia  Left frontal scalp soft tissue swelling  ? CT head without contrast 11/14/2018:  No acute intracranial abnormality  ? CTA stroke head and neck 11/13/2018:  Normal CTA of the head and neck  ? CT stroke alert brain 11/13/2018:  No acute intracranial disease  Supraorbital left frontal scalp hematoma    · [place] collar ordered to be worn at all times, [place] collar for showering  · Patient may shower tomorrow  · Dressing change daily or when gauze is saturated more than 50%  · GOSIA drain:  >100cc/24hrs will remain in for now until output is < 100cc/24hrs or < 30cc/8hrs  ? Possible removal tomorrow  · Medical management per primary team  · DVT ppx:  Heparin, SCDs  · Mobilize as tolerated with assistance  ? PT:  Okay to discharge to rehab when medically stable  ? OT:  Okay to discharge to short-term rehab when medically stable  · Neurosurgery will continue to follow  Please call with questions or concerns  Subjective/Objective   Chief Complaint: "I am having problems with condom cath " / POD#2 posterior cervical decompressive laminectomy and instrumented posterior lateral fusion fixation C3-6 bilateral    Subjective:  Patient complains of 20/10 of arm, leg, neck pain  Neck pain is sharp and leg and arm pain is aching in nature  He reports increased mobility of the right hand and states he is able to open and close it better  The patient had a bowel movement yesterday  He is urinating her baseline but states he is having problems with condom catheter versus portable urinal   Patient denies headache, did S, change in vision or hearing, trouble swallowing, chest pain, shortness of breath, abdominal pain, nausea or vomiting, new numbness or tingling or weakness  Objective:  Lying in bed, C-collar on, NAD  I/O       11/19 0701 - 11/20 0700 11/20 0701 - 11/21 0700 11/21 0701 - 11/22 0700    Urine (mL/kg/hr)   150    Drains   25    Total Output     175    Net     -175                 Invasive Devices     Peripheral Intravenous Line            Peripheral IV 11/17/18 Right Forearm 3 days    Peripheral IV 11/19/18 Right Wrist 2 days          Drain            Closed/Suction Drain Posterior;Right Neck Bulb 7 Fr  1 day    External Urinary Catheter Small 1 day                Physical Exam:  Vitals: Blood pressure 123/62, pulse 68, temperature 98 6 °F (37 °C), temperature source Oral, resp  rate 16, height 5' 7" (1 702 m), weight 75 6 kg (166 lb 10 7 oz)  ,Body mass index is 26 1 kg/m²  General appearance: alert, appears stated age, cooperative and no distress  Head: Normocephalic, without obvious abnormality, atraumatic  Eyes: EOMI, PERRL  Neck: supple, symmetrical, trachea midline and NT, incision clean dry intact with drain in place, new dressing over top, c-spine collar in place  Lungs: non labored breathing  Heart: regular heart rate  Neurologic:   Mental status: Alert, oriented x3, thought content appropriate  Cranial nerves: grossly intact (Cranial nerves II-XII)  Sensory:  Normal to light touch, decreased sensation to pinprick in left upper and lower extremity excluding the left hand and right upper extremity excluding the right-hand, JPS 3/3, DST intact  Motor: moving all extremities without focal weakness   RUE:  4+/5 biceps/triceps/deltoids, 4-/5 , 5/5 shoulder shrug   LUE:  5/5 throughout   BLE:  5/5 throughout  Reflexes: 2+ and symmetric, no sy or clonus  Coordination: finger to nose normal bilaterally, no drift bilaterally    Lab Results:    Results from last 7 days  Lab Units 11/20/18  0624 11/15/18  0526   WBC Thousand/uL 11 20* 9 29   HEMOGLOBIN g/dL 10 0* 12 2   HEMATOCRIT % 29 7* 37 2   PLATELETS Thousands/uL 190  180 191       Results from last 7 days  Lab Units 11/20/18  0624 11/18/18  0544 11/15/18  0526   POTASSIUM mmol/L 4 2 4 0 4 2   CHLORIDE mmol/L 103 102 102   CO2 mmol/L 23 24 25   BUN mg/dL 10 14 12   CREATININE mg/dL 0 63 0 60 0 73   CALCIUM mg/dL 8 0* 8 2* 8 3               Results from last 7 days  Lab Units 11/16/18  1659   INR  1 05   PTT seconds 28     No results found for: TROPONINT  ABG:No results found for: PHART, UGG6URK, PO2ART, TWF7YWJ, R9DNPYHG, BEART, SOURCE    Imaging Studies: I have personally reviewed pertinent reports  and I have personally reviewed pertinent films in PACS    EKG, Pathology, and Other Studies: I have personally reviewed pertinent reports        VTE Pharmacologic Prophylaxis: Heparin    VTE Mechanical Prophylaxis: sequential compression device

## 2018-11-21 NOTE — RESPIRATORY THERAPY NOTE
RT Protocol Note  Steffanie Lamb 68 y o  male MRN: [id number]  Unit/Bed#: -01 Encounter: [id number]    Assessment    Principal Problem:    Cervical myelopathy ([address] )  Active Problems:    OCD (obsessive compulsive disorder)      Home Pulmonary Medications:  none       Past Medical History:   Diagnosis Date    Anxiety     Chronic low back pain     Depression     Hypercholesterolemia     Hypertension     OCD (obsessive compulsive disorder)      Social History     Social History    Marital status:      Spouse name: N/A    Number of children: N/A    Years of education: N/A     Social History Main Topics    Smoking status: Never Smoker    Smokeless tobacco: Never Used    Alcohol use No    Drug use: No    Sexual activity: Not on file     Other Topics Concern    Not on file     Social History Narrative    No narrative on file       Subjective         Objective    Physical Exam:   Assessment Type: Assess only  General Appearance: Awake  Respiratory Pattern: Normal  Chest Assessment: Chest expansion symmetrical  Bilateral Breath Sounds: Clear, Diminished    Vitals:  Blood pressure 123/62, pulse 68, temperature 98 6 °F (37 °C), temperature source Oral, resp  rate 16, height 5' 7" (1 702 m), weight 75 6 kg (166 lb 10 7 oz), SpO2 (P) 97 %  Imaging and other studies: I have personally reviewed pertinent reports  Plan    Respiratory Plan: Discontinue Protocol        Resp Comments: (P) Pt initated on respiratory protocol  Pt admitted as a stroke alert and found down by son  Pt has medical hx of cervical myelopathy, hyponatremia, anxiety and depression  Pt has no pulmonary hx and does not take anything at home for his breathing  Pt denies any SOB or respiratory distress at his time  No recent CXR has been done  Pt instructed on IS at this time  No resp care is needed for pt at this time, will dc protocol

## 2018-11-21 NOTE — RESTORATIVE TECHNICIAN NOTE
Restorative Specialist Mobility Note       Activity: Ambulate in cisneros, Ambulate in room, Bathroom privileges, Chair, Dangle, Stand at bedside (Educated/encouraged pt to ambulate with assistance 3-4 x's/day  Chair alarm on   Pt callbell, phone/tray within reach )     Assistive Device: Front wheel walker ([place] Collar on)        Ciro MARTINEZ, Restorative Technician, [employer]

## 2018-11-21 NOTE — NURSING NOTE
Clinically Significant Medication Issue  Time of Stay: ADMISSION    Did a complete drug regimen review identify potential clinically significant medication issues?     yes

## 2018-11-21 NOTE — PROGRESS NOTES
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  Date of Onset: 11/13/2018   Date of surgery: 11/19/2018 Posterior cervical decompressive laminectomy and instrumented posterior lateral fusion fixation C3-6 (Bilateral)    PATIENT INFORMATION  Name: Minerva Diane Phone #: [phone] (home)   Address: [facility]et[facility] Esposito   [address] [place] [address] [address] [place] [address] Rd: 1945 Age: 68 y o  SS#   Marital Status:   Ethnicity:   Employment Status: retired  Extended Emergency Contact Information  Primary Emergency Contact: Ronda Reynolds  Address: [address] [place] [address] Phone: [phone]  Relation: Son  Advance Directive: Level 1 Full Code - unknown advanced directive    INSURANCE/COVERAGE:     Primary Payor: MEDICARE / Plan: MEDICARE A AND B / Product Type: Medicare A & B Fee for Service /   Secondary Payer: Aetna/Aetna PPO   Payer Contact:  Payer Contact:   Contact Phone:  Contact Phone:   Authorization #:   Coverage Dates:  LCD:   Medicare #: [id number]  Medicare Days: 60/30/60  Medical Record #: [id number]    REFERRAL SOURCE:   Referring provider: Woodrow Phillip DO  Referring facility: [address] [place] [address]  Room: [place] [address]/[place] [address]  PCP: Angelique Jung DO PCP phone number: [phone]    MEDICAL INFORMATION  HPI: Patient is a 68year old male that presented to [place] [address] on 11/13/2018 as a pre-hospital stroke alert  Patient got out of bed, felt weak and fell  He struck his forehead, however, denies loss of consciousness   EMS was called and patient was noted to be hypotensive with SBP in 90's with right sided weakness  Patient was also noted to have intermittent bradycardia on telemetry  Noted left frontal head hematoma from fall on exam  CT of the brain showed supraorbital left frontal scalp hematoma  CTA of the head and neck was negative  CXR showed cardiomegaly and diminished inspiration  Bilateral hip/pelvis x-rays were negative  Neurology evaluated the patient, with an NIHSS of 2, and patient was recommended for thrombolysis; tPA given per protocol  Hypotension was responsive to IV fluids, and EKG showed first degree AV block without acute ST changes  Per Neurology, hold all AP and AC since tPA given, with repeat CTH in 24 hours, and if negative, ok to initiate AP and AC  On 11/14, repeat CTH was negative, as well as MRI of the brain  MRI of the cervical spine showed disc and facet osteophytosis resulting in severe central canal stenosis and chronic mass effect on cord at C3-4 and C4-5; severe bilateral neural foraminal narrowing from C2-3 to C4-5 without evidence of cord signal abnormality  Neurosurgery was consulted, who noted cord signal change on review of imagine, and stated patient will likely need surgical intervention given deteriorating motor and sensory exam  Vista collar is to be worn at all times  ECHO showed an EF of 60%, no regional wall motion abnormalities, grade 1 diastolic dysfunction, without evidence of apical thrombus  Patient was initiated on ASA and statin therapy  On 11/19, patient went to the OR with Neurosurgery for posterior cervical decompressive laminectomy and instrumented posterior lateral fusion fixation C3-6 bilaterally  GOSIA drain intact after surgery, and remains intact currently due to continued monitoring of output  Behavioral health was consulted regarding patient's history of OCD and possible depression  Patient denies suicidal thoughts, plan or intent   He has some obsession regarding medical issues, however, it is not interferring with medical treatment  Behavioral health recommending continuing with medical management and Zoloft, and is cleared for discharge to rehab  Patient continues with some right sided weakness, but has shown improvement since surgical intervention  Patient is currently on a Regular diet, and is tolerating well with aspiration precautions in place  PT/OT therapies and PMR were consulted, and they are recommending patient for inpatient Acute Rehab  He has demonstrated that he can tolerate and participate in 3 hours of therapy per day  Past Medical History:   Past Surgical History: Allergies:     Past Medical History:   Diagnosis Date    Anxiety     Chronic low back pain     Depression     Hypercholesterolemia     Hypertension     OCD (obsessive compulsive disorder)     Past Surgical History:   Procedure Laterality Date    CERVICAL FUSION Bilateral 11/19/2018    Procedure: Posterior cervical decompressive laminectomy and instrumented posterior lateral fusion fixation C3-6;  Surgeon: Macy Russell MD;  Location: [facility];  Service: Neurosurgery    HERNIA REPAIR       No Known Allergies      Comorbidities: Anxiety, hyponatremia, fall, hypotension, right sided weakness, bradycardia, left frontal head hematoma, OCD, HTN, HLD, chronic low back pain due to lumbar stenosis, depression, hernia repair      CURRENT VITAL SIGNS:   Temp:  [98 6 °F (37 °C)-98 9 °F (37 2 °C)] 98 9 °F (37 2 °C)  HR:  [70-77] 72  Resp:  [16] 16  BP: (111-130)/(48-58) 130/58   Intake/Output Summary (Last 24 hours) at 11/21/18 1057  Last data filed at 11/21/18 0701   Gross per 24 hour   Intake          1073 33 ml   Output             1307 ml   Net          -233 67 ml        LABORATORY RESULTS:      Lab Results   Component Value Date    HGB 10 0 (L) 11/20/2018    HCT 29 7 (L) 11/20/2018    WBC 11 20 (H) 11/20/2018     Lab Results   Component Value Date    BUN 10 11/20/2018    K 4 2 11/20/2018     11/20/2018    CREATININE 0 63 11/20/2018     Lab Results   Component Value Date    PROTIME 13 8 11/16/2018    INR 1 05 11/16/2018        DIAGNOSTIC STUDIES:  Ct Head Wo Contrast    Result Date: 11/14/2018  Impression: No acute intracranial abnormality  Workstation performed: [id number]     Mri Brain Wo Contrast    Result Date: 11/14/2018  Impression: No MR evidence of acute ischemia  Left frontal scalp soft tissue swelling only partially visualized, correlate clinically  Workstation performed: [id number]     Mri Cervical Spine Wo Contrast    Result Date: 11/15/2018  Impression: 1  Disc and facet osteophytosis resulting in severe central canal stenosis and chronic mass effect on the cord at C3-4 and C4-5  Severe bilateral neural foraminal narrowing from C2-3 to C4-5  2   No evidence of cord signal abnormality  Workstation performed: [id number]     Xr Stroke Alert Portable Chest    Result Date: 11/13/2018  Impression: Cardiomegaly  Diminished inspiration  No acute cardiopulmonary disease  Workstation performed: [id number]     Ct Stroke Alert Brain    Result Date: 11/13/2018  Impression: No acute intracranial disease  Supraorbital left frontal scalp hematoma Findings were directly discussed with Fidencio Youssef on 11/13/2018 11:04 AM  Workstation performed: [id number]     Xr Hip/pelv 4+ Vw Left If Performed    Result Date: 11/13/2018  Impression: No acute osseous abnormality  Workstation performed: [id number]     Xr Hip/pelvis 4+ Vw Right If Performed    Result Date: 11/13/2018  Impression: No acute osseous abnormality  Workstation performed: [id number]     Cta Stroke Alert (head/neck)    Result Date: 11/13/2018  Impression: Normal CTA of the head and neck  No large vessel flow restrictive disease within the head or neck   * I personally telephoned this result to Giancarlo Coburn on 11/13/2018 11:04 AM  Workstation performed: [id number]       PRECAUTIONS/SPECIAL NEEDS:  Tobacco:   History   Smoking Status    Never Smoker   Smokeless Tobacco    Never Used   , Alcohol:    History   Alcohol Use No   , Splints/Braces: vista collar @ all times, Anticoagulation:  heparin, Edema Management, Safety Concerns, Pain Management, IV: Type: Peripheral Location: Right Wrist/Right Forearm Reason: Medications/IVF, Aspiration Risk/Precautions, Language Preference: English, Cervical Spinal Precautions, and Fall Precautions      MEDICATIONS:     Current Facility-Administered Medications:     acetaminophen (TYLENOL) tablet 650 mg, 650 mg, Oral, Q6H PRN, [place] , CRNP, 650 mg at 11/15/18 0756    atorvastatin (LIPITOR) tablet 40 mg, 40 mg, Oral, Daily With [place] Nose, CRNP, 40 mg at 11/20/18 1819    bacitracin topical ointment 1 large application, 1 large application, Topical, BID, Can Padilla DO, 1 large application at 60/71/92 5956    bisacodyl (DULCOLAX) rectal suppository 10 mg, 10 mg, Rectal, Daily PRN, Og David MD    docusate sodium (COLACE) capsule 100 mg, 100 mg, Oral, BID, Og David MD, 100 mg at 11/21/18 0850    fentanyl citrate (PF) 100 MCG/2ML 25 mcg, 25 mcg, Intravenous, Q1H PRN, Og David MD, 25 mcg at 11/20/18 0631    heparin (porcine) subcutaneous injection 5,000 Units, 5,000 Units, Subcutaneous, Q8H [facility], 5,000 Units at 11/21/18 0444 **AND** Platelet count, , , Once, Og David MD    ondansetron [facility]) injection 4 mg, 4 mg, Intravenous, Q6H PRN, Og David MD    oxyCODONE (ROXICODONE) immediate release tablet 10 mg, 10 mg, Oral, Q4H PRN, Og David MD, 10 mg at 11/21/18 0200    oxyCODONE (ROXICODONE) IR tablet 5 mg, 5 mg, Oral, Q4H PRN, [place] , CRNP, 5 mg at 11/21/18 0850    polyethylene glycol (MIRALAX) packet 17 g, 17 g, Oral, Daily, Carlos , CRNP, 17 g at 11/21/18 0849    senna-docusate sodium (SENOKOT S) 8 6-50 mg per tablet 1 tablet, 1 tablet, Oral, BID, TAZ Man, 1 tablet at 11/21/18 0850    sertraline (ZOLOFT) tablet 100 mg, 100 mg, Oral, Daily, , CRNP, 100 mg at 11/21/18 0850    SKIN INTEGRITY:   no rashes, erythema - buttock(s) bilateral, allevyn to buttocks, facial abrasions SHAZIA, Incision: healing well, no significant drainage, no dehiscence, no significant erythema, posterior cervical neck incision with DSD    PRIOR LEVEL OF FUNCTION:  He lives in [facility]  Kirt Brito is  and lives with their son  Self Care: Independent, Indoor Mobility: Independent, Stairs (in/outdoor): Independent and Cognition: Independent    HOME ENVIRONMENT:  The living area: bedroom on 2nd floor and bathroom on 2nd floor  There are No steps to enter the home, with 16 steps inside the home  The patient will not have 24 hour supervision/physical assistance available upon discharge  Patient's son works a few hours at night, however, is otherwise home to assist as needed  PREVIOUS DME:  Equipment in home (previous DME): None    FUNCTIONAL STATUS:  Physical Therapy Occupational Therapy Speech Therapy   11/20/2018, per PT    Transfers   Sit to Stand 4  Minimal assistance   Additional items Assist x 1   Stand to Sit 4  Minimal assistance   Additional items Assist x 1   Ambulation/Elevation   Gait pattern (slow, ataxia, increased RW advancement)   Gait Assistance 4  Minimal assist   Additional items Assist x 1   Assistive Device Rolling walker   Distance 75'x2, seated rest in between trials   Balance   Static Sitting Good   Dynamic Sitting Fair -   Static Standing Poor +   Dynamic Standing Poor +   Ambulatory Poor +   Endurance Deficit   Endurance Deficit Yes   Endurance Deficit Description fatigue, weakness, pain   Activity Tolerance   Activity Tolerance Patient limited by fatigue;Patient limited by pain;Treatment limited secondary to medical complications (Comment)   Nurse Made Aware yes   Assessment   Prognosis Fair   Problem List Decreased strength;Decreased endurance; Impaired balance;Decreased mobility; Decreased coordination;Decreased cognition;Decreased safety awareness; Impaired judgement;Pain;Orthopedic restrictions   Assessment Pt seen for re-evaluation, as pt now s/p posterior cervical decompressive laminectomy and instrumented posterior lateral funsion fixation C3-6 done yesterday  Pt presents w/ decreased functional mob, standing balance, endurance, B LE strength and coordination, barriers at home  will benefit from skilled PT to correct for the above problems  Recommend rehab at d/c      11/20/2018, per OT    Psychosocial   Psychosocial (WDL) WDL   ADL   Where Assessed Chair   Eating Assistance 5  Supervision/Setup   Eating Deficit Setup   Grooming Assistance 4  Minimal Assistance   UB Bathing Assistance 4  Minimal Assistance   LB Bathing Assistance 3  Moderate Assistance   UB Dressing Assistance 4  Minimal Assistance   LB Dressing Assistance 3  Moderate Assistance   Toileting Assistance  3  Moderate Assistance   Functional Assistance 3  Moderate Assistance   Bed Mobility   Sit to Supine 4  Minimal assistance   Additional items Assist x 1;LE management   Transfers   Sit to Stand 4  Minimal assistance   Additional items Assist x 1   Stand to Sit 4  Minimal assistance   Additional items Assist x 1   Stand pivot 4  Minimal assistance   Additional items Assist x 1   Functional Mobility   Additional items Rolling walker   Balance   Static Sitting Fair +   Dynamic Sitting Fair +   Static Standing Fair   Dynamic Standing Fair -   Activity Tolerance   Activity Tolerance Patient limited by pain   Nurse Made Aware Okay to see per Eda BUTTS Assessment   RUE Assessment X   LUE Assessment   LUE Assessment WFL   Hand Function   Gross Motor Coordination Functional   Fine Motor Coordination Functional   Cognition   Overall Cognitive Status Impaired   Arousal/Participation Responsive; Cooperative   Attention Attends with cues to redirect   Orientation Level Oriented X4   Memory Decreased recall of precautions   Following Commands Follows one step commands with increased time or repetition   Comments Pt pleasant but confused  He did not realize that he was wearing a cervical brace  He also required redirection during conversation and some repetition of commands  Assessment   Limitation Decreased ADL status; Decreased UE ROM; Decreased UE strength;Decreased Safe judgement during ADL;Decreased endurance;Decreased cognition;Decreased self-care trans;Decreased high-level ADLs   Prognosis Good   Assessment Pt is a 68 y o  male who was admitted to [facility] on 11/13/2018 with Cervical spinal stenosis  Pt seen for re-evaluation 2* s/p " posterior cervical decompressive laminectomy and instrumented posterior lateral fusion fixation C3-6 bilateral " Pt w/ spinal precautions and C/S collar  Please see initial eval for PMH and home s/u  Currently pt requires mod A for overall ADLS and min Ax1 for functional mobility/transfers w/ RW  Pt w/ mild confusion and c/o pain t/o session  He did not recall that he was wearing a C/S collar and was unable to recall precautions  Educated pt on spinal precautions and use of brace  Pt currently presents with impairments in the following categories -limited home support, difficulty performing ADLS, difficulty performing IADLS, limited insight into deficits, activity tolerance, endurance, standing balance/tolerance, sitting balance/tolerance, UE ROM, memory, insight and safety   These impairments, as well as pt's fatigue, pain, spinal precautions and risk for falls  limit pt's ability to safely engage in all baseline areas of occupation, including grooming, bathing, dressing, toileting, functional mobility/transfers and leisure activities  From OT standpoint, recommend inpatient rehab upon D/C  OT will continue to follow to address the below stated goals  11/13/2018, per SLP    Summary:  Pt presents w/ functional oropharyngeal swallow  No coughing or throat clear noted   Pt's son present in room & stated his father will not eat past 3pm  He also stated his father only wants to eat fiber b/c he is concerned about having bowel movements daily      Recommendations:  Diet: regular   Liquid: thin   Meds: whole w/ thin   Supervision: assist w/ feeding   Positioning:Upright  Strategies: Pt to take PO/Meds only when fully alert and upright  Oral care: frequently      Eval only, No f/u tx indicated       Patient's goal: not stated     Reason for consult:  R/o aspiration  Determine safest and least restrictive diet  New neuro event        Current diet:  NPO     Premorbid diet[de-identified]  Regular w/ thin      Previous VBS:  -  O2 requirement:  RA  Voice/Speech:  Intelligible & clear     Social:  Lives at home independently, however son stated hes at his house 90% of the time      Follows commands:   yes                    Cognitive Status:  Alert & cooperative     Oral [facility] exam:  Full dentition  Full symmetry        Items administered:  Puree, soft solid, hard solid, nectar thick liquid, thin liquids, Liquids were taken by straw/cup       Oral stage:  Lip closure: wfl   Mastication: wfl  Bolus formation: wfl  Bolus control: wfl  Transfer: wfl  Oral residue: not noted  Pocketing: not noted     Pharyngeal stage:  Swallow promptness: prompt    Laryngeal rise: judged to be adequate per palpation  Wet voice: not noted  Throat clear: not noted  Cough: not noted  Secondary swallows: not noted  Audible swallows: not noted  No s/s aspiration: w/ all consistencies     Esophageal stage:  No s/s reported     Results d/w:  Pt, nursing, family     Goal(s):  Pt will tolerate least restrictive diet w/out s/s aspiration or oral/pharyngeal difficulties  CURRENT GAP IN FUNCTION     Prior to Admission:     Functional Status: Patient was independent with mobility/ambulation, transfers, ADL's, IADL's  Patient's son provided transportation      Estimated length of stay: 10 to 14 days    Anticipated Post-Discharge Disposition/Treatment  Disposition: Return to previous home/apartment  Outpatient Services: Physical Therapy (PT) and Occupational Therapy (OT)    BARRIERS TO DISCHARGE  Weakness, Pain, Balance Difficulty, Fatigue, Home Accessibility, Caregiver Accessibility, Financial Resources, Equipment Needs and Resource Availability    INTERVENTIONS FOR DISCHARGE  Adaptive equipment, Patient/Family/Caregiver Education, [employer], Home Evaluation, Support Group, Financial Assistance, Arrange DME needs, Medication Changes as per MD recommendations, Therapy exercises and Center of balance support     REQUIRED THERAPY:  Patient will require PT and OT 90 minutes each per day, five days per week to achieve rehab goals  REQUIRED FUNCTIONAL AND MEDICAL MANAGEMENT FOR INPATIENT REHABILITATION:  Skin:  There are no pressure sores currently, buttocks erythema/blanchable with allevyn, facial abrasions SHAZIA, posterior cervical neck incision with DSD, Pain Management: Overall pain is well controlled, Deep Vein Thrombosis (DVT) Prophylaxis:  heparin and SCD's while in bed, further IM management of additional medical conditions while on the ARC, he needs PT/OT intervention, patient/family education and training, possible Neuropsych and Neurosurgery consults with any other needed consults prn, nursing medication review and management of bowel/bladder function  RECOMMENDED LEVEL OF CARE:  Patient presented to [place] [address] on 11/13/2018 as a pre-hospital stroke alert  Patient got out of bed, felt weak and fell  He struck his forehead, however, denies loss of consciousness  EMS was called and patient was noted to be hypotensive with SBP in 90's with right sided weakness  Patient was also noted to have intermittent bradycardia on telemetry  Noted left frontal head hematoma from fall on exam  CT of the brain showed supraorbital left frontal scalp hematoma  CTA of the head and neck was negative   CXR showed cardiomegaly and diminished inspiration  Neurology evaluated the patient, with an NIHSS of 2, and patient was recommended for thrombolysis; tPA given per protocol  Hypotension was responsive to IV fluids, and EKG showed first degree AV block without acute ST changes  On 11/14, repeat CTH was negative, as well as MRI of the brain  MRI of the cervical spine showed disc and facet osteophytosis resulting in severe central canal stenosis and chronic mass effect on cord at C3-4 and C4-5; severe bilateral neural foraminal narrowing from C2-3 to C4-5 without evidence of cord signal abnormality  Neurosurgery was consulted, who noted cord signal change on review of imagine, and stated patient will likely need surgical intervention given deteriorating motor and sensory exam  Vista collar is to be worn at all times  ECHO showed an EF of 60%, no regional wall motion abnormalities, grade 1 diastolic dysfunction, without evidence of apical thrombus  Patient was initiated on ASA and statin therapy  On 11/19, patient went to the OR with Neurosurgery for posterior cervical decompressive laminectomy and instrumented posterior lateral fusion fixation C3-6 bilaterally  GOSIA drain intact after surgery, and remains intact currently due to continued monitoring of output  Behavioral health was consulted regarding patient's history of OCD and possible depression  Patient denies suicidal thoughts, plan or intent  He has some obsession regarding medical issues, however, it is not interferring with medical treatment  Behavioral health recommending continuing with medical management and Zoloft, and is cleared for discharge to rehab  Patient continues with some right sided weakness, but has shown improvement since surgical intervention  Patient is currently on a Regular diet, and is tolerating well with aspiration precautions in place  Prior to patient's admission, patient was fully Independent with ADL's and IADL's, except for driving   Currently, patient is Min assist with RW for gait and transfers, and Min assist for UB ADL's, Mod assist for LB ADL's  Close medical management and PM&R management is recommended at this time while patient is on the [facility]  Inpatient acute rehab is recommended for patient to maximize overall strength and mobility to Modified Independent upon discharge to home with support of family

## 2018-11-21 NOTE — SOCIAL WORK
Informed by Rene Goff liacharles, that pt has been approved by their MD's and they have a bed available for pt today if medically stable for discharge  Informed by Dr Bar Melendez that pt is medically stable for discharge today to [facility]  Informed Kaitlin that pt is medically stable for discharge to [facility]  She stated that pt will go to room 973 with a 1 pm   Cm informed pt, pt's bedside RN Dawson Jama of transport time and location  Left message for pt's son Aleah Hillman, contact # [phone], to inform him of pt's discharge location and time

## 2018-11-21 NOTE — CONSULTS
Consultation - Mary Zapien 68 y o  male MRN: [id number]    Unit/Bed#: -01 Encounter: [id number]        History of Present Illness     HPI: Mary Zapien is a 68y o  year old male with a history of HTN, HLD, tremors, OCD, chronic lumbar radiculopathy who was brought to the hospital after son found him on the floor and had right sided weakness  He did hit his head but did not lose consciousness  CTH was w/o acute changes and CTA was normal   He received tPA  He had right hip pain from falling but the xray was negative  MRI of the brain was normal but MRI of the cervical spine showed severe central canal stenosis and chronic mass effect on the cord at C3-4 and C4-5  Severe bilateral neural foraminal narrowing from C2-3 to C4-5  On 11/19/18 he underwent a posterior cervical decompressive laminectomy with instrumented posterior lateral fusion fixation C3-6 with Dr Josephine Gonzalez from Neurosurgery  He developed some mild hyponatremia which is improving  Psychiatry saw in consult to clear him to come to [facility] and kept him on his Zoloft 100mg daily      Currently, patient denies headache, CP/SOB/dizziness, N/V/D    ROS:  As in HPI, otherwise negative 12 point ROS        Historical Information   Past Medical History:   Diagnosis Date    Anxiety     Chronic low back pain     Depression     Hypercholesterolemia     Hypertension     OCD (obsessive compulsive disorder)      Past Surgical History:   Procedure Laterality Date    CERVICAL FUSION Bilateral 11/19/2018    Procedure: Posterior cervical decompressive laminectomy and instrumented posterior lateral fusion fixation C3-6;  Surgeon: Macy Russell MD;  Location: [facility];  Service: Neurosurgery    HERNIA REPAIR       Social History   History   Alcohol Use No     History   Drug Use No     History   Smoking Status    Never Smoker   Smokeless Tobacco    Never Used     Family History   Problem Relation Age of Onset    Diabetes type II Father    Susan Reddy Glaucoma Son     Panic disorder Son        Meds/Allergies   current meds:  Current Facility-Administered Medications   Medication Dose Route Frequency    acetaminophen (TYLENOL) tablet 650 mg  650 mg Oral Q6H PRN    atorvastatin (LIPITOR) tablet 40 mg  40 mg Oral Daily With Dinner    bacitracin topical ointment 1 large application  1 large application Topical BID    docusate sodium (COLACE) capsule 100 mg  100 mg Oral BID    heparin (porcine) subcutaneous injection 5,000 Units  5,000 Units Subcutaneous Q8H [address]    OLANZapine (ZyPREXA) tablet 5 mg  5 mg Oral Once    oxyCODONE (ROXICODONE) IR tablet 5 mg  5 mg Oral Q4H PRN    [START ON 11/22/2018] polyethylene glycol (MIRALAX) packet 17 g  17 g Oral Daily    senna-docusate sodium (SENOKOT S) 8 6-50 mg per tablet 1 tablet  1 tablet Oral BID    [START ON 11/22/2018] sertraline (ZOLOFT) tablet 100 mg  100 mg Oral Daily       PTA meds:   Prescriptions Prior to Admission   Medication    docusate sodium (COLACE) 50 mg capsule    glucosamine-chondroitin 500-400 MG tablet    metoprolol tartrate (LOPRESSOR) 25 mg tablet    Multiple Vitamins-Minerals (MULTIVITAMIN MEN 50+) TABS    Omega-3 Fatty Acids (FISH OIL) 1200 MG CAPS    polyethylene glycol (MIRALAX) 17 g packet    sertraline (ZOLOFT) 100 mg tablet     No Known Allergies    Objective   Vitals: Blood pressure 123/62, pulse 68, temperature 98 6 °F (37 °C), temperature source Oral, resp  rate 16, height 5' 7" (1 702 m), weight 75 6 kg (166 lb 10 7 oz)      Physical Exam     Constitutional:  NAD; pleasant; nontoxic  HEENT:  AT/NC; oropharynx negative for thrush on tongue   Neck: negative for JVD  CV:  +S1, S2;  RRR; no rub/murmur  Pulmonary:  BBS without crackles/wheeze/rhonci; resp are unlabored  Abdominal:  soft, +BS, ND/NT; no mass  Skin:  no rashes  Musculoskeletal:  no edema  :  no cristina  Neurological/Psych:  AAO;  LE 5/5; RUE 4/5, LUE 4+/5; no depression/anxiety    Lab Results:     Results from last 7 days  Lab Units 11/20/18  0624 11/15/18  0526   WBC Thousand/uL 11 20* 9 29   HEMOGLOBIN g/dL 10 0* 12 2   HEMATOCRIT % 29 7* 37 2   PLATELETS Thousands/uL 190  180 191       Results from last 7 days  Lab Units 11/20/18  0624 11/18/18  0544   POTASSIUM mmol/L 4 2 4 0   CHLORIDE mmol/L 103 102   CO2 mmol/L 23 24   BUN mg/dL 10 14   CREATININE mg/dL 0 63 0 60   CALCIUM mg/dL 8 0* 8 2*           Results from last 7 days  Lab Units 11/16/18  1659   INR  1 05       No results found for: GLUCOSE    Labs reviewed    Imaging: reviewed  EKG, Pathology, and Other Studies: I have personally reviewed pertinent reports  VTE Prophylaxis: Heparin    Code Status: Level 1 - Full Code     Assessment/Plan      Fall, right sided weakness; s/p tPA:  radiologic studies did not show CVA    Left periorbital hematoma:  from hitting his head when he fell; will watch; primary service to assess for TBI    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; s/p posterior cervical decompressive laminectomy with instrumented posterior lateral fusion fixation C3-6 on 11/19 [facility]):  watch incision; continue collar at all times    HTN:  only takes Lopressor 12 5 mg qd at home = on nothing currently; will watch    OCD: currently was only on his usual dose of Zoloft but also takes Buspar 10mg BID/Zyprexa 15mg qhs/Remeron 15 mg qhs at home  Primary service is now going to add back Zyprexa but at 5mg qhs for now    Leukocytosis:  mild; no fever so will watch    ABLA:  mild; required no transfusion; will watch      Counseling / Coordination of Care  Total floor / unit time spent today 60 minutes  Greater than 50% of total time was spent with the patient and / or family counseling and / or coordination of care        TAZ Mcdonald

## 2018-11-22 PROBLEM — Z98.1 S/P CERVICAL SPINAL FUSION: Status: ACTIVE | Noted: 2018-11-22

## 2018-11-22 PROBLEM — Z86.39 HISTORY OF HYPERLIPIDEMIA: Status: ACTIVE | Noted: 2018-11-22

## 2018-11-22 PROBLEM — M48.061 LUMBAR STENOSIS: Status: ACTIVE | Noted: 2018-11-22

## 2018-11-22 PROBLEM — Z91.89 AT RISK FOR VENOUS THROMBOEMBOLISM (VTE): Status: ACTIVE | Noted: 2018-11-22

## 2018-11-22 PROBLEM — M54.50 CHRONIC LOW BACK PAIN: Status: ACTIVE | Noted: 2018-11-22

## 2018-11-22 PROBLEM — R52 PAIN: Status: ACTIVE | Noted: 2018-11-22

## 2018-11-22 PROBLEM — I10 HYPERTENSION: Status: ACTIVE | Noted: 2018-11-22

## 2018-11-22 PROBLEM — S06.9X9A TRAUMATIC BRAIN INJURY WITH LOSS OF CONSCIOUSNESS (HCC): Status: ACTIVE | Noted: 2018-11-22

## 2018-11-22 PROBLEM — G47.00 INSOMNIA: Status: ACTIVE | Noted: 2018-11-22

## 2018-11-22 PROBLEM — G89.29 CHRONIC LOW BACK PAIN: Status: ACTIVE | Noted: 2018-11-22

## 2018-11-22 PROBLEM — M54.16 LUMBAR RADICULOPATHY: Status: ACTIVE | Noted: 2018-11-22

## 2018-11-22 PROCEDURE — 97535 SELF CARE MNGMENT TRAINING: CPT

## 2018-11-22 PROCEDURE — 97163 PT EVAL HIGH COMPLEX 45 MIN: CPT | Performed by: PHYSICAL THERAPIST

## 2018-11-22 PROCEDURE — 99024 POSTOP FOLLOW-UP VISIT: CPT | Performed by: PHYSICIAN ASSISTANT

## 2018-11-22 PROCEDURE — 97530 THERAPEUTIC ACTIVITIES: CPT | Performed by: PHYSICAL THERAPIST

## 2018-11-22 PROCEDURE — 97166 OT EVAL MOD COMPLEX 45 MIN: CPT

## 2018-11-22 PROCEDURE — 97110 THERAPEUTIC EXERCISES: CPT | Performed by: PHYSICAL THERAPIST

## 2018-11-22 PROCEDURE — 97116 GAIT TRAINING THERAPY: CPT | Performed by: PHYSICAL THERAPIST

## 2018-11-22 RX ADMIN — METOPROLOL TARTRATE 12.5 MG: 25 TABLET ORAL at 21:36

## 2018-11-22 RX ADMIN — POLYETHYLENE GLYCOL 3350 17 G: 17 POWDER, FOR SOLUTION ORAL at 09:39

## 2018-11-22 RX ADMIN — OLANZAPINE 5 MG: 5 TABLET, FILM COATED ORAL at 21:40

## 2018-11-22 RX ADMIN — OXYCODONE HYDROCHLORIDE 5 MG: 5 TABLET ORAL at 14:12

## 2018-11-22 RX ADMIN — BACITRACIN ZINC 1 LARGE APPLICATION: 500 OINTMENT TOPICAL at 17:09

## 2018-11-22 RX ADMIN — BACITRACIN ZINC 1 LARGE APPLICATION: 500 OINTMENT TOPICAL at 09:38

## 2018-11-22 RX ADMIN — MIRTAZAPINE 7.5 MG: 15 TABLET ORAL at 21:40

## 2018-11-22 RX ADMIN — OXYCODONE HYDROCHLORIDE 5 MG: 5 TABLET ORAL at 09:37

## 2018-11-22 RX ADMIN — DOCUSATE SODIUM 100 MG: 100 CAPSULE, LIQUID FILLED ORAL at 09:37

## 2018-11-22 RX ADMIN — SENNOSIDES AND DOCUSATE SODIUM 1 TABLET: 8.6; 5 TABLET ORAL at 09:38

## 2018-11-22 RX ADMIN — HEPARIN SODIUM 5000 UNITS: 5000 INJECTION INTRAVENOUS; SUBCUTANEOUS at 13:47

## 2018-11-22 RX ADMIN — GABAPENTIN 100 MG: 100 CAPSULE ORAL at 21:35

## 2018-11-22 RX ADMIN — GABAPENTIN 100 MG: 100 CAPSULE ORAL at 15:33

## 2018-11-22 RX ADMIN — GABAPENTIN 100 MG: 100 CAPSULE ORAL at 09:37

## 2018-11-22 RX ADMIN — HEPARIN SODIUM 5000 UNITS: 5000 INJECTION INTRAVENOUS; SUBCUTANEOUS at 21:35

## 2018-11-22 RX ADMIN — SERTRALINE HYDROCHLORIDE 100 MG: 100 TABLET ORAL at 09:38

## 2018-11-22 RX ADMIN — BISACODYL 10 MG: 5 TABLET, COATED ORAL at 13:47

## 2018-11-22 RX ADMIN — SENNOSIDES AND DOCUSATE SODIUM 1 TABLET: 8.6; 5 TABLET ORAL at 17:08

## 2018-11-22 RX ADMIN — DOCUSATE SODIUM 100 MG: 100 CAPSULE, LIQUID FILLED ORAL at 17:08

## 2018-11-22 RX ADMIN — ATORVASTATIN CALCIUM 40 MG: 40 TABLET, FILM COATED ORAL at 15:33

## 2018-11-22 RX ADMIN — HEPARIN SODIUM 5000 UNITS: 5000 INJECTION INTRAVENOUS; SUBCUTANEOUS at 05:48

## 2018-11-22 NOTE — ASSESSMENT & PLAN NOTE
Impaired cognition stable but improved from presentation to [address] [address] with closed head injury with brief LOC with residual deficits in cognition   -CT head without acute findings;  -Completed all intensive skilled therapies in [facility] with particular focus on SLP as  outlined with oversight and management by rehabilitation physician

## 2018-11-22 NOTE — ASSESSMENT & PLAN NOTE
-C3-C6 posterior cervical decompressive lami and PLF by Dr Andrew Mehta on 11/19  -Monitor incision  -Cervical collar   -Follow-up with NS during [facility] course PRN and after d/c

## 2018-11-22 NOTE — ASSESSMENT & PLAN NOTE
-Following fall causing R sided weakness, imbalance, limb pain s/p decompression and fusion   -Possible pre-fall cervical myelopathy based on imaging and history that attributed to fall in first place   -Completed acute comprehensive interdisciplinary inpatient rehabilitation with intensive skilled therapies (PT, OT) as previously outlined with oversight and management by rehabilitation physician as well as inpatient rehab level nursing, case management and weekly interdisciplinary team meetings

## 2018-11-22 NOTE — PROGRESS NOTES
Progress Note - Neurosurgery   Janet Thomas 68 y o  male MRN: [id number]  Unit/Bed#: -01 Encounter: [id number]    Assessment:  1  POD# 3 Posterior cervical decompressive laminectomy and instrumented posterior lateral fusion fixation C3-6 (Bilateral) - 11/19/18  2  Severe central canal stenosis and chronic mass effect on the cord at C3-5 and C4-5  3  Severe bilateral neural foraminal narrowing at C2-3 to C4-5  4  Status post fall  5  CVA - tPA administered 11/13/2018  6  Anxiety  7  Depression  8  Constipation  9  OCD    Plan:    · Exam : AAOx3  Speech clear and fluent  Sensation to PP intact b/l UE, torso, right lower extremity, and dorsum/lateral left foot  Sensation to pinprick is diminished of anterior left thigh and medial/lateral left calf compared to right lower extremity  JPS/Vibratory sense intact b/l thumbs/great toes  Motor: Shoulder abduction 5/5 bilaterally  Elbow flex left 5/5 and right 4+/5  Elbow extension left 5/5 and right 4+/5  Wrist extension left 5/5 and right 4/5  Wrist flex 5/5 bilaterally  Finger  left 5/5 and right 4- to 4/5  Motor LEs: Hip flex/abd/add 5/5 bilaterally  Knee flex/ext 5/5 bilaterally  Ankle DF/PF 5/5 bilaterally  Great toe DF 5/5 bilaterally  Reflexes: B/BR +2 b/l , T/P brisk b/l  Achilles+1 b/l   No AC b/l    · Imaging Final results as below  · 11/20/18 C-spine xray -- status post posterior spinal hardware fusion from C3 through C6 with intact hardware  Expected postoperative changes are seen in the adjacent soft tissues  · [place] c-collar for showering (at bedside)  Otherwise wear [place] c-collar at all times  · Dressing change daily or when gauze is saturated more than 50%  · GOSIA drain:  11/21/18 0701hr to current  = 50ml daily total  · GOSIA removed without event -- suture tied and steri-strips applied over suture  ABD pad placed over posterior cervical incision      · Pain control:  ·  Gabapentin 100mg TID  · APAP 650 q 6 hrs prn (has not used today)  · Oxycodone 5mg 1 tab po q 4 hrs prn (used today 0937hr and 1412hr)    · Mobilize as tolerated with assistance  · Currently in [facility] for rehab - PT/OT/ST    · DVT PPX: SQ Hep, SCDs  · Will need Neurosurgery 2 week Post-op visit and 6 week POV scheduled  · Spoke with patient's nursing staff  Subjective/Objective   Chief Complaint: "Discomfort throughout body" /  Post-op follow up  Subjective:  Patient reports soreness and sharp pain of his posterior neck  He does report some posterior cervical incisional discomfort  He currently rates pain as 7/10 on pain scale  Pain medication provides some relief  He describes generalized dull aching discomfort of both arms and both legs  He reports this is unchanged from yesterday  Pain medications provide some relief  When asked if he has numbness he reports "I can feel fine"  He reports generalized weakness of his body  He still describes some difficulty with mobility of his right hand such with using a fork or spoon but does report this has improved some compared to prior to surgery  Patient reports he sat out of bed in chair today  He reports he also ambulated in hallways with walker  He reports using either urinal or getting out of bed to go to the bathroom to void urine  He reports it has been approximately 2 days since his last bowel movement  Objective: Appeared comfortable laying in bed sleeping when entered room  He awoke to examiner saying his name and he remained awake  I/O       11/20 0701 - 11/21 0700 11/21 0701 - 11/22 0700 11/22 0701 - 11/23 0700    P  O   0 490    Total Intake(mL/kg)  0 (0) 490 (6 5)    Urine (mL/kg/hr)  2275     Drains  50     Total Output   2325      Net   -2325 +490           Unmeasured Urine Occurrence  1 x 1 x          Invasive Devices     Peripheral Intravenous Line            Peripheral IV 11/19/18 Right Wrist 3 days          Drain            Closed/Suction Drain Posterior;Right Neck Bulb 7 Fr  3 days                Physical Exam:  Vitals: Blood pressure 100/51, pulse 66, temperature 98 3 °F (36 8 °C), temperature source Oral, resp  rate 18, height 5' 7" (1 702 m), weight 75 6 kg (166 lb 10 7 oz), SpO2 98 %  ,Body mass index is 26 1 kg/m²  General appearance: AAOx3,  appears stated age, cooperative and no distress  Speech clear and fluent  Head:  periorbital ecchymosis  Neck:  Posterior cervical dressing is dry  Dressing removed from posterior cervical region  Incision is clean dry and intact  GOSIA drain present  Lungs: non labored breathing  Neurologic:   Mental status: Alert, oriented 3  Speech clear and fluent  Counts fingers correct  Shows correct number fingers both hands  Sensory: Sensation to PP intact b/l UE, torso, right lower extremity, and dorsum/lateral left foot  Sensation to pinprick is diminished of anterior left thigh and medial/lateral left calf compared to right lower extremity  JPS/Vibratory sense intact b/l thumbs/great toes  Motor:  Shoulder abduction 5/5 bilaterally  Elbow flex left 5/5 and right 4+/5  Elbow extension left 5/5 and right 4+/5  Wrist extension left 5/5 and right 4/5  Wrist flex 5/5 bilaterally  Finger  left 5/5 and right 4- to 4/5  Hip flex/abd/add 5/5 bilaterally  Knee flex/ext 5/5 bilaterally  Ankle DF/PF 5/5 bilaterally  Great toe DF 5/5 bilaterally  Reflexes: B/BR +2 b/l , T/P brisk b/l  Achilles+1 b/l   No AC b/l        Lab Results:    Results from last 7 days  Lab Units 11/20/18  0624   WBC Thousand/uL 11 20*   HEMOGLOBIN g/dL 10 0*   HEMATOCRIT % 29 7*   PLATELETS Thousands/uL 190  180       Results from last 7 days  Lab Units 11/20/18  0624 11/18/18  0544   POTASSIUM mmol/L 4 2 4 0   CHLORIDE mmol/L 103 102   CO2 mmol/L 23 24   BUN mg/dL 10 14   CREATININE mg/dL 0 63 0 60   CALCIUM mg/dL 8 0* 8 2*               Results from last 7 days  Lab Units 11/16/18  1659   INR  1 05   PTT seconds 28     No results found for: TROPONINT  ABG:No results found for: PHART, BMR1NWG, PO2ART, TLC6UVS, D7VBHBOH, BEART, SOURCE    Imaging Studies: I have personally reviewed pertinent reports     and I have personally reviewed pertinent films in PACS      VTE Pharmacologic Prophylaxis: Heparin    VTE Mechanical Prophylaxis: sequential compression device on lower extremities during exam

## 2018-11-22 NOTE — PROGRESS NOTES
Internal Medicine Progress Note  Patient: Kusum Byrne  Age/sex: 68 y o  male  Medical Record #: [id number]      ASSESSMENT/PLAN:  Kusum Byrne is seen and examined and mangement for following issues:    Fall, right sided weakness; s/p tPA:  radiologic studies did not show CVA     Left periorbital hematoma:   stable:  from hitting his head when he fell; will watch; neurosurgery evaluation demonstrates class scalp coma Scale 15 without any worsening change in neurological status      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; s/p posterior cervical decompressive laminectomy with instrumented posterior lateral fusion fixation C3-6 on 11/19 [facility]):  watch incision; continue collar at all times     HTN:   blood pressure mildly elevated would reinstitute Lopressor 12 5 mg and monitor       OCD: currently was only on his usual dose of Zoloft but also takes Buspar 10mg BID/Zyprexa 15mg qhs/Remeron 15 mg qhs at home  Primary service is now going to add back Zyprexa but at 5mg qhs for now     ABLA:  mild; required no transfusion; will watch         Subjective: Patient seen and examined  New or overnight issues include the following:  No significant overnight events were reported nursing issues      ROS:   GI: denies abdominal pain, change bowel habits or reflux symptoms  Neuro: No new neurologic changes  Respiratory: No Cough, SOB  Cardiovascular: No CP, palpitations     Scheduled Meds:    Current Facility-Administered Medications:  acetaminophen 650 mg Oral Q6H PRN Kirby Harrison MD   atorvastatin 40 mg Oral Daily With Yue Vaz MD   bacitracin 1 large application Topical BID Kirby Harrison MD   bisacodyl 10 mg Rectal Daily PRN Kirby Harrison MD   bisacodyl 10 mg Rectal Once Kirby Harrison MD   docusate sodium 100 mg Oral BID Kirby Harrison MD   gabapentin 100 mg Oral TID Kirby Harrison MD   heparin (porcine) 5,000 Units Subcutaneous [facility] Kirby Harrison MD   magnesium hydroxide 30 mL Oral Daily PRN Cora Fernandez DO   mirtazapine 7 5 mg Oral HS Vincent Noel MD   OLANZapine 5 mg Oral HS Vincent Noel MD   oxyCODONE 5 mg Oral Q4H PRN Vincent Noel MD   polyethylene glycol 17 g Oral Daily Vincent Noel MD   polyethylene glycol 17 g Oral Once Vnicent Noel MD   senna-docusate sodium 1 tablet Oral BID Vincent Noel MD   sertraline 100 mg Oral Daily Vincent Noel MD       Labs:       Results from last 7 days  Lab Units 11/20/18  0624   WBC Thousand/uL 11 20*   HEMOGLOBIN g/dL 10 0*   HEMATOCRIT % 29 7*   PLATELETS Thousands/uL 190  180       Results from last 7 days  Lab Units 11/20/18  0624 11/18/18  0544   POTASSIUM mmol/L 4 2 4 0   CHLORIDE mmol/L 103 102   CO2 mmol/L 23 24   BUN mg/dL 10 14   CREATININE mg/dL 0 63 0 60   CALCIUM mg/dL 8 0* 8 2*           Results from last 7 days  Lab Units 11/16/18  1659   INR  1 05        No results found for: GLUCOSE    Labs reviewed    Physical Examination:  Vitals:   Vitals:    11/21/18 1326 11/21/18 1638 11/21/18 2008 11/22/18 0456   BP: 123/62  119/65 134/94   BP Location: Left arm  Left arm Left arm   Pulse: 68  58 65   Resp: 16 18 18   Temp: 98 6 °F (37 °C)  98 1 °F (36 7 °C) 97 7 °F (36 5 °C)   TempSrc: Oral  Oral Oral   SpO2:  97% 97% 94%   Weight: 75 6 kg (166 lb 10 7 oz)      Height: 5' 7" (1 702 m)          Constitutional:  NAD; pleasant; nontoxic  HEENT:  AT/NC; oropharynx negative for thrush on tongue   Neck: negative for JVD  CV:  +S1, S2;  RRR; no rub/murmur  Pulmonary:  BBS without crackles/wheeze/rhonci; resp are unlabored  Abdominal:  soft, +BS, ND/NT; no mass  Skin:  no rashes  Musculoskeletal:  no edema  :  no cristina  Neurological/Psych:  AAO;  LE 5/5; RUE 4/5, LUE 4+/5; no depression/anxiety      [x ] Total time spent: 30 Mins and greater than 50% of this time was spent counseling/coordinating care  ** Please Note: Dragon 360 Dictation voice to text software may have been used in the creation of this document   **

## 2018-11-22 NOTE — ASSESSMENT & PLAN NOTE
Mood overall improving; Meds titrated back up and patient will discharge on  Zoloft 100mg qday, Buspar 10mg BID, Zyprexa 10mg QHS, Remeron 7 5mg QHS  (Also now on gabapentin 400mg TID largely for pain - but may play role in mood stabilization)  Follow-up with psychiatry, Dr Toño Mohan, and psychology after d/c  >Call placed and voicemail left with Dr Toño Mohan to discuss course but have not heard back as of yet    OCD, anxiety, depression, insomnia hx:  >Fairly significant insomnia, mood d/o/depression sub-optimal control earlier in course which has improved  >Developed some increased anxiety a few days prior to discharge but stable for d/c and follow-up plan  >Had been off of multiple chronic agents (Zyprexa 15mg QHS, Remeron 15mg QHS, Buspar 10mg BID) except for Zoloft 100mg Qday since being hospitalized over a week ago);  Seen by [facility] psych prior to transfer who felt patient mood stable at that time  >Neuropsych c/s'd and assisted with mgmt during course

## 2018-11-22 NOTE — PROGRESS NOTES
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   Date of Onset 11/13/18   Home Setup   Type of Home Apartment   Method of Entry Stairs   Number of Stairs 15   Prior Level of Function   Self-Care 3  Independent - Patient completed the activities by him/herself, with or without an assistive device, with no assistance from a helper  Indoor-Mobility (Ambulation) 3  Independent - Patient completed the activities by him/herself, with or without an assistive device, with no assistance from a helper  Stairs 3  Independent - Patient completed the activities by him/herself, with or without an assistive device, with no assistance from a helper  Functional Cognition 3  Independent - Patient completed the activities by him/herself, with or without an assistive device, with no assistance from a helper  Prior Assistance Needed for Driving;Household Chores/Cleaning;Meal Preparation;Medication Management;Money Management; Shopping   Patient Preference   Nickname (Patient Preference) Ivan   Restrictions/Precautions   Precautions Bed/chair alarms; Fall Risk;Multiple lines;Pain;Spinal precautions   Braces or Orthoses C/S Collar   Pain Assessment   Pain Assessment 0-10   Pain Score 7   Pain Type Acute pain   Pain Location Neck   Pain Orientation Bilateral   Pain Descriptors Radiating;Sore;Tightness   Pain Frequency Constant/continuous   Pain Onset Ongoing   QI: Roll Left and Right   Assistance Needed Physical assistance   Assistance Provided by [place] 25%-49%   Roll Left and Right CARE Score 3   Bed Mobility   Findings Min mostly Mod rolling to left side    QI: Sit to Lying   Reason if not Attempted Activity not applicable   Sit to Lying CARE Score 9   QI: Lying to Sitting on Side of Bed   Assistance Needed Physical assistance   Assistance Provided by [place] 25%-49%   Comment Min A and VCs for hand placement and sequence    Lying to Sitting on Side of Bed CARE Score 3   QI: Sit to Stand   Assistance Needed Physical assistance   Assistance Provided by [place] Less than 25%   Comment min A    Sit to Stand CARE Score 3   QI: Chair/Bed-to-Chair Transfer   Assistance Needed Physical assistance   Assistance Provided by [place] 25%-49%   Comment min    Chair/Bed-to-Chair Transfer CARE Score 3   QI: Car Transfer   Assistance Needed Physical assistance   Assistance Provided by [place] Less than 25%   Comment min   Car Transfer CARE Score 3   Transfer Bed/Chair/Wheelchair   Limitations Noted In Balance;Confidence; Coordination; Endurance;Pain Management;Problem Solving; Sequencing;LE Strength   Adaptive Equipment Roller Walker   Stand Pivot Minimal Assist   Sit to Stand Minimal   Stand to Sit Minimal   Supine to Sit Minimal   Findings min   Bed, Chair, Wheelchair Transfer (FIM) 4 - Patient requires steadying assist or light touching   QI: Walk 10 Feet   Assistance Needed Incidental touching   Assistance Provided by [place] Less than 25%   Comment cg   Walk 10 Feet CARE Score 3   QI: Walk 50 Feet with Two Turns   Assistance Needed Incidental touching   Assistance Provided by [place] Less than 25%   Comment cg   Walk 50 Feet with Two Turns CARE Score 3   QI: Walk 150 Feet   Assistance Needed Incidental touching   Assistance Provided by [place] Less than 25%   Comment CGA' 150 feet x 3 cycles and 350 feet one time with pausing as needed with standing rest breaks  slight reduciton stride length    Walk 150 Feet CARE Score 3   QI: Walking 10 Feet on Uneven Surfaces   Reason if not Attempted Safety concerns   Walking 10 Feet on Uneven Surfaces CARE Score 88   Ambulation   Does the patient walk? 2   Yes   Walk Assist Level Contact Guard   Gait Pattern Antalgic;Decreased foot clearance   Assist Device Roller Walker   Distance Walked (feet) 350 ft   Walking (FIM) 4 - Patient requires steadying assist or light touching AND distance 150 feet or more, no rest   Wheelchair mobility   QI: Does the patient use a wheelchair? 0  No   QI: 1 Step (Curb)   Assistance Needed Physical assistance   Assistance Provided by [place] Less than 25%   Comment 8 inch curb x 2 reps CGA and VCs for sequence with management of RW    1 Step (Curb) CARE Score 3   QI: 4 Steps   Assistance Needed Physical assistance   Assistance Provided by [place] Less than 25%   Comment Trainer steps 2 cycles over and back 2 times, 6inch step conducted 4 tiems and 4inch step conducted 6 times  4 Steps CARE Score 3   QI: 12 Steps   Reason if not Attempted Safety concerns   12 Steps CARE Score 88   Stairs   Type  Steps   # of Steps 4   Assist Devices Bilateral Rail   Stairs (FIM) 2 - Patient goes up and down 4 - 11 stairs regardless of assist/device/setup   RLE Assessment   RLE Assessment X   Strength RLE   RLE Overall Strength 3+/5   LLE Assessment   LLE Assessment X   Strength LLE   LLE Overall Strength 3+/5   Sensation   Light Touch No apparent deficits   Objective Measure   PT Measure(s) TUG test: 41 seconds , DGI score 11/24  with RW    PT Findings STS 2 sets, 10 reps with use of arm rest  transfer training with recliner, edge of bed with RW 3 cycles over and back with CGA for safety and VCs for correct performance  seated TE of LAQ, Hip abd, hi8p abd and hip flexion with band blue 2 sets, 10 reps 3 sec hold end range  Discharge Information   Impressions Pt is a 68 y o  male seen for PT evaluation s/p posterior cervical decompressive laminectomy and posterior lateral fusion fixation C3-6  Pt comorbidities include: anxiety, hyponatremia, hx of falls, bradycardia, left frontal head hematoma, OCD, HTN, HLD, chronic low back pain 2* lumbar stenosis, depression, and hernia repair  Pt reports living independent w/ older son in an apartment  Pt reports PLOF as independent w/ use of no AD, however pt reports 3 prior falls at home  Pt has no DME at home    Pt does report inc pain and difficulty w/ completing transfers and functional mobility with noted blister on right foot causing more pain than C spine region  Current level of fxn: Flavia w/ RW for fxnl transfers, Flavia for bed mobility and CGA for ambulation and stair navigation  Min A with car transfer  Following deficits impacting functional mobility: pain mgmt, dec stand tolerance, impaired balance, spinal precautions, dec endurance, impaired problem solving, UE/LE weakness, impaired FMC, and decreased  strength  Pt demonstrates good rehab potential and would benefit from skilled PT services to reach pt goals of mod I w/ LRAD for fxnl transfer, gait and stairs w/ ELOS 7-10 days to return to least restrictive environment pending pt progress     PT Therapy Minutes   PT Time In 1230   PT Time Out 1400   PT Total Time (minutes) 90   PT Mode of treatment - Individual (minutes) 90   PT Mode of treatment - Concurrent (minutes) 0   PT Mode of treatment - Group (minutes) 0   PT Mode of treatment - Co-treat (minutes) 0   PT Mode of Teatment - Total time(minutes) 90 minutes

## 2018-11-22 NOTE — PROGRESS NOTES
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 Yes   Home Setup   Type of Home Apartment   Method of Entry Stairs   Number of Stairs 15   Number of Stairs in Home 0   First Floor Bathroom Tub; Shower;Combo;Curtain   Available Equipment (none)   Baseline Information   Vocation Other  (retired)   Transportation Family/friends drive   Prior Device(s) Used (none)   Prior IADL Participation   Money Management Identify Money;Estimate Costs;Estimate Change   Meal Preparation Other  (light meal prep/cold foods)   Laundry Other  (son completes)   Home Cleaning Other  (son completes)   Prior Level of Function   Self-Care 3  Independent - Patient completed the activities by him/herself, with or without an assistive device, with no assistance from a helper  Indoor-Mobility (Ambulation) 3  Independent - Patient completed the activities by him/herself, with or without an assistive device, with no assistance from a helper  Stairs 3  Independent - Patient completed the activities by him/herself, with or without an assistive device, with no assistance from a helper  Functional Cognition 3  Independent - Patient completed the activities by him/herself, with or without an assistive device, with no assistance from a helper  Prior Assistance Needed for Driving;Household Chores/Cleaning;Meal Preparation;Money Management; Shopping   Prior Device Used Other (comments)  (none)   Patient Preference   Tom (Patient Preference) Ivan   Psychosocial   Psychosocial (WDL) X   Patient Behaviors/Mood Anxious;Guilty; Sad   Restrictions/Precautions   Precautions Bed/chair alarms; Fall Risk;Multiple lines;Pain;Spinal precautions;Supervision on toilet/commode  (GOSIA drain)   Braces or Orthoses C/S Collar   Pain Assessment   Pain Assessment 0-10   Pain Score Worst Possible Pain   Pain Type Acute pain   Pain Location Neck   Pain Orientation Bilateral;Posterior   Pain Radiating Towards towards b/l arms   Pain Descriptors Discomfort;Radiating; Shooting; Sore   Pain Frequency Constant/continuous   Pain Onset Ongoing   Clinical Progression Not changed   Effect of Pain on Daily Activities inc w/ fxnl reaching   Patient's Stated Pain Goal No pain   Hospital Pain Intervention(s) Rest;Repositioned   Response to Interventions Pt tolerated Tx and c/o dec pain when seated   QI: Eating   Assistance Needed Set-up / [employer] [employer] [place] Provided by [place] Less than 25%   Eating CARE Score 3   Eating Assessment   Food To Mouth Yes   Able To Cut Yes  (inc time for soft food ([place] toast))   Positioning Upright;Out of Bed   Safety Needs Increase Time   Meal Assessed Breakfast   QI: Swallowing/Nutritional Status Regular food   Current Diet Regular   Intake Mode PO;Self   Opens Packages No   Findings Pt requires A to open packages 2* dec FM strength and impaired  strength   Eating (FIM) 5 - Patient needs help to open contianers or set up tray   QI: Oral Hygiene   Assistance Needed Incidental touching   Assistance Provided by [place] Less than 25%   Comment CGA in stance at sink, S once seated in w/c 2* pain  Oral Hygiene CARE Score 3   Grooming   Able To Wash/Dry Face;Brush/Clean Teeth;Wash/Dry Hands   Limitation Noted In Problem Solving; Safety;Strength;Timeliness; Coordination   Findings Pt requires A to comb hair 2* pain in neck/arms  Pt required inc time and VC to initiate brushing teeth and for placing toothpaste onto toothbrush/manipulating cap      Grooming (FIM) 4 - Patient completed 3/4  tasks   QI: Shower/Bathe Self   Assistance Needed Physical assistance   Assistance Provided by [place] 50%-74% Shower/Bathe Self CARE Score 2   Bathing   Assessed Bath Style Tub   Anticipated D/C Bath Style Tub   Able to Gather/Transport No   Able to [employer] Temperature Yes   Able to Wash/Rinse/Dry (body part) Left Arm;Right Arm;L Upper Leg;R Upper Leg;Chest;Abdomen;Perineal Area   Limitations Noted in Balance; Coordination; Endurance;Problem Solving; Safety;ROM;Strength;Timeliness   Positioning Seated;Standing   Findings  Pt required A to bathe b/l LE 2* spinal precautions, pt unable to complete cross leg method  Pt required A for thoroughness for bathing buttocks 2* b/l UE weakness  Bathing (FIM) 3 - Patient completes 5/10  6/10 or 7/10 parts   QI: Upper Body Dressing   Assistance Needed Physical assistance   Assistance Provided by [place] 25%-49%   Comment Pt required incidental A for shirt mgmt over trunk  Pt required max inc time to problem solve/complete w/ rest break after threading LUE  Pt depend for C/S collar at this time  Upper Body Dressing CARE Score 3   QI: Lower Body Dressing   Assistance Needed Physical assistance   Assistance Provided by [place] 50%-74%   Comment Pt required A to thread pants/undergarment over b/l LE  Pt steadying A in stance and able to complete CM over hips w/ inc effort  Lower Body Dressing CARE Score 2   QI: Putting On/Taking Off Footwear   Assistance Needed Physical assistance   Assistance Provided by [place] 75% or more   Comment Pt depend to don/doff socks  Putting On/Taking Off Footwear CARE Score 2   QI: Picking Up Object   Reason if not Attempted Safety concerns   Picking Up Object CARE Score 88   Dressing/Undressing Clothing   Remove UB Clothes Other  (hospital gown)   Remove LB [address] [facility] [address] LB Clothes Pants; Undergarment;Socks   Limitations Noted In Balance; Coordination; Endurance;Problem Solving; Safety;Strength;ROM; Timeliness   Positioning Supported Sit;Standing   UB Dressing (FIM) 4 - Patient completes 75% of all tasks   LB Dressing (FIM) 2 - Patient completes 25-49% of all tasks   QI: [address] [place]   Reason if not Attempted Activity not applicable   Toileting Hygiene CARE Score 9   Toileting   Toileting (FIM) 0 - Activity does not occur   QI: Roll Left and Right   Assistance Needed Physical assistance   Assistance Provided by [place] 25%-49%   Roll Left and Right CARE Score 3   QI: Sit to Lying   Reason if not Attempted Activity not applicable   Sit to Lying CARE Score 9   QI: Lying to Sitting on Side of Bed   Assistance Needed Physical assistance   Assistance Provided by [place] 25%-49%   Comment Flavia for trunk support and VC for technique  Lying to Sitting on Side of Bed CARE Score 3   QI: Sit to Stand   Assistance Needed Physical assistance   Assistance Provided by [place] 25%-49%   Sit to Stand CARE Score 3   QI: Chair/Bed-to-Chair Transfer   Assistance Needed Physical assistance   Assistance Provided by [place] 25%-49%   Chair/Bed-to-Chair Transfer CARE Score 3   Transfer Bed/Chair/Wheelchair   Limitations Noted In Balance;Confidence; Coordination; Endurance;Pain Management;UE Strength;LE Strength   Adaptive Equipment Roller Walker   Stand Pivot Minimal Assist   Sit to Stand Minimal   Stand to Sit Minimal   Supine to Sit Minimal   Findings Flavia w/ RW and VC for safe hand placement  Bed, Chair, Wheelchair Transfer (FIM) 4 - Patient requires steadying assist or light touching   QI: Toilet Transfer   Assistance Needed Physical assistance   Assistance Provided by [place] 25%-49%   Toilet Transfer CARE Score 3   Toilet Transfer   Surface Assessed Standard Toilet   Transfer Technique Standard   Limitations Noted In Balance;Confidence; Endurance;Problem Solving; Safety;UE Strength;LE Strength   Adaptive Equipment Grab Bar   Positioning Concerns Safety;Cognition   Findings Pt Flavia w/ RW and VC for safe hand placement on GB  Pt noted to be very anxious and demo difficulty following OT cue to utilize GB      Toilet Transfer (FIM) 4 - Patient requires steadying assist or light touching   Tub/Shower Transfer   Not Assessed Sponge Bath   Tub Transfer (FIM) 0 - Activity does not occur   Shower Transfer (FIM) 0 - Activity does not occur   Comprehension   QI: Comprehension 3  Usually Understands: Understands most conversations, but misses some part/intent of message  Requires cues at times to understand  Comprehension (FIM) 4 - Understands basic info/conversation 75-90% of time   Expression   QI: Expression 3  Exhibits some difficulty with expressing needs and ideas (e g , some words or finishing thoughts) or speech is not clear   Expression (FIM) 5 - Needs help/cues only RARELY (< 10% of the time)   Social Interaction   Social Interaction (FIM) 4 - Interacts 75-89% of time   Problem Solving   Problem solving (FIM) 4 - Solves basic problems 75-89% of time   Memory   Memory (FIM) 4 - Recognizes/recalls/performs 75-89%   RUE Assessment   RUE Assessment X  (tolerates 90 degrees AROM, impaired  strength)   LUE Assessment   LUE Assessment X  (tolerates 90 degrees AROM, impaired  strength)   Coordination   Movements are Fluid and Coordinated 0   Coordination and Movement Description impaired FMC   Sensation   Light Touch No apparent deficits   Cognition   Overall Cognitive Status Impaired   Arousal/Participation Responsive   Attention Attends with cues to redirect   Orientation Level Oriented X4   Memory Decreased recall of precautions   Following Commands Follows one step commands with increased time or repetition   Comments Pt frequently states "I'm not much help, sorry I can't help more, I don't think I will be able to walk again or do anything like before"  Pt sad at times and benefits from encouragement to participate  Pt requires prompt to initiate steps of ADL routine  Pt is able to recall events leading up to hospital stay     Discharge Information   Patient's Discharge Plan home w/ son   Patient's Rehab Expectations "to walk again" Barriers to Discharge Home Unsafe Home Setup; Decreased Cognitive Function;Decreased Strength;Decreased Endurance;Depression;Pain; Safety Considerations   Impressions Pt is a 68 y o  male seen for OT evaluation s/p admit to [facility] on 11/13/2018 for posterior cervical decompressive laminectomy and posterior lateral fusion fixation C3-6  Pt presented to [address] [place] [address] pre-hospital stroke alert w/ R sided weakness noted, tPA given  Pt comorbidities include: anxiety, hyponatremia, hx of falls, bradycardia, left frontal head hematoma, OCD, HTN, HLD, chronic low back pain 2* lumbar stenosis, depression, and hernia repair  Pt reports living independent w/ older son in an apartment PTA  Pt reports PLOF as independent w/ use of no AD, however pt reports 3 prior falls at home  Pt has no DME at home  Pt 's son assisted w/ IADLS PTA  Pt does report inc pain and difficulty w/ completing LB dressing PTA  Upon evaluation, pt current level of fxn: Flavia w/ RW for fxnl transfers, Flavia for UB dressing, maxA for LB dressing, and modA for bathing due to the following deficits impacting occupational performance: pain mgmt, dec stand tolerance, impaired balance, spinal precautions, dec endurance, impaired problem solving, UE/LE weakness, impaired FMC, and decreased  strength  Based on OT evaluation, pt demonstrates good rehab potential and would benefit from skilled OT services to reach pt goals of mod I w/ LRAD for fxnl transfers and Flavia for ADL fxn w/ ELOS 7-10 days to return to least restrictive environment pending pt progress     OT Therapy Minutes   OT Time In 0715   OT Time Out 0845   OT Total Time (minutes) 90   OT Mode of treatment - Individual (minutes) 90   OT Mode of treatment - Concurrent (minutes) 0   OT Mode of treatment - Group (minutes) 0   OT Mode of treatment - Co-treat (minutes) 0   OT Mode of Teatment - Total time(minutes) 90 minutes

## 2018-11-22 NOTE — ASSESSMENT & PLAN NOTE
Adequately controlled  Acute cervical myelopathy and spinal surgery pain with associated myofascial pain on chronic low back pain and lumbar radiculopathy   Discharge on:  -APAP 100mg TID PRN, Oxycodone IR 5mg 2-3 times daily PRN BTP  -Gabapentin continue 400mg TID for neuropathic and adjuvant pain control    -Counseled on and continue to encourage deep breathing/relaxation/behavioral pain management techniques:     Deep breathin seconds in, 5 seconds out, 5 times per hour when awake and PRN when in pain or anticipate pain; avoid holding breath and tightening muscles and instead breathe slowly and deeply    -Follow-up with PMR after d/c

## 2018-11-22 NOTE — TREATMENT PLAN
Individualized Plan of [address] [place] [address] 68 y o  male MRN: [id number]  Unit/Bed#: -01 Encounter: [id number]     PATIENT INFORMATION  ADMISSION DATE: 11/21/2018  1:22 PM TOÑA CATEGORY:Major Multiple Trauma:  14 1  Brain and Spinal Cord Injury   ADMISSION DIAGNOSIS: Traumatic cervical myelopathy and closed head injury with LOC EXPECTED LOS: 14 days     MEDICAL/FUNCTIONAL PROGNOSIS  Pre-admit screens and post-admit evaluations reviewed and are consistent  Based on my assessment of the patient's medical conditions and current functional status, the prognosis for attaining medical and functional goals or the [facility] stay is:  Excellent    Patient is appropriate for acute rehabilitation  Medical Goals:   Patient will be medically stable for discharge home upon completion or acute rehab program and patient/family will be able to manage medical conditions and comorbid conditions with medications and appropriate follow up upon completion of rehab program     Cardiopulmonary function: Ensure cardiopulmonary stability and optimize cardiopulmonary function not only at rest but with activity as patient's activity level significantly increases in acute rehab compared with prior to transfer in preparation for safe discharge from [facility]  Must closely and frequently monitor blood pressure and HR to ensure adequate cardiac output during ADLs and ambulation as patient is at increased risk for orthostatic hypotension/syncope and potential injury if not monitored for and managed adequately  Blood pressure management:    Frequent monitoring of blood pressure with appropriate adjustments in blood pressure medication management to optimize blood pressure control and prevent/limit renal complications  Monitoring impact of blood pressure and side-effects of blood pressure medications at rest and with activity    Hypoxia prevention: Ensure appropriate level of oxygenation at rest and with activity to avoid symptomatic hypoxia, maximize functional performance, and decrease risk of atelectasis/pneumonia through close and frequent monitoring, providing appropriate respiratory treatments (such as incentive spirometry), and when necessary provide/adjust respiratory medications  Pain management:  Pain will improve with frequent evaluation of pain, careful adjustments in medications, frequent re-evaluation of patient's pain and medical/neurologic status to ensure optimal pain control, avoidance of potential serious and even life-threatening side-effects and drug interactions, as well as weaning pain medications as soon as possible to decrease risk of short and long-term use  Brain injury:  Patient's cognitive status is significantly impaired and neurologic status can fluctuate particularly early in rehabilitation process  Patient requires frequent rehab physician and rehab nursing neurologic monitoring for subtle and more significant changes in mentation and neurologic function  Labs must be monitored closely along with hydration and nutritional status  Low threshold to obtain brain imaging  Medications must be carefully monitored and adjusted to optimize functional recovery while limit cognitive impairments  Goal to improve cognitive and neurologic function, provide appropriate patient (and/or family education), and to ensure appropriate optimal discharge plan  Neurologic Disorder: Cervical myelopathy causing impaired mobility, ADLs, and gait:  intensive skilled therapies with physical therapy and occupational therapy with close oversight and management by rehab specialized physician in acute rehabilitation setting to most expeditiously and effectively improve functional mobility, transfers, upper and lower body strengthening, conditioning, balance, and gait training with appropriate assistive device    Patient will have optimal supervision and management of patient's underlying neurologic disorder with specialized rehabilitation physician during this period of recovery to ensure most expeditious and optimal recovery with decreased risks of fall/injury and other complications including acute worsening of neuro disorder, decrease risk of VTE, PNA, and skin ulceration  Inpatient rehabilitation education/teaching: To be provided to patient and typically family/caregiver (if able to be identified) by all skilled therapists, rehab nursing, case management, and rehab specialized physician to ensure optimal recovery and decrease risks of complications in both acute rehabilitation setting as well as after discharge  OCD/Anxiety/Insomnia: Patient's mood d/o and insomnia and it's impact on therapy participation and functional recovery will improve during course with supportive counseling, relaxation/breathing techniques and if necessary medication management  Requires frequent re-assessment and close management to ensure anxiety/depression management during acute rehab course with planning for appropriate outpatient management to ensure optimal mental health and functional recovery  Bladder dysfunction:  Appropriate bladder management with appropriate toileting program from rehab nursing and staff with oversight management by rehabilitation physicians which include appropriate monitoring and possible adjustments in medications to with goals to optimize bladder function and decrease risk of bladder retention, incontinence, and urinary tract infection  Skin wounds: Appropriate skin checks for wound/skin evaluation including evaluation of healing, worsening of wounds, or signs of infection  Wound care management from rehab nursing, wound care nursing, physicians  Ensure frequent appropriate turning, positioning in bed, in chair, when mobilizing, and when appropriate with use of appropriate devices to optimize healing and decrease risk of worsening or new skin breakdown        ANTICIPATED DISCHARGE DISPOSITION AND SERVICES  COMMUNITY SETTING: Home - alone  Is a 24-hr caregiver available? No    ANTICIPATED FOLLOW-UP SERVICE:   Home Health vs Outpatient Services: PT, OT, ST      DISCIPLINE SPECIFIC PLANS:  Required Disciplines & Services: PT, OT, ST, Rehabilitation Physician, Rehabillitation Nursing, Case Management, Psychology, Dietary, Respiratory    REQUIRED THERAPY (expected): Therapy Hours per Day Days per Week Tx Days (Days in [facility])   Physical Therapy 1 5 14   Occupational Therapy 1 5 14   Speech/Language Therapy 1 5 14   NOTE: Additional therapy time(s) may be added as appropriate to meet patient needs and to achieve functional goals  ANTICIPATED FUNCTIONAL OUTCOMES:  ADL: Patient will be modified independent with ADLs upon completion of rehab program   Bladder/Bowel: Patient will be modified independent with bladder/bowel management upon completion of rehab program   Transfers: Patient will be modified independent with transfers upon completion of rehab program   Locomotion: Patient will be modified independent with locomotion upon completion of rehab program   Cognitive: Patient will be mod-independent for basic tasks upon completion of rehab program     DISCHARGE PLANNING NEEDS  Equipment needs: To be determined at team conference  REHAB ANTICIPATED PARTICIPATION RESTRICTIONS:  Uncertain at this time  To be determined closer to discharge

## 2018-11-22 NOTE — ASSESSMENT & PLAN NOTE
IM c/s'd assited with mgmt  BP on low side with + orthostasis during course not requiring anti-HTN meds

## 2018-11-22 NOTE — PLAN OF CARE
Discharge to home or other facility with appropriate resources Progressing      Absence or prevention of progression during hospitalization Progressing      Maintain or return mobility to safest level of function Progressing      Maintain proper alignment of affected body part Progressing      Verbalizes/displays adequate comfort level or baseline comfort level Progressing      Patient will remain free of falls Progressing      Skin integrity is maintained or improved Progressing      Maintain or return to baseline ADL function Progressing      Maintain or return mobility status to optimal level Progressing      Skin integrity remains intact Progressing      Incision(s), wounds(s) or drain site(s) healing without S/S of infection Progressing

## 2018-11-22 NOTE — H&P
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    Overall Assessment & Plan with Risks of Comorbidities:  Salvador Mir is a 68 y o  male with PMH of HTN, HL, OCD, MDD, SHAQUILLE, CLBP, lumbar stenosis, lumbar radiculopathy who apparently fell at home hitting his head with residual R sided weakness and LOC who was initially believed to develop R sided weakness first causing the fall with possibility for stroke who was brought to [facility] on 11/13/18 prompting stroke alert/protocol  CT head and CTA head and negative were negative and patient received tPA  Patient was later noted to have some bilateral strength and sensory changes as well as ongoing imbalance and limb pain prompting additional imaging  MRI brain showed severe central canal stenosis with chronic mass effect on cord at C3-5 as well as severe bilateral NF narrowing from C2 to C5  NS was consulted who recommended surgical intervention and patient underwent C3-C6 posterior cervical decompressive laminectomy and posterior lateral fusion/fixation by Dr Kristyn Soto on 11/19  Patient still has GOSIA drain in place with some serous drainage  Patient also had c/s by  Psych who felt patient mood currently stable  He has been only on Zoloft 100mg qday but at home he takes several other mood agents which he has been off of  Patient was evaluated by skilled therapies and was found to have significant decline in ADLs and ambulation appropriate for admission to [facility]      Patient presents to [facility] s/p fall causing cervical myelopathy and TBI with closed head injury and likely brief LOC s/p cervical decompression and fusion with decline in strength, balance, cognition, pain as well as significant insomnia and mood disorder  * Cervical myelopathy (HCC)   Assessment & Plan    -Following fall causing R sided weakness, imbalance, limb pain s/p decompression and fusion   -Recommend acute comprehensive interdisciplinary inpatient rehabilitation to include intensive skilled therapies (PT, OT) as outlined with oversight and management by rehabilitation physician as well as inpatient rehab level nursing, case management and weekly interdisciplinary team meetings  Traumatic brain injury with loss of consciousness [facility])   Assessment & Plan    -Fall with closed head injury with brief LOC with residual deficits in cognition   -CT head without acute findings;  -Recommend all intensive skilled therapies with particular focus on SLP as   outlined with oversight and management by rehabilitation physician  Continue inpatient rehab level nursing, case management and weekly interdisciplinary team meetings  Skilled therapist, rehab nursing, rehab physician, CM, and supporting staff to provide appropriate teaching to patient (and if applicable to caregiver(s))     -Overstim precautions  -Sleep-agitation log/optimize sleep wake cycle  -Minimize sedating meds when appropriate       S/P cervical spinal fusion   Assessment & Plan    -C3-C6 posterior cervical decompressive lami and PLF by Dr Yanira Rodríguez on   -GOSIA drain in place with mgmt per NS  -Monitor incision  -Cervical collar   -Follow-up with NS during ARC course PRN and after d/c       Pain   Assessment & Plan    Acute cervical myelopathy and spinal surgery pain with associated myofascial pain on chronic low back pain and lumbar radiculopathy   -Gabapentin 100mg TID for neuropathic and adjuvant pain control  -APAP PRN > may schedule  -Oxycodone 5mg Q4H PRN  -Counseled on and continue to encourage deep breathing/relaxation/behavioral pain management techniques:     Deep breathin seconds in, 5 seconds out, 5 times per hour when awake and PRN when in pain or anticipate pain; avoid holding breath and tightening muscles and instead breathe slowly and deeply       OCD (obsessive compulsive disorder)   Assessment & Plan    OCD, MDD, SHAQUILLE hx - fairly severe in past including psych hospitalization but denies hx of SI  >Has been off of multiple chronic agents (Zyprexa 15mg QHS, Remeron 15mg QHS, Buspar 10mg BID) except for Zoloft 100mg Qday since being hospitalized over a week ago  -Follow-up with patient's outpatient psychiatrist if needed during course as well as prior to d/c  -Seen by [facility] psych prior to transfer who felt patient mood currently stable  -Patient currently c/o significant insomnia, obsessive thoughts, and anxiety  -Recommend Zyprexa 5mg x1 > if effective without unwanted sided effects would start  -Zyprexa 5mg QHS and Remeron 7 5mg QHS for sleep/mood   -Continue Zoloft 100mg qday       Insomnia   Assessment & Plan    Mgmt as outlined above     At risk for venous thromboembolism (VTE)   Assessment & Plan    Heparin, SCDs, and ambulation      Lumbar stenosis   Assessment & Plan    Mgmt as outlined above     History of hyperlipidemia   Assessment & Plan    Recent lipid panel wnl  Continue statin      Hypertension   Assessment & Plan    IM c/s to assit with mgmt  Monitor BP and orthostatics   MTP      Chronic low back pain   Assessment & Plan    Mgmt as outlined above     Lumbar radiculopathy   Assessment & Plan    Mgmt as outlined above     Hyponatremia   Assessment & Plan    Mild, monitor      Chronic idiopathic constipation   Assessment & Plan    LBM several days ago  Recommend colace/senna/miralax and if dulcolax supp if no BM soon  PRN bowel regimen        # Cardiopulmonary:   Saturating well on room air  - Encourage out of bed activities, incentive spirometry and deep breathing to prevent atelectasis  - Ensure adequate hydration, volume status, and intermittently monitor Hb, BUN/Cr and sodium  - Monitor vitals at rest and with activity  - Orthostatics PRN for S/S of orthostasis; Abdominal binder PRN    # Bladder care: Wean off of condom cath   - monitor for retention, incontinence, signs/symptoms of UTI  - recommend voiding trial; nursing prompt to void followed by bladder scan starting Q4-6H or after each patient initiated void; nursing to record voided output and bladder scan totals; straight cath PRN >350-400 cc; if post void residual bladder scans are <150 cc x3 consecutive voids, can stop scans      # Skin:   - Nursing to turn patient Q2H if not adequately ambulatory, monitor for skin breakdown, rashes, and wounds if applicable  - GOSIA drain per NS    # Diet/Hydration:    Regular    Disposition:   Home with [facility] 2 weeks from admission     Follow-up:   NS, PMR, Psych, PCP    CODE: Level 1: Full Code    Restrictions include: Fall precautions, cervical spine    ---------------------------------------------------------------------------------------------------------------------    Chief Complaint:  Neck pain    History of Present Illness:   August Coelho is a 68 y o  male with PMH of HTN, HL, OCD, MDD, SHAQUILLE, CLBP, lumbar stenosis, lumbar radiculopathy who apparently fell at home hitting his head with residual R sided weakness and LOC who was initially believed to develop R sided weakness first causing the fall with possibility for stroke who was brought to hospitals on 11/13/18 prompting stroke alert/protocol  CT head and CTA head and negative were negative and patient received tPA  Patient was later noted to have some bilateral strength and sensory changes as well as ongoing imbalance and limb pain prompting additional imaging  MRI brain showed severe central canal stenosis with chronic mass effect on cord at C3-5 as well as severe bilateral NF narrowing from C2 to C5    NS was consulted who recommended surgical intervention and patient underwent C3-C6 posterior cervical decompressive laminectomy and posterior lateral fusion/fixation by Dr Andrew Mehta on 11/19  Patient still has GOSIA drain in place with some serous drainage  Patient also had c/s by  Psych who felt patient mood currently stable  He has been only on Zoloft 100mg qday but at home he takes several other mood agents which he has been off of  Patient was evaluated by skilled therapies and was found to have significant decline in ADLs and ambulation appropriate for admission to [place] [id number]  On evaluation, patient reports mild-moderate neck pain helped somewhat with pain medication  He reports he was having severe 4 limb pain initially after fall but this improved since surgery now with just some occasional limb pain as some pain that is chronic in his low back and radiating down L leg at times  He reports not sleeping much at all last several days  He notes being fairly anxious about his condition and has some difficulty slowing his thoughts down  He denies depression, hallucinations  Patient not sure when last BM was  He notes having some gas  He reports that he was having significant urgency and some soiling himself with misplacing the urinal and has been using a condom cath  He reports that urgency has improved and he can feel when he has to urinate  He denies dysuria  Patient reports having more trouble with memory since the fall and putting things together  He stated he did have headache initially but not currently  He notes occasional lightheadedness with movements  He denies visual changes, vertigo, light or sound sensitivity, nausea, abdominal pain, calf pain, SOB, fever, chills, or other complaints  Review of Systems:     Complete review of systems obtained  Please see HPI for details with other significant symptoms or history listed here: Otherwise, 14 point review of systems completed and was otherwise unremarkable      OBJECTIVE:    Physical Exam:  Temp:  [98 1 °F (36 7 °C)-98 9 °F (37 2 °C)] 98 1 °F (36 7 °C)  HR:  [58-72] 58  Resp: [16-18] 18  BP: (111-130)/(53-65) 119/65  General: Awake, alert in NAD  HENT: MMM significant bilateral lower periorbital ecchymosis and L frontal ecchymosis and mild swelling above L eye  Neck: Supple, no lymphadenopathy, Cervical collar and GOSIA drain in place   Respiratory: Unlabored breathing, breath sounds equal, Lungs CTA, no wheezes, rales, or rhonchi  Cardiovascular: Regular rate and rhythm, no murmurs, rubs, or gallops  Gastrointestinal: Soft, non-tender, non-distended, normoactive bowel sounds  Genitourinary: Condom cath in place   SkiN/MSK/Extremities:  Distal extremities appropriately warm, normal cap refill distally, no cyanosis or calf edema, no calf tenderness to palpation  Neurologic/Psych:   MENTAL STATUS/Affect: awake, oriented to person, place, time, and partially to situation; appears anxious with impaired attention/easy distractibility, can perseverate on whether he will recover from this injury   CN II-XII: grossly intact   Strength/MMT:    Right  Left  Site  Right  Left  Site    5- 5  S Ab: Shoulder Abductors  5  5  HF: Hip Flexors    5- 5  EF: Elbow Flexors  5  5 KF: Knee Flexors    5-  5  EE: Elbow Extensors  5  5  KE: Knee Extensors    5-  5  WE: Wrist Extensors  5  5  DR: Dorsi Flexors    5-  5  FF: Finger Flexors  5  5  PF: Plantar Flexors    5-  5  HI: Hand Intrinsics  5  5  EHL: Extensor Hallucis Longus   Sensation: intact to light touch  DTR/Tone/Upper motor neuron signs: normoreflexive without increased tone  Finger to nose intact    Laboratory:      Results from last 7 days  Lab Units 11/20/18  0624 11/15/18  0526   HEMOGLOBIN g/dL 10 0* 12 2   HEMATOCRIT % 29 7* 37 2   WBC Thousand/uL 11 20* 9 29       Results from last 7 days  Lab Units 11/20/18  0624 11/18/18  0544 11/15/18  0526   BUN mg/dL 10 14 12   POTASSIUM mmol/L 4 2 4 0 4 2   CHLORIDE mmol/L 103 102 102   CREATININE mg/dL 0 63 0 60 0 73       Results from last 7 days  Lab Units 11/16/18  1659   PROTIME seconds 13 8   INR 1  05        Wt Readings from Last 1 Encounters:   11/21/18 75 6 kg (166 lb 10 7 oz)     Estimated body mass index is 26 1 kg/m² as calculated from the following:    Height as of this encounter: 5' 7" (1 702 m)  Weight as of this encounter: 75 6 kg (166 lb 10 7 oz)      Imaging: reviewed    Per EMR:  Past Medical History:   Past Surgical History:   Family History:   Social history:   Past Medical History:   Diagnosis Date    Anxiety     Chronic low back pain     Depression     Hypercholesterolemia     Hypertension     OCD (obsessive compulsive disorder)     Past Surgical History:   Procedure Laterality Date    CERVICAL FUSION Bilateral 11/19/2018    Procedure: Posterior cervical decompressive laminectomy and instrumented posterior lateral fusion fixation C3-6;  Surgeon: Markel Hyde MD;  Location: [facility];  Service: Neurosurgery    HERNIA REPAIR       Family History   Problem Relation Age of Onset    Diabetes type II Father     Glaucoma Son     Panic disorder Son       Social History     Social History    Marital status:      Spouse name: N/A    Number of children: N/A    Years of education: N/A     Social History Main Topics    Smoking status: Never Smoker    Smokeless tobacco: Never Used    Alcohol use No    Drug use: No    Sexual activity: No     Other Topics Concern    None     Social History Narrative    None          Current Medical Diagnosis Medications Allergies   Patient Active Problem List   Diagnosis    Chronic idiopathic constipation    OCD (obsessive compulsive disorder)    Weakness    Depression    Anxiety    Cervical spinal stenosis    Cervical stenosis of spine    Hyponatremia    Cervical myelopathy ([facility] [address] )      Current Facility-Administered Medications:     acetaminophen (TYLENOL) tablet 650 mg, 650 mg, Oral, Q6H PRN, Shan Erickson MD    atorvastatin (LIPITOR) tablet 40 mg, 40 mg, Oral, Daily With Aure Hall MD, 40 mg at 11/21/18 4924   bacitracin topical ointment 1 large application, 1 large application, Topical, BID, Smooth Guerra MD    docusate sodium (COLACE) capsule 100 mg, 100 mg, Oral, BID, Smooth Guerra MD, 100 mg at 11/21/18 1707    heparin (porcine) subcutaneous injection 5,000 Units, 5,000 Units, Subcutaneous, Q8H [address], Smooth Guerra MD, 5,000 Units at 11/21/18 1549    oxyCODONE (ROXICODONE) IR tablet 5 mg, 5 mg, Oral, Q4H PRN, Smooth Guerra MD, 5 mg at 11/21/18 1549    [START ON 11/22/2018] polyethylene glycol (MIRALAX) packet 17 g, 17 g, Oral, Daily, Smooth Guerra MD    senna-docusate sodium (SENOKOT S) 8 6-50 mg per tablet 1 tablet, 1 tablet, Oral, BID, Smooth Guerra MD, 1 tablet at 11/21/18 1709    [START ON 11/22/2018] sertraline (ZOLOFT) tablet 100 mg, 100 mg, Oral, Daily, Smooth Guerra MD No Known Allergies        Social History per encounter and chart review:  Femi Simmons lives with son in 1 story floor home with 15 steps to enter  He denies significant hx of smoking, alcohol use, cannabis use, or illicit drug use  Patient/family's goals: Return to previous home/apartment  The patient will not have 24 hour supervision available upon discharge      Prior Level of Function:   Independent with ADLs, ambulation, and cognition  Current Level of Function:  Min assist for transfers and ambulation 75 ft, LB dressing and toileting mod assist, UB dressing min assist     Potential Barriers to Discharge:  Co-morbidities (see above), new functional deficits, imbalance, pain, cognitive deficits, mood abnormalities    Tolerance for three hours of therapy per therapy day: adequate     Rehabilitation Prognosis: good       Rehabilitation Plan/Therapy Interventions:  This patient will have close medical and rehabilitation oversight from a physical medicine and rehabilitation physician and if needed an internal medicine physician to manage the complexity of medical issues to optimize health, ability to participate in therapy, quality of life, and functional outcomes  This patient requires 24 hour rehabilitation nursing to address bowel and bladder management, (pain management if listed below), medication administration, positioning/skin monitoring, fall/injury prevention, and VTE prophylaxis  Physical, occupational and speech therapy: Patient requires PT and OT to improve functional mobility, transfers, upper and lower body strengthening, conditioning, balance, and gait training with appropriate assistive device  Patient also requires ST to evaluate and if appropriate treat deficits in speech, swallowing, and cognition  PT, OT, ST will be provided approximately 5 times per week for 60 minutes per day  Skilled therapists and nursing will also provide patient/family safety education and training  / will work to ensure proper communication between patient (+/- family) and staff regarding the overall rehabilitation and medical process while patient is in the acute rehabilitation center  / will work with patient (and if applicable family and community resources) to optimize safe discharge  Discharge Planning:    Estimated length of stay:   14 days  Family/Patient Goals:  Patient/family's goals: Return to previous home/apartment  Mobility Goals:   Bed Mobility: Moderate [place]  Bed to wheelchair transfer: Moderate [place]  Sit to stand: Moderate [place]  Ambulation: Moderate [place]  Stairs: Supervision    Activities of Daily Living (ADLs) Goals:  Eating: Moderate [place]  Hygiene and Grooming: Moderate [place]  Bathing: Supervision  Upper Extremity Dressing: Moderate [place]  Diet / Swallowing: Moderate [place]  Lower Extremity Dressing: Moderate [place]  Tub/shower Transfers: Moderate [place]  Toilet Transfers: Moderate [place]  Toileting / Hygiene:   Moderate [place]    Cognition / Communication:  Return to prior level of function    Rehabilitation and discharge goals discussed with the patient and/or family  Case Managment and Social Work to review patient/family resources and to coordinate Discharge Planning  Patient and Family Education and Training:  Rehabilitation and discharge goals discussed with the patient and/or family  Patient/family education/training needs to be discussed in weekly team meeting  Other equipment: To be determined    Medical Necessity Criteria for ARC Admission:  The preadmission screen, post-admission physical evaluation, overall plan of care and admissions order demonstrate a reasonable expectation that the following criteria were met at the time of admission to the [facility]  (See "Specific areas of management and oversight in ARC setting" for additional details on medical necessity as outlined below)  1  The patient requires active and ongoing therapeutic intervention of multiple therapy disciplines (physical therapy, occupational therapy, speech-language pathology, or prosthetics/orthotics), one of which is physical or occupational therapy  2  Patient requires an intensive rehabilitation therapy program, as defined in Chapter 1, section 110 2 2 of the CMS Medicare Policy Manual  This intensive rehabilitation therapy program will consist of at least 3 hours of therapy per day at least 5 days per week or at least 15 hours of intensive rehabilitation therapy within a 7 consecutive day period, beginning with the date of admission to the [facility]  3  The patient is reasonably expected to actively participate in, and benefit significantly from, the intensive rehabilitation therapy program as defined in Chapter 1, section 110 2 2 of the CMS Medicare Policy Manual at this time of admission to the [facility]   He can reasonably be expected to make measurable improvement (that will be of practical value to improve the patients functional capacity or adaptation to impairments) as a result of the rehabilitation treatment, as defined in section 110 3, and such improvement can be expected to be made within the prescribed period of time  As noted in the CMS Medicare Policy Manual, the patient need not be expected to achieve complete independence in the domain of self-care nor be expected to return to his or her prior level of functioning in order to meet this standard  4  The patient must require physician supervision by a rehabilitation physician  As such, a rehabilitation physician will conduct face-to-face visits with the patient at least 3 days per week throughout the patients stay in the [facility] to assess the patient both medically and functionally, as well as to modify the course of treatment as needed to maximize the patients capacity to benefit from the rehabilitation process  5  The patient requires an intensive and coordinated interdisciplinary approach to providing rehabilitation, as defined in Chapter 1, section 110 2 5 of the CMS Medicare Policy Manual  This will be achieved through periodic team conferences, conducted at least once in a 7-day period, and comprising of an interdisciplinary team of medical professionals consisting of: a rehabilitation physician, registered nurse,  and/or , and a licensed/certified therapist from each therapy discipline involved in treating the patient  Changes Since Pre-admission Assessment: None -This patient's participation in rehab continues to be reasonable, necessary and appropriate  CMS Required Post-Admission Physician Evaluation Elements  History and Physical, including medical history, functional history and active comorbidities as in above text      PostAdmission Physician Evaluation:  The patient has the potential to make improvement and is in need of physical, occupational, and/or therapy services  The patient may also need nutritional services   Given the patient's complex medical condition and risk of further medical complications, rehabilitative services cannot be safely provided at a lower level of care, such as a skilled nursing facility  I have reviewed the patient's functional and medical status at the time of the preadmission screening and they are the same as on the day of this admission  I acknowledge that I have personally performed a full physical examination on this patient within 24 hours of admission  The patient demonstrated understanding the rehabilitation program and the discharge process after we discussed them      Agree in entirety: yes  Minor adaptions: none    Major changes: none    Specific areas of management and oversight in ARC setting:    Cardiopulmonary function: Ensure cardiopulmonary stability and optimize cardiopulmonary function not only at rest but with activity as patient's activity level significantly increases in acute rehab compared with prior to transfer in preparation for safe discharge from [facility]  Must closely and frequently monitor blood pressure and HR to ensure adequate cardiac output during ADLs and ambulation as patient is at increased risk for orthostatic hypotension/syncope and potential injury if not monitored for and managed adequately  Blood pressure management:    Frequent monitoring of blood pressure with appropriate adjustments in blood pressure medication management to optimize blood pressure control and prevent/limit renal complications  Monitoring impact of blood pressure and side-effects of blood pressure medications at rest and with activity  Hypoxia prevention: Ensure appropriate level of oxygenation at rest and with activity to avoid symptomatic hypoxia, maximize functional performance, and decrease risk of atelectasis/pneumonia through close and frequent monitoring, providing appropriate respiratory treatments (such as incentive spirometry), and when necessary provide/adjust respiratory medications      Pain management:  Pain will improve with frequent evaluation of pain, careful adjustments in medications, frequent re-evaluation of patient's pain and medical/neurologic status to ensure optimal pain control, avoidance of potential serious and even life-threatening side-effects and drug interactions, as well as weaning pain medications as soon as possible to decrease risk of short and long-term use  Brain injury:  Patient's cognitive status is significantly impaired and neurologic status can fluctuate particularly early in rehabilitation process  Patient requires frequent rehab physician and rehab nursing neurologic monitoring for subtle and more significant changes in mentation and neurologic function  Labs must be monitored closely along with hydration and nutritional status  Low threshold to obtain brain imaging  Medications must be carefully monitored and adjusted to optimize functional recovery while limit cognitive impairments  Goal to improve cognitive and neurologic function, provide appropriate patient (and/or family education), and to ensure appropriate optimal discharge plan  Neurologic Disorder: Cervical myelopathy causing impaired mobility, ADLs, and gait:  intensive skilled therapies with physical therapy and occupational therapy with close oversight and management by rehab specialized physician in acute rehabilitation setting to most expeditiously and effectively improve functional mobility, transfers, upper and lower body strengthening, conditioning, balance, and gait training with appropriate assistive device  Patient will have optimal supervision and management of patient's underlying neurologic disorder with specialized rehabilitation physician during this period of recovery to ensure most expeditious and optimal recovery with decreased risks of fall/injury and other complications including acute worsening of neuro disorder, decrease risk of VTE, PNA, and skin ulceration  Inpatient rehabilitation education/teaching:   To be provided to patient and typically family/caregiver (if able to be identified) by all skilled therapists, rehab nursing, case management, and rehab specialized physician to ensure optimal recovery and decrease risks of complications in both acute rehabilitation setting as well as after discharge  OCD/Anxiety/Insomnia: Patient's mood d/o and insomnia and it's impact on therapy participation and functional recovery will improve during course with supportive counseling, relaxation/breathing techniques and if necessary medication management  Requires frequent re-assessment and close management to ensure anxiety/depression management during acute rehab course with planning for appropriate outpatient management to ensure optimal mental health and functional recovery  Bladder dysfunction:  Appropriate bladder management with appropriate toileting program from rehab nursing and staff with oversight management by rehabilitation physicians which include appropriate monitoring and possible adjustments in medications to with goals to optimize bladder function and decrease risk of bladder retention, incontinence, and urinary tract infection  Skin wounds: Appropriate skin checks for wound/skin evaluation including evaluation of healing, worsening of wounds, or signs of infection  Wound care management from rehab nursing, wound care nursing, physicians  Ensure frequent appropriate turning, positioning in bed, in chair, when mobilizing, and when appropriate with use of appropriate devices to optimize healing and decrease risk of worsening or new skin breakdown         Rhianna Gibbs MD, [address] [facility]  Physical Medicine and Rehabilitation  Brain Injury Medicine    ** Please Note: Fluency Direct voice to text software may have been used in the creation of this document   **

## 2018-11-22 NOTE — PROGRESS NOTES
OT LTG   11/22/18 0715   Rehab Team Goals   ADL Team Goal Patient will require assist with ADLs with least restrictive device upon completion of rehab program   Transfer Team Goal Patient will be independent with transfers with least restrictive device upon completion of rehab program   Cognitive Team Goal Patient will be independent for basic  tasks and require supervision for complex tasks upon completion of rehab program   Rehab Team Interventions   OT Interventions Self Care;Home Management; Therapeutic Exercise;Community Reintegration;Cognitive Retraining;Energy Conservation;Splint Fabrication/Modification;Patient/Family Education   Eating Goal   QI: Eating Goal 06  Independent - Patient completes the activity by him/herself with no assistance from a helper  FIM Eating Goal Moderate [place]   Diet Level Regular   Status Ongoing; Target goal - one week; Target goal - two weeks   Interventions Compensation Strategies; Optimal Position   Grooming Goal   QI: Oral Hygiene Goal 06  Independent - Patient completes the activity by him/herself with no assistance from a helper  FIM Grooming Goal Moderate [place]   Task Wash/Dry Face;Wash/Dry Hands;Brush Teeth;Comb Hair; Shave;Acquire Items; Initiate Task;Complete Groom   Environment Seated in Chair   Status Ongoing; Target goal - one week; Target goal - two weeks   Intervention Assistive Device;Balance Work; Therapeutic Exercise; Tolerance Work   Bathing Goal   QI: Shower/bathe self Goal 06  Independent - Patient completes the activity by him/herself with no assistance from a helper  FIM Bathing Goal Moderate [place]   Task Upper Body Bathing  (LB bathing)   Environment Seated;Standing; Tub   Adaptive Equipment Long Handle Sponge;Seat with back   Safety Precautions Other  (spinal precautions)   Status Ongoing; Target goal - one week; Target goal - two weeks   Intervention ADL Training;Assistive Device; Therapeutic Exercise   Upper Body Dressing Goal   QI: Upper body dressing Goal 04  Supervision or touching assistance- [place] provides VERBAL CUES or supervision throughout activity  FIM Upper Body Dressing Goal Contact Guard   Task Upper Body;Arms in/out; Over Head;Other  (C/S collar)   Environment Seated   Status Ongoing; Target goal - one week; Target goal - two weeks   Intervention Assistive Device;Balance Work; Therapeutic Exercise; Tolerance Work   [place] Global Dressing Goal   QI: Lower body dressing Goal 06  Independent - Patient completes the activity by him/herself with no assistance from a helper  QI: Putting on/taking off footwear Goal 06  Independent - Patient completes the activity by him/herself with no assistance from a helper  FIM Lower Body Dressing Goal Moderate [place]   Task Lower Body;Shoe/Slipper;Socks;Pants; Undergarment   Adaptive Equipment Reacher;Sock Aide; Shoe Horn;Dressing Stick; Elastic Laces   Environment Seated;Standing   Safety Precautions Verbal Instruction  (spinal precautions)   Status Ongoing; Target goal - one week; Target goal - two weeks   Intervention Assistive Device;Balance Work; Therapeutic Exercise; Tolerance Work   Toileting Goal   QI: Toileting hygiene Goal 06  Independent - Patient completes the activity by him/herself with no assistance from a helper  FIM Toileting Goal Moderate [place]   Task Pants Up;Pants Down;Hygiene   Safety Balance   Status Ongoing; Target goal - one week; Target goal - two weeks   Intervention ADL Training;Balance Work;Assistive Device   PT Transfer Goal   QI: Sit to stand Goal 06  Independent - Patient completes the activity by him/herself with no assistance from a helper  QI: Chair/bed-to-chair transfer Goal 06  Independent - Patient completes the activity by him/herself with no assistance from a helper  FIM Transfer Goal Modified [place]   Assistive Device (LRAD)   Environment Level Surface; Well Lit   Status Ongoing; Target goal - one week; Target goal - two weeks   Toileting Transfer Goal QI: Toilet transfer Goal 06  Independent - Patient completes the activity by him/herself with no assistance from a helper  FIM Toilet Transfer Goal Moderate [place]   Assistive Device Other  (LRAD)   Status Ongoing; Target goal - one week; Target goal - two weeks   Intervention ADL Training;Balance Work;Assistive Device   Tub/Shower Transfer Goal   FIM Tub/ Shower Transfer Goal Supervision   Method Tub   Assist Device Other  (LRAD)   Status Ongoing; Target goal - one week; Target goal - two weeks   Interventions ADL Training;Assistive Device   Comprehension Goal   Comprehension Assist Level Moderate [place]   Status Ongoing; Target goal - one week; Target goal - two weeks   Expression Goal   Expression Assist Level Moderate [place]   Function Demand Safety Strategy   Status Ongoing; Target goal - one week; Target goal - two weeks   Social Interaction Goal   Social Interaction Assist Level Moderate [place]   Behaviors Appropriate;Safe;Participate   Status Ongoing; Target goal - one week; Target goal - two weeks   Intervention Stress Management   Problem Solving Goal   Problem Solving Assist Level Close Supervision   Basic Function Problem Recognition;Routine Solution   Executive Function Cause/Effect;Provide Solution;Reason/   Status Ongoing; Target goal - one week; Target goal - two weeks   Intervention Cognitive Training; Safety Education   Memory Goal   Memory Assist Level Moderate [place]   Assist Device Patient Schedule   Short-Term Memory Orientation; Recent Recall   [facility] Rehab Program;Safety Strategy   Status Ongoing; Target goal - one week; Target goal - two weeks   Intervention Assistive Device;Cognitive Training;Precautions Review   Community Reintegration Goal   Status Ongoing; Target - one week; Target - two weeks   Interventions Activity Tolerance;Leisure Activity   Object Retrieval Goal   QI: Picking up object Goal 06   Independent - Patient completes the activity by him/herself with no assistance from a helper  Assistive Device  Reacher   Small Object Picked Up socks   Home Management Goal   Goal light meal prep   Assist Level Moderate [place]   Status Ongoing; Target - one week; Target - two weeks   Interventions Activity Tolerance;Meal Preparation

## 2018-11-23 PROCEDURE — G0515 COGNITIVE SKILLS DEVELOPMENT: HCPCS

## 2018-11-23 PROCEDURE — 97530 THERAPEUTIC ACTIVITIES: CPT

## 2018-11-23 PROCEDURE — 97116 GAIT TRAINING THERAPY: CPT

## 2018-11-23 PROCEDURE — 92523 SPEECH SOUND LANG COMPREHEN: CPT

## 2018-11-23 PROCEDURE — 97110 THERAPEUTIC EXERCISES: CPT

## 2018-11-23 PROCEDURE — 97535 SELF CARE MNGMENT TRAINING: CPT

## 2018-11-23 PROCEDURE — 99233 SBSQ HOSP IP/OBS HIGH 50: CPT

## 2018-11-23 RX ORDER — BUSPIRONE HYDROCHLORIDE 5 MG/1
5 TABLET ORAL 2 TIMES DAILY
Status: DISCONTINUED | OUTPATIENT
Start: 2018-11-23 | End: 2018-11-28

## 2018-11-23 RX ORDER — OLANZAPINE 5 MG/1
5 TABLET ORAL DAILY PRN
Status: DISCONTINUED | OUTPATIENT
Start: 2018-11-23 | End: 2018-12-06 | Stop reason: HOSPADM

## 2018-11-23 RX ADMIN — BACITRACIN ZINC 1 LARGE APPLICATION: 500 OINTMENT TOPICAL at 19:00

## 2018-11-23 RX ADMIN — BUSPIRONE HYDROCHLORIDE 5 MG: 5 TABLET ORAL at 19:27

## 2018-11-23 RX ADMIN — GABAPENTIN 100 MG: 100 CAPSULE ORAL at 21:19

## 2018-11-23 RX ADMIN — BISACODYL 10 MG: 10 SUPPOSITORY RECTAL at 12:59

## 2018-11-23 RX ADMIN — HEPARIN SODIUM 5000 UNITS: 5000 INJECTION INTRAVENOUS; SUBCUTANEOUS at 05:28

## 2018-11-23 RX ADMIN — ATORVASTATIN CALCIUM 40 MG: 40 TABLET, FILM COATED ORAL at 16:27

## 2018-11-23 RX ADMIN — POLYETHYLENE GLYCOL 3350 17 G: 17 POWDER, FOR SOLUTION ORAL at 08:22

## 2018-11-23 RX ADMIN — HEPARIN SODIUM 5000 UNITS: 5000 INJECTION INTRAVENOUS; SUBCUTANEOUS at 21:20

## 2018-11-23 RX ADMIN — SENNOSIDES AND DOCUSATE SODIUM 1 TABLET: 8.6; 5 TABLET ORAL at 08:23

## 2018-11-23 RX ADMIN — OXYCODONE HYDROCHLORIDE 5 MG: 5 TABLET ORAL at 10:36

## 2018-11-23 RX ADMIN — SERTRALINE HYDROCHLORIDE 100 MG: 100 TABLET ORAL at 08:23

## 2018-11-23 RX ADMIN — ACETAMINOPHEN 650 MG: 325 TABLET, FILM COATED ORAL at 21:19

## 2018-11-23 RX ADMIN — BACITRACIN ZINC 1 LARGE APPLICATION: 500 OINTMENT TOPICAL at 08:24

## 2018-11-23 RX ADMIN — OLANZAPINE 5 MG: 5 TABLET, FILM COATED ORAL at 21:21

## 2018-11-23 RX ADMIN — DOCUSATE SODIUM 100 MG: 100 CAPSULE, LIQUID FILLED ORAL at 08:23

## 2018-11-23 RX ADMIN — HEPARIN SODIUM 5000 UNITS: 5000 INJECTION INTRAVENOUS; SUBCUTANEOUS at 13:01

## 2018-11-23 RX ADMIN — GABAPENTIN 100 MG: 100 CAPSULE ORAL at 08:23

## 2018-11-23 RX ADMIN — MIRTAZAPINE 7.5 MG: 15 TABLET ORAL at 21:20

## 2018-11-23 RX ADMIN — ACETAMINOPHEN 650 MG: 325 TABLET, FILM COATED ORAL at 08:25

## 2018-11-23 RX ADMIN — GABAPENTIN 100 MG: 100 CAPSULE ORAL at 16:27

## 2018-11-23 NOTE — PROGRESS NOTES
Physical Medicine and Rehabilitation Progress Note  August Coelho 68 y o  male MRN: [id number]  Unit/Bed#: -01 Encounter: [id number]    Chief Complaints:  Anxiety    Subjective/Interval Events:   GOSIA Drain removed by NS  Patient reports sleeping better last few days  He reports still being more anxious than normal and worried that he might not get better  He reports thoughts are not quite as pressured recently  Patient provided supportive counseling and we reviewed deep breathing/relaxation techniques  He reports neck pain is only real pain recently and is overall adequately controlled  He reports hand strength remains decreased but better than it did as is his balance  He states he hopes he could get better faster  Patient reviewed at length recovery process and plan  He did note some mild lightheadedness with therapy at times but denies SOB, headache, fever, chills, abdominal pain, calf pain, or other complaints  ROS: A 10-point ROS was performed  Negative except as listed above  Extended discussion today held with patient regarding current condition, medical history, medical/rehabilitation management, and disposition  Overall Assessment/relevant history:   68 y o  male with PMH of HTN, HL, OCD, MDD, SHAQUILLE, CLBP, lumbar stenosis, lumbar radiculopathy who apparently fell at home hitting his head with residual R sided weakness and LOC who was initially believed to develop R sided weakness first causing the fall with possibility for stroke who was brought to [facility]"" on 11/13/18 prompting stroke alert/protocol  CT head and CTA head and negative were negative and patient received tPA  Patient was later noted to have some bilateral strength and sensory changes as well as ongoing imbalance and limb pain prompting additional imaging  MRI brain showed severe central canal stenosis with chronic mass effect on cord at C3-5 as well as severe bilateral NF narrowing from C2 to C5    NS was consulted who recommended surgical intervention and patient underwent C3-C6 posterior cervical decompressive laminectomy and posterior lateral fusion/fixation by Dr Julio C De León on 11/19  Patient still has GOSIA drain in place with some serous drainage  Patient also had c/s by  Psych who felt patient mood currently stable  He has been only on Zoloft 100mg qday but at home he takes several other mood agents which he has been off of  Patient was evaluated by skilled therapies and was found to have significant decline in ADLs and ambulation appropriate for admission to [place] [id number]      Patient presented to [facility] on 11/21/18 s/p fall causing cervical myelopathy and TBI with closed head injury and likely brief LOC s/p cervical decompression and fusion with decline in strength, balance, cognition, pain as well as significant insomnia and mood disorder  Functional status (recent):    See below    Functional status on admission to [facility]:  OT:  Lower body dressing max assist bathing moderate assist body dressing general transfers and toilet transfers minutes assist  PT:  Transfers and ambulation Min assist, stairs max assist  ST:  Pending    * Cervical myelopathy (HCC)   Assessment & Plan    -Following fall causing R sided weakness, imbalance, limb pain s/p decompression and fusion   -Recommend acute comprehensive interdisciplinary inpatient rehabilitation to include intensive skilled therapies (PT, OT) as outlined with oversight and management by rehabilitation physician as well as inpatient rehab level nursing, case management and weekly interdisciplinary team meetings              Traumatic brain injury with loss of consciousness [facility])   Assessment & Plan    -Fall with closed head injury with brief LOC with residual deficits in cognition   -CT head without acute findings;  -Recommend all intensive skilled therapies with particular focus on SLP as   outlined with oversight and management by rehabilitation physician  Continue inpatient rehab level nursing, case management and weekly interdisciplinary team meetings  Skilled therapist, rehab nursing, rehab physician, CM, and supporting staff to provide appropriate teaching to patient (and if applicable to caregiver(s))     -Overstim precautions  -Sleep-agitation log/optimize sleep wake cycle  -Minimize sedating meds when appropriate  -Optimize mood d/o mgmt        S/P cervical spinal fusion   Assessment & Plan    -C3-C6 posterior cervical decompressive lami and PLF by Dr Parker Mayes on   -GOSIA drain in place with mgmt per NS  -Monitor incision  -Cervical collar   -Follow-up with NS during [facility] course PRN and after d/c       Pain   Assessment & Plan    Adequately controlled  Acute cervical myelopathy and spinal surgery pain with associated myofascial pain on chronic low back pain and lumbar radiculopathy   -Gabapentin 100mg TID for neuropathic and adjuvant pain control  -APAP PRN > may schedule  -Oxycodone 5mg Q4H PRN  -Counseled on and continue to encourage deep breathing/relaxation/behavioral pain management techniques:     Deep breathin seconds in, 5 seconds out, 5 times per hour when awake and PRN when in pain or anticipate pain; avoid holding breath and tightening muscles and instead breathe slowly and deeply       OCD (obsessive compulsive disorder)   Assessment & Plan    Sleep improving, pressured speech, obsessive thoughts improving with low dose Zyprexa and Ramelteon at QHS  >Still fair degree of anxiety    >Continue Zoloft, Resume Buspar but at half dose 5mg BID    >Recommend Zyprexa 5mg qday PRN for breakthrough restlessness/OCD/anxiey symptoms  Current regimen:   Zoloft 100mg qday   Buspar starting 5mg BID (previously on 10mg BID)   Zyprexa 5mg QHS (previously on 15m QHS)   Ramelteon 7 5mg QHS (previously on 15mg QHS)  OCD, MDD, SHAQUILLE hx - fairly severe in past including psych hospitalization but denies hx of SI  >Had been off of multiple chronic agents (Zyprexa 15mg QHS, Remeron 15mg QHS, Buspar 10mg BID) except for Zoloft 100mg Qday since being hospitalized over a week ago  -Follow-up with patient's outpatient psychiatrist if needed during course as well as prior to d/c  -Seen by [facility] psych prior to transfer who felt patient mood currently stable  -Psychology consult       Insomnia   Assessment & Plan    Improving back on Zyprexa and Remeron   Mgmt as outlined above     At risk for venous thromboembolism (VTE)   Assessment & Plan    Heparin, SCDs, and ambulation      Lumbar stenosis   Assessment & Plan    Mgmt as outlined above     History of hyperlipidemia   Assessment & Plan    Recent lipid panel wnl  Continue statin      Hypertension   Assessment & Plan    IM c/s to assit with mgmt  Monitor BP and orthostatics   MTP      Chronic low back pain   Assessment & Plan    Mgmt as outlined above     Lumbar radiculopathy   Assessment & Plan    Mgmt as outlined above     Hyponatremia   Assessment & Plan    Mild, monitor      Chronic idiopathic constipation   Assessment & Plan    Improved after supp today  Recommend colace/senna/miralax  PRN bowel regimen          # Cardiopulmonary:   Saturating well on room air  - Encourage out of bed activities, incentive spirometry and deep breathing to prevent atelectasis  - Ensure adequate hydration, volume status, and intermittently monitor Hb, BUN/Cr and sodium  - Monitor vitals at rest and with activity  - Orthostatics PRN for S/S of orthostasis;  Abdominal binder PRN     # Bladder care:    - monitor for retention, incontinence, signs/symptoms of UTI  - recommend voiding trial; nursing prompt to void followed by bladder scan starting Q4-6H or after each patient initiated void; nursing to record voided output and bladder scan totals; straight cath PRN >350-400 cc; if post void residual bladder scans are <150 cc x3 consecutive voids, can stop scans      # Skin:   - Nursing to turn patient Q2H if not adequately ambulatory, monitor for skin breakdown, rashes, and wounds if applicable  - GOSIA drain per NS     # Diet/Hydration:    Regular     Disposition:   Home with [facility] 2 weeks from admission      Follow-up:   NS, PMR, Psych, PCP     CODE: Level 1: Full Code     Restrictions include: Fall precautions, cervical spine    ---------------------------------------------------------------------------------------------------------------------    Objective: Allergies and Medications per EMR    Physical Exam:  Temp:  [97 6 °F (36 4 °C)-98 3 °F (36 8 °C)] 97 6 °F (36 4 °C)  HR:  [59-70] 61  Resp:  [18] 18  BP: ()/(51-68) 94/51  General: Awake, alert in NAD  HENT: MMM, resolving  bilateral lower periorbital ecchymosis and L frontal ecchymosis   Neck: Cervical collar in place  Respiratory: Unlabored breathing, breath sounds equal, Lungs CTA, no wheezes, rales, or rhonchi  Cardiovascular: Regular rate and rhythm, no murmurs, rubs, or gallops  Gastrointestinal: Soft, non-tender, non-distended, normoactive bowel sounds  Genitourinary: no catheter  SkiN/MSK/Extremities: nocalf edema, no calf tenderness to palpation  Neurologic/Psych:   MENTAL STATUS/Affect: awake, oriented to person, place, time, and partially to situation; less anxious and pressured; still somewhat anxious; still with impaired attention but less so then prior encounter  Strength/MMT:    Right  Left  Site  Right  Left  Site    5- 5  S Ab: Shoulder Abductors  5  5  HF: Hip Flexors    5- 5  EF: Elbow Flexors  5  5 KF: Knee Flexors    5-  5  EE: Elbow Extensors  5  5  KE: Knee Extensors    5-  5  WE: Wrist Extensors  5  5  DR: Dorsi Flexors    5-  5  FF: Finger Flexors  5  5  PF: Plantar Flexors    5-  5  HI: Hand Intrinsics  5  5  EHL: Extensor Hallucis Longus       Diagnostic Studies: reviewed, no new imaging  No results found      Laboratory:      Results from last 7 days  Lab Units 11/20/18  0624   HEMOGLOBIN g/dL 10 0*   HEMATOCRIT % 29 7*   WBC Thousand/uL 11 20*       Results from last 7 days  Lab Units 11/20/18  0624 11/18/18  0544   BUN mg/dL 10 14   POTASSIUM mmol/L 4 2 4 0   CHLORIDE mmol/L 103 102   CREATININE mg/dL 0 63 0 60       Results from last 7 days  Lab Units 11/16/18  1659   PROTIME seconds 13 8   INR  1 05        Patient Active Problem List   Diagnosis    Chronic idiopathic constipation    OCD (obsessive compulsive disorder)    Weakness    Depression    Anxiety    Cervical spinal stenosis    Cervical stenosis of spine    Hyponatremia    Cervical myelopathy (HCC)    S/P cervical spinal fusion    Lumbar radiculopathy    Chronic low back pain    Pain    Traumatic brain injury with loss of consciousness ([facility] [address] )    Hypertension    History of hyperlipidemia    Insomnia    Lumbar stenosis    At risk for venous thromboembolism (VTE)       ** Please Note: Fluency Direct voice to text software may have been used in the creation of this document  **    35 minutes or greater spent face-to-face with patient during this encounter and with coordination of care      Drew Romero MD, [address]  Physical Medicine and Rehabilitation  Brain Injury Medicine

## 2018-11-23 NOTE — PROGRESS NOTES
11/23/18 1300   Pain Assessment   Pain Assessment No/denies pain   Pain Score No Pain   Effect of Pain on Daily Activities No pain reported initally with a slight increase in neck with activity    Restrictions/Precautions   Precautions Bed/chair alarms;Pain;Spinal precautions; Fall Risk   Braces or Orthoses C/S Collar   Cognition   Overall Cognitive Status Impaired   Arousal/Participation Cooperative   Attention Attends with cues to redirect   Orientation Level Oriented X4   Memory Decreased recall of precautions  (able to recall 2/3 from AM )   Following Commands Follows multistep commands with increased time or repetition   Comments Pt able to recall 2/3 spinal precautions- continue to reinforce  Slow to process at times    Subjective   Subjective "I don't know how much you're going to get from me this PM    QI: Sit to [address] to Lying CARE Score 4   QI: Lying to Sitting on Side of Bed   Assistance Needed Physical assistance   Assistance Provided by [place] Less than 25%   Comment Flavia   Lying to Sitting on Side of Bed CARE Score 3   QI: Sit to Stand   Assistance Needed Physical assistance   Assistance Provided by [place] Less than 25%   Comment instruction required for proper hand placement during stand->sit activity   Sit to Stand CARE Score 3   QI: Chair/Bed-to-Chair Transfer   Assistance Needed Physical assistance   Assistance Provided by [place] Less than 25%   Comment RW    Chair/Bed-to-Chair Transfer CARE Score 3   Transfer Bed/Chair/Wheelchair   Limitations Noted In Balance;Confidence; Coordination;Problem Solving;LE Strength;UE Strength   Adaptive Equipment Roller Walker   Stand Pivot Minimal Assist;Contact Guard   Sit to Stand Minimal Assist;Contact Guard   Stand to Sit Minimal Assist;Contact Guard   Supine to Sit Minimal Assist   Sit to Supine Supervision   Car Transfer Minimal Assist   All Transfer Minimal Assist   Findings Durign SPT and all turning, patient observed with increased forward flexion  He is able to identifity doing it but demonstrates difficulty correcting  Patient denies flexing to relieve back pain  Ambualtes with flat foot, posterior pelivc tilt and knees flexed  Bed, Chair, Wheelchair Transfer (FIM) 4 - Patient requires steadying assist or light touching   QI: Car Transfer   Assistance Needed Physical assistance   Assistance Provided by [place] 25%-49%   Comment Flavia    Car Transfer CARE Score 3   QI: Walk 10 Feet   Assistance Needed Incidental touching   Assistance Provided by [place] Less than 25%   Comment RW   Walk 10 Feet CARE Score 3   QI: Walk 50 Feet with Two Turns   Assistance Needed Physical assistance   Assistance Provided by [place] 25%-49%   Comment Flavia during turning    Walk 50 Feet with Two Turns CARE Score 3   QI: Walk 150 Feet   Assistance Needed Incidental touching   Assistance Provided by [place] Less than 25%   Comment CGA   Walk 150 Feet CARE Score 3   QI: Walking 10 Feet on Uneven Surfaces   Reason if not Attempted Safety concerns   Walking 10 Feet on Uneven Surfaces CARE Score 88   Ambulation   Does the patient walk? 2  Yes   Primary Discharge Mode of Locomotion Walk   Walk Assist Level Minimum Assist;Contact Guard   Gait Pattern Antalgic; Forward Flexion  (absent heep strike, B knee flexion )   Assist Device Roller Walker   Distance Walked (feet) 100 ft  (x2 + 300')   Limitations Noted In Balance;Device Management; Heel Strike;Posture   Walking (FIM) 4 - Patient requires steadying assist or light touching AND distance 150 feet or more, no rest   Wheelchair mobility   QI: Does the patient use a wheelchair? 0  No   QI: 1 Step (Curb)   Assistance Needed Physical assistance   Assistance Provided by [place] 25%-49%   1 Step (Curb) CARE Score 3   Stairs   Type Stairs; Ramp   # of Steps 1   Weight Bearing Precautions Fall Risk   Assist Devices Walker   Findings curb step 1x + ramp up and ramp down    Stairs (FIM) 1 - Patient goes up and down less than 4 stairs regardless of assist/device/set up   QI: Picking Up Object   Reason if not Attempted Safety concerns   Picking Up Object CARE Score 88   QI: Toilet Transfer   Assistance Needed Physical assistance   Assistance Provided by [place] Less than 25%   Comment CGA/Flavia   Toilet Transfer CARE Score 3   Toilet Transfer   Surface Assessed Standard Toilet   Limitations Noted In Balance;UE Strength;LE Strength   Adaptive Equipment [facility]   Findings educated to utilize grab abr> RW for stability during transfers    Toilet Transfer (FIM) 4 - Patient requires steadying assist or light touching   Therapeutic Interventions   Strengthening 5x repeated STS   Flexibility HS/heel cord stretching 3 minutes BLE   Balance unsupported sitting 5 minutes/ dynamic balance at sink    Other Edcuated at length rehab process, schedule and expectations  Patient expressing concerns that he would be sent home too soon  Pt overall down and frustrated with current status   Assessment   Treatment Assessment Patient making progress with skilled PT intervention but remains limited by deconditioning, weakness and impaired balance  Session focussing on proper hand placement during transfers and posture during gait  Pt increased foward flexion during transfers but denies 2* pain  he also reports having a h/o disc buldging but was educated that fwd flexion would increase symptoms and provoke pain- denied  Notable posterior tilt and tigh hamstrings with focus placed on stretching to reduce pain and improve mechanics during gait  Will conitnue to require PT intervention to maximize mobility and reduce caregiver burden  PT Barriers   Physical Impairment Decreased strength;Decreased range of motion;Decreased endurance; Impaired balance;Decreased mobility; Decreased cognition;Decreased skin integrity;Orthopedic restrictions;Pain   Functional Limitation Car transfers; Ramp negotiation;Stair negotiation;Standing;Transfers; Walking   Plan Treatment/Interventions Functional transfer training;LE strengthening/ROM; Elevations; Therapeutic exercise; Endurance training;Cognitive reorientation;Patient/family training;Equipment eval/education; Bed mobility;Gait training; Compensatory technique education;Spoke to nursing   Progress Progressing toward goals   Recommendation   Recommendation Outpatient PT; Home with family support  (pending progress)   Equipment Recommended Vicky Bingham  (denies owning )   PT Therapy Minutes   PT Time In 1300   PT Time Out 1400   PT Total Time (minutes) 60   PT Mode of treatment - Individual (minutes) 60   PT Mode of treatment - Concurrent (minutes) 0   PT Mode of treatment - Group (minutes) 0   PT Mode of treatment - Co-treat (minutes) 0   PT Mode of Teatment - Total time(minutes) 60 minutes   Therapy Time missed   Time missed?  No

## 2018-11-23 NOTE — SOCIAL WORK
Met w/pt and reviewed rehab routine and cm role  Pt resides with son Nabila Araujo in multi level home with bed and bath on the second flr  Pt denies any previous experience with outpt therapy or dme needs  Pt states he had a nurse come to the home two years ago to check his vitals on a daily basis for a week or so  Pt has an rx plan through medicare he thinks and uses [facility] in [place]  Pt does not drive, his son provides transport  Pt's son does work in the evenings for a few hours and pt will be alone  Pt is not concerned about this and reports he just wants to be able to walk around his house to do basic things like eat and use the bathroom  Informed of team mtg and projected los  Following to assist w/dc planning needs

## 2018-11-23 NOTE — PROGRESS NOTES
11/23/18 1030   Pain Assessment   Pain Assessment 0-10   Pain Score Worst Possible Pain   Pain Type Acute pain   Pain Location Neck   Pain Orientation Posterior   Hospital Pain Intervention(s) (RN administered meds at begining of session)   Restrictions/Precautions   Precautions Bed/chair alarms; Fall Risk;Spinal precautions   Braces or Orthoses C/S Collar   QI: Eating   Assistance Needed Adaptive equipment;Set-up / clean-up   Assistance Provided by [place] No physical assistance   Eating CARE Score 5   Eating Assessment   Food To Mouth Yes   Able To Cut No  (for salad)   Positioning Upright;Out of Bed   Intake Mode PO   Opens Packages No   Findings Trialed plate to mouth with use of built up utensil to decrease fatigue  Pt reports increased ease wtih plate to mouth tasks  Eating (FIM) 5 - Patient needs help to open contianers or set up tray   QI: Upper Body Dressing   Assistance Needed Physical assistance   Assistance Provided by [place] 25%-49%   Comment Pt able to thread b/l Ue's into shirt  Pt able to thread overhead with incidental assist to adjust over C/S collar  Pt requires assist to pull shrit down in back   Upper Body Dressing CARE Score 3   QI: Sit to Stand   Assistance Needed Physical assistance   Assistance Provided by [place] Less than 25%   Sit to Stand CARE Score 3   QI: Chair/Bed-to-Chair Transfer   Assistance Needed Physical assistance   Assistance Provided by [place] Less than 25%   Comment RW   Chair/Bed-to-Chair Transfer CARE Score 3   Transfer Bed/Chair/Wheelchair   Limitations Noted In Balance; Coordination;LE Strength   Stand Pivot Minimal Assist   Sit to Stand Minimal   Stand to Sit Minimal   Bed, Chair, Wheelchair Transfer (FIM) 4 - Patient requires steadying assist or light touching   Exercise Tools   Exercise Tools Yes   Theraputty Pt given HEP for theraputty exercieses with yellow theraputty   Pt completed thumb flexion, digit flexion, pincer grasp (to all digits), and gross grasp 10 x/each  Pt required use of visual demonstration and written direction to improve carryover pt states 'Im having a hard time understanding all of this"  Pt also retreived beads from theraputty to focus on digit isolation and strengthening  Cognition   Overall Cognitive Status Impaired   Arousal/Participation Alert; Cooperative   Attention Difficulty dividing attention   Orientation Level Oriented to person;Oriented to place   Memory Decreased short term memory   Following Commands Follows multistep commands with increased time or repetition   Activity Tolerance   Activity Tolerance Patient tolerated treatment well   Assessment   Treatment Assessment Pt engaged in OT treatment with focus on hand strengthening,s elf feeding and fxnl transfers  Pt states his goal ist o be able to write his own name and develop strength in his hand to be able to feed himself better  Pt is right hand dominant and demonsrates difficulty maintain pad to pad and pincer grasp patterns  Pt educated on timeline for return of strength  pt given theraputty exercises and stress ball to improve UE strength while nto in therapy  pt demonstrates deficits with memory and attention during therapy task and would benefit froM MOCA assessmetn  Pt is being seen by SLP  Pt overall demonstrates decreaed balance wtih b/l hand release during fxnl tasks  Pt wudl benefit from contineud OT services to imrpove hand strength/coordination and balance  Problem List Decreased strength;Decreased endurance;Decreased mobility; Decreased cognition;Decreased coordination; Impaired judgement;Decreased safety awareness   Plan   Treatment/Interventions ADL retraining;Functional transfer training;LE strengthening/ROM; Therapeutic exercise; Endurance training;Cognitive reorientation;Equipment eval/education;Gait training; Compensatory technique education   Progress Progressing toward goals   OT Therapy Minutes   OT Time In 1030   OT Time Out 1130   OT Total Time (minutes) 60   OT Mode of treatment - Individual (minutes) 60   OT Mode of treatment - Concurrent (minutes) 0   OT Mode of treatment - Group (minutes) 0   OT Mode of treatment - Co-treat (minutes) 0   OT Mode of Teatment - Total time(minutes) 60 minutes   Therapy Time missed   Time missed?  No

## 2018-11-23 NOTE — PROGRESS NOTES
SLP Cognitive Assessment        11/23/18 0900   Pain Assessment   Pain Assessment No/denies pain   Restrictions/Precautions   Precautions Bed/chair alarms;Cognitive; Fall Risk;Spinal precautions;Supervision on toilet/commode   Braces or Orthoses C/S Collar   Cognitive Linguisitic Assessments   Cognitive Linquistic Quick Test (CLQT) Pt completing formalize cognitive linguistic assessment, CLQT+, where overall score when compared to age matched peers between 65-80 yrs  , pt's score was 2 6 out of 4 0, which indicates mild cognitive deficits at this time  The following subtest scores are as follows: Attention: score of 68, indicating moderate impairments; Memory: score of 129, indicating mild impairments; Executive Functions: score of 14, indicating mild impairments; Language: score of 23, indicating moderate impairments; Visuospatial SkillsL score of 49, indicating mild impairments and Clock Drawing: score of 6, indicating severe impairments  Pt was noted to be below cutoff criterion scored on 6 out of 10 tasks completed  While completing assessment, pt was noted to have slow processing time, decreased comprehension and decreased memory during assessment  Educated pt in regards of role of SLP and d/w pt about activities to complete to improve overall cognitive skills at this time, which pt was in agreement  Memory Skills   Memory (FIM) 4 - Recalls 2 of 3 steps   Social Interaction (FIM) 5 - Interacts appropriately with others 90% of time   Speech/Language/Cognition Assessmetn   Treatment Assessment Pt completing the CLQT+, in which overall scores when compared to age matched pers between 65-80 yrs  deems pt to demonstrate mild cognitive linguistic impairments  Pt will benefit form SLP services at this time to maximize skills      SLP Therapy Minutes   SLP Time In 0900   SLP Time Out 0950   SLP Total Time (minutes) 50   SLP Mode of treatment - Individual (minutes) 50   SLP Mode of treatment - Concurrent (minutes) 0 SLP Mode of treatment - Group (minutes) 0   SLP Mode of treatment - Co-treat (minutes) 0   SLP Mode of Teatment - Total time(minutes) 50 minutes   Therapy Time missed   Time missed?  No   Daily FIM Score   Problem solving (FIM) 3 - Solves basic problmes 50-74% of time   Comprehension (FIM) 4 - Understands basic info/conversation 75-90% of time   Expression (FIM) 5 - Needs help/cues only RARELY (< 10% of the time)

## 2018-11-23 NOTE — PROGRESS NOTES
11/23/18 0830   Pain Assessment   Pain Assessment No/denies pain   Pain Score No Pain   Restrictions/Precautions   Precautions Bed/chair alarms; Fall Risk;Cognitive;Spinal precautions;Supervision on toilet/commode   Braces or Orthoses C/S Collar   Cognition   Overall Cognitive Status Impaired   Arousal/Participation Cooperative   Attention Attends with cues to redirect   Orientation Level Oriented to person;Oriented to place;Oriented to situation;Oriented to time   Memory Decreased short term memory;Decreased recall of precautions   Following Commands Follows multistep commands with increased time or repetition   Comments Pt is overall pleasant and cooperative  Forgetful at times; required reinforcement on spinal precautions  Difficulty initially processing/sequencing during familiar activites (ie  toileting and washing hands at sink)   Subjective   Subjective Patient agreeable to particaipte in PT session  Admits he is frustrated with his current level of mobility   Wants to be able to walk again    QI: Roll Left and Right   Comment Pt OOB upon arrival    QI: Sit to Stand   Assistance Needed Physical assistance   Assistance Provided by [place] Less than 25%   Comment instruction required for proper hand placement during stand->sit activity   Sit to Stand CARE Score 3   QI: Chair/Bed-to-Chair Transfer   Assistance Needed Physical assistance   Assistance Provided by [place] Less than 25%   Comment RW   Chair/Bed-to-Chair Transfer CARE Score 3   Transfer Bed/Chair/Wheelchair   Limitations Noted In Balance;Confidence;Problem Solving;LE Strength   Adaptive Equipment Roller Walker   Stand Pivot Minimal Assist;Contact Guard   Sit to Stand Minimal Assist;Contact Guard   Stand to Sit Minimal Assist;Contact Guard   All Transfer Minimal Assist   Bed, Chair, Wheelchair Transfer (FIM) 4 - Patient requires steadying assist or light touching   QI: Walk 10 Feet   Assistance Needed Incidental touching   Assistance Provided by [place] Less than 25%   Walk 10 Feet CARE Score 3   QI: Walk 50 Feet with Two Turns   Assistance Needed Incidental touching   Assistance Provided by [place] Less than 25%   Walk 50 Feet with Two Turns CARE Score 3   QI: Walk 150 Feet   Assistance Needed Incidental touching   Assistance Provided by [place] Less than 25%   Walk 150 Feet CARE Score 3   QI: Walking 10 Feet on Uneven Surfaces   Reason if not Attempted Safety concerns   Walking 10 Feet on Uneven Surfaces CARE Score 88   Ambulation   Does the patient walk? 2  Yes   Primary Discharge Mode of Locomotion Walk   Walk Assist Level Contact Guard   Gait Pattern Antalgic; Inconsistant Sandra; Forward Flexion   Assist Device Roller Walker   Distance Walked (feet) 200 ft   Limitations Noted In Device Management; Sequencing;Speed   Findings Patient required verbal instruction to avoid obstacles and for scanning environment  Patient with increased trunk flexion during turning   Walking (FIM) 4 - Patient requires steadying assist or light touching AND distance 150 feet or more, no rest   QI: Wheel 50 Feet with Two Turns   Reason if not Attempted Activity not applicable   Wheel 50 Feet with Two Turns CARE Score 9   QI: Wheel 150 Feet   Reason if not Attempted Activity not applicable   Wheel 430 Feet CARE Score 9   Wheelchair mobility   QI: Does the patient use a wheelchair? 0  No   QI: Picking Up Object   Reason if not Attempted Safety concerns   Picking Up Object CARE Score 88   Toilet Transfer   Findings Dynamic standing balance activities performed at commode and sink with no LOB; appx 7 minutes    Assessment   Treatment Assessment Pt engaged in PT treatment session focussing on balance, transfers and gait  Patient requires overall leonard/CGA for transfers and balance with reinforcement required on hand placement, RW management, and posture  Patient also continues to require reinforcement on precautions in which he was kendall to recall 2/3 following teaching by the end of session  Patient continues to require skilled PT intervention to progress funcitoanl mobility at this time  Barriers at this time include 15 EMMA and son working in the AM     PT Barriers   Physical Impairment Decreased strength;Decreased range of motion;Decreased endurance; Impaired balance;Decreased mobility; Decreased skin integrity;Orthopedic restrictions;Pain   Functional Limitation Car transfers;Stair negotiation;Standing;Transfers; Walking   Plan   Treatment/Interventions Functional transfer training;LE strengthening/ROM; Elevations; Therapeutic exercise; Endurance training;Patient/family training;Cognitive reorientation;Equipment eval/education; Bed mobility;Gait training; Compensatory technique education   Progress Progressing toward goals   Recommendation   Recommendation Outpatient PT; Home with family support  (pending progress)   Equipment Recommended Walker   PT Therapy Minutes   PT Time In 0830   PT Time Out 0900   PT Total Time (minutes) 30   PT Mode of treatment - Individual (minutes) 30   PT Mode of treatment - Concurrent (minutes) 0   PT Mode of treatment - Group (minutes) 0   PT Mode of treatment - Co-treat (minutes) 0   PT Mode of Teatment - Total time(minutes) 30 minutes   Therapy Time missed   Time missed?  No

## 2018-11-23 NOTE — PROGRESS NOTES
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; s/p posterior cervical decompressive laminectomy with instrumented posterior lateral fusion fixation C3-6 on 11/19 [facility]):  watch incision; continue collar at all times     HTN:   on Lopressor 12 5 mg but too low to get this AM = son said was stopped as OP so will stop again (office note says stopped 2/2 low BP)       OCD: currently was only on his usual dose of Zoloft but also takes Buspar 10mg BID/Zyprexa 15mg qhs/Remeron 15 mg qhs at home  Primary service added back Zyprexa but at 5mg qhs/Remeron 15 HS     ABLA:  mild; required no transfusion; will watch         Subjective: offers no complaints        Scheduled Meds:    Current Facility-Administered Medications:  acetaminophen 650 mg Oral Q6H PRN Ann Washington MD   atorvastatin 40 mg Oral Daily With Meet Keith MD   bacitracin 1 large application Topical BID Ann Washington MD   bisacodyl 10 mg Rectal Daily PRN Ann Washington MD   bisacodyl 10 mg Rectal Once Ann Washington MD   docusate sodium 100 mg Oral BID Ann Washington MD   gabapentin 100 mg Oral TID Ann Washington MD   heparin (porcine) 5,000 Units Subcutaneous [facility] Ann Washington MD   magnesium hydroxide 30 mL Oral Daily PRN Sudie Felty, DO   metoprolol tartrate 12 5 mg Oral Q12H [address] Sudie Felty,    mirtazapine 7 5 mg Oral HS Ann Washington MD   OLANZapine 5 mg Oral HS Ann Washington MD   oxyCODONE 5 mg Oral Q4H PRN Ann Washington MD   polyethylene glycol 17 g Oral Daily Ann Washington MD   polyethylene glycol 17 g Oral Once Ann Washington MD   senna-docusate sodium 1 tablet Oral BID Chelsey Hernandez MD   sertraline 100 mg Oral Daily Chelsey Hernandez MD       Labs:       Results from last 7 days  Lab Units 11/20/18  0624   WBC Thousand/uL 11 20*   HEMOGLOBIN g/dL 10 0*   HEMATOCRIT % 29 7*   PLATELETS Thousands/uL 190  180       Results from last 7 days  Lab Units 11/20/18  0624 11/18/18  0544   POTASSIUM mmol/L 4 2 4 0   CHLORIDE mmol/L 103 102   CO2 mmol/L 23 24   BUN mg/dL 10 14   CREATININE mg/dL 0 63 0 60   CALCIUM mg/dL 8 0* 8 2*           Results from last 7 days  Lab Units 11/16/18  1659   INR  1 05        No results found for: GLUCOSE    Labs reviewed    Physical Examination:  Vitals:   Vitals:    11/22/18 2033 11/22/18 2136 11/23/18 0525 11/23/18 0814   BP: 118/66 143/68 116/58 94/51   BP Location: Left arm  Left arm Left arm   Pulse: 64 70 59 61   Resp: 18  18    Temp: 98 °F (36 7 °C)  97 6 °F (36 4 °C)    TempSrc: Oral  Oral    SpO2: 99%  97%    Weight:       Height:           Constitutional:  NAD; pleasant; nontoxic  HEENT:  AT/NC; oropharynx negative for thrush on tongue   Neck: negative for JVD  CV:  +S1, S2;  RRR; no rub/murmur  Pulmonary:  BBS without crackles/wheeze/rhonci; resp are unlabored  Abdominal:  soft, +BS, ND/NT; no mass  Skin:  no rashes  Musculoskeletal:  no edema  :  no cristina  Neurological/Psych:  AAO;  LE 5/5; RUE 4/5, LUE 4+/5; no depression/anxiety      [x ] Total time spent: 30 Mins and greater than 50% of this time was spent counseling/coordinating care  ** Please Note: Dragon 360 Dictation voice to text software may have been used in the creation of this document   **

## 2018-11-23 NOTE — PROGRESS NOTES
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   Date of Onset 11/13/18   Prior Level of Function   Functional Cognition 2  Needed Some Help - Patient needed a partial assistance from another person to complete activities  Prior Assistance Needed for Household Chores/Cleaning;Meal Preparation;Medication Management;Money Management   Patient Preference   Nickname (Patient Preference) Ivan   Restrictions/Precautions   Precautions Bed/chair alarms;Cognitive; Fall Risk;Spinal precautions;Supervision on toilet/commode   Braces or Orthoses C/S Collar   Pain Assessment   Pain Assessment No/denies pain   Transfer Bed/Chair/Wheelchair   Bed, Chair, Wheelchair Transfer (FIM) 4 - Patient requires steadying assist or light touching   Comprehension   Assist Devices Glasses   Auditory Basic;Complex   Visual Basic;Complex   Findings Pt completing formalized cognitive linguistic assessment  Refer to SLP Rehab note for details  QI: Comprehension 3  Usually Understands: Understands most conversations, but misses some part/intent of message  Requires cues at times to understand  Comprehension (FIM) 4 - Understands basic info/conversation 75-90% of time   Expression   Verbal Basic;Complex   Non-Verbal Basic;Complex   Intelligibility Sentence   Findings Pt completing formalized cognitive linguistic assessment  Refer to SLP Rehab note for details  QI: Expression 3  Exhibits some difficulty with expressing needs and ideas (e g , some words or finishing thoughts) or speech is not clear   Expression (FIM) 5 - Needs help/cues only RARELY (< 10% of the time)   Social Interaction   Cooperation with staff   Participation Individual   Behaviors observed Appropriate   Findings Pt completing formalized cognitive linguistic assessment  Refer to SLP Rehab note for details      Social Interaction (FIM) 5 - Interacts appropriately with others 90% of time   Problem Solving   Complex Manages discharge planning   Routine Manages call bell   Findings Pt completing formalized cognitive linguistic assessment  Refer to SLP Rehab note for details  Problem solving (FIM) 3 - Solves basic problmes 50-74% of time   Memory   Initiates Tasks Yes   Short-Term Impaired   Long Term Impaired   Findings Pt completing formalized cognitive linguistic assessment  Refer to SLP Rehab note for details  Memory (FIM) 4 - Recalls 2 of 3 steps   Discharge Information   Patient's Discharge Plan home w/ family support   Patient's Rehab Expectations "to get back to normal"   Barriers to Discharge Home Decreased Cognitive Function;Decreased Endurance;Decreased Strength; Safety Considerations   Impressions Pt is a good rehab candidate to achieve supervision level for basic functional cognitive skills upon anticipated d/c to home w/ family support/supervision  Pt will benefit from SLP services to maximiize skills at this time      SLP Therapy Minutes   SLP Time In 0900   SLP Time Out 0950   SLP Total Time (minutes) 50   SLP Mode of treatment - Individual (minutes) 50   SLP Mode of treatment - Concurrent (minutes) 0   SLP Mode of treatment - Group (minutes) 0   SLP Mode of treatment - Co-treat (minutes) 0   SLP Mode of Teatment - Total time(minutes) 50 minutes

## 2018-11-24 PROCEDURE — 97110 THERAPEUTIC EXERCISES: CPT

## 2018-11-24 PROCEDURE — 97116 GAIT TRAINING THERAPY: CPT

## 2018-11-24 RX ADMIN — OLANZAPINE 5 MG: 5 TABLET, FILM COATED ORAL at 22:04

## 2018-11-24 RX ADMIN — HEPARIN SODIUM 5000 UNITS: 5000 INJECTION INTRAVENOUS; SUBCUTANEOUS at 06:01

## 2018-11-24 RX ADMIN — BUSPIRONE HYDROCHLORIDE 5 MG: 5 TABLET ORAL at 08:52

## 2018-11-24 RX ADMIN — MIRTAZAPINE 7.5 MG: 15 TABLET ORAL at 22:03

## 2018-11-24 RX ADMIN — BACITRACIN ZINC 1 LARGE APPLICATION: 500 OINTMENT TOPICAL at 08:54

## 2018-11-24 RX ADMIN — HEPARIN SODIUM 5000 UNITS: 5000 INJECTION INTRAVENOUS; SUBCUTANEOUS at 22:03

## 2018-11-24 RX ADMIN — GABAPENTIN 100 MG: 100 CAPSULE ORAL at 16:30

## 2018-11-24 RX ADMIN — ACETAMINOPHEN 650 MG: 325 TABLET, FILM COATED ORAL at 10:42

## 2018-11-24 RX ADMIN — DOCUSATE SODIUM 100 MG: 100 CAPSULE, LIQUID FILLED ORAL at 08:51

## 2018-11-24 RX ADMIN — ATORVASTATIN CALCIUM 40 MG: 40 TABLET, FILM COATED ORAL at 16:30

## 2018-11-24 RX ADMIN — GABAPENTIN 100 MG: 100 CAPSULE ORAL at 08:51

## 2018-11-24 RX ADMIN — SERTRALINE HYDROCHLORIDE 100 MG: 100 TABLET ORAL at 08:51

## 2018-11-24 RX ADMIN — GABAPENTIN 100 MG: 100 CAPSULE ORAL at 20:30

## 2018-11-24 RX ADMIN — SENNOSIDES AND DOCUSATE SODIUM 1 TABLET: 8.6; 5 TABLET ORAL at 20:30

## 2018-11-24 RX ADMIN — SENNOSIDES AND DOCUSATE SODIUM 1 TABLET: 8.6; 5 TABLET ORAL at 08:51

## 2018-11-24 RX ADMIN — HEPARIN SODIUM 5000 UNITS: 5000 INJECTION INTRAVENOUS; SUBCUTANEOUS at 13:59

## 2018-11-24 RX ADMIN — POLYETHYLENE GLYCOL 3350 17 G: 17 POWDER, FOR SOLUTION ORAL at 08:51

## 2018-11-24 RX ADMIN — BUSPIRONE HYDROCHLORIDE 5 MG: 5 TABLET ORAL at 20:30

## 2018-11-24 RX ADMIN — DOCUSATE SODIUM 100 MG: 100 CAPSULE, LIQUID FILLED ORAL at 20:30

## 2018-11-24 NOTE — PROGRESS NOTES
11/24/18 1001   Pain Assessment   Pain Assessment 0-10   Pain Score Worst Possible Pain   Pain Location Neck   Restrictions/Precautions   Precautions Bed/chair alarms; Fall Risk;Pain;Spinal precautions   Braces or Orthoses C/S Collar   QI: Chair/Bed-to-Chair Transfer   Assistance Needed Incidental touching   Chair/Bed-to-Chair Transfer CARE Score 4   Transfer Bed/Chair/Wheelchair   Limitations Noted In Balance; Endurance   Adaptive Equipment Roller Walker   Stand Pivot Minimal Assist   Sit to Stand Minimal   Stand to Sit Minimal   Bed, Chair, Wheelchair Transfer (FIM) 4 - Patient requires steadying assist or light touching   Therapeutic Exercise - ROM   UE-ROM Yes  (BUE tabletop towel glides 2 x 10 5 planes  cues positioning)   Neuromuscular Education   Comments trialed radial nerve glides in stance however limited by c/o lightheadness  Pt  also had poor carryover of direction following even with hand over hand guidance  Coordination   Fine Motor tolerated medium resistance (red) theraputty focusing on B integration and FM control/strength to remove beads from putty  completed 1 x 10 gross grasp and in hand manipulation of putty each hand  Pt  required use of L hand/table for manipulation of putty in R hand 2* decreased coordination/strength  pt  completed 3 jaw mario BUE to place glass beads into slot container  no loss of  noted with increased time for coordination  pt  unable to complete palm to finger translation  Cognition   Overall Cognitive Status Impaired   Arousal/Participation Cooperative   Assessment   Treatment Assessment tolerated session well focusing on BUE ROM, nerve glides, FM coordination/strengthening  Pt  c/o lightheadedness in stance with /55  nsg aware  noted increased in BP to 110/58 after mobility  Pt  inconsistent with report if symptomatic with positional changes  per nsg BP has been running low   Pt  would like further training with him and family for cervical collar/jay collar management  pt  required frequent cues for encouragement/validation/positive reinforcement throughout session to A with coping with current functional/medical status  Prognosis Fair   Problem List Decreased strength;Decreased range of motion;Decreased endurance; Impaired balance   Plan   Treatment/Interventions ADL retraining;Functional transfer training; Therapeutic exercise; Endurance training;Patient/family training;Equipment eval/education   Progress Progressing toward goals   OT Therapy Minutes   OT Time In 1000   OT Time Out 1110   OT Total Time (minutes) 70   OT Mode of treatment - Individual (minutes) 70   OT Mode of treatment - Concurrent (minutes) 0   OT Mode of treatment - Group (minutes) 0   OT Mode of treatment - Co-treat (minutes) 0   OT Mode of Teatment - Total time(minutes) 70 minutes   Therapy Time missed   Time missed?  No

## 2018-11-24 NOTE — PROGRESS NOTES
11/24/18 0930   Pain Assessment   Pain Assessment No/denies pain   Pain Score No Pain   Restrictions/Precautions   Precautions Bed/chair alarms;Cognitive; Fall Risk;Spinal precautions   Braces or Orthoses C/S Collar   Cognition   Overall Cognitive Status Impaired   Arousal/Participation Cooperative   Attention Attends with cues to redirect   Orientation Level Oriented X4   Memory Decreased short term memory  (able to recall 3/3 spinal precautions )   Following Commands Follows multistep commands with increased time or repetition   Comments Pt expressing feelings of hoplessness and frustration on current functional mobility   Would recommend Dr Rob Roman consult    Subjective   Subjective Patient in bed upon arrival  Agreeable to particiapte in PT session    QI: Roll Left and Right   Assistance Needed Supervision   Assistance Provided by [place] No physical assistance   Roll Left and Right CARE Score 4   QI: Lying to Sitting on Side of Bed   Assistance Needed Supervision   Lying to Sitting on Side of Bed CARE Score 4   QI: Sit to Stand   Assistance Needed Incidental touching   Assistance Provided by [place] Less than 25%   Comment CGA   Sit to Stand CARE Score 3   Bed Mobility   Able to Roll Left to Right   QI: Chair/Bed-to-Chair Transfer   Assistance Needed Incidental touching   Assistance Provided by [place] Less than 25%   Comment CGA with RW    Chair/Bed-to-Chair Transfer CARE Score 3   Transfer Bed/Chair/Wheelchair   Limitations Noted In Balance;Confidence;Problem Solving;LE Strength   Adaptive Equipment Roller Walker   Stand Pivot Contact Guard   Sit to [facility]   Stand to [facility]   Supine to [facility] Supervision   All Transfer Minimal Assist   QI: Walk 10 Feet   Assistance Needed Incidental touching   Assistance Provided by [place] Less than 25%   Comment RW   Walk 10 Feet CARE Score 3   QI: Walk 50 Feet with Two Turns   Assistance Needed Physical assistance;Verbal cues   Assistance Provided by [place] Less than 25%   Comment Flavia during turns   Walk 50 Feet with Two Turns CARE Score 3   QI: Walk 150 Feet   Assistance Needed Incidental touching   Assistance Provided by [place] Less than 25%   Comment RW   Walk 150 Feet CARE Score 3   QI: Walking 10 Feet on Uneven Surfaces   Reason if not Attempted Safety concerns   Walking 10 Feet on Uneven Surfaces CARE Score 88   Ambulation   Does the patient walk? 2  Yes   Primary Discharge Mode of Locomotion Walk   Walk Assist Level Minimum Assist;Contact Guard   Gait Pattern Forward Flexion; Antalgic  (L steppage at times)   Assist Device Roller Maria Luz Arango Walked (feet) 150 ft   Limitations Noted In Device Management; Heel Strike;Balance; Sequencing;Speed   Walking (FIM) 4 - Patient requires steadying assist or light touching AND distance 150 feet or more, no rest   QI: Wheel 50 Feet with Two Turns   Reason if not Attempted Activity not applicable   Wheel 50 Feet with Two Turns CARE Score 9   QI: Wheel 150 Feet   Reason if not Attempted Activity not applicable   Wheel 911 Feet CARE Score 9   Wheelchair mobility   QI: Does the patient use a wheelchair? 0  No   QI: Picking Up Object   Reason if not Attempted Safety concerns   Picking Up Object CARE Score 88   Therapeutic Interventions   Strengthening standing therex 10x3: heel raises, hip ABD   Flexibility BLE HS stretching 5min x2   Balance 100'x3 ambulation with no AD    Assessment   Treatment Assessment Pt engaged in PT treatment session focussing on balance, stregthening, and functional mobility  Pt overall CGA for ambulation with a RW but requires Flavia during turns 2* forward flexed posture that he is able to correct with verbal instruciton  Continue to stretch HS 2* poor body mechanics during gait  Focus of additional sessions- core stability 2* poor posture and gait  Patient continues to require skilled PT intervention to maximize function and reduce caregiver burden   Recommend Dr Rebeca Anderson consult 2* hopelessness and continued need for reinforcement during activity  PT Barriers   Physical Impairment Decreased strength;Decreased range of motion;Decreased endurance; Impaired balance;Decreased mobility; Decreased coordination;Decreased cognition;Decreased skin integrity;Orthopedic restrictions   Functional Limitation Car transfers;Stair negotiation;Standing;Transfers; Walking   Plan   Treatment/Interventions LE strengthening/ROM; Functional transfer training;Elevations; Therapeutic exercise; Endurance training;Cognitive reorientation;Patient/family training;Equipment eval/education; Bed mobility;Gait training; Compensatory technique education   Progress Progressing toward goals   Recommendation   Recommendation Outpatient PT; Home with family support  (pending progress)   Equipment Recommended Walker   PT Therapy Minutes   PT Time In 0930   PT Time Out 1000   PT Total Time (minutes) 30   PT Mode of treatment - Individual (minutes) 30   PT Mode of treatment - Concurrent (minutes) 0   PT Mode of treatment - Group (minutes) 0   PT Mode of treatment - Co-treat (minutes) 0   PT Mode of Teatment - Total time(minutes) 30 minutes   Therapy Time missed   Time missed?  No     Brent Odom, PT

## 2018-11-24 NOTE — PROGRESS NOTES
Internal Medicine Progress Note  Patient: Ailyn Pacheco  Age/sex: 68 y o  male  Medical Record #: [id number]      ASSESSMENT/PLAN:  Ailyn Pacheco is seen and examined and management for following issues:    Fall, right sided weakness; s/p tPA:  radiologic studies did not show CVA     Left periorbital hematoma:   stable (from hitting his head when he fell); will watch      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; s/p posterior cervical decompressive laminectomy with instrumented posterior lateral fusion fixation C3-6 on 11/19 [facility]):  watch incision; continue collar at all times     HTN:   on Lopressor 12 5 mg but too low to get this AM = son said was stopped as OP so will stop again (office note says stopped 2/2 low BP)       OCD: currently was only on his usual dose of Zoloft but also takes Buspar 10mg BID/Zyprexa 15mg qhs/Remeron 15 mg qhs at home  Primary service added back Zyprexa but at 5mg qhs/Remeron 15 HS     ABLA:  mild; required no transfusion; will watch         Subjective: offers no complaints        Scheduled Meds:    Current Facility-Administered Medications:  acetaminophen 650 mg Oral Q6H PRN Daryl Rodriguez MD   atorvastatin 40 mg Oral Daily With Loretta Nguyen MD   bacitracin 1 large application Topical BID Daryl Rodriguez MD   bisacodyl 10 mg Rectal Daily PRN Daryl Rodriguez MD   bisacodyl 10 mg Rectal Once Daryl Rodriguez MD   busPIRone 5 mg Oral BID Daryl Rodriguez MD   docusate sodium 100 mg Oral BID Daryl Rodriguez MD   gabapentin 100 mg Oral TID Daryl Rodriguez MD   heparin (porcine) 5,000 Units Subcutaneous [facility] Daryl Rodriguez MD   magnesium hydroxide 30 mL Oral Daily PRN Jimmie Francois DO   mirtazapine 7 5 mg Oral HS Daryl Rodriguez MD   OLANZapine 5 mg Oral HS Daryl Rodriguez MD   OLANZapine 5 mg Oral Daily PRN Daryl Rodriguez MD   oxyCODONE 5 mg Oral Q4H PRN Daryl Rodriguez MD   polyethylene glycol 17 g Oral Daily Daryl Rodriguez MD   polyethylene glycol 17 g Oral Once Tevin Seats Judith Sandoval MD   senna-docusate sodium 1 tablet Oral BID Anne Marie Storey MD   sertraline 100 mg Oral Daily Anne Marie Storey MD       Labs:       Results from last 7 days  Lab Units 11/20/18  0624   WBC Thousand/uL 11 20*   HEMOGLOBIN g/dL 10 0*   HEMATOCRIT % 29 7*   PLATELETS Thousands/uL 190  180       Results from last 7 days  Lab Units 11/20/18  0624 11/18/18  0544   POTASSIUM mmol/L 4 2 4 0   CHLORIDE mmol/L 103 102   CO2 mmol/L 23 24   BUN mg/dL 10 14   CREATININE mg/dL 0 63 0 60   CALCIUM mg/dL 8 0* 8 2*                No results found for: GLUCOSE    Labs reviewed    Physical Examination:  Vitals:   Vitals:    11/24/18 1035 11/24/18 1038 11/24/18 1041 11/24/18 1315   BP: 100/55 116/61 110/58 101/52   BP Location: Right arm Left arm Right arm Left arm   Pulse:    56   Resp:    20   Temp:    97 8 °F (36 6 °C)   TempSrc:    Oral   SpO2:    98%   Weight:       Height:           Constitutional:  NAD; pleasant; nontoxic  HEENT:  AT/NC; oropharynx negative for thrush on tongue   Neck: negative for JVD  CV:  +S1, S2;  RRR; no rub/murmur  Pulmonary:  BBS without crackles/wheeze/rhonci; resp are unlabored  Abdominal:  soft, +BS, ND/NT; no mass  Skin:  no rashes  Musculoskeletal:  no edema  :  no cristina  Neurological/Psych:  AAO;  LE 5/5; RUE 4/5, LUE 4+/5; no depression/anxiety      [x ] Total time spent: 30 Mins and greater than 50% of this time was spent counseling/coordinating care  ** Please Note: Dragon 360 Dictation voice to text software may have been used in the creation of this document   **

## 2018-11-25 PROCEDURE — 97530 THERAPEUTIC ACTIVITIES: CPT

## 2018-11-25 PROCEDURE — 97116 GAIT TRAINING THERAPY: CPT

## 2018-11-25 PROCEDURE — G0515 COGNITIVE SKILLS DEVELOPMENT: HCPCS

## 2018-11-25 RX ADMIN — SENNOSIDES AND DOCUSATE SODIUM 1 TABLET: 8.6; 5 TABLET ORAL at 18:03

## 2018-11-25 RX ADMIN — SENNOSIDES AND DOCUSATE SODIUM 1 TABLET: 8.6; 5 TABLET ORAL at 08:06

## 2018-11-25 RX ADMIN — HEPARIN SODIUM 5000 UNITS: 5000 INJECTION INTRAVENOUS; SUBCUTANEOUS at 13:53

## 2018-11-25 RX ADMIN — OXYCODONE HYDROCHLORIDE 5 MG: 5 TABLET ORAL at 19:39

## 2018-11-25 RX ADMIN — ATORVASTATIN CALCIUM 40 MG: 40 TABLET, FILM COATED ORAL at 16:24

## 2018-11-25 RX ADMIN — DOCUSATE SODIUM 100 MG: 100 CAPSULE, LIQUID FILLED ORAL at 18:03

## 2018-11-25 RX ADMIN — SERTRALINE HYDROCHLORIDE 100 MG: 100 TABLET ORAL at 08:06

## 2018-11-25 RX ADMIN — HEPARIN SODIUM 5000 UNITS: 5000 INJECTION INTRAVENOUS; SUBCUTANEOUS at 06:16

## 2018-11-25 RX ADMIN — BUSPIRONE HYDROCHLORIDE 5 MG: 5 TABLET ORAL at 18:03

## 2018-11-25 RX ADMIN — POLYETHYLENE GLYCOL 3350 17 G: 17 POWDER, FOR SOLUTION ORAL at 08:06

## 2018-11-25 RX ADMIN — OLANZAPINE 5 MG: 5 TABLET, FILM COATED ORAL at 21:26

## 2018-11-25 RX ADMIN — GABAPENTIN 100 MG: 100 CAPSULE ORAL at 21:26

## 2018-11-25 RX ADMIN — ACETAMINOPHEN 650 MG: 325 TABLET, FILM COATED ORAL at 08:08

## 2018-11-25 RX ADMIN — BUSPIRONE HYDROCHLORIDE 5 MG: 5 TABLET ORAL at 08:09

## 2018-11-25 RX ADMIN — DOCUSATE SODIUM 100 MG: 100 CAPSULE, LIQUID FILLED ORAL at 08:06

## 2018-11-25 RX ADMIN — MIRTAZAPINE 7.5 MG: 15 TABLET ORAL at 21:26

## 2018-11-25 RX ADMIN — HEPARIN SODIUM 5000 UNITS: 5000 INJECTION INTRAVENOUS; SUBCUTANEOUS at 21:26

## 2018-11-25 RX ADMIN — GABAPENTIN 100 MG: 100 CAPSULE ORAL at 16:24

## 2018-11-25 RX ADMIN — GABAPENTIN 100 MG: 100 CAPSULE ORAL at 08:06

## 2018-11-25 NOTE — PROGRESS NOTES
Internal Medicine Progress Note  Patient: Juan Neves  Age/sex: 68 y o  male  Medical Record #: [id number]      ASSESSMENT/PLAN:  Juan Neves is seen and examined and management for following issues:    Fall, right sided weakness; s/p tPA:  radiologic studies did not show CVA     Left periorbital hematoma:   stable (from hitting his head when he fell); will watch      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; s/p posterior cervical decompressive laminectomy with instrumented posterior lateral fusion fixation C3-6 on 11/19 [facility]):  watch incision; continue collar at all times     HTN:   on Lopressor 12 5 mg but too low to get this AM = son said was stopped as OP so will stop again (office note says stopped 2/2 low BP)       OCD: currently was only on his usual dose of Zoloft but also takes Buspar 10mg BID/Zyprexa 15mg qhs/Remeron 15 mg qhs at home  Primary service added back Zyprexa but at 5mg qhs/Remeron 15 HS     ABLA:  mild; required no transfusion; will watch         Subjective: offers no complaints        Scheduled Meds:    Current Facility-Administered Medications:  acetaminophen 650 mg Oral Q6H PRN Armin Lai MD   atorvastatin 40 mg Oral Daily With Lawrence Roberts MD   bacitracin 1 large application Topical BID Armin Lai MD   bisacodyl 10 mg Rectal Daily PRN Armin Lai MD   bisacodyl 10 mg Rectal Once Armin Lai MD   busPIRone 5 mg Oral BID Armin Lai MD   docusate sodium 100 mg Oral BID Armin Lai MD   gabapentin 100 mg Oral TID Armin Lai MD   heparin (porcine) 5,000 Units Subcutaneous [facility] Armin Lai MD   magnesium hydroxide 30 mL Oral Daily PRN Geena Ferguson DO   mirtazapine 7 5 mg Oral HS Armin Lai MD   OLANZapine 5 mg Oral HS Armin Lai MD   OLANZapine 5 mg Oral Daily PRN Armin Lai MD   oxyCODONE 5 mg Oral Q4H PRN Armin Lai MD   polyethylene glycol 17 g Oral Daily Armin Lai MD   polyethylene glycol 17 g Oral Once [facility] Sonia Oswald MD   senna-docusate sodium 1 tablet Oral BID Vincent Noel MD   sertraline 100 mg Oral Daily Vincent Noel MD       Labs:       Results from last 7 days  Lab Units 11/20/18  0624   WBC Thousand/uL 11 20*   HEMOGLOBIN g/dL 10 0*   HEMATOCRIT % 29 7*   PLATELETS Thousands/uL 190  180       Results from last 7 days  Lab Units 11/20/18  0624   POTASSIUM mmol/L 4 2   CHLORIDE mmol/L 103   CO2 mmol/L 23   BUN mg/dL 10   CREATININE mg/dL 0 63   CALCIUM mg/dL 8 0*                No results found for: GLUCOSE    Labs reviewed    Physical Examination:  Vitals:   Vitals:    11/24/18 1041 11/24/18 1315 11/24/18 2041 11/25/18 0516   BP: 110/58 101/52 129/59 96/54   BP Location: Right arm Left arm Right arm Left arm   Pulse:  56 61 73   Resp:  20 18 18   Temp:  97 8 °F (36 6 °C) 97 6 °F (36 4 °C) 98 6 °F (37 °C)   TempSrc:  Oral Oral Oral   SpO2:  98% 96% 97%   Weight:       Height:           Constitutional:  NAD; pleasant; nontoxic  HEENT:  AT/NC; oropharynx negative for thrush on tongue   Neck: negative for JVD  CV:  +S1, S2;  RRR; no rub/murmur  Pulmonary:  BBS without crackles/wheeze/rhonci; resp are unlabored  Abdominal:  soft, +BS, ND/NT; no mass  Skin:  no rashes  Musculoskeletal:  no edema  :  no cristina  Neurological/Psych:  AAO;  LE 5/5; RUE 4/5, LUE 4+/5; no depression/anxiety      [x ] Total time spent: 30 Mins and greater than 50% of this time was spent counseling/coordinating care  ** Please Note: Dragon 360 Dictation voice to text software may have been used in the creation of this document   **

## 2018-11-25 NOTE — PROGRESS NOTES
Speech Note     11/25/18 1000   Pain Assessment   Pain Assessment No/denies pain   Memory Skills   Memory (FIM) 4 - Recognizes/recalls/performs 75-89%   Social Interaction (FIM) 5 - Interacts appropriately with others 90% of time   Speech/Language/Cognition Assessmetn   Treatment Assessment Pt  began session with 4 step written events  Pt  accurately sequenced steps with 14/16 increasing to 16/16 accuracy givenverbal cues  Pt  then completed concrete categorty exclusion activity, pt  accurately stated the word that did not belong in a field of 4 with 14/15 accuracy  Pt  then completed abstract categories exclusion acitivity  Pt  accurately stated the item that did not belong frmo a field of 4 with 9/15 accuracy increasing to 14/15 with verbal cues  Pt  continues to demonstrated cognitive linguistic deficits and continues to benefit from skilled ST services to improve overal cognitive abilities  SLP Therapy Minutes   SLP Time In 1000   SLP Time Out 1030   SLP Total Time (minutes) 30   SLP Mode of treatment - Individual (minutes) 30   SLP Mode of treatment - Concurrent (minutes) 0   SLP Mode of treatment - Group (minutes) 0   SLP Mode of treatment - Co-treat (minutes) 0   SLP Mode of Teatment - Total time(minutes) 30 minutes   Therapy Time missed   Time missed?  No   Daily FIM Score   Problem solving (FIM) 3 - Solves basic problmes 50-74% of time   Comprehension (FIM) 4 - Understands basic info/conversation 75-90% of time   Expression (FIM) 5 - Needs help/cues only RARELY (< 10% of the time)

## 2018-11-25 NOTE — PROGRESS NOTES
11/25/18 1500   Pain Assessment   Pain Assessment 0-10   Pain Score 4   Pain Type Acute pain   Pain Location Neck   Effect of Pain on Daily Activities Patient with difficulty finding position of comfort  Attempted sidelying at the end of session to relieve pressure however, patient did not prefer position  Educated on shoulder rolls to relieve tension  Attempted repositioning of pillows  Restrictions/Precautions   Precautions Bed/chair alarms; Fall Risk;Pain;Supervision on toilet/commode;Spinal precautions   Braces or Orthoses C/S Collar   Cognition   Overall Cognitive Status Impaired   Arousal/Participation Cooperative   Attention Attends with cues to redirect   Orientation Level Oriented X4   Memory Decreased short term memory;Decreased recall of recent events;Decreased recall of precautions  (able to recall 2/3 spinal precautions )   Following Commands Follows multistep commands with increased time or repetition   Subjective   Subjective Patient perseverating on progress  See below  QI: Roll Left and Right   Assistance Needed Supervision   Roll Left and Right CARE Score 4   QI: Sit to Lying   Assistance Needed Supervision   Sit to Lying CARE Score 4   QI: Lying to Sitting on Side of Bed   Assistance Needed Physical assistance   Assistance Provided by [place] Less than 25%   Comment Flavia   Lying to Sitting on Side of Bed CARE Score 3   QI: Sit to Stand   Assistance Needed Supervision   Sit to Stand CARE Score 4   Bed Mobility   Able to Roll Right to Left   QI: Chair/Bed-to-Chair Transfer   Assistance Needed Supervision   Chair/Bed-to-Chair Transfer CARE Score 4   Transfer Bed/Chair/Wheelchair   Limitations Noted In Balance;Confidence; Coordination; Endurance; Sequencing;LE Strength   Adaptive Equipment Roller Walker   Stand Pivot Supervision   Sit to Stand Supervision   Stand to Sit Supervision   Supine to Sit Minimal Assist   Sit to Supine Supervision   All Transfer Minimal Assist   Bed, Chair, Wheelchair Transfer (FIM) 4 - Patient requires steadying assist or light touching   QI: Walk 10 Feet   Assistance Needed Incidental touching   Assistance Provided by [place] Less than 25%   Walk 10 Feet CARE Score 3   QI: Walk 50 Feet with Two Turns   Assistance Needed Incidental touching;Verbal cues   Assistance Provided by [place] Less than 25%   Comment CGA with RW, able to carryover decreased forward flexion during gait and turning    Walk 50 Feet with Two Turns CARE Score 3   Ambulation   Does the patient walk? 2  Yes   Primary Discharge Mode of Locomotion Walk   Walk Assist Level Contact Guard   Gait Pattern Antalgic;Decreased foot clearance; Forward Flexion; Shuffle   Assist Device Jacquelineer Maria Luz Arango Walked (feet) 70 ft   Limitations Noted In Device Management; Heel Strike;Sensation; Sequencing;Speed;Strength;Swing   Wheelchair mobility   QI: Does the patient use a wheelchair? 0  No   QI: 1 Step (Curb)   Assistance Needed Physical assistance   Assistance Provided by [place] 25%-49%   Comment Flavia   1 Step (Curb) CARE Score 3   QI: 4 Steps   Assistance Needed Physical assistance   Assistance Provided by [place] 25%-49%   Comment Flavia   4 Steps CARE Score 3   Stairs   Type Stairs   # of Steps 4   Weight Bearing Precautions Fall Risk   Assist Devices Bilateral Rail  (reports having B railing to enter home )   Findings fwd flexed during turn at top and during forward descend    Stairs (FIM) 2 - Patient goes up and down 4 - 11 stairs regardless of assist/device/setup   QI: Picking Up Object   Reason if not Attempted Safety concerns   Picking Up Object CARE Score 88   Therapeutic Interventions   Other Per request of nursing , orthostatics taken: 121/59 in supine, 118/58 standing and s/p 2 minutes 140/61, 138/63 in standing and 123/59 s/p 2 minutes  During this time, patient with generalized fatigue    Other Comments   Comments Spent time during session educating patient and setting realistic goals   Would benefit from Dr Delaney Joshi consult   Assessment   Treatment Assessment Patient engaged in PT treatment session focussing on transfers, gait and stair negotiation  Patient continues to perseverate on progress which limits his participation despite education  Patient able to perform bed mobility and transfers at S with gait at [facility] using RW  Patient with improvement of gait during turns in which he demonstrated decreased forward flexion  During stairs, patient with forward flexion on descend (CGA/Flavia) and S on ascension  Technique performed with B rails as he reprots having B at home and able to access both at same time  Continues to require skilled PT inervention to progress fucntional mobility (I) and safety  PT Barriers   Physical Impairment Decreased strength;Decreased endurance;Decreased range of motion; Impaired balance;Decreased mobility; Decreased skin integrity;Orthopedic restrictions;Pain   Functional Limitation Car transfers;Stair negotiation; Walking   Plan   Treatment/Interventions Functional transfer training;LE strengthening/ROM; Elevations; Therapeutic exercise; Endurance training;Cognitive reorientation;Equipment eval/education; Bed mobility;Gait training;Patient/family training; Compensatory technique education   Progress Progressing toward goals   Recommendation   Equipment Recommended Walker   PT Therapy Minutes   PT Time In 1500   PT Time Out 1530   PT Total Time (minutes) 30   PT Mode of treatment - Individual (minutes) 30   PT Mode of treatment - Concurrent (minutes) 0   PT Mode of treatment - Group (minutes) 0   PT Mode of treatment - Co-treat (minutes) 0   PT Mode of Teatment - Total time(minutes) 30 minutes   Therapy Time missed   Time missed?  No

## 2018-11-26 PROBLEM — R42 POSITIONAL LIGHTHEADEDNESS: Status: ACTIVE | Noted: 2018-11-26

## 2018-11-26 LAB
ANION GAP SERPL CALCULATED.3IONS-SCNC: 4 MMOL/L (ref 4–13)
BASOPHILS # BLD AUTO: 0.01 THOUSANDS/ΜL (ref 0–0.1)
BASOPHILS NFR BLD AUTO: 0 % (ref 0–1)
BUN SERPL-MCNC: 10 MG/DL (ref 5–25)
CALCIUM SERPL-MCNC: 9.2 MG/DL (ref 8.3–10.1)
CHLORIDE SERPL-SCNC: 103 MMOL/L (ref 100–108)
CO2 SERPL-SCNC: 27 MMOL/L (ref 21–32)
CREAT SERPL-MCNC: 0.62 MG/DL (ref 0.6–1.3)
EOSINOPHIL # BLD AUTO: 0.14 THOUSAND/ΜL (ref 0–0.61)
EOSINOPHIL NFR BLD AUTO: 2 % (ref 0–6)
ERYTHROCYTE [DISTWIDTH] IN BLOOD BY AUTOMATED COUNT: 14 % (ref 11.6–15.1)
GFR SERPL CREATININE-BSD FRML MDRD: 98 ML/MIN/1.73SQ M
GLUCOSE SERPL-MCNC: 84 MG/DL (ref 65–140)
HCT VFR BLD AUTO: 32.5 % (ref 36.5–49.3)
HGB BLD-MCNC: 10.7 G/DL (ref 12–17)
IMM GRANULOCYTES # BLD AUTO: 0.03 THOUSAND/UL (ref 0–0.2)
IMM GRANULOCYTES NFR BLD AUTO: 1 % (ref 0–2)
LYMPHOCYTES # BLD AUTO: 1.34 THOUSANDS/ΜL (ref 0.6–4.47)
LYMPHOCYTES NFR BLD AUTO: 21 % (ref 14–44)
MCH RBC QN AUTO: 31 PG (ref 26.8–34.3)
MCHC RBC AUTO-ENTMCNC: 32.9 G/DL (ref 31.4–37.4)
MCV RBC AUTO: 94 FL (ref 82–98)
MONOCYTES # BLD AUTO: 0.81 THOUSAND/ΜL (ref 0.17–1.22)
MONOCYTES NFR BLD AUTO: 12 % (ref 4–12)
NEUTROPHILS # BLD AUTO: 4.2 THOUSANDS/ΜL (ref 1.85–7.62)
NEUTS SEG NFR BLD AUTO: 64 % (ref 43–75)
NRBC BLD AUTO-RTO: 0 /100 WBCS
PLATELET # BLD AUTO: 251 THOUSANDS/UL (ref 149–390)
PMV BLD AUTO: 12.2 FL (ref 8.9–12.7)
POTASSIUM SERPL-SCNC: 4 MMOL/L (ref 3.5–5.3)
RBC # BLD AUTO: 3.45 MILLION/UL (ref 3.88–5.62)
SODIUM SERPL-SCNC: 134 MMOL/L (ref 136–145)
WBC # BLD AUTO: 6.53 THOUSAND/UL (ref 4.31–10.16)

## 2018-11-26 PROCEDURE — 85025 COMPLETE CBC W/AUTO DIFF WBC: CPT | Performed by: NURSE PRACTITIONER

## 2018-11-26 PROCEDURE — 99232 SBSQ HOSP IP/OBS MODERATE 35: CPT

## 2018-11-26 PROCEDURE — 80048 BASIC METABOLIC PNL TOTAL CA: CPT | Performed by: NURSE PRACTITIONER

## 2018-11-26 PROCEDURE — 97116 GAIT TRAINING THERAPY: CPT

## 2018-11-26 PROCEDURE — G0515 COGNITIVE SKILLS DEVELOPMENT: HCPCS

## 2018-11-26 PROCEDURE — 97535 SELF CARE MNGMENT TRAINING: CPT

## 2018-11-26 PROCEDURE — 97530 THERAPEUTIC ACTIVITIES: CPT

## 2018-11-26 PROCEDURE — 97110 THERAPEUTIC EXERCISES: CPT

## 2018-11-26 RX ORDER — GABAPENTIN 100 MG/1
200 CAPSULE ORAL 3 TIMES DAILY
Status: DISCONTINUED | OUTPATIENT
Start: 2018-11-26 | End: 2018-11-27

## 2018-11-26 RX ORDER — OLANZAPINE 10 MG/1
10 TABLET ORAL
Status: DISCONTINUED | OUTPATIENT
Start: 2018-11-26 | End: 2018-12-06 | Stop reason: HOSPADM

## 2018-11-26 RX ADMIN — GABAPENTIN 200 MG: 100 CAPSULE ORAL at 21:27

## 2018-11-26 RX ADMIN — BACITRACIN ZINC 1 LARGE APPLICATION: 500 OINTMENT TOPICAL at 08:11

## 2018-11-26 RX ADMIN — MIRTAZAPINE 7.5 MG: 15 TABLET ORAL at 21:28

## 2018-11-26 RX ADMIN — OLANZAPINE 10 MG: 10 TABLET, FILM COATED ORAL at 21:28

## 2018-11-26 RX ADMIN — SENNOSIDES AND DOCUSATE SODIUM 1 TABLET: 8.6; 5 TABLET ORAL at 17:40

## 2018-11-26 RX ADMIN — BUSPIRONE HYDROCHLORIDE 5 MG: 5 TABLET ORAL at 17:40

## 2018-11-26 RX ADMIN — OXYCODONE HYDROCHLORIDE 5 MG: 5 TABLET ORAL at 13:36

## 2018-11-26 RX ADMIN — ACETAMINOPHEN 650 MG: 325 TABLET, FILM COATED ORAL at 08:10

## 2018-11-26 RX ADMIN — SERTRALINE HYDROCHLORIDE 100 MG: 100 TABLET ORAL at 08:13

## 2018-11-26 RX ADMIN — ATORVASTATIN CALCIUM 40 MG: 40 TABLET, FILM COATED ORAL at 16:09

## 2018-11-26 RX ADMIN — DOCUSATE SODIUM 100 MG: 100 CAPSULE, LIQUID FILLED ORAL at 17:40

## 2018-11-26 RX ADMIN — HEPARIN SODIUM 5000 UNITS: 5000 INJECTION INTRAVENOUS; SUBCUTANEOUS at 21:27

## 2018-11-26 RX ADMIN — SENNOSIDES AND DOCUSATE SODIUM 1 TABLET: 8.6; 5 TABLET ORAL at 08:12

## 2018-11-26 RX ADMIN — OXYCODONE HYDROCHLORIDE 5 MG: 5 TABLET ORAL at 08:12

## 2018-11-26 RX ADMIN — GABAPENTIN 100 MG: 100 CAPSULE ORAL at 16:09

## 2018-11-26 RX ADMIN — DOCUSATE SODIUM 100 MG: 100 CAPSULE, LIQUID FILLED ORAL at 08:11

## 2018-11-26 RX ADMIN — POLYETHYLENE GLYCOL 3350 17 G: 17 POWDER, FOR SOLUTION ORAL at 08:12

## 2018-11-26 RX ADMIN — GABAPENTIN 100 MG: 100 CAPSULE ORAL at 08:11

## 2018-11-26 RX ADMIN — BACITRACIN ZINC 1 LARGE APPLICATION: 500 OINTMENT TOPICAL at 17:40

## 2018-11-26 RX ADMIN — HEPARIN SODIUM 5000 UNITS: 5000 INJECTION INTRAVENOUS; SUBCUTANEOUS at 06:14

## 2018-11-26 RX ADMIN — HEPARIN SODIUM 5000 UNITS: 5000 INJECTION INTRAVENOUS; SUBCUTANEOUS at 13:37

## 2018-11-26 RX ADMIN — BUSPIRONE HYDROCHLORIDE 5 MG: 5 TABLET ORAL at 08:11

## 2018-11-26 NOTE — PROGRESS NOTES
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; s/p posterior cervical decompressive laminectomy with instrumented posterior lateral fusion fixation C3-6 on 11/19 [facility]):  watch incision; continue collar at all times     HTN: stopped Lopressor again last week (office note says stopped 2/2 low BP)  Is stable w/o it      OCD: meds per primary team     ABLA:  mild; required no transfusion; will watch         Subjective: offers no complaints        Scheduled Meds:    Current Facility-Administered Medications:  acetaminophen 650 mg Oral Q6H PRN Shavonne Urban MD   atorvastatin 40 mg Oral Daily With Adlaid Barnett MD   bacitracin 1 large application Topical BID Shavonne Urban MD   bisacodyl 10 mg Rectal Daily PRN Shavonne Urban MD   bisacodyl 10 mg Rectal Once Shavonne Urban MD   busPIRone 5 mg Oral BID Shavonne Urban MD   docusate sodium 100 mg Oral BID Shavonne Urban MD   gabapentin 100 mg Oral TID Shavonne Urban MD   heparin (porcine) 5,000 Units [facility] Shavonne Urban MD   magnesium hydroxide 30 mL Oral Daily PRN Jackolyn Alert, DO   mirtazapine 7 5 mg Oral HS Shavonne Urban MD   OLANZapine 5 mg Oral HS Shavonne Urban MD   OLANZapine 5 mg Oral Daily PRN Shavonne Urban MD   oxyCODONE 5 mg Oral Q4H PRN Shavonne Urban MD   polyethylene glycol 17 g Oral Daily Shavonne Urban MD   polyethylene glycol 17 g Oral Once Shavonne Urban MD   senna-docusate sodium 1 tablet Oral BID Shavonne Urban MD   sertraline 100 mg Oral Daily Shavonne Urban MD       Labs:       Results from last 7 days  Lab Units 11/26/18  0553 11/20/18  0624   WBC Thousand/uL 6 53 11 20*   HEMOGLOBIN g/dL 10 7* 10 0*   HEMATOCRIT % 32 5* 29 7*   PLATELETS Thousands/uL 251 190  180       Results from last 7 days  Lab Units 11/26/18  0553 11/20/18  0624   POTASSIUM mmol/L 4 0 4 2   CHLORIDE mmol/L 103 103   CO2 mmol/L 27 23   BUN mg/dL 10 10   CREATININE mg/dL 0 62 0 63   CALCIUM mg/dL 9 2 8 0*                No results found for: GLUCOSE    Labs reviewed    Physical Examination:  Vitals:   Vitals:    11/25/18 1518 11/25/18 1521 11/25/18 2109 11/26/18 0527   BP: 138/63 123/59 115/54 121/78   BP Location:   Left arm Left arm   Pulse:   58 60   Resp:   18 18   Temp:   98 4 °F (36 9 °C) 98 1 °F (36 7 °C)   TempSrc:   Oral Oral   SpO2:   95% 99%   Weight:       Height:           Constitutional:  NAD; pleasant; nontoxic  HEENT:  AT/NC; oropharynx negative for thrush on tongue   Neck: negative for JVD  CV:  +S1, S2;  RRR; no rub/murmur  Pulmonary:  BBS without crackles/wheeze/rhonci; resp are unlabored  Abdominal:  soft, +BS, ND/NT; no mass  Skin:  no rashes  Musculoskeletal:  no edema  :  no cristina  Neurological/Psych:  AAO;  LE 5/5; RUE 4/5, LUE 4+/5; no depression/anxiety      [x ] Total time spent: 30 Mins and greater than 50% of this time was spent counseling/coordinating care  ** Please Note: Dragon 360 Dictation voice to text software may have been used in the creation of this document   **

## 2018-11-26 NOTE — PROGRESS NOTES
11/26/18 1300   Pain Assessment   Pain Assessment 0-10   Pain Score 5   Pain Location [facility] Pain Intervention(s) Repositioned;Distraction   Restrictions/Precautions   Precautions Bed/chair alarms;Cognitive; Fall Risk;Supervision on toilet/commode;Spinal precautions   Braces or Orthoses C/S Collar   Memory Skills   Memory (FIM) 3 - Recognizes, recalls/performs 50-74%   Social Interaction (FIM) 5 - Interacts appropriately with others 90% of time   Speech/Language/Cognition Assessmetn   Treatment Assessment Session started in overall d/w pt in regards to overall diagnosis vs  current progress  Pt noting to have a difficult time in recognizing current functional abilities, where pt on more than one occasion had stated "I'm not waliking yet" but has been clearly walking w/ a RW in therapies  Increased education needed in recognizing the various levels of mobility, which pt's recall was noted to be decreased  Pt willing to participate in written 5-step sequencing task where pt was 13/15 accurate, increasing to 15/15 given review  STM recall of daily event was min A,needing verbal cues to increase recall of prior meal, activities in prior therapies and visitors  Pt will continue to benefit from SLP services at this time to maximize cognitive linguistic skills at this time  SLP Therapy Minutes   SLP Time In 1300   SLP Time Out 1330   SLP Total Time (minutes) 30   SLP Mode of treatment - Individual (minutes) 30   SLP Mode of treatment - Concurrent (minutes) 0   SLP Mode of treatment - Group (minutes) 0   SLP Mode of treatment - Co-treat (minutes) 0   SLP Mode of Teatment - Total time(minutes) 30 minutes   Therapy Time missed   Time missed?  No   Daily FIM Score   Problem solving (FIM) 3 - Solves basic problmes 50-74% of time   Comprehension (FIM) 4 - Understands basic info/conversation 75-90% of time   Expression (FIM) 5 - Needs help/cues only RARELY (< 10% of the time)

## 2018-11-26 NOTE — PROGRESS NOTES
11/26/18 0830   Pain Assessment   Pain Assessment 0-10   Pain Score Worst Possible Pain   Pain Type Acute pain   Pain Location Arm;Neck   Restrictions/Precautions   Precautions Bed/chair alarms;Cognitive; Fall Risk;Supervision on toilet/commode   Braces or Orthoses C/S Collar   QI: Shower/Bathe Self   Assistance Needed Physical assistance   Assistance Provided by [place] 25%-49%   Comment Pt requires steadying assist whiel in stance to wash nancy and buttock area  Pt requries multiple attempts for thorughness  Pt requires assist to wash b/l feet  Pt attmepted to lean forward to wash feet and educated on precautions  Pt unabelt o cross leg over opposing knee  Shower/Bathe Self CARE Score 3   Bathing   Assessed Bath Style Sponge Bath  (due to low bp's)   Anticipated D/C Bath Style Tub   Able to Gather/Transport No   Able to [employer] Temperature Yes   Able to Wash/Rinse/Dry (body part) Left Arm;Right Arm;L Upper Leg;R Upper Leg;Chest;Abdomen;Perineal Area; Buttocks   Limitations Noted in Balance; Coordination; Safety;Strength;Timeliness;Problem Solving   Positioning Seated;Standing   Bathing (FIM) 4 - Patient completes 8/10 or 9/10 parts   Tub/Shower Transfer   Not Assessed Sponge Bath;Medical  (low BP's)   QI: Upper Body Dressing   Assistance Needed Physical assistance   Assistance Provided by [place] 25%-49%   Comment Pt [place] to thread b/l UE's into shirt and overhead  Pt requires cueing to pull shirt over head  Pt requires assit to pull shirt down in back   Upper Body Dressing CARE Score 3   QI: Lower Body Dressing   Assistance Needed Physical assistance   Assistance Provided by [place] 25%-49%   Comment Pt [place] to use LH reacher to thread underwear with thearpist cueing for set up on reacher  Pt required assist to thread LE's into pants even with use of LH reacher   Pt requires steadying assist to pull aptns up over hips   Lower Body Dressing CARE Score 3   QI: Putting On/Taking Off Footwear   Assistance Needed Physical assistance   Assistance Provided by [place] 50%-74%   Comment Pt able to doff socks by using opposing foot  Pt requires assist to don b/l socks  Putting On/Taking Off Footwear CARE Score 2   Dressing/Undressing Clothing   Remove UB Clothes Pullover Shirt   Remove LB Clothes Pants;Socks   Don UB Clothes Pullover Shirt   Don LB Clothes Pants; Undergarment;Socks   Limitations Noted In Balance; Coordination;Problem Solving; Safety;Strength;Timeliness   Positioning Supported Sit;Standing   UB Dressing (FIM) 4 - Patient completes 75% of all tasks   LB Dressing (FIM) 3 - Patient completes  50-74% of all tasks   QI: Roll Left and Right   Assistance Needed Supervision   Assistance Provided by [place] No physical assistance   Roll Left and Right CARE Score 4   QI: Lying to Sitting on Side of Bed   Assistance Needed Physical assistance   Assistance Provided by [place] Less than 25%   Lying to Sitting on Side of Bed CARE Score 3   QI: Sit to Stand   Assistance Needed Physical assistance   Assistance Provided by [place] Less than 25%   Sit to Stand CARE Score 3   QI: Chair/Bed-to-Chair Transfer   Assistance Needed Physical assistance   Assistance Provided by [place] 25%-49%   Chair/Bed-to-Chair Transfer CARE Score 3   Transfer Bed/Chair/Wheelchair   Stand Pivot Minimal Assist   Sit to Stand Minimal   Stand to Sit Minimal   Bed, Chair, Wheelchair Transfer (FIM) 3 - [place] needs to lift, boost or assist to stand OR sit   QI: [address] Needed Physical assistance   Assistance Provided by [place] 25%-49%   Comment Pt requires assist for hygiene post bladder void  pt requires steadying assist to pull pants up over hips   Chivo Perezi 83 Score 3   Toileting   Able to [address] down yes, up yes  Able to Manage Clothing Closures No   Manage Hygiene Bladder   Limitations Noted In Balance; Coordination; Safety;UE Strength;LE Strength   Toileting (FIM) 4 - Patient requires steadying assist or light touching QI: Toilet Transfer   Assistance Needed Physical assistance   Assistance Provided by [place] 25%-49%   Toilet Transfer CARE Score 3   Toilet Transfer   Surface Assessed Standard Toilet   Transfer Technique Stand Pivot   Limitations Noted In Balance; Safety;LE Strength   Adaptive Equipment Grab Bar   Toilet Transfer (FIM) 4 - Patient requires steadying assist or light touching   Cognition   Overall Cognitive Status Impaired   Arousal/Participation Alert; Cooperative   Attention Attends with cues to redirect   Orientation Level Oriented X4   Memory Decreased long term memory   Following Commands Follows multistep commands with increased time or repetition   Activity Tolerance   Activity Tolerance Patient limited by pain   Assessment   Treatment Assessment Pt seen for OT treatment session wtih focus on goal identification, education on post surgical rehab, and ADL routine  Pt anxious today and fixated on fear of being discharged and not being independent  Therapist spent extensive time identifying pt's fears, educating pt on expectationed and rehab process for his type of surgery, and remaining present and not focusing on discharge, as pt does not yet have d/c date  Identified goal for week: TO improve standing balance during clothing management so that therapist does not need to guard patient while in stance  Pt did have low BP;'s during today's session and were as follows: 110/59(seated), 119/60 (standing), `110/56 (seated)  and 115/57 (standing)  Pt edcuated on improtance of drinking fluids as pt was tryhing not to drink alot in order to not have to urinate  Pt was also limited during ADL routine due to pt not wanting to take pain medicatino prior to session  Pt educated about PRN pain medications and when to take medication in order to be able to engage in activities  Pt did demonstrate improved standing balance today and improved  with hand in order to manage clothing up/down   Pt does contineu to fatigue quickly and requires increased time and effort to complete tasks  Contineu wtih POC with focus on standing balance with b/l hand release, fxnl transfrers,  strength, and self feeding/grooming  Prognosis Fair   Problem List Decreased strength;Decreased range of motion;Decreased endurance; Impaired balance;Decreased mobility; Decreased coordination;Pain;Orthopedic restrictions   Plan   Treatment/Interventions ADL retraining;Functional transfer training;LE strengthening/ROM; Therapeutic exercise; Endurance training;Patient/family training;Equipment eval/education;Gait training; Compensatory technique education; Bed mobility   Progress Progressing toward goals   Recommendation   OT Discharge Recommendation (rehab vs home pending progress)   OT Therapy Minutes   OT Time In 0830   OT Time Out 1000   OT Total Time (minutes) 90   OT Mode of treatment - Individual (minutes) 90   OT Mode of treatment - Concurrent (minutes) 0   OT Mode of treatment - Group (minutes) 0   OT Mode of treatment - Co-treat (minutes) 0   OT Mode of Teatment - Total time(minutes) 90 minutes   Therapy Time missed   Time missed?  No

## 2018-11-26 NOTE — PROGRESS NOTES
11/26/18 1030   Pain Assessment   Pain Assessment 0-10   Pain Score 4   Pain Type Surgical pain;Acute pain   Pain Location Neck   Pain Radiating Towards B UE   Pain Descriptors Shooting; Sharp   Pain Frequency Intermittent   Effect of Pain on Daily Activities limits use of B    [facility]ospital Pain Intervention(s) Rest;Repositioned   Restrictions/Precautions   Precautions Fall Risk;Spinal precautions   Braces or Orthoses C/S Collar   Cognition   Attention Difficulty dividing attention   Following Commands Follows one step commands with increased time or repetition   Subjective   Subjective feels like he has no energy   QI: Roll Left and Right   Assistance Needed Supervision;Verbal cues   Roll Left and Right CARE Score 4   QI: Sit to Lying   Assistance Needed Supervision;Verbal cues   Sit to Lying CARE Score 4   QI: Lying to Sitting on Side of Bed   Assistance Needed Verbal cues; Supervision   Lying to Sitting on Side of Bed CARE Score 4   QI: Sit to Stand   Assistance Needed Incidental touching   Sit to Stand CARE Score 4   QI: Chair/Bed-to-Chair Transfer   Assistance Needed Physical assistance;Verbal cues; Adaptive equipment   Assistance Provided by [place] Less than 25%   Chair/Bed-to-Chair Transfer CARE Score 3   Transfer Bed/Chair/Wheelchair   Limitations Noted In Balance;LE Strength;UE Strength   Adaptive Equipment Hand Hold;Roller Walker   Bed, Chair, Wheelchair Transfer (FIM) 4 - Patient requires steadying assist or light touching   QI: Car Transfer   Assistance Needed Physical assistance;Verbal cues; Adaptive equipment   Assistance Provided by [place] Less than 25%   Car Transfer CARE Score 3   QI: Walk 10 Feet   Assistance Needed Incidental touching; Adaptive equipment   Assistance Provided by [place] Less than 25%   Walk 10 Feet CARE Score 3   QI: Walk 50 Feet with Two Turns   Assistance Needed Incidental touching; Adaptive equipment   Assistance Provided by [place] Less than 25%   Walk 50 Feet with Two Turns CARE Score 3   QI: Walk 150 Feet   Assistance Needed Physical assistance; Adaptive equipment   Assistance Provided by [place] Less than 25%   Walk 150 Feet CARE Score 3   QI: Walking 10 Feet on Uneven Surfaces   Assistance Needed Physical assistance;Verbal cues; Adaptive equipment   Assistance Provided by [place] 25%-49%   Comment ramp with RW   Walking 10 Feet on Uneven Surfaces CARE Score 3   Ambulation   Does the patient walk? 2  Yes   Primary Discharge Mode of Locomotion Walk   Walk Assist Level Minimum Assist   Gait Pattern Inconsistant Sandra; Forward Flexion   Assist Device Roller Walker;Hand Hold   Distance Walked (feet) 150 ft  (x 3)   Limitations Noted In Strength;Posture; Endurance;Balance   Findings Pt when fatigued has increased flexed posture   Walking (FIM) 4 - Patient requires steadying assist or light touching AND distance 150 feet or more, no rest   Wheelchair mobility   QI: Does the patient use a wheelchair? 0  No   QI: 1 Step (Curb)   Assistance Needed Physical assistance;Verbal cues; Adaptive equipment   Assistance Provided by [place] 25%-49%   1 Step (Curb) CARE Score 3   QI: 4 Steps   Assistance Needed Physical assistance;Verbal cues   Assistance Provided by [place] 25%-49%   4 Steps CARE Score 3   QI: 12 Steps   Reason if not Attempted Safety concerns   12 Steps CARE Score 88   Stairs   Type Stairs   # of Steps 6   Weight Bearing Precautions Fall Risk   Assist Devices Single Rail   Findings R HR, leading with R LE going up   Stairs (FIM) 2 - Patient goes up and down 4 - 11 stairs regardless of assist/device/setup   Therapeutic Interventions   Strengthening postural ex's seated at edge of chair, ravindra in lap 5 bouts for 1 min  Flexibility Passive stretch B calves x 3 min   Other Reviewed positioning on bed with proper support of pillows in supine and sidelying  Pt using 1 pilow and soft care cushion in supine with HOB elevated ~ 15 degrees  Sidelying pt hugging pillow for U Esupport and pillow btwn knees  Equipment Use   NuStep 10 min B LE lvl 2   Assessment   Treatment Assessment Pt participating well  Pt having intermittent c/o pain B UE when reaching or when fatigued with incresaed kyphosis  Pt will benefit form continued strength, conditioning and standing balance activities to maximize his functional Ind and safety  Plan   Treatment/Interventions Gait training;Bed mobility; Equipment eval/education;Patient/family training; Endurance training; Therapeutic exercise;LE strengthening/ROM; Functional transfer training   Progress Progressing toward goals   Recommendation   Recommendation Home with family support;Home PT;Outpatient PT   Equipment Recommended Walker   PT Therapy Minutes   PT Time In 1030   PT Time Out 1130   PT Total Time (minutes) 60   PT Mode of treatment - Individual (minutes) 60   PT Mode of treatment - Concurrent (minutes) 0   PT Mode of treatment - Group (minutes) 0   PT Mode of treatment - Co-treat (minutes) 0   PT Mode of Teatment - Total time(minutes) 60 minutes   Therapy Time missed   Time missed?  No

## 2018-11-27 ENCOUNTER — TELEPHONE (OUTPATIENT)
Dept: NEUROSURGERY | Facility: CLINIC | Age: 73
End: 2018-11-27

## 2018-11-27 PROCEDURE — 97116 GAIT TRAINING THERAPY: CPT

## 2018-11-27 PROCEDURE — G0515 COGNITIVE SKILLS DEVELOPMENT: HCPCS

## 2018-11-27 PROCEDURE — 97110 THERAPEUTIC EXERCISES: CPT

## 2018-11-27 PROCEDURE — 99232 SBSQ HOSP IP/OBS MODERATE 35: CPT

## 2018-11-27 PROCEDURE — 97535 SELF CARE MNGMENT TRAINING: CPT

## 2018-11-27 PROCEDURE — 97530 THERAPEUTIC ACTIVITIES: CPT

## 2018-11-27 RX ORDER — ACETAMINOPHEN 325 MG/1
975 TABLET ORAL EVERY 8 HOURS SCHEDULED
Status: DISCONTINUED | OUTPATIENT
Start: 2018-11-27 | End: 2018-12-06 | Stop reason: HOSPADM

## 2018-11-27 RX ORDER — GABAPENTIN 300 MG/1
300 CAPSULE ORAL 3 TIMES DAILY
Status: DISCONTINUED | OUTPATIENT
Start: 2018-11-27 | End: 2018-11-28

## 2018-11-27 RX ADMIN — MIRTAZAPINE 7.5 MG: 15 TABLET ORAL at 21:32

## 2018-11-27 RX ADMIN — HEPARIN SODIUM 5000 UNITS: 5000 INJECTION INTRAVENOUS; SUBCUTANEOUS at 05:59

## 2018-11-27 RX ADMIN — GABAPENTIN 300 MG: 300 CAPSULE ORAL at 21:33

## 2018-11-27 RX ADMIN — SENNOSIDES AND DOCUSATE SODIUM 1 TABLET: 8.6; 5 TABLET ORAL at 09:14

## 2018-11-27 RX ADMIN — OXYCODONE HYDROCHLORIDE 5 MG: 5 TABLET ORAL at 14:28

## 2018-11-27 RX ADMIN — DOCUSATE SODIUM 100 MG: 100 CAPSULE, LIQUID FILLED ORAL at 17:27

## 2018-11-27 RX ADMIN — HEPARIN SODIUM 5000 UNITS: 5000 INJECTION INTRAVENOUS; SUBCUTANEOUS at 21:33

## 2018-11-27 RX ADMIN — ACETAMINOPHEN 975 MG: 325 TABLET, FILM COATED ORAL at 21:33

## 2018-11-27 RX ADMIN — BACITRACIN ZINC 1 LARGE APPLICATION: 500 OINTMENT TOPICAL at 17:27

## 2018-11-27 RX ADMIN — SERTRALINE HYDROCHLORIDE 100 MG: 100 TABLET ORAL at 09:14

## 2018-11-27 RX ADMIN — OLANZAPINE 10 MG: 10 TABLET, FILM COATED ORAL at 21:33

## 2018-11-27 RX ADMIN — HEPARIN SODIUM 5000 UNITS: 5000 INJECTION INTRAVENOUS; SUBCUTANEOUS at 14:22

## 2018-11-27 RX ADMIN — GABAPENTIN 200 MG: 100 CAPSULE ORAL at 09:14

## 2018-11-27 RX ADMIN — ATORVASTATIN CALCIUM 40 MG: 40 TABLET, FILM COATED ORAL at 16:08

## 2018-11-27 RX ADMIN — BACITRACIN ZINC 1 LARGE APPLICATION: 500 OINTMENT TOPICAL at 10:12

## 2018-11-27 RX ADMIN — BUSPIRONE HYDROCHLORIDE 5 MG: 5 TABLET ORAL at 17:27

## 2018-11-27 RX ADMIN — OXYCODONE HYDROCHLORIDE 5 MG: 5 TABLET ORAL at 09:15

## 2018-11-27 RX ADMIN — DOCUSATE SODIUM 100 MG: 100 CAPSULE, LIQUID FILLED ORAL at 09:14

## 2018-11-27 RX ADMIN — SENNOSIDES AND DOCUSATE SODIUM 1 TABLET: 8.6; 5 TABLET ORAL at 17:27

## 2018-11-27 RX ADMIN — POLYETHYLENE GLYCOL 3350 17 G: 17 POWDER, FOR SOLUTION ORAL at 09:15

## 2018-11-27 RX ADMIN — BUSPIRONE HYDROCHLORIDE 5 MG: 5 TABLET ORAL at 09:13

## 2018-11-27 RX ADMIN — OXYCODONE HYDROCHLORIDE 5 MG: 5 TABLET ORAL at 18:29

## 2018-11-27 NOTE — ASSESSMENT & PLAN NOTE
Symptomatic particularly early in course > Improving  Midodrine 5mg TID before meals; check BP at home with BP log and follow-up with PCP  Abdominal binder when mobilizing +/- MALACHI hose

## 2018-11-27 NOTE — PROGRESS NOTES
11/27/18 1100   Pain Assessment   Pain Assessment 0-10   Pain Score Worst Possible Pain   Pain Location Neck   Pain Orientation Bilateral   Hospital Pain Intervention(s) Repositioned;Distraction   Restrictions/Precautions   Precautions Bed/chair alarms;Cognitive; Fall Risk;Supervision on toilet/commode;Spinal precautions   Braces or Orthoses C/S Collar   Memory Skills   Memory (FIM) 5 - Recalls/performs request 90% of time   Social Interaction (FIM) 5 - Interacts appropriately with others 90% of time   Speech/Language/Cognition Assessmetn   Treatment Assessment Pt engaging in completing written 5 and 6 step sequences  Pt was 25/25 accurate in completing written 5-step sequences, just needing mildly increased time to complete task  Pt was 18/18 accurate in [id number] written 6-step sequences  Pt then engagining in auditory word recall task given word placement  Pt was 13/13 accurate in recalling all words appropriately w/o need for any cues  However, STM recall of daily events was grossly [facility] for recalling visitors, activities completed in prior therapy session (PT), and minimal cues to recall items consumed at breakfast  Will continue to f/u to maximize cognitive linguistic skills at this time  SLP Therapy Minutes   SLP Time In 1100   SLP Time Out 1130   SLP Total Time (minutes) 30   SLP Mode of treatment - Individual (minutes) 30   SLP Mode of treatment - Concurrent (minutes) 0   SLP Mode of treatment - Group (minutes) 0   SLP Mode of treatment - Co-treat (minutes) 0   SLP Mode of Teatment - Total time(minutes) 30 minutes   Therapy Time missed   Time missed?  No   Daily FIM Score   Problem solving (FIM) 5 - Solves basic problems 90% of time   Comprehension (FIM) 5 - Understands basic directions and conversation   Expression (FIM) 5 - Needs help/cues only RARELY (< 10% of the time)

## 2018-11-27 NOTE — PROGRESS NOTES
11/27/18 1500   Pain Assessment   Pain Assessment 0-10   Pain Score Worst Possible Pain   Pain Type Acute pain;Surgical pain   Pain Location Neck   Pain Orientation Bilateral   Pain Radiating Towards spine    Pain Descriptors Shooting; Sharp   Pain Frequency Constant/continuous   Pain Onset Ongoing   Clinical Progression Not changed   Effect of Pain on Daily Activities limits participation and quality of mobility    Patient's Stated Pain Goal No pain   Hospital Pain Intervention(s) Repositioned; Ambulation/increased activity; Distraction   Response to Interventions unchanged    Restrictions/Precautions   Precautions Bed/chair alarms; Fall Risk;Pain;Supervision on toilet/commode;Spinal precautions   Braces or Orthoses C/S Collar   Cognition   Overall Cognitive Status Impaired   Arousal/Participation Cooperative   Attention Difficulty attending to directions   Orientation Level Oriented to person;Oriented to place;Oriented to situation   Memory Decreased short term memory;Decreased recall of recent events   Following Commands Follows one step commands with increased time or repetition   Comments Pt perseverating on past  Difficult to redirect on current progress being made    Subjective   Subjective Patient agreeable to particiapte    QI: Sit to [address] Provided by [place] No physical assistance   Sit to Lying CARE Score 4   QI: Sit to [address] Provided by [place] No physical assistance   Sit to Stand CARE Score 4   QI: Chair/Bed-to-Chair Transfer   Assistance Needed Adaptive equipment; Incidental touching   Assistance Provided by [place] Less than 25%   Comment CGA with RW   Chair/Bed-to-Chair Transfer CARE Score 3   Transfer Bed/Chair/Wheelchair   Limitations Noted In Balance;Confidence; Coordination; Endurance;Pain Management;UE Strength;LE Strength   Adaptive Equipment Roller Walker   Stand Pivot Contact Guard   Sit to TXU [place] Stand to Sit Supervision   Sit to Supine Supervision   All Transfer Contact Guard   Bed, Chair, Wheelchair Transfer (FIM) 4 - Patient requires steadying assist or light touching   QI: Walk 10 Feet   Assistance Needed Adaptive equipment; Incidental touching   Assistance Provided by [place] Less than 25%   Comment CGA    Walk 10 Feet CARE Score 3   QI: Walk 50 Feet with Two Turns   Assistance Needed Adaptive equipment; Incidental touching   Assistance Provided by [place] Less than 25%   Walk 50 Feet with Two Turns CARE Score 3   QI: Walk 150 Feet   Assistance Needed Adaptive equipment; Incidental touching   Assistance Provided by [place] Less than 25%   Walk 150 Feet CARE Score 3   Ambulation   Does the patient walk? 2  Yes   Primary Discharge Mode of Locomotion Walk   Walk Assist Level Contact Guard   Gait Pattern Inconsistant Sandra;Decreased foot clearance; Forward Flexion   Assist Device Roller Maria Luz Arango Walked (feet) 200 ft  (x3)   Limitations Noted In Balance; Endurance; Heel Strike;Posture; Sequencing;Speed   Findings Fwd flexion on turns    Walking (FIM) 4 - Patient requires steadying assist or light touching AND distance 150 feet or more, no rest   QI: Wheel 50 Feet with Two Turns   Reason if not Attempted Activity not applicable   Wheel 50 Feet with Two Turns CARE Score 9   QI: Wheel 150 Feet   Reason if not Attempted Activity not applicable   Wheel 717 Feet CARE Score 9   Wheelchair mobility   QI: Does the patient use a wheelchair? 0   No   QI: Picking Up Object   Reason if not Attempted Safety concerns   Picking Up Object CARE Score 88   Toilet Transfer   Toilet Transfer (FIM) 4 - Patient requires steadying assist or light touching   Therapeutic Interventions   Flexibility HS, SKTC, piriformis stretching BLE    Balance unsupported standing 60 seconds with wide YEN; 60 seconds with feet touching   Assessment   Treatment Assessment Patient limited this session 2* self limiting behavior and generalized low mood  Patient continues to perseverate on past and has difficulty engaging in new tasks  Patient educated on gains made thusfar with PT intervention however, patient not responding positively  Continues to require CGA for ambulation  Focus of future sessions: stair training  Will conitnue to require skilled PT intervention to maximize function and reduce caregiver burden  PT Barriers   Physical Impairment Decreased strength;Decreased range of motion;Decreased endurance; Impaired balance;Decreased mobility; Decreased coordination; Impaired sensation;Decreased skin integrity;Orthopedic restrictions;Pain   Functional Limitation Car transfers;Stair negotiation;Ramp negotiation;Standing;Walking   Plan   Treatment/Interventions Functional transfer training;LE strengthening/ROM; Elevations; Therapeutic exercise; Endurance training;Cognitive reorientation;Patient/family training;Equipment eval/education; Bed mobility;Gait training; Compensatory technique education   Progress Progressing toward goals   Recommendation   Recommendation (HOME PENDING PROGRESS)   Equipment Recommended Walker   PT Therapy Minutes   PT Time In 1500   PT Time Out 1530   PT Total Time (minutes) 30   PT Mode of treatment - Individual (minutes) 30   PT Mode of treatment - Concurrent (minutes) 0   PT Mode of treatment - Group (minutes) 0   PT Mode of treatment - Co-treat (minutes) 0   PT Mode of Teatment - Total time(minutes) 30 minutes   Therapy Time missed   Time missed?  No

## 2018-11-27 NOTE — PROGRESS NOTES
Patient requesting to use the urinal still  Encouraged patient to get up and go into the bathroom to void, but patient refused

## 2018-11-27 NOTE — PROGRESS NOTES
Patient requesting to use the urinal instead of getting up to go into the bathroom  Informed patient that it would be best if he got up to go to the bathroom and he replied "well if you're going to make me do that, I may as well sleep in there"

## 2018-11-27 NOTE — PROGRESS NOTES
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; s/p posterior cervical decompressive laminectomy with instrumented posterior lateral fusion fixation C3-6 on 11/19 [facility]):  watch incision; continue collar at all times     HTN: stopped Lopressor again last week (office note says stopped 2/2 low BP)  Is stable on no meds      OCD: meds per primary team     ABLA:  mild; required no transfusion; will watch         Subjective: offers no complaints        Scheduled Meds:    Current Facility-Administered Medications:  acetaminophen 650 mg Oral Q6H PRN Clayton Payne MD   atorvastatin 40 mg Oral Daily With Leopoldo Samuels MD   bacitracin 1 large application Topical BID Clayton Payne MD   bisacodyl 10 mg Rectal Daily PRN Clayton Payne MD   bisacodyl 10 mg Rectal Once Clayton Payne MD   busPIRone 5 mg Oral BID Clayton Payne MD   docusate sodium 100 mg Oral BID Clayton Payne MD   gabapentin 200 mg Oral TID Clayton Payne MD   heparin (porcine) 5,000 Units Subcutaneous formerly [facility] Clayton Payne MD   magnesium hydroxide 30 mL Oral Daily PRN Markel Lubin DO   mirtazapine 7 5 mg Oral HS Clayton Payne MD   OLANZapine 10 mg Oral HS Clayton Payne MD   OLANZapine 5 mg Oral Daily PRN Clayton Payne MD   oxyCODONE 5 mg Oral Q4H PRN Clayton Payne MD   polyethylene glycol 17 g Oral Daily Clayton Payne MD   polyethylene glycol 17 g Oral Once Clayton Payne MD   senna-docusate sodium 1 tablet Oral BID Clayton Payne MD   sertraline 100 mg Oral Daily Clayton Payne MD       Labs:       Results from last 7 days  Lab Units 11/26/18  0553   WBC Thousand/uL 6 53 HEMOGLOBIN g/dL 10 7*   HEMATOCRIT % 32 5*   PLATELETS Thousands/uL 251       Results from last 7 days  Lab Units 11/26/18  0553   POTASSIUM mmol/L 4 0   CHLORIDE mmol/L 103   CO2 mmol/L 27   BUN mg/dL 10   CREATININE mg/dL 0 62   CALCIUM mg/dL 9 2                No results found for: GLUCOSE    Labs reviewed    Physical Examination:  Vitals:   Vitals:    11/26/18 0527 11/26/18 0900 11/26/18 1350 11/26/18 2041   BP: 121/78  106/54 105/51   BP Location: Left arm  Left arm Left arm   Pulse: 60  60 59   Resp: 18 18 18   Temp: 98 1 °F (36 7 °C)  97 6 °F (36 4 °C) 98 5 °F (36 9 °C)   TempSrc: Oral  Oral Oral   SpO2: 99% 98% 100% 95%   Weight:       Height:           Constitutional:  NAD; pleasant; nontoxic  HEENT:  AT/NC; oropharynx negative for thrush on tongue   Neck: negative for JVD  CV:  +S1, S2;  RRR; no rub/murmur  Pulmonary:  BBS without crackles/wheeze/rhonci; resp are unlabored  Abdominal:  soft, +BS, ND/NT; no mass  Skin:  no rashes  Musculoskeletal:  no edema  :  no cristina  Neurological/Psych:  AAO;  LE 5/5; RUE 4/5, LUE 4+/5; no depression/anxiety      [x ] Total time spent: 30 Mins and greater than 50% of this time was spent counseling/coordinating care  ** Please Note: Dragon 360 Dictation voice to text software may have been used in the creation of this document   **

## 2018-11-27 NOTE — PCC PHYSICAL THERAPY
12/4/2018 , PTA  Continues to make progress with strength and balance and has been progressed to BRP with RW at this time; pt continues to view the RW negatively but does understand it is for his safety and to prevent another fall; pt continues to be very worried about new information regarding his recovery and status but is able to remain calm when things are explained; FF with single rail sideways down and forward up; continue to increase strength, balance, and endurance for safe and functional mobility/activity; continue POC as per PT    11/27/18  Patient making improvements with skilled PT intervention but is limited by 10/10 sharp/shooting pain in neck, mood/affect, and self limiting behaviors  At this time, patient is performing bed mobility at S, transfers and gait at [facility], and [facility] at [facility]  At d/c, patient lives with son whom he reports works during the day  Apartment has FF to enter with B railing  Stairs are also a barrier to safe d/c home at this time  Will require skilled PT intervention to progress functional mobility (I) and safety  11/28/18  To accommodate medicare guidelines, patient being re-teamed 11/29/18  Patient making improvements with skilled PT intervention but is limited by 10/10 sharp/shooting pain in neck, mood/affect, and self limiting behaviors  At this time, patient is performing bed mobility at S, transfers and gait at [facility], and stairs at [facility]  At d/c, patient lives with son whom he reports works during the day  Apartment has FF to enter with B railing  Stairs are also a barrier to safe d/c home at this time  Will require skilled PT intervention to progress functional mobility (I) and safety

## 2018-11-27 NOTE — PROGRESS NOTES
Physical Medicine and Rehabilitation Progress Note  Teodoro Alonso 68 y o  male MRN: [id number]  Unit/Bed#: -01 Encounter: [id number]    Chief Complaints:  Difficulty sleeping    Subjective/Interval Events:   Patient reports sleeping is better than it was last week but he is still having difficulty  He notes still fair amount of anxiety at times but still better than it was  He denies depression  Patient reports fair amount of shoulder aching pain bilaterally at times with stable neck pain with limited pain in arms  He reports strength and sensation are about the same  He reports lightheadedness with therapies and standing at times  Patient denies SOB, headache, fever, chills, abdominal pain, calf pain, or other complaints  ROS: A 10-point ROS was performed  Negative except as listed above  Overall Assessment/relevant history:   68 y o  male with PMH of HTN, HL, OCD, MDD, SHAQUILLE, CLBP, lumbar stenosis, lumbar radiculopathy who apparently fell at home hitting his head with residual R sided weakness and LOC who was initially believed to develop R sided weakness first causing the fall with possibility for stroke who was brought to [facility] on 11/13/18 prompting stroke alert/protocol  CT head and CTA head and negative were negative and patient received tPA  Patient was later noted to have some bilateral strength and sensory changes as well as ongoing imbalance and limb pain prompting additional imaging  MRI brain showed severe central canal stenosis with chronic mass effect on cord at C3-5 as well as severe bilateral NF narrowing from C2 to C5  NS was consulted who recommended surgical intervention and patient underwent C3-C6 posterior cervical decompressive laminectomy and posterior lateral fusion/fixation by Dr Vivi Morales on 11/19  Patient still has GOSIA drain in place with some serous drainage  Patient also had c/s by  Psych who felt patient mood currently stable    He has been only on Zoloft 100mg qday but at home he takes several other mood agents which he has been off of  Patient was evaluated by skilled therapies and was found to have significant decline in ADLs and ambulation appropriate for admission to [facility]      Patient presented to [facility] on 11/21/18 s/p fall causing cervical myelopathy and TBI with closed head injury and likely brief LOC s/p cervical decompression and fusion with decline in strength, balance, cognition, pain as well as significant insomnia and mood disorder  Functional status (recent):    ST: Problem solving mod assist   PT: Transfers and ambulation min assist   OT: UB dressing min assist; LB dressing mod assist     Functional status on admission to [facility]:  OT:  Lower body dressing max assist bathing moderate assist body dressing general transfers and toilet transfers minutes assist  PT:  Transfers and ambulation Min assist, stairs max assist      * Cervical myelopathy (HCC)   Assessment & Plan    -Following fall causing R sided weakness, imbalance, limb pain s/p decompression and fusion   -Recommend acute comprehensive interdisciplinary inpatient rehabilitation to include intensive skilled therapies (PT, OT) as outlined with oversight and management by rehabilitation physician as well as inpatient rehab level nursing, case management and weekly interdisciplinary team meetings  Traumatic brain injury with loss of consciousness [facility])   Assessment & Plan    -Fall with closed head injury with brief LOC with residual deficits in cognition   -CT head without acute findings;  -Recommend all intensive skilled therapies with particular focus on SLP as   outlined with oversight and management by rehabilitation physician  Continue inpatient rehab level nursing, case management and weekly interdisciplinary team meetings    Skilled therapist, rehab nursing, rehab physician, CM, and supporting staff to provide appropriate teaching to patient (and if applicable to caregiver(s))     -Overstim precautions  -Sleep-agitation log/optimize sleep wake cycle  -Minimize sedating meds when appropriate  -Optimize mood d/o mgmt        S/P cervical spinal fusion   Assessment & Plan    -C3-C6 posterior cervical decompressive lami and PLF by Dr Kristyn Soto on   -Monitor incision  -Cervical collar   -Follow-up with NS during [facility] course PRN and after d/c       Pain   Assessment & Plan    Sub-optimally controlled   Acute cervical myelopathy and spinal surgery pain with associated myofascial pain on chronic low back pain and lumbar radiculopathy   -Gabapentin increase to 200mg TID for neuropathic and adjuvant pain control  -APAP PRN > may schedule  -Oxycodone 5mg Q4H PRN  -Counseled on and continue to encourage deep breathing/relaxation/behavioral pain management techniques:     Deep breathin seconds in, 5 seconds out, 5 times per hour when awake and PRN when in pain or anticipate pain; avoid holding breath and tightening muscles and instead breathe slowly and deeply       OCD (obsessive compulsive disorder)   Assessment & Plan    Overall improving but still sub-optimal control of mood/sleep  >Increase Zyprexa to 10mg QHS (previously on 15m QHS)  Continue   Zoloft 100mg qday   Buspar 5mg BID (previously on 10mg BID)   Ramelteon 7 5mg QHS (previously on 15mg QHS)  >Recommend Zyprexa 5mg qday PRN for breakthrough restlessness/OCD/anxiey symptoms  OCD, MDD, SHAQUILLE hx - fairly severe in past including psych hospitalization but denies hx of SI  >Had been off of multiple chronic agents (Zyprexa 15mg QHS, Remeron 15mg QHS, Buspar 10mg BID) except for Zoloft 100mg Qday since being hospitalized over a week ago  -Follow-up with patient's outpatient psychiatrist if needed during course as well as prior to d/c  -Seen by [facility] psych prior to transfer who felt patient mood currently stable  -Psychology consult       Insomnia   Assessment & Plan    Some improvements but sub-optimal  Mgmt as outlined above   Mgmt as outlined above     Positional lightheadedness   Assessment & Plan    Likely orthostasis  Continue orthostatic vitals  Abdominal binder when mobilizing     At risk for venous thromboembolism (VTE)   Assessment & Plan    Heparin, SCDs, and ambulation      Lumbar stenosis   Assessment & Plan    Mgmt as outlined above     History of hyperlipidemia   Assessment & Plan    Recent lipid panel wnl  Continue statin      Hypertension   Assessment & Plan    IM c/s to assit with mgmt  Monitor BP and orthostatics   MTP      Chronic low back pain   Assessment & Plan    Mgmt as outlined above     Lumbar radiculopathy   Assessment & Plan    Mgmt as outlined above     Hyponatremia   Assessment & Plan    Mild, monitor      Chronic idiopathic constipation   Assessment & Plan    Improved  Recommend colace/senna/miralax  PRN bowel regimen          # Cardiopulmonary:   Saturating well on room air  - Encourage out of bed activities, incentive spirometry and deep breathing to prevent atelectasis  - Ensure adequate hydration, volume status, and intermittently monitor Hb, BUN/Cr and sodium  - Monitor vitals at rest and with activity  - Orthostatics PRN for S/S of orthostasis; Abdominal binder PRN     # Bladder care:    - monitor for retention, incontinence, signs/symptoms of UTI  - recommend voiding trial; nursing prompt to void followed by bladder scan starting Q4-6H or after each patient initiated void; nursing to record voided output and bladder scan totals; straight cath PRN >350-400 cc; if post void residual bladder scans are <150 cc x3 consecutive voids, can stop scans      # Skin:   - Nursing to turn patient Q2H if not adequately ambulatory, monitor for skin breakdown, rashes, and wounds if applicable  - GOSIA drain per NS     # Diet/Hydration:    Regular     Disposition:   Home with [facility] 2 weeks from admission      Follow-up:   NS, PMR, Psych, PCP     CODE: Level 1: Full Code     Restrictions include:   Fall precautions, cervical spine    ---------------------------------------------------------------------------------------------------------------------    Objective: Allergies and Medications per EMR    Physical Exam:  Temp:  [97 6 °F (36 4 °C)-98 4 °F (36 9 °C)] 97 6 °F (36 4 °C)  HR:  [58-60] 60  Resp:  [18] 18  BP: (106-121)/(54-78) 106/54  General: Awake, alert in NAD  HENT: MMM, stable bilateral lower periorbital ecchymosis and L frontal ecchymosis   Neck: Cervical collar in place  Respiratory: Unlabored breathing, breath sounds equal, Lungs CTA, no wheezes, rales, or rhonchi  Cardiovascular: Regular rate and rhythm, no murmurs, rubs, or gallops  Gastrointestinal: Soft, non-tender, non-distended, normoactive bowel sounds  SkiN/MSK/Extremities: no calf edema, no calf tenderness to palpation  Neurologic/Psych:   MENTAL STATUS/Affect: awake, oriented to person, place, time, and situation; minimally anxious  Strength/MMT:  Stable      Diagnostic Studies: reviewed, no new imaging  No results found      Laboratory:      Results from last 7 days  Lab Units 11/26/18  0553 11/20/18  0624   HEMOGLOBIN g/dL 10 7* 10 0*   HEMATOCRIT % 32 5* 29 7*   WBC Thousand/uL 6 53 11 20*       Results from last 7 days  Lab Units 11/26/18  0553 11/20/18  0624   BUN mg/dL 10 10   POTASSIUM mmol/L 4 0 4 2   CHLORIDE mmol/L 103 103   CREATININE mg/dL 0 62 0 63            Patient Active Problem List   Diagnosis    Chronic idiopathic constipation    OCD (obsessive compulsive disorder)    Weakness    Depression    Anxiety    Cervical spinal stenosis    Cervical stenosis of spine    Hyponatremia    Cervical myelopathy (HCC)    S/P cervical spinal fusion    Lumbar radiculopathy    Chronic low back pain    Pain    Traumatic brain injury with loss of consciousness ([address] )    Hypertension    History of hyperlipidemia    Insomnia    Lumbar stenosis    At risk for venous thromboembolism (VTE)       ** Please Note: Fluency Direct voice to text software may have been used in the creation of this document  **    Total visit time:  At least 25 minutes, with more than 50% spent counseling/coordinating care    Mariangel Alejandre MD, [facility]  Physical Medicine and Rehabilitation  Brain Injury Medicine

## 2018-11-27 NOTE — TELEPHONE ENCOUNTER
12/06/2018-PT DISCHARGED HOME  PT IS SCHEDULED FOR 6 WK POV APPT ON 01/08/2019 WITH REPEAT CERVICAL SPINE XRAY  12/04/2018-PT STILL IN HOSPITAL    12/03/2018-PT STILL IN HOSPITAL  2WK APPT CANCELLED, [facility] [place]  6WK POV APPT IS 01/08/2019 11/30/2018-PT STILL IN HOSPITAL    11/28/2018-PT [address]  2 WK POV-12/03/2018  6 WK POV-01/08/2019 11/27/2018-GIULIANA (11/23/2018)FOLLOW-UP AS OUTPATIENT 12/03/2018 AT 1:00pm FOR INCISION CHECK AND 01/08/2019 AT 1:45pm WITH REPEAT CERVICAL SPINE XRAYS WITH DR Tree Cam      [address] [place] [address]

## 2018-11-27 NOTE — PCC SPEECH THERAPY
Pt currently being followed for cognitive linguistic tx  Completed formalized cognitive assessment (CLQT+), where overall score when compared to age matched peers between 65-80 yrs, deems pt to demonstrate mild cognitive impairments (score of 2 6 out of 4 0)  Upon f/u sessions, pt is noted to have slow processing time and during structured tasks, pt states that things "just don't click " Ongoing education given to pt in regards to role of SLP and activities which are completed in sessions aid in improvement of pt's "fog " Will continue to benefit from SLP services to maximize cognitive linguistic skills at this time  Pt will be updated for team again on 11/28/2018 to maintain medicare requirements for regular team day, where no overt changes occurred from prior note on 11/27/18  Update from week 12/5/2018: Pt continues to be followed for cognitive linguistic tx  Pt making steady progress at this time when engaging in structured executive function and memory tasks  Pt is able to complete many items at supervision level when basic level, but when challenged given higher level items, pt does require increased verbal cues to complete tasks  Pt still states being in a "fog" but ongoing review of items that are completed each session in all disciplines, pt making progress and recommending to d/c home w/ son support/supervision

## 2018-11-27 NOTE — PROGRESS NOTES
11/27/18 4357   Pain Assessment   Pain Assessment FLACC   Pain Score Worst Possible Pain   Pain Type Surgical pain   Pain Location Neck   Pain Orientation Bilateral   Pain Radiating Towards BUE    Pain Descriptors Shooting; Sharp   Pain Frequency Constant/continuous   Clinical Progression Not changed   Patient's Stated Pain Goal No pain   Hospital Pain Intervention(s) Repositioned;Medication (See MAR)   Diversional Activities Other (Comment)  (Conversation )   Response to Interventions No change in pain    Pain Rating: FLACC (Rest) - Face 0   Pain Rating: FLACC (Rest) - Legs 0   Pain Rating: FLACC (Rest) - Activity 0   Pain Rating: FLACC (Rest) - Cry 0   Pain Rating: FLACC (Rest) - Consolability 0   Score: FLACC (Rest) 0   Pain Rating: FLACC (Activity) - Face 1   Pain Rating: FLACC (Activity) - Legs 0   Pain Rating: FLACC (Activity) - Activity 0   Pain Rating: FLACC (Activity) - Cry 0   Pain Rating: FLACC (Activity) - Consolability 0   Score: FLACC (Activity) 1   Restrictions/Precautions   Precautions Bed/chair alarms;Cognitive;Pain;Supervision on toilet/commode;Spinal precautions; Fall Risk   Braces or Orthoses C/S Collar   Cognition   Overall Cognitive Status Impaired   Arousal/Participation Arousable   Attention Difficulty dividing attention   Orientation Level Oriented X4   Memory Decreased short term memory;Decreased recall of recent events;Decreased recall of precautions   Following Commands Follows one step commands with increased time or repetition   Comments Patient appears not present during session  Multiple attempts made to increase participation  Encourgement and reinforcement not effective in boosting morale or participation level  On many occasions, patient does not respond to questions asked of him and can be self limiting at times  Patient encouraged to be an advocate for himself and particiapte in goal setting and attempts to encorporating activities that bring him david      Subjective   Subjective "I feel lifeless  I have no enegry "   QI: Sit to Stand   Assistance Needed Supervision   Assistance Provided by [place] No physical assistance   Sit to Stand CARE Score 4   QI: Chair/Bed-to-Chair Transfer   Assistance Needed Adaptive equipment;Supervision   Assistance Provided by [place] No physical assistance   Comment CS, use of RW    Chair/Bed-to-Chair Transfer CARE Score 4   Transfer Bed/Chair/Wheelchair   Limitations Noted In Balance;Confidence; Coordination; Endurance;UE Strength;LE Strength   Adaptive Equipment Hand Hold;Roller Walker   Stand Pivot Supervision   Sit to Stand Supervision   Stand to Sit Supervision   All Transfer Contact Guard;Supervision   Findings inconsistent ability to reduce forward flexion during transfers and ambulation    QI: Walk 10 Feet   Assistance Needed Adaptive equipment; Incidental touching   Assistance Provided by [place] Less than 25%   Comment CGA/CS   Walk 10 Feet CARE Score 3   QI: Walk 50 Feet with Two Turns   Assistance Needed Adaptive equipment; Incidental touching   Assistance Provided by [place] Less than 25%   Comment CGA/CS   Walk 50 Feet with Two Turns CARE Score 3   QI: Walk 150 Feet   Assistance Needed Adaptive equipment; Incidental touching   Assistance Provided by [place] Less than 25%   Comment CGA/CS   Walk 150 Feet CARE Score 3   Ambulation   Does the patient walk? 2  Yes   Primary Discharge Mode of Locomotion Walk   Walk Assist Level Contact Guard;Close Supervision   Gait Pattern Inconsistant Sandra; Forward Flexion  (absent heel strike )   Assist Device Roller Walker;Hand Hold   Distance Walked (feet) 150 ft  (x2 + 100 HHA for balance )   Limitations Noted In Balance; Endurance; Heel Strike;Posture   Walking (FIM) 4 - Patient requires steadying assist or light touching AND distance 150 feet or more, no rest   QI: Wheel 50 Feet with Two Turns   Reason if not Attempted Activity not applicable   Wheel 50 Feet with Two Turns CARE Score 9   QI: Wheel 150 Feet   Reason if not Attempted Activity not applicable   Wheel 776 Feet CARE Score 9   Wheelchair mobility   QI: Does the patient use a wheelchair? 0  No   QI: 4 Steps   Assistance Needed Physical assistance   Assistance Provided by [place] 25%-49%   Comment Flavia   4 Steps CARE Score 3   Stairs   Type Stairs   # of Steps 6  (4 fwd up/fwd down; 2 fwd up and sideways down )   Weight Bearing Precautions Fall Risk   Assist Devices Single Rail   QI: Picking Up Object   Reason if not Attempted Safety concerns   Picking Up Object CARE Score 88   Therapeutic Interventions   Strengthening All 15x2 BLE: seated LAQ with yellow TB, standing heel raises, standing hip flexion    Flexibility BLE hamstring and heel cord stretch 6 minutes each; pelvic tilts with little carryover    Balance 100' ambulation with no AD    Other Incentive Spirometer 5x to reduce risk of PNA   Assessment   Treatment Assessment Patient progressing with skilled PT intervention but remains limited by pain and mood  Pain reported at 10/10 with no reduction during session  Patient'd mood low where he presents with decreased participation, little engagement and continues to report feeling lifeless  In terms of functional mobility, patient performing transfers at S with no LOB, ambulation using RW at /[facility] for appx distance of 150', and Flavia on appx 6  steps  Patient requires skilled PT intervention for strengthening, flexibility, and to increase tolerance for a safe d/c home  Barriers to Discharge Comments mood   PT Barriers   Physical Impairment Decreased strength;Decreased range of motion; Impaired balance;Decreased endurance;Decreased mobility; Decreased coordination;Decreased cognition;Decreased skin integrity;Orthopedic restrictions;Pain   Functional Limitation Car transfers; Ramp negotiation;Stair negotiation;Standing;Walking   Plan   Treatment/Interventions Functional transfer training;LE strengthening/ROM; Elevations; Therapeutic exercise; Endurance training;Cognitive reorientation;Patient/family training;Equipment eval/education; Bed mobility;Gait training; Compensatory technique education   Progress Progressing toward goals   Recommendation   Recommendation Home with family support;Home PT;Outpatient PT  (pending progress)   Equipment Recommended Walker   PT Therapy Minutes   PT Time In 0830   PT Time Out 0930   PT Total Time (minutes) 60   PT Mode of treatment - Individual (minutes) 60   PT Mode of treatment - Concurrent (minutes) 0   PT Mode of treatment - Group (minutes) 0   PT Mode of treatment - Co-treat (minutes) 0   PT Mode of Teatment - Total time(minutes) 60 minutes   Therapy Time missed   Time missed?  No

## 2018-11-27 NOTE — PROGRESS NOTES
11/27/18 1230   Pain Assessment   Pain Assessment 0-10   Pain Score 8   Pain Type Acute pain   Pain Location Neck   Restrictions/Precautions   Braces or Orthoses C/S Collar   Lifestyle   Autonomy "Brad not sure how I will do everyhting at home "   QI: [address] Needed Incidental touching   [address] Score 4   Toileting   Able to [address] down yes, up yes  Manage Hygiene Bladder   Toileting (FIM) 4 - Patient requires steadying assist or light touching   QI: Toilet Transfer   Assistance Needed Incidental touching   Toilet Transfer CARE Score 4   Toilet Transfer   Surface Assessed Standard Toilet   Adaptive Equipment Grab Bar   Toilet Transfer (FIM) 4 - Patient requires steadying assist or light touching   Cognition   Overall Cognitive Status Impaired   Arousal/Participation Cooperative   Attention Difficulty dividing attention   Orientation Level Oriented X4   Memory Decreased short term memory;Decreased recall of recent events   Following Commands Follows one step commands with increased time or repetition   Activity Tolerance   Activity Tolerance Patient tolerated treatment well   Assessment   Treatment Assessment Pt participates in skilled OT session focusing on AE training, self care education  Upon approach Pt reporting that he is not sure he can D/C home because "he cant do anything on his own " Pt engages in toielting task, and requires only incidental touching for assist for nancy care  Pt engages in writing task for identification of required tasks at home, and OT goal identification  Pt is able to identify appropriate activities needed to do at home (get out of bed, toilet, dress, wash)  Pt engages in discussion about self reflection about areas that he really needs assist with  Pt requires assist for LB care and reports that it was difficult PTA   Pt requires extensive education regarding change in function for D/C that he will complete tasks at home differently than prior to admission  Pt participated in EDU on LHAE use to increase I w/ ADLs  Pt engaged in verbal instruction, and visual demonstration of techniques  Pt reports "I can look at all this and never get it " pt acts as the AE Kit was never dicussed  Pt verbalizes poor recall of how to use each item  Pt then engages in discussion of potential SNF D/C vs home  Pt reports he will have to dicuss with son and educated that son will need to participate in family training to determine best D/C plan  Pt continues to require skilled acute rehab OT services to increase overall functional independence and safety w/ ADLs and functional transfers, continue to follow plan of care  Prognosis Fair   Problem List Decreased strength;Decreased range of motion;Decreased endurance; Impaired balance;Decreased mobility; Decreased coordination;Decreased cognition; Impaired judgement   Plan   Treatment/Interventions ADL retraining;Functional transfer training;LE strengthening/ROM; Therapeutic exercise; Endurance training;Cognitive reorientation;Patient/family training   Progress Progressing toward goals   OT Therapy Minutes   OT Time In 1230   OT Time Out 1400   OT Total Time (minutes) 90   OT Mode of treatment - Individual (minutes) 90   OT Mode of treatment - Concurrent (minutes) 0   OT Mode of treatment - Group (minutes) 0   OT Mode of treatment - Co-treat (minutes) 0   OT Mode of Teatment - Total time(minutes) 90 minutes

## 2018-11-28 PROCEDURE — 97535 SELF CARE MNGMENT TRAINING: CPT

## 2018-11-28 PROCEDURE — 97112 NEUROMUSCULAR REEDUCATION: CPT

## 2018-11-28 PROCEDURE — 97110 THERAPEUTIC EXERCISES: CPT

## 2018-11-28 PROCEDURE — 97116 GAIT TRAINING THERAPY: CPT

## 2018-11-28 PROCEDURE — 97530 THERAPEUTIC ACTIVITIES: CPT

## 2018-11-28 PROCEDURE — G0515 COGNITIVE SKILLS DEVELOPMENT: HCPCS

## 2018-11-28 PROCEDURE — 99233 SBSQ HOSP IP/OBS HIGH 50: CPT

## 2018-11-28 RX ORDER — BUSPIRONE HYDROCHLORIDE 5 MG/1
7.5 TABLET ORAL 2 TIMES DAILY
Status: DISCONTINUED | OUTPATIENT
Start: 2018-11-28 | End: 2018-12-04

## 2018-11-28 RX ORDER — OXYCODONE HYDROCHLORIDE 5 MG/1
5 TABLET ORAL 2 TIMES DAILY
Status: DISCONTINUED | OUTPATIENT
Start: 2018-11-29 | End: 2018-12-06 | Stop reason: HOSPADM

## 2018-11-28 RX ORDER — GABAPENTIN 400 MG/1
400 CAPSULE ORAL 3 TIMES DAILY
Status: DISCONTINUED | OUTPATIENT
Start: 2018-11-28 | End: 2018-12-06 | Stop reason: HOSPADM

## 2018-11-28 RX ADMIN — HEPARIN SODIUM 5000 UNITS: 5000 INJECTION INTRAVENOUS; SUBCUTANEOUS at 06:32

## 2018-11-28 RX ADMIN — BUSPIRONE HYDROCHLORIDE 7.5 MG: 5 TABLET ORAL at 17:29

## 2018-11-28 RX ADMIN — SENNOSIDES AND DOCUSATE SODIUM 1 TABLET: 8.6; 5 TABLET ORAL at 08:59

## 2018-11-28 RX ADMIN — GABAPENTIN 300 MG: 300 CAPSULE ORAL at 14:31

## 2018-11-28 RX ADMIN — SERTRALINE HYDROCHLORIDE 100 MG: 100 TABLET ORAL at 08:59

## 2018-11-28 RX ADMIN — POLYETHYLENE GLYCOL 3350 17 G: 17 POWDER, FOR SOLUTION ORAL at 09:00

## 2018-11-28 RX ADMIN — BUSPIRONE HYDROCHLORIDE 5 MG: 5 TABLET ORAL at 09:00

## 2018-11-28 RX ADMIN — ACETAMINOPHEN 975 MG: 325 TABLET, FILM COATED ORAL at 21:41

## 2018-11-28 RX ADMIN — HEPARIN SODIUM 5000 UNITS: 5000 INJECTION INTRAVENOUS; SUBCUTANEOUS at 14:32

## 2018-11-28 RX ADMIN — ACETAMINOPHEN 975 MG: 325 TABLET, FILM COATED ORAL at 06:32

## 2018-11-28 RX ADMIN — GABAPENTIN 400 MG: 400 CAPSULE ORAL at 21:43

## 2018-11-28 RX ADMIN — OXYCODONE HYDROCHLORIDE 5 MG: 5 TABLET ORAL at 09:14

## 2018-11-28 RX ADMIN — MIRTAZAPINE 7.5 MG: 15 TABLET ORAL at 21:42

## 2018-11-28 RX ADMIN — HEPARIN SODIUM 5000 UNITS: 5000 INJECTION INTRAVENOUS; SUBCUTANEOUS at 21:43

## 2018-11-28 RX ADMIN — GABAPENTIN 300 MG: 300 CAPSULE ORAL at 06:32

## 2018-11-28 RX ADMIN — BACITRACIN ZINC 1 LARGE APPLICATION: 500 OINTMENT TOPICAL at 08:59

## 2018-11-28 RX ADMIN — DOCUSATE SODIUM 100 MG: 100 CAPSULE, LIQUID FILLED ORAL at 08:59

## 2018-11-28 RX ADMIN — BACITRACIN ZINC 1 LARGE APPLICATION: 500 OINTMENT TOPICAL at 17:32

## 2018-11-28 RX ADMIN — ATORVASTATIN CALCIUM 40 MG: 40 TABLET, FILM COATED ORAL at 17:28

## 2018-11-28 RX ADMIN — SENNOSIDES AND DOCUSATE SODIUM 1 TABLET: 8.6; 5 TABLET ORAL at 17:28

## 2018-11-28 RX ADMIN — DOCUSATE SODIUM 100 MG: 100 CAPSULE, LIQUID FILLED ORAL at 17:28

## 2018-11-28 RX ADMIN — ACETAMINOPHEN 975 MG: 325 TABLET, FILM COATED ORAL at 14:31

## 2018-11-28 RX ADMIN — OLANZAPINE 10 MG: 10 TABLET, FILM COATED ORAL at 21:42

## 2018-11-28 NOTE — PROGRESS NOTES
Physical Medicine and Rehabilitation Progress Note  Aileen Luna 68 y o  male MRN: [id number]  Unit/Bed#: -01 Encounter: [id number]    Chief Complaints:  Shoulder and arm pain     Subjective/Interval Events:   Patient reports fairly significant pain in both shoulders and down arms at times when he lifts his shoulders/arms up  He reports strength and sensation is not worse  He reports neck pain is stable  He denies worsening balance or bowel/bladder problems  He reports sleeping is ok but not like home yet  He reports mood is getting better and appreciated the care he is receiving  He still notes some lightheadedness with activity OOB at times but denies SOB, headache, fever, chills, abdominal pain, calf pain, or other complaints  ROS: A 10-point ROS was performed  Negative except as listed above  Overall Assessment/relevant history:   68 y o  male with PMH of HTN, HL, OCD, MDD, SHAQUILLE, CLBP, lumbar stenosis, lumbar radiculopathy who apparently fell at home hitting his head with residual R sided weakness and LOC who was initially believed to develop R sided weakness first causing the fall with possibility for stroke who was brought to [facility] on 11/13/18 prompting stroke alert/protocol  CT head and CTA head and negative were negative and patient received tPA  Patient was later noted to have some bilateral strength and sensory changes as well as ongoing imbalance and limb pain prompting additional imaging  MRI brain showed severe central canal stenosis with chronic mass effect on cord at C3-5 as well as severe bilateral NF narrowing from C2 to C5  NS was consulted who recommended surgical intervention and patient underwent C3-C6 posterior cervical decompressive laminectomy and posterior lateral fusion/fixation by Dr Yanira Rodríguez on 11/19  Patient still has GOSIA drain in place with some serous drainage  Patient also had c/s by  Psych who felt patient mood currently stable    He has been only on Zoloft 100mg qday but at home he takes several other mood agents which he has been off of  Patient was evaluated by skilled therapies and was found to have significant decline in ADLs and ambulation appropriate for admission to [facility]      Patient [facility] to [facility] on 11/21[facility]18 s/p fall causing cervical myelopathy and TBI with closed head injury and likely brief LOC s/p cervical decompression and fusion with decline in strength, balance, cognition, pain as well as significant insomnia and mood disorder  Functional status (recent):    ST: Significant improvements - today largely supervision   PT: Transfers and ambulation min assist   OT: Toileting and toilet transfers min assist     Functional status on admission to [facility]:  OT:  Lower body dressing max assist bathing moderate assist body dressing general transfers and toilet transfers minutes assist  PT:  Transfers and ambulation Min assist, stairs max assist      * Cervical myelopathy (HCC)   Assessment & Plan    -Following fall causing R sided weakness, imbalance, limb pain s/p decompression and fusion   -Recommend acute comprehensive interdisciplinary inpatient rehabilitation to include intensive skilled therapies (PT, OT) as outlined with oversight and management by rehabilitation physician as well as inpatient rehab level nursing, case management and weekly interdisciplinary team meetings  Traumatic brain injury with loss of consciousness [facility])   Assessment & Plan    Improving cognition  -Fall with closed head injury with brief LOC with residual deficits in cognition   -CT head without acute findings;  -Recommend all intensive skilled therapies with particular focus on SLP as   outlined with oversight and management by rehabilitation physician  Continue inpatient rehab level nursing, case management and weekly interdisciplinary team meetings    Skilled therapist, rehab nursing, rehab physician, CM, and supporting staff to provide appropriate teaching to patient (and if applicable to caregiver(s))     -Overstim precautions  -Sleep-agitation log/optimize sleep wake cycle  -Minimize sedating meds when appropriate  -Optimize mood d/o mgmt        S/P cervical spinal fusion   Assessment & Plan    -C3-C6 posterior cervical decompressive lami and PLF by Dr Parker Mayes on   -Monitor incision  -Cervical collar   -Follow-up with NS during [facility] course PRN and after d/c       Pain   Assessment & Plan    Sub-optimally controlled   Acute cervical myelopathy and spinal surgery pain with associated myofascial pain on chronic low back pain and lumbar radiculopathy   -Gabapentin increase to 300mg TID for neuropathic and adjuvant pain control  -APAP schedule 975mg TID   -Continue Oxycodone 5mg Q4H PRN  -Counseled on and continue to encourage deep breathing/relaxation/behavioral pain management techniques:     Deep breathin seconds in, 5 seconds out, 5 times per hour when awake and PRN when in pain or anticipate pain; avoid holding breath and tightening muscles and instead breathe slowly and deeply       OCD (obsessive compulsive disorder)   Assessment & Plan    Overall improving - multiple recent changed - continue current regimen for now particularly with increasing gabapentin for pain   >Continue Zyprexa 10mg QHS (previously on 15m QHS)   Zoloft 100mg qday   Buspar 5mg BID (previously on 10mg BID)   Ramelteon 7 5mg QHS (previously on 15mg QHS)  >Recommend Zyprexa 5mg qday PRN for breakthrough restlessness/OCD/anxiey symptoms  OCD, MDD, SHAQUILLE hx - fairly severe in past including psych hospitalization but denies hx of SI  >Had been off of multiple chronic agents (Zyprexa 15mg QHS, Remeron 15mg QHS, Buspar 10mg BID) except for Zoloft 100mg Qday since being hospitalized over a week ago  -Follow-up with patient's outpatient psychiatrist if needed during course as well as prior to d/c  -Seen by  psych prior to transfer who felt patient mood currently stable  -Psychology consult       Insomnia   Assessment & Plan    Improving   Mgmt as outlined above   Mgmt as outlined above     Positional lightheadedness   Assessment & Plan    Orthostatic vitals good today but still symptomatic at times  Continue orthostatic vitals  Abdominal binder when mobilizing     At risk for venous thromboembolism (VTE)   Assessment & Plan    Heparin, SCDs, and ambulation      Lumbar stenosis   Assessment & Plan    Mgmt as outlined above     History of hyperlipidemia   Assessment & Plan    Recent lipid panel wnl  Continue statin      Hypertension   Assessment & Plan    IM c/s to assit with mgmt  Monitor BP and orthostatics   MTP      Chronic low back pain   Assessment & Plan    Mgmt as outlined above     Lumbar radiculopathy   Assessment & Plan    Mgmt as outlined above     Hyponatremia   Assessment & Plan    Mild, monitor      Chronic idiopathic constipation   Assessment & Plan    Improved  Recommend colace/senna/miralax  PRN bowel regimen          # Cardiopulmonary:   Saturating well on room air  - Encourage out of bed activities, incentive spirometry and deep breathing to prevent atelectasis  - Ensure adequate hydration, volume status, and intermittently monitor Hb, BUN/Cr and sodium  - Monitor vitals at rest and with activity  - Orthostatics PRN for S/S of orthostasis;  Abdominal binder PRN     # Bladder care:    - monitor for retention, incontinence, signs/symptoms of UTI  - recommend voiding trial; nursing prompt to void followed by bladder scan starting Q4-6H or after each patient initiated void; nursing to record voided output and bladder scan totals; straight cath PRN >350-400 cc; if post void residual bladder scans are <150 cc x3 consecutive voids, can stop scans      # Skin:   - Nursing to turn patient Q2H if not adequately ambulatory, monitor for skin breakdown, rashes, and wounds if applicable  - GOSIA drain per NS     # Diet/Hydration:    Regular     Disposition:   Home with ELOS 2 weeks from admission      Follow-up:   NS, PMR, Psych, PCP     CODE: Level 1: Full Code     Restrictions include: Fall precautions, cervical spine    ---------------------------------------------------------------------------------------------------------------------    Objective: Allergies and Medications per EMR    Physical Exam:  Temp:  [97 8 °F (36 6 °C)-98 5 °F (36 9 °C)] 98 °F (36 7 °C)  HR:  [59-66] 63  Resp:  [18-20] 20  BP: (105-128)/(51-63) 119/56  General: Awake, alert in NAD  HENT: MMM, improving bilateral lower periorbital ecchymosis and L frontal ecchymosis   Neck: Cervical collar in place  Respiratory: Unlabored breathing, breath sounds equal, Lungs CTA, no wheezes, rales, or rhonchi  Cardiovascular: Regular rate and rhythm, no murmurs, rubs, or gallops  Gastrointestinal: Soft, non-tender, non-distended, normoactive bowel sounds  SkiN/MSK/Extremities: no calf edema, no calf tenderness to palpation  Neurologic/Psych:   MENTAL STATUS/Affect: awake, orientation intact; less anxiety   Strength/MMT:  no changes      Diagnostic Studies: reviewed, no new imaging  No results found      Laboratory:      Results from last 7 days  Lab Units 11/26/18  0553   HEMOGLOBIN g/dL 10 7*   HEMATOCRIT % 32 5*   WBC Thousand/uL 6 53       Results from last 7 days  Lab Units 11/26/18  0553   BUN mg/dL 10   POTASSIUM mmol/L 4 0   CHLORIDE mmol/L 103   CREATININE mg/dL 0 62            Patient Active Problem List   Diagnosis    Chronic idiopathic constipation    OCD (obsessive compulsive disorder)    Weakness    Depression    Anxiety    Cervical spinal stenosis    Cervical stenosis of spine    Hyponatremia    Cervical myelopathy (HCC)    S/P cervical spinal fusion    Lumbar radiculopathy    Chronic low back pain    Pain    Traumatic brain injury with loss of consciousness ([address] )    Hypertension    History of hyperlipidemia    Insomnia    Lumbar stenosis    At risk for venous thromboembolism (VTE)    Positional lightheadedness       ** Please Note: Fluency Direct voice to text software may have been used in the creation of this document  **    Total visit time:  At least 25 minutes, with more than 50% spent counseling/coordinating care    Carlo Gastelum MD, [id number]  Physical Medicine and Rehabilitation  Brain Injury Medicine

## 2018-11-28 NOTE — ORTHOTIC NOTE
Orthotic Note            Date: 11/28/2018      Patient Name: Janet Thomas        Time: 8:45am    Reason for Consult:  Patient Active Problem List   Diagnosis    Chronic idiopathic constipation    OCD (obsessive compulsive disorder)    Weakness    Depression    Anxiety    Cervical spinal stenosis    Cervical stenosis of spine    Hyponatremia    Cervical myelopathy (HCC)    S/P cervical spinal fusion    Lumbar radiculopathy    Chronic low back pain    Pain    Traumatic brain injury with loss of consciousness ([facility] [address] )    Hypertension    History of hyperlipidemia    Insomnia    Lumbar stenosis    At risk for venous thromboembolism (VTE)    Positional lightheadedness   [place]/Lynne Collar   Delivery of [employer] Replacement Pads [id number]    I was present to follow up with patient and deliver 1 set of [employer] replacement pads per staff  Vista collar is in optimal position  Rn present and aware at this time  Recommendations:  Please call Mobility Coordinator at ext  [phone] in regards to bracing instruction and/or adjustment  Lowell Chang Mobility Coordinator LCFo, LCOF, ASOP R  O T, O B T

## 2018-11-28 NOTE — PCC NURSING
Admitted to [facility] s/p fall at home hitting head on floor- no LOC- Related as a cva on admission with TPA ( 11/13)-MRI of the brain was normal   CTH was w/o acute changes and CTA was normal  MRI of the cervical spine showed severe central canal stenosis and chronic mass effect on the cord at C3-4 and C4-5  11/19:posterior cervical decompressive laminectomy with instrumented posterior lateral fusion fixation C3-6 - Dr Kristyn Soto from Neurosurgery  Incision to posterior neck/head-staples removed  Wears Beauty collar aat  Pain managed with tylenol, gabapentin, oxycodone  Measuring Orthostatic BP's daily after 8am daily and monitoring I&O's  He needs set up with meals, uses build up equipment on utensils  Pt has anxiety related to upcoming changes  This week we will continue to  monitor lab values and vital signs, work on pain management  We will promote healing and prevent skin breakdown  Pt will work on methods to decrease anxiety and be encouraged to be more independent  Pt will work on safety with transfers and remain free from falls  Patient to be discharged on 12/6/18

## 2018-11-28 NOTE — PCC CARE MANAGEMENT
Pt has achieved goals and is returning home today with contd [facility] services for rn pt and ot

## 2018-11-28 NOTE — PROGRESS NOTES
11/28/18 1000   Pain Assessment   Pain Assessment 0-10   Pain Score 6   Pain Location Neck   Hospital Pain Intervention(s) Repositioned;Distraction   Restrictions/Precautions   Precautions Bed/chair alarms;Cognitive; Fall Risk;Spinal precautions;Supervision on toilet/commode   Braces or Orthoses C/S Collar   Memory Skills   Memory (FIM) 4 - Recalls 2 of 3 steps   Social Interaction (FIM) 5 - Needs monitoring/encouragement  to participate/interact   Speech/Language/Cognition Assessmetn   Treatment Assessment Pt engaging in completing reading comprehension task given TV schedule, where pt was 10/10 accurate in answering questions in regards to information  While pt did comment on how hand writing was "not good" it was legible and appropriate  Increased encouragement needed to re-inforce this concept  When engaging in auditory memory and mental manipulation task given 3 words to arrange in correct order, pt's ability to recall all 3 words was 14/14 accurate and ability to sequence 3 words in order of occurance was 14/14 accurate  However, when challenged in completing auditory recall of short paragraphs to recall specific infromation, pt was 5/10 accurate, needing increased repetition to recall items  Educated pt in regards to increased difficulty of that task vs  the others, which pt appeared to acknowledge and agree about difficulty, but agreeable to continue to work and maximize memory at this time  SLP Therapy Minutes   SLP Time In 1000   SLP Time Out 1030   SLP Total Time (minutes) 30   SLP Mode of treatment - Individual (minutes) 30   SLP Mode of treatment - Concurrent (minutes) 0   SLP Mode of treatment - Group (minutes) 0   SLP Mode of treatment - Co-treat (minutes) 0   SLP Mode of Teatment - Total time(minutes) 30 minutes   Therapy Time missed   Time missed?  No   Daily FIM Score   Problem solving (FIM) 5 - Solves basic problems 90% of time   Comprehension (FIM) 5 - Understands basic directions and conversation   Expression (FIM) 5 - Needs help/cues only RARELY (< 10% of the time)

## 2018-11-28 NOTE — TEAM CONFERENCE
Acute RehabilitationTeam Conference Note  Date: 11/28/2018   Time: 11:34 AM       Patient Name:  Christian North       Medical Record Number: [id number]   YOB: 1945  Sex: Male          Room/Bed:  [facility]3/[facility] 973-01  Payor Info:  Payor: Noah Boor / Plan: MEDICARE A AND B / Product Type: Medicare A & B Fee for Service /      Admitting Diagnosis: History of neck surgery [[id number] [id number]]   Admit Date/Time:  11/21/2018  1:22 PM  Admission Comments: No comment available     Primary Diagnosis:  Cervical myelopathy ([facility] [address] )  Principal Problem: Cervical myelopathy ([facility] [address] )    Patient Active Problem List    Diagnosis Date Noted    Positional lightheadedness 11/26/2018    S/P cervical spinal fusion 11/22/2018    Lumbar radiculopathy 11/22/2018    Chronic low back pain 11/22/2018    Pain 11/22/2018    Traumatic brain injury with loss of consciousness ([facility] [address] ) 11/22/2018    Hypertension 11/22/2018    History of hyperlipidemia 11/22/2018    Insomnia 11/22/2018    Lumbar stenosis 11/22/2018    At risk for venous thromboembolism (VTE) 11/22/2018    Cervical myelopathy ([facility] [address] ) 11/21/2018    Hyponatremia 11/16/2018    Cervical spinal stenosis 11/15/2018    Depression 11/14/2018    Anxiety 11/14/2018    Weakness 11/13/2018    Cervical stenosis of spine 11/13/2018    Chronic idiopathic constipation 01/09/2017    OCD (obsessive compulsive disorder) 01/09/2017       Physical Therapy:    Weight Bearing Status: Full Weight Bearing  Transfers: Contact Guard  Bed Mobility: Contact Guard  Amulation Distance (ft): 150 feet  Ambulation: Contact Guard  Assistive Device for Ambulation: Roller Walker  Number of Stairs: 6  Assistive Device for Stairs: [place] Hand Rail  Stair Assistance: Minimal Assistance    11/27/18  Patient making improvements with skilled PT intervention but is limited by 10/10 sharp/shooting pain in neck, mood/affect, and self limiting behaviors    At this time, patient is performing bed mobility at S, transfers and gait at [facility], and stairs at [address]  At d/c, patient lives with son whom he reports works during the day  Apartment has FF to enter with B railing  Stairs are also a barrier to safe d/c home at this time  Will require skilled PT intervention to progress functional mobility (I) and safety  Occupational Therapy:  Eating: Minimal Assistance  Grooming: Supervision  Bathing: Minimal Assistance  Bathing: Minimal Assistance  Upper Body Dressing: Minimal Assistance  Lower Body Dressing: Minimal Assistance  Toileting: Minimal Assistance  Toilet Transfer: Minimal Assistance  Cognition: Within Defined Limits  Orientation: Person, Place, Time, Situation  Discharge Recommendations: Home with:  [address] with[de-identified] Family Support       Pt is making improvements in OT with functional transfers and  Bathing/dressing  Pt continues to demonstrate decreased  strength in UE's but requires multiple attempts to maintain grasp on heavy items  Pt requires CGA for balance support as he has been having orthostatic BP's recently  Pt overall with generalized decondition and low back pain limiting ADLs  Pt has improved with fxnl transfers and mobility to CGA level  Pt must be mod I to return home with family as his son works during day  Pt would benefit from continued OT to improve upon deficits and increase independence and safety  Speech Therapy:  Mode of Communication: Verbal  Cognition: Exceptions to WNL  Cognition: Decreased Memory, Decreased Executive Functions, Decreased Attention, Decreased Comprehension  Orientation: Person, Place, Time, Situation  Discharge Recommendations: Home with:  [address] with[de-identified] 24 Hour Supervision, Family Support  Pt currently being followed for cognitive linguistic tx  Completed formalized cognitive assessment (CLQT+), where overall score when compared to age matched peers between 65-80 yrs, deems pt to demonstrate mild cognitive impairments (score of 2 6 out of 4 0)   Upon f/u sessions, pt is noted to have slow processing time and during structured tasks, pt states that things "just don't click " Ongoing education given to pt in regards to role of SLP and activities which are completed in sessions aid in improvement of pt's "fog " Will continue to benefit from SLP services to maximize cognitive linguistic skills at this time  Nursing Notes:  Appetite: Good  Diet Type: Regular/House                           Type of Wound (LDA): Incision           Incision 11/19/18 Cervical Neck Posterior (Active)   Incision Description Clean;Dry; Intact 11/28/2018 10:56 AM   Jazmine-wound Assessment Clean;Dry; Intact 11/28/2018 10:56 AM   Closure Cynthia; Sutures 11/28/2018 10:56 AM   Drainage Amount None 11/28/2018 10:56 AM   Dressing Open to air 11/28/2018 10:56 AM   Dressing Changed Changed 11/26/2018  9:15 AM   Patient Tolerance Tolerated well 11/23/2018  4:22 PM   Dressing Status Other (Comment) 11/27/2018  9:30 AM     Wound 11/13/18 Abrasion(s) Face Left edema/abrasions Left facial (Active)   Wound Description Edema 11/28/2018 10:56 AM   Jazmine-wound Assessment Edema 11/28/2018 10:56 AM   Closure None 11/26/2018  9:15 AM   Drainage Amount None 11/26/2018  9:15 AM   Drainage Description Serosanguineous 11/20/2018 10:17 AM   Treatments Other (Comment) 11/22/2018  9:37 AM   Dressing Open to air 11/26/2018  9:15 AM   Wound packed?  No 11/20/2018 10:17 AM   Patient Tolerance Tolerated well 11/23/2018  4:22 PM   Dressing Status Open to air 11/27/2018  9:30 AM        Type of Wound Patient/Family Education: Yes  Bladder: 6 - Modified [place]        Bowel: 6 - Modified [place]        Pain Location: Neck, Arm  Pain Orientation: Bilateral  Pain Score: 9                       Hospital Pain Intervention(s): Medication (See MAR), Repositioned  Pain Patient/Family Education: Yes  Medication Management/Safety  Injectable:  (heparin)  Safe Administration: Yes  Medication Patient/Family Education Complete: Yes    Admitted to [facility] s/p fall at home hitting head on floor- no LOC- Related as a cva on admission with TPA ( 11/13)-MRI of the brain was normal   CTH was w/o acute changes and CTA was normal  MRI of the cervical spine showed severe central canal stenosis and chronic mass effect on the cord at C3-4 and C4-5  11/19:posterior cervical decompressive laminectomy with instrumented posterior lateral fusion fixation C3-6 - Dr Diana Sails from Neurosurgery  Incision to posterior neck/head-staples in place  Wears [place] collar aat  Pain managed with tylenol, gabapentin, oxycodone  Measuring Orthostatic BP's daily after 8am daily and monitoring I&O's  He needs set up with meals, uses build up equipment on utensils  This week we will monitor lab values and vital signs, work on pain management  We will promote healing and prevent skin breakdown  Pt will work on safety with transfers and remain free from falls  Case Management:     Discharge Planning  Living Arrangements: Children  Support Systems: Children  Assistance Needed: will need at d/c  Type of Current Residence: Private residence  Current Home Care Services: No  Pt is participating well with therapy and expects to return home with son  Pt has been educated on potential recommendations for contd therapy services  Following to assist w/dc planning needs and recommendations  Is the patient actively participating in therapies? yes  List any modifications to the treatment plan:     Barriers Interventions   depressed On antidepressant, neuropsych services   Neuropathic pain lidacain patches   lightedheadedness Abdominal binder, teds applied             Is the patient making expected progress toward goals?  yes  List any update or changes to goals:     Medical Goals: Patient will be medically stable for discharge to [facility] upon completion of rehab program and Patient will be able to manage medical conditions and comorbid conditions with medications and follow up upon completion of rehab program    Weekly Team Goals:   Rehab Team Goals  ADL Team Goal: Patient will require assist with ADLs with least restrictive device upon completion of rehab program  Bowel/Bladder Team Goal: Patient will return to premorbid level for bladder/bowel management upon completion of rehab program  Transfer Team Goal: Patient will be independent with transfers with least restrictive device upon completion of rehab program  Locomotion Team Goal: Patient will be independent with locomotion with least restrictive device upon completion of rehab program  Cognitive Team Goal: Patient will be independent for basic  tasks and require supervision for complex tasks upon completion of rehab program    Discussion: pt presents with above barriers and is severely depressed and anxious about plan  Pt is min a with overall cog function, pt is supervision to min a with overall functional transfers and ambulation  Pt has decreased grasp which is limiting his gross function, but he is able to complete some tasks  Pt has 15 steps to complete to return home and has currently done 6 at contact guard/min a  Goals are mod I for dc, supervision on stairs  Recommendations are for outpt pt ot   Anticipated Discharge Date:  12/6/18  [facility] Team Members Present: The following team members are supervising care for this patient and were present during this Weekly Team Conference      Physician: Dr Nita Tenorio MD  : RONEL Deluna  Registered Nurse: Anmol Broderick RN  Physical Therapist: Nita Trejo DPT  Occupational Therapist: Avram Ormond, [address], OTR/L  Speech Therapist: Ortiz Guerra MA, CCC-SLP  Other:

## 2018-11-28 NOTE — PROGRESS NOTES
11/28/18 1300   Pain Assessment   Pain Assessment 0-10   Pain Score Worst Possible Pain   Pain Type Acute pain;Surgical pain;Neuropathic pain   Pain Location Neck;Arm   Pain Orientation Bilateral   Pain Radiating Towards spine    Pain Descriptors Sharp; Shooting   Pain Frequency Constant/continuous   Pain Onset Ongoing   Clinical Progression Not changed   Effect of Pain on Daily Activities impaired tolerance and quality of mobility    Patient's Stated Pain Goal No pain   Hospital Pain Intervention(s) Repositioned   Response to Interventions attempted repositioning with little effect  STM also performed on UT- some reliev found    Pain Rating: FLACC (Rest) - Face 0   Pain Rating: FLACC (Rest) - Legs 0   Pain Rating: FLACC (Rest) - Activity 0   Pain Rating: FLACC (Rest) - Cry 0   Pain Rating: FLACC (Rest) - Consolability 0   Score: FLACC (Rest) 0   Pain Rating: FLACC (Activity) - Face 1   Pain Rating: FLACC (Activity) - Legs 0   Pain Rating: FLACC (Activity) - Activity 0   Pain Rating: FLACC (Activity) - Cry 0   Pain Rating: FLACC (Activity) - Consolability 0   Score: FLACC (Activity) 1   Restrictions/Precautions   Precautions Bed/chair alarms;Supervision on toilet/commode;Spinal precautions;Pain; Fall Risk   Braces or Orthoses C/S Collar   Cognition   Overall Cognitive Status Impaired   Arousal/Participation Cooperative   Attention Attends with cues to redirect   Orientation Level Oriented X4   Memory Decreased short term memory  (Able to recall 3/3 spinal precautions )   Following Commands Follows multistep commands with increased time or repetition   Subjective   Subjective Patient agreeable to particiapte in PT session  QI: Sit to Stand   Assistance Needed Supervision; Adaptive equipment   Assistance Provided by [place] No physical assistance   Sit to Stand CARE Score 4   QI: Chair/Bed-to-Chair Transfer   Assistance Needed Adaptive equipment; Incidental touching   Assistance Provided by [place] Less than 25% Comment CGA/CS   Chair/Bed-to-Chair Transfer CARE Score 3   Transfer Bed/Chair/Wheelchair   Limitations Noted In Balance;Confidence;Problem Solving; Sequencing   Adaptive Equipment Roller Walker   Stand Pivot Contact Guard   Sit to TXU [place]   Stand to [employer] Transfer Minimal Assist   All Transfer Contact [employer], Chair, Wheelchair Transfer (FIM) 4 - Patient requires steadying assist or light touching   QI: Car Transfer   Assistance Needed Physical assistance   Assistance Provided by [place] 25%-49%   Comment [place]   Car Transfer CARE Score 3   QI: Walk 10 Feet   Assistance Needed Adaptive equipment; Incidental touching   Assistance Provided by [place] Less than 25%   Comment CGA using RW    Walk 10 Feet CARE Score 3   QI: Walk 50 Feet with Two Turns   Assistance Needed Adaptive equipment;Physical assistance   Assistance Provided by [place] Less than 25%   Comment CGA using RW    Walk 50 Feet with Two Turns CARE Score 3   QI: Walk 150 Feet   Assistance Needed Adaptive equipment;Physical assistance   Assistance Provided by [place] Less than 25%   Comment CGA/CS   Walk 150 Feet CARE Score 3   QI: Walking 10 Feet on Uneven Surfaces   Assistance Needed Physical assistance   Assistance Provided by [place] 25%-49%   Comment ramp with RW at [place]    Walking 10 Feet on Uneven Surfaces CARE Score 3   Ambulation   Does the patient walk? 2  Yes   Primary Discharge Mode of Locomotion Walk   Walk Assist Level Contact Guard   Gait Pattern Antalgic;Decreased foot clearance; Forward Flexion   Assist Device Chase Maria Luz Nba Walked (feet) 200 ft   Limitations Noted In Balance; Coordination;Device Management; Heel Strike;Speed; Sequencing   Findings patient able to improve fwd flexion with dec verbal instruction during short distance ambulation with turns   Increased flexion with fatigue longer distances    Walking (FIM) 4 - Patient requires steadying assist or light touching AND distance 150 feet or more, no rest   QI: Wheel 50 Feet with Two Turns   Reason if not Attempted Activity not applicable   Wheel 50 Feet with Two Turns CARE Score 9   QI: Wheel 150 Feet   Reason if not Attempted Activity not applicable   Wheel 627 Feet CARE Score 9   Wheelchair mobility   QI: Does the patient use a wheelchair? 0  No   QI: 1 Step (Curb)   Assistance Needed Physical assistance   Assistance Provided by [place] 25%-49%   Comment [place], able to carryover RW placement    1 Step (Curb) CARE Score 3   QI: 4 Steps   Assistance Needed Physical assistance   Assistance Provided by [place] 25%-49%   Comment 6 fwd up/fwd down 6"  steps + 9 fwd up/down 8"  steps    4 Steps CARE Score 3   QI: 12 Steps   Reason if not Attempted Safety concerns   12 Steps CARE Score 88   Stairs   Type Stairs;Curb;Ramp   # of Steps 6   Weight Bearing Precautions Fall Risk   Assist Devices Bilateral Rail;Roller Walker   Findings RW utilized on ramp and curb; B railing on  steps    Stairs (FIM) 2 - Patient goes up and down 4 - 11 stairs regardless of assist/device/setup   QI: Picking Up Object   Comment Edcuated on use of equipment such as reacher to pick items off floor to review with OT    Reason if not Attempted Safety concerns   Picking Up Object CARE Score 88   QI: Toilet Transfer   Comment Patient on toilet at the end of session with nursing    Toilet Transfer   Toilet Transfer (FIM) 4 - Patient requires steadying assist or light touching   Therapeutic Interventions   Flexibility 6 minute B HS stretching    Balance static standing balance 60 seconds: wide YEN, narrow YEN, R semi tandem stance, L semi tandem stance; side stepping 20' using wall rail B sides; 50'x2 ambulation with no AD    Neuromuscular Re-Education 50'x2 ambulation using RW with focus on turns and reduction of fwd flexion   Other Proper size (M/L) ABD binder ordered from store room  Nursing aware   Also spoke with Mary Ann Bring about ambulating after dinner using RW at [facility] to appx elevators on L or shorter distances  Other Comments   Comments Extensive time spent during session educating patient on goals of therapy  Many times throughtout session, patient verbalizing that he is not walking  Spoke with patient about safety and increased stability using RW  He feels using the RW is not walking but barely surviving  Educated patient on reasons for RW recommendation at this time  Please works on balance and conitnue to trial ambulation with HHA to increase stability  However, based on current performace, patient likely required RW prior and was not using and likely will need at d/c  Also discussed with patient h/o 3 falls and use of RW to decrease future falls  Patient typcially falls forward- please conitnue to reinforce reducetion of fwd flexion during turns as shorter distances to reduce fatigue  Despite extensive conversation and education, patient with depressed mood in regards to his "future"  Little effect made despite attempts  Assessment   Treatment Assessment Patient engaged in PT treatment session focussing on balance, ambulation and functional mobility  Patient is making progress despite lack of awareness to this and education on gains  Balance remains poor and recommendation for use of RW continues to stand  Continue to trial stairs as this is a barrier to d/c home at this time  patient reports at d/c, if to home, he would be alone from 5:00-8:00 PM 5 days/week  Patient unsafe to return at current functional mobility level and will require skilled PT intervention to progress functional mobility (I) and safety  PT Barriers   Physical Impairment Decreased strength;Decreased range of motion;Decreased endurance; Impaired balance;Decreased mobility; Decreased coordination;Decreased cognition;Decreased skin integrity;Orthopedic restrictions;Pain   Functional Limitation Car transfers;Stair negotiation;Transfers; Walking   Plan   Treatment/Interventions Functional transfer training;LE strengthening/ROM; Elevations; Therapeutic exercise; Endurance training;Patient/family training;Cognitive reorientation;Equipment eval/education; Bed mobility;Gait training; Compensatory technique education   Progress Progressing toward goals   Recommendation   Recommendation Other (Comment)  (PENDING PROGRESS)   Equipment Recommended Walker   PT Therapy Minutes   PT Time In 1300   PT Time Out 1430   PT Total Time (minutes) 90   PT Mode of treatment - Individual (minutes) 90   PT Mode of treatment - Concurrent (minutes) 0   PT Mode of treatment - Group (minutes) 0   PT Mode of treatment - Co-treat (minutes) 0   PT Mode of Teatment - Total time(minutes) 90 minutes   Therapy Time missed   Time missed?  No

## 2018-11-28 NOTE — PROGRESS NOTES
Internal Medicine Progress Note  Patient: Emely Lozano  Age/sex: 68 y o  male  Medical Record #: [id number]      ASSESSMENT/PLAN:  Emely Lozano is seen and examined and management for following issues:    Fall, right sided weakness; s/p tPA:  radiologic studies did not show CVA     Left periorbital hematoma: stable (from hitting his head when he fell); will watch      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; s/p posterior cervical decompressive laminectomy with instrumented posterior lateral fusion fixation C3-6 on 11/19 [facility]):  watch incision; continue collar at all times     HTN/mild orthostasis: stopped Lopressor again last week (office note says stopped 2/2 low BP)  Had mild orthostasis this AM = will add TEDS      OCD: meds per primary team     ABLA:  mild; required no transfusion; will watch         Subjective: offers no complaints        Scheduled Meds:    Current Facility-Administered Medications:  acetaminophen 975 mg Oral [facility] Chica Blankenship MD   atorvastatin 40 mg Oral Daily With Eva Head MD   bacitracin 1 large application Topical BID Chica Blankenship MD   bisacodyl 10 mg Rectal Daily PRN Chica Blankenship MD   bisacodyl 10 mg Rectal Once Chica Blankenship MD   busPIRone 5 mg Oral BID Chica Blankenship MD   docusate sodium 100 mg Oral BID Chica Blankenship MD   gabapentin 300 mg Oral TID Chica Blankenship MD   heparin (porcine) 5,000 Units Subcutaneous [facility] Chica Blankenship MD   magnesium hydroxide 30 mL Oral Daily PRN Letha Duverney, DO   mirtazapine 7 5 mg Oral HS Chica Blankenship MD   OLANZapine 10 mg Oral HS Chica Blankenship MD   OLANZapine 5 mg Oral Daily PRN Chica Blankenship MD   oxyCODONE 5 mg Oral Q4H PRN Chica Blankenship MD   polyethylene glycol 17 g Oral Daily Chica Blankenship MD   polyethylene glycol 17 g Oral Once Chica Blankenship MD   senna-docusate sodium 1 tablet Oral BID Chica Blankenship MD   sertraline 100 mg Oral Daily Chica Blankenship MD       Labs:       Results from last 7 days  Lab Units 11/26/18  0553   WBC Thousand/uL 6 53   HEMOGLOBIN g/dL 10 7*   HEMATOCRIT % 32 5*   PLATELETS Thousands/uL 251       Results from last 7 days  Lab Units 11/26/18  0553   POTASSIUM mmol/L 4 0   CHLORIDE mmol/L 103   CO2 mmol/L 27   BUN mg/dL 10   CREATININE mg/dL 0 62   CALCIUM mg/dL 9 2                No results found for: GLUCOSE    Labs reviewed    Physical Examination:  Vitals:   Vitals:    11/28/18 0700 11/28/18 0705 11/28/18 0719 11/28/18 0914   BP: (!) 118/44 131/59 115/54    BP Location: Left arm Left arm Left arm    Pulse:       Resp:       Temp:       TempSrc:       SpO2:    97%   Weight:       Height:           Constitutional:  NAD; pleasant; nontoxic  HEENT:  AT/NC; oropharynx negative for thrush on tongue   Neck: negative for JVD  CV:  +S1, S2;  RRR; no rub/murmur  Pulmonary:  BBS without crackles/wheeze/rhonci; resp are unlabored  Abdominal:  soft, +BS, ND/NT; no mass  Skin:  no rashes  Musculoskeletal:  no edema  :  no cristina  Neurological/Psych:  AAO;  LE 5/5; RUE 4/5 with grasp 4-/5, LUE 4+/5; no depression/anxiety      [x ] Total time spent: 30 Mins and greater than 50% of this time was spent counseling/coordinating care  ** Please Note: Dragon 360 Dictation voice to text software may have been used in the creation of this document   **

## 2018-11-28 NOTE — PROGRESS NOTES
11/28/18 0700   Pain Assessment   Pain Assessment 0-10   Pain Score Worst Possible Pain   Pain Type Surgical pain;Neuropathic pain   Pain Location Neck   Pain Orientation Anterior   Pain Radiating Towards down arms and spine   Pain Descriptors Shooting   Pain Frequency Intermittent   Pain Onset Sudden   Clinical Progression Not changed   Effect of Pain on Daily Activities limits act tolerance and indep with functional tasks   Hospital Pain Intervention(s) Repositioned; Rest   Response to Interventions unchanged   Restrictions/Precautions   Precautions Bed/chair alarms;Cognitive; Fall Risk;Pain;Spinal precautions;Supervision on toilet/commode   Braces or Orthoses C/S Collar   QI: Eating   Assistance Needed Adaptive equipment;Set-up / clean-up   Assistance Provided by [place] No physical assistance   Comment built up foam on spoon   Eating CARE Score 5   Eating Assessment   Food To Mouth Yes   Positioning Upright;Out of Bed   Meal Assessed Breakfast   Opens Packages No   Findings Pt able to bring food items to mouth with built-up foam spoon  Pt cont to req A for opening packaging 2* dec FM  Eating (FIM) 5 - Patient needs help to open contianers or set up tray   QI: Oral Hygiene   Assistance Needed Supervision;Verbal cues   Assistance Provided by [place] No physical assistance   Comment seated at sink, ext time to manipulate items   Oral Hygiene CARE Score 4   Grooming   Able To Comb/Brush Hair; Shave;Wash/Dry Face;Brush/Clean Teeth;Wash/Dry Hands   Limitation Noted In Coordination;Strength   Findings Pt able to manipulate items for brushing teeth with ext time  Pt req A to brush hair in back 2* dec strength and ROM in b/l UEs and inc pain with movement   Pt attempted to shave with electric razor but unable to maintain precautions in neck therefore req A to complete   Grooming (FIM) 3 - Patient completed  3/5 tasks   QI: Shower/Bathe Self   Assistance Needed Physical assistance   Assistance Provided by [place] Less than 25%   Comment Pt utilized sink to stabilize while bathing nancy/rear in stance  Pt cont to be unable to cross leg over opposite knee to bathe req A for b/l feet  Shower/Bathe Self CARE Score 3   Bathing   Assessed Bath Style Sponge Bath   Anticipated D/C Bath Style Tub   Able to Gather/Transport No   Able to [employer] Temperature No   Able to Wash/Rinse/Dry (body part) Left Arm;Right Arm;L Upper Leg;R Upper Leg;Chest;Abdomen;Perineal Area; Buttocks   Limitations Noted in Balance; Endurance; Coordination;ROM;Strength   Positioning Seated;Standing   Findings  Pt req constant v/c to maintain spinal precautions during session   Bathing (FIM) 4 - Patient completes 8/10 or 9/10 parts   Tub/Shower Transfer   Not Assessed Sponge Bath;Medical  (low BPs)   QI: Upper Body Dressing   Assistance Needed Physical assistance   Assistance Provided by [place] Less than 25%   Comment Pt able to don shirt over arms and raise arms overhead  Pt req A to pull over c/s collar  Pt able to adjust shirt around trunk   Upper Body Dressing CARE Score 3   QI: Lower Body Dressing   Assistance Needed Physical assistance   Assistance Provided by [place] 50%-74%   Comment Pt attempted to utilize [facility] reach during LB dressing but had noted difficulty coordinating today  Pt able to maintain balance in stance and complete CM over hips  Lower Body Dressing CARE Score 2   QI: Putting On/Taking Off Footwear   Assistance Needed Physical assistance   Assistance Provided by [place] 50%-74%   Comment can doff socks with opposite foot  Req A to don   Putting On/Taking Off Footwear CARE Score 2   Dressing/Undressing Clothing   Remove UB Clothes Other  (gown)   Remove LB Clothes Pants; Undergarment;Socks   Don UB [address] [place] [address] Avenue; Undergarment;Socks   Limitations Noted In Balance; Endurance; Coordination;Problem Solving;Strength;ROM   Adaptive Equipment Reacher   Positioning Supported Sit;Standing   Findings Pt able to doff all clothing articles at S level with support from sink for balance  Pt cont to req A for thread b/l LEs 2* dec coordination  Pt becomes flustered and confused with tasks that cause self-reported anxiety and repeats "I can't do this  I'm not getting better "   UB Dressing (FIM) 4 - Patient completes 75% of all tasks   LB Dressing (FIM) 2 - Patient completes 25-49% of all tasks   QI: Roll Left and Right   Assistance Needed Supervision   Assistance Provided by [place] No physical assistance   Roll Left and Right CARE Score 4   QI: Lying to Sitting on Side of Bed   Assistance Needed Supervision   Assistance Provided by [place] No physical assistance   Comment report slight dizziness with change of position   Lying to Sitting on Side of Bed CARE Score 4   QI: Sit to [address] Provided by [place] No physical assistance   Sit to Stand CARE Score 4   QI: Chair/Bed-to-Chair Transfer   Assistance Needed Adaptive equipment; Incidental touching   Assistance Provided by [place] Less than 25%   Comment CGA with RW   Chair/Bed-to-Chair Transfer CARE Score 3   Transfer Bed/Chair/Wheelchair   Limitations Noted In Endurance;Confidence;LE Strength   Adaptive Equipment Roller Walker   Findings Pt cont to have fluctuating BPs with change in position  NSG aware and NSG recorded orthostatic BPs  Bed, Chair, Wheelchair Transfer (FIM) 4 - Patient requires steadying assist or light touching   Exercise Tools   Theraputty Pt completed theraputty exercises with yellow putty to inc indep with self-feeding and self care tasks  Pt req to divide putty into small pieces and roll into balls to promote FM strength, tripod grasp coordination, and b/l integration  Pt req ext time but able to complete 10 theraputty balls     Cognition   Overall Cognitive Status Impaired   Arousal/Participation Cooperative   Attention Attends with cues to redirect   Orientation Level Oriented X4   Memory Decreased short term memory;Decreased recall of recent events;Decreased recall of precautions   Following Commands Follows one step commands with increased time or repetition   Activity Tolerance   Activity Tolerance Treatment limited secondary to medical complications (Comment)  (fluctuating BP)   Medical Staff Made Aware NSG aware   Assessment   Treatment Assessment Pt participated in skilled OT session focusing on ADL retraining and [address]  Pt session limited by fluctuating BP throughout session  NSG aware and recorded BP  Pt reported feeling symptomatic during change in position throughout entire session  Pt cont to be unable to maintain precautions even with frequent v/c  Pt req max encouragement to attempt tasks before requesting help during bathing and dressing  Pt is demo'ing inc FM control during grooming tasks but would still benefit from cont skilled therapy focusing on b/l hand strength and coordination to inc indep with ADL tasks  Pt edu on C/S collar management during session but unable to physically demo at this time  Pt perseverated on washing hair during session saying "did we wash it?" and "is all the soap out?" even after dressed and participating in next therapy task  Pt in recliner at end of session with breakfast set-up and all necessary items within reach  Prognosis Fair   Problem List Decreased strength;Decreased range of motion;Decreased endurance; Impaired balance;Decreased mobility; Decreased coordination;Decreased cognition; Impaired judgement;Orthopedic restrictions;Pain   Barriers to Discharge Inaccessible home environment   Plan   Treatment/Interventions ADL retraining;Functional transfer training; Therapeutic exercise; Endurance training   Progress Progressing toward goals   OT Therapy Minutes   OT Time In 0700   OT Time Out 0830   OT Total Time (minutes) 90   OT Mode of treatment - Individual (minutes) 90   OT Mode of treatment - Concurrent (minutes) 0   OT Mode of treatment - Group (minutes) 0   OT Mode of treatment - Co-treat (minutes) 0   OT Mode of Teatment - Total time(minutes) 90 minutes   Therapy Time missed   Time missed?  No

## 2018-11-28 NOTE — PCC OCCUPATIONAL THERAPY
Pt is demonstrating good progress with occupational therapy and is progressing toward mod indep level goals for ADL, IADL, and functional transfers/mobility  Pt continues to present with impairments in ROM, pain management, and overall strength/endurance  With AE, pt has demo inc indep with bathing and dressing tasks and can now complete at overall mod Indep level  Pt has support from son upon discharge to provide assistance with C/S Collar management and IADL performance  Pt will benefit from skilled rehab OT services at home level to address above mentioned barriers and maximize functional independence in baseline areas of occupation to meet established treatment goals with overall inc indep

## 2018-11-29 LAB
ANION GAP SERPL CALCULATED.3IONS-SCNC: 8 MMOL/L (ref 4–13)
BASOPHILS # BLD AUTO: 0 THOUSANDS/ΜL (ref 0–0.1)
BASOPHILS NFR BLD AUTO: 0 % (ref 0–1)
BUN SERPL-MCNC: 9 MG/DL (ref 5–25)
CALCIUM SERPL-MCNC: 8.7 MG/DL (ref 8.3–10.1)
CHLORIDE SERPL-SCNC: 104 MMOL/L (ref 100–108)
CO2 SERPL-SCNC: 26 MMOL/L (ref 21–32)
CREAT SERPL-MCNC: 0.61 MG/DL (ref 0.6–1.3)
EOSINOPHIL # BLD AUTO: 0.15 THOUSAND/ΜL (ref 0–0.61)
EOSINOPHIL NFR BLD AUTO: 3 % (ref 0–6)
ERYTHROCYTE [DISTWIDTH] IN BLOOD BY AUTOMATED COUNT: 14 % (ref 11.6–15.1)
GFR SERPL CREATININE-BSD FRML MDRD: 99 ML/MIN/1.73SQ M
GLUCOSE SERPL-MCNC: 81 MG/DL (ref 65–140)
HCT VFR BLD AUTO: 34.5 % (ref 36.5–49.3)
HGB BLD-MCNC: 10.9 G/DL (ref 12–17)
IMM GRANULOCYTES # BLD AUTO: 0.04 THOUSAND/UL (ref 0–0.2)
IMM GRANULOCYTES NFR BLD AUTO: 1 % (ref 0–2)
LYMPHOCYTES # BLD AUTO: 1.43 THOUSANDS/ΜL (ref 0.6–4.47)
LYMPHOCYTES NFR BLD AUTO: 30 % (ref 14–44)
MCH RBC QN AUTO: 30 PG (ref 26.8–34.3)
MCHC RBC AUTO-ENTMCNC: 31.6 G/DL (ref 31.4–37.4)
MCV RBC AUTO: 95 FL (ref 82–98)
MONOCYTES # BLD AUTO: 0.76 THOUSAND/ΜL (ref 0.17–1.22)
MONOCYTES NFR BLD AUTO: 16 % (ref 4–12)
NEUTROPHILS # BLD AUTO: 2.34 THOUSANDS/ΜL (ref 1.85–7.62)
NEUTS SEG NFR BLD AUTO: 50 % (ref 43–75)
NRBC BLD AUTO-RTO: 0 /100 WBCS
PLATELET # BLD AUTO: 283 THOUSANDS/UL (ref 149–390)
PMV BLD AUTO: 12.3 FL (ref 8.9–12.7)
POTASSIUM SERPL-SCNC: 3.9 MMOL/L (ref 3.5–5.3)
RBC # BLD AUTO: 3.63 MILLION/UL (ref 3.88–5.62)
SODIUM SERPL-SCNC: 138 MMOL/L (ref 136–145)
WBC # BLD AUTO: 4.72 THOUSAND/UL (ref 4.31–10.16)

## 2018-11-29 PROCEDURE — 97530 THERAPEUTIC ACTIVITIES: CPT

## 2018-11-29 PROCEDURE — 97110 THERAPEUTIC EXERCISES: CPT

## 2018-11-29 PROCEDURE — 97112 NEUROMUSCULAR REEDUCATION: CPT

## 2018-11-29 PROCEDURE — 85025 COMPLETE CBC W/AUTO DIFF WBC: CPT | Performed by: NURSE PRACTITIONER

## 2018-11-29 PROCEDURE — 80048 BASIC METABOLIC PNL TOTAL CA: CPT | Performed by: NURSE PRACTITIONER

## 2018-11-29 RX ADMIN — HEPARIN SODIUM 5000 UNITS: 5000 INJECTION INTRAVENOUS; SUBCUTANEOUS at 21:26

## 2018-11-29 RX ADMIN — OLANZAPINE 10 MG: 10 TABLET, FILM COATED ORAL at 21:28

## 2018-11-29 RX ADMIN — BUSPIRONE HYDROCHLORIDE 7.5 MG: 5 TABLET ORAL at 17:29

## 2018-11-29 RX ADMIN — ACETAMINOPHEN 975 MG: 325 TABLET, FILM COATED ORAL at 21:25

## 2018-11-29 RX ADMIN — SENNOSIDES AND DOCUSATE SODIUM 1 TABLET: 8.6; 5 TABLET ORAL at 07:49

## 2018-11-29 RX ADMIN — GABAPENTIN 400 MG: 400 CAPSULE ORAL at 21:25

## 2018-11-29 RX ADMIN — OXYCODONE HYDROCHLORIDE 5 MG: 5 TABLET ORAL at 13:57

## 2018-11-29 RX ADMIN — GABAPENTIN 400 MG: 400 CAPSULE ORAL at 13:57

## 2018-11-29 RX ADMIN — POLYETHYLENE GLYCOL 3350 17 G: 17 POWDER, FOR SOLUTION ORAL at 07:50

## 2018-11-29 RX ADMIN — BACITRACIN ZINC 1 LARGE APPLICATION: 500 OINTMENT TOPICAL at 17:28

## 2018-11-29 RX ADMIN — ATORVASTATIN CALCIUM 40 MG: 40 TABLET, FILM COATED ORAL at 17:29

## 2018-11-29 RX ADMIN — BACITRACIN ZINC 1 LARGE APPLICATION: 500 OINTMENT TOPICAL at 07:51

## 2018-11-29 RX ADMIN — DOCUSATE SODIUM 100 MG: 100 CAPSULE, LIQUID FILLED ORAL at 07:48

## 2018-11-29 RX ADMIN — OXYCODONE HYDROCHLORIDE 5 MG: 5 TABLET ORAL at 06:43

## 2018-11-29 RX ADMIN — ACETAMINOPHEN 975 MG: 325 TABLET, FILM COATED ORAL at 13:57

## 2018-11-29 RX ADMIN — GABAPENTIN 400 MG: 400 CAPSULE ORAL at 06:43

## 2018-11-29 RX ADMIN — BUSPIRONE HYDROCHLORIDE 7.5 MG: 5 TABLET ORAL at 07:51

## 2018-11-29 RX ADMIN — HEPARIN SODIUM 5000 UNITS: 5000 INJECTION INTRAVENOUS; SUBCUTANEOUS at 05:50

## 2018-11-29 RX ADMIN — MIRTAZAPINE 7.5 MG: 15 TABLET ORAL at 21:26

## 2018-11-29 RX ADMIN — DOCUSATE SODIUM 100 MG: 100 CAPSULE, LIQUID FILLED ORAL at 17:29

## 2018-11-29 RX ADMIN — SERTRALINE HYDROCHLORIDE 100 MG: 100 TABLET ORAL at 07:50

## 2018-11-29 RX ADMIN — HEPARIN SODIUM 5000 UNITS: 5000 INJECTION INTRAVENOUS; SUBCUTANEOUS at 13:57

## 2018-11-29 RX ADMIN — ACETAMINOPHEN 975 MG: 325 TABLET, FILM COATED ORAL at 05:51

## 2018-11-29 RX ADMIN — SENNOSIDES AND DOCUSATE SODIUM 1 TABLET: 8.6; 5 TABLET ORAL at 17:29

## 2018-11-29 NOTE — PROGRESS NOTES
11/29/18 1100   Pain Assessment   Pain Assessment 0-10   Pain Score 5   Pain Type Surgical pain   Pain Location Neck   Pain Orientation Posterior   Pain Descriptors Throbbing   Restrictions/Precautions   Precautions Bed/chair alarms; Fall Risk;Spinal precautions;Pain;Supervision on toilet/commode   Braces or Orthoses C/S Collar   Cognition   Overall Cognitive Status Impaired   Arousal/Participation Alert; Cooperative   Attention Attends with cues to redirect   Orientation Level Oriented X4   Memory Decreased short term memory   Following Commands Follows multistep commands inconsistently   Subjective   Subjective reports he does not want the RW to be permanent   QI: Sit to Stand   Assistance Needed Supervision   Sit to Stand CARE Score 4   QI: Chair/Bed-to-Chair Transfer   Assistance Needed Supervision   Chair/Bed-to-Chair Transfer CARE Score 4   Transfer Bed/Chair/Wheelchair   Limitations Noted In Balance;Pain Management;UE Strength;LE Strength   Adaptive Equipment Roller Walker   Stand Pivot Supervision   Sit to Stand Supervision   Stand to W  R  Prachi, Chair, Wheelchair Transfer (FIM) 5 - Patient requires supervision/monitoring   QI: Walk 10 Feet   Assistance Needed Supervision   Walk 10 Feet CARE Score 4   QI: Walk 50 Feet with Two Turns   Assistance Needed Supervision   Walk 50 Feet with Two Turns CARE Score 4   QI: Walk 150 Feet   Assistance Needed Supervision   Walk 150 Feet CARE Score 4   Ambulation   Does the patient walk? 2  Yes   Primary Discharge Mode of Locomotion Walk   Walk Assist Level Supervision   Gait Pattern Antalgic; Inconsistant Sandra; Slow Sandra;Decreased foot clearance;R foot drag; Forward Flexion;Narrow YEN; Poor UE WB;Step through; Improper weight shift   Assist Device Chase [place] Walked (feet) 150 ft  (x2)   Limitations Noted In Balance; Endurance; Heel Strike;Posture;Speed;Strength;Swing   Findings begins to lean forward with fatigue and being nervous   Walking (FIM) 5 - Patient requires supervision/monitoring AND distance 150 feet or more, no rest   Wheelchair mobility   QI: Does the patient use a wheelchair? 0  No   Therapeutic Interventions   Flexibility hamstring and gastroc 60"x2   Assessment   Treatment Assessment extensive education provided on the rehab and recovery process, pt is not understanding fully why he needs to use a RW at this time; it was explained that it is for safety and stability at this time; decreased trunk strength for amb with no UE support and poor endurance; pt would benefit from increasing strength and balance for safe and functional mobility; continue POC as per PT   Problem List Decreased strength;Decreased range of motion;Decreased endurance; Impaired balance;Decreased mobility; Decreased coordination;Decreased cognition; Impaired judgement;Orthopedic restrictions;Pain   Barriers to Discharge Inaccessible home environment   PT Barriers   Functional Limitation Car transfers;Stair negotiation;Transfers; Walking;Ramp negotiation   Plan   Treatment/Interventions ADL retraining;Functional transfer training;LE strengthening/ROM; Elevations; Therapeutic exercise; Endurance training;Cognitive reorientation;Patient/family training;Equipment eval/education; Bed mobility;Gait training   Progress Progressing toward goals   PT Therapy Minutes   PT Time In 1100   PT Time Out 1130   PT Total Time (minutes) 30   PT Mode of treatment - Individual (minutes) 30   PT Mode of treatment - Concurrent (minutes) 0   PT Mode of treatment - Group (minutes) 0   PT Mode of treatment - Co-treat (minutes) 0   PT Mode of Teatment - Total time(minutes) 30 minutes   Therapy Time missed   Time missed?  No

## 2018-11-29 NOTE — TEAM CONFERENCE
Acute RehabilitationTeam Conference Note  Date: 11/29/2018   Time: 10:42 AM       Patient Name:  Sera Martin       Medical Record Number: [id number]   YOB: 1945  Sex:  Male          Room/Bed:  [facility] 973-01  Payor Info:  Payor: August Charissa / Plan: MEDICARE A AND B / Product Type: Medicare A & B Fee for Service /      Admitting Diagnosis: History of neck surgery [[id number] [id number]]   Admit Date/Time:  11/21/2018  1:22 PM  Admission Comments: No comment available     Primary Diagnosis:  Cervical myelopathy ([address] )  Principal Problem: Cervical myelopathy ([address] )    Patient Active Problem List    Diagnosis Date Noted    Positional lightheadedness 11/26/2018    S/P cervical spinal fusion 11/22/2018    Lumbar radiculopathy 11/22/2018    Chronic low back pain 11/22/2018    Pain 11/22/2018    Traumatic brain injury with loss of consciousness ([address] ) 11/22/2018    Hypertension 11/22/2018    History of hyperlipidemia 11/22/2018    Insomnia 11/22/2018    Lumbar stenosis 11/22/2018    At risk for venous thromboembolism (VTE) 11/22/2018    Cervical myelopathy ([address] ) 11/21/2018    Hyponatremia 11/16/2018    Cervical spinal stenosis 11/15/2018    Depression 11/14/2018    Anxiety 11/14/2018    Weakness 11/13/2018    Cervical stenosis of spine 11/13/2018    Chronic idiopathic constipation 01/09/2017    OCD (obsessive compulsive disorder) 01/09/2017       Physical Therapy:    Weight Bearing Status: Full Weight Bearing  Transfers: Contact Guard  Bed Mobility: Contact Guard, Supervision  Amulation Distance (ft): 200 feet  Ambulation: Contact Guard  Assistive Device for Ambulation: Roller Walker  Wheelchair Mobility Distance: 0 ft  Number of Stairs: 6  Assistive Device for Stairs: Bilateral [employer] [place]  Stair Assistance: Minimal Assistance  Ramp: Minimal Assistance  Assistive Device for Ramp: Roller Walker    11/27/18  Patient making improvements with skilled PT intervention but is limited by 10/10 sharp/shooting pain in neck, mood/affect, and self limiting behaviors  At this time, patient is performing bed mobility at S, transfers and gait at [facility], and stairs at [facility]  At /, patient lives with son whom he reports works during the day  Apartment has FF to enter with B railing  Stairs are also a barrier to safe d/c home at this time  Will require skilled PT intervention to progress functional mobility (I) and safety  11/28/18  To accommodate medicare guidelines, patient being re-teamed 11/29/18  Patient making improvements with skilled PT intervention but is limited by 10/10 sharp/shooting pain in neck, mood/affect, and self limiting behaviors  At this time, patient is performing bed mobility at S, transfers and gait at [facility], and stairs at [facility]  At d/, patient lives with son whom he reports works during the day  Apartment has FF to enter with B railing  Stairs are also a barrier to safe d/c home at this time  Will require skilled PT intervention to progress functional mobility (I) and safety  Occupational Therapy:  Eating: Supervision  Grooming: Supervision  Bathing: Minimal Assistance  Bathing: Minimal Assistance  Upper Body Dressing: Minimal Assistance  Lower Body Dressing: Moderate Assistance  Toileting: Minimal Assistance  Toilet Transfer: Contact Guard  Cognition: Exceptions to WNL  Cognition: Decreased Memory, Decreased Attention, Decreased Safety  Orientation: Person, Place, Time, Situation  Discharge Recommendations: Home with:  [address] with[de-identified] Family Support       Pt is making improvements in OT with functional transfers and  Bathing/dressing  Pt continues to demonstrate decreased  strength in UE's but requires multiple attempts to maintain grasp on heavy items  Pt requires CGA for balance support as he has been having orthostatic BP's recently  Pt overall with generalized decondition and low back pain limiting ADLs  Pt has improved with fxnl transfers and mobility to CGA level   Pt must be mod I to return home with family as his son works during day  Pt would benefit from continued OT to improve upon deficits and increase independence and safety  To accommodate medical requirements, pt is being re-teamed on 11/29/18  Speech Therapy:  Mode of Communication: Verbal  Cognition: Exceptions to WNL  Cognition: Decreased Memory, Decreased Executive Functions, Decreased Comprehension  Orientation: Person, Time, Situation  Discharge Recommendations: Home with:  [address] with[de-identified] 24 Hour Supervision, Family Support  Pt currently being followed for cognitive linguistic tx  Completed formalized cognitive assessment (CLQT+), where overall score when compared to age matched peers between 65-80 yrs, deems pt to demonstrate mild cognitive impairments (score of 2 6 out of 4 0)  Upon f/u sessions, pt is noted to have slow processing time and during structured tasks, pt states that things "just don't click " Ongoing education given to pt in regards to role of SLP and activities which are completed in sessions aid in improvement of pt's "fog " Will continue to benefit from SLP services to maximize cognitive linguistic skills at this time  Pt will be updated for team again on 11/28/2018 to maintain medicare requirements for regular team day, where no overt changes occurred from prior note on 11/27/18  Nursing Notes:  Appetite: Good  Diet Type: Regular/House                           Type of Wound (LDA): Incision           Incision 11/19/18 Cervical Neck Posterior (Active)   Incision Description Clean;Dry; Intact 11/29/2018  8:34 AM   Jazmine-wound Assessment Clean;Dry; Intact 11/29/2018  8:34 AM   Closure [place]; Sutures 11/29/2018  8:34 AM   Drainage Amount None 11/29/2018  8:34 AM   Dressing Open to air 11/29/2018  8:34 AM   Dressing Changed Changed 11/26/2018  9:15 AM   Patient Tolerance Tolerated well 11/23/2018  4:22 PM   Dressing Status Other (Comment) 11/29/2018  8:34 AM     Wound 11/13/18 Abrasion(s) Face Left edema/abrasions Left facial (Active)   Wound Description Clean;Dry;Edema 11/29/2018  8:34 AM   Jazmine-wound Assessment Clean;Dry; Intact 11/29/2018  8:34 AM   Closure None 11/26/2018  9:15 AM   Drainage Amount None 11/29/2018  8:34 AM   Drainage Description Serosanguineous 11/20/2018 10:17 AM   Treatments Other (Comment) 11/22/2018  9:37 AM   Dressing Open to air 11/26/2018  9:15 AM   Wound packed? No 11/20/2018 10:17 AM   Patient Tolerance Tolerated well 11/23/2018  4:22 PM   Dressing Status Open to air 11/29/2018  8:34 AM        Type of Wound Patient/Family Education: Yes  Bladder: 6 - Modified [place]        Bowel: 6 - Modified [place]        Pain Location: Neck  Pain Orientation: Bilateral  Pain Score: 0                       Hospital Pain Intervention(s): Repositioned  Pain Patient/Family Education: Yes  Medication Management/Safety  Injectable:  (heparin)  Safe Administration: Yes  Medication Patient/Family Education Complete: Yes    Admitted to [facility] s/p fall at home hitting head on floor- no LOC- Related as a cva on admission with TPA ( 11/13)-MRI of the brain was normal   CTH was w/o acute changes and CTA was normal  MRI of the cervical spine showed severe central canal stenosis and chronic mass effect on the cord at C3-4 and C4-5  11/19:posterior cervical decompressive laminectomy with instrumented posterior lateral fusion fixation C3-6 - Dr Ysabel Barrientos from Neurosurgery  Incision to posterior neck/head-staples in place  Wears [place] collar aat  Pain managed with tylenol, gabapentin, oxycodone  Measuring Orthostatic BP's daily after 8am daily and monitoring I&O's  He needs set up with meals, uses build up equipment on utensils  This week we will monitor lab values and vital signs, work on pain management  We will promote healing and prevent skin breakdown  Pt will work on safety with transfers and remain free from falls           Case Management:     Discharge Planning  Living Arrangements: Children  Support Systems: Children  Assistance Needed: will need at d/c  Type of Current Residence: Private residence  Current Home Care Services: No  Pt is participating well with therapy and expects to return home with son  Pt has been educated on potential recommendations for contd therapy services  Following to assist w/dc planning needs and recommendations  Is the patient actively participating in therapies? yes  List any modifications to the treatment plan:     Barriers Interventions   Coping, anxiety neuropsych services   balance Use of walker                 Is the patient making expected progress toward goals? yes  List any update or changes to goals:     Medical Goals: Patient will be medically stable for discharge to [facility] upon completion of rehab program and Patient will be able to manage medical conditions and comorbid conditions with medications and follow up upon completion of rehab program    Weekly Team Goals:   Rehab Team Goals  ADL Team Goal: Patient will require assist with ADLs with least restrictive device upon completion of rehab program  Bowel/Bladder Team Goal: Patient will return to premorbid level for bladder/bowel management upon completion of rehab program  Transfer Team Goal: Patient will be independent with transfers with least restrictive device upon completion of rehab program  Locomotion Team Goal: Patient will be independent with locomotion with least restrictive device upon completion of rehab program  Cognitive Team Goal: Patient will be independent for basic  tasks and require supervision for complex tasks upon completion of rehab program    Discussion: no change from yesterdays team mtg  reteamed today to get pt back on regular team day and to maintain medicare compliance  Anticipated Discharge Date:12/6/18    [facility] Team Members Present:  The following team members are supervising care for this patient and were present during this Weekly Team Conference      Physician: Dr Vincenza Schlatter, MD  : Solo Lemos MSW  Registered Nurse: Pilar Colvin RN  Physical Therapist: Marycruz Davila DPT  Occupational Therapist: Petty Tapia MS, OTR/L  Speech Therapist: Leora Pearson MA, CCC-SLP  Other:

## 2018-11-29 NOTE — PROGRESS NOTES
11/29/18 1230   Pain Assessment   Pain Assessment 0-10   Pain Score 6   Pain Type Surgical pain   Pain Location Neck   Pain Orientation Posterior   Pain Descriptors Stabbing   Pain Frequency Intermittent   Restrictions/Precautions   Precautions Bed/chair alarms; Fall Risk;Supervision on toilet/commode;Spinal precautions;Pain   Braces or Orthoses C/S Collar   Cognition   Overall Cognitive Status Impaired   Arousal/Participation Alert; Cooperative   Attention Attends with cues to redirect   Orientation Level Oriented X4   Memory Decreased short term memory   Following Commands Follows multistep commands inconsistently   Subjective   Subjective reports he is very anxious about what cause his neck problem and if it will prevent another injury   QI: Sit to Stand   Assistance Needed Supervision   Sit to Stand CARE Score 4   QI: Chair/Bed-to-Chair Transfer   Assistance Needed Supervision   Chair/Bed-to-Chair Transfer CARE Score 4   Transfer Bed/Chair/Wheelchair   Limitations Noted In Balance; Endurance;Pain Management;UE Strength;LE Strength   Adaptive Equipment Roller Walker   Stand Pivot Supervision   Sit to Stand Supervision   Stand to W  R  [place], Chair, Wheelchair Transfer (FIM) 5 - Patient requires supervision/monitoring   QI: Walk 10 Feet   Assistance Needed Supervision   Walk 10 Feet CARE Score 4   QI: Walk 50 Feet with Two Turns   Assistance Needed Supervision   Walk 50 Feet with Two Turns CARE Score 4   QI: Walk 150 Feet   Assistance Needed Supervision   Walk 150 Feet CARE Score 4   Ambulation   Does the patient walk? 2  Yes   Primary Discharge Mode of Locomotion Walk   Walk Assist Level Supervision   Gait Pattern Inconsistant Sandra; Slow Sandra;Decreased foot clearance;R foot drag; Forward Flexion;Narrow YEN;Step to; Step through; Improper weight shift   Assist Device Chase Arango Walked (feet) 150 ft  (x4)   Limitations Noted In Balance; Endurance; Heel Strike;Posture;Speed;Strength;Swing Findings amb with RW but also amb at rail with no UE support for balance, CG used   Walking (FIM) 5 - Patient requires supervision/monitoring AND distance 150 feet or more, no rest   Wheelchair mobility   QI: Does the patient use a wheelchair? 0  No   Therapeutic Interventions   Strengthening HUBERTbrennen 2# CW 15x2; adduction yellow ball 20x3; hip ext manually resisted 10x3   Flexibility hamstring and gastroc 60"x3   Neuromuscular Re-Education amb along rail 120'x4 with no UE support; FA EO/EC 60"x1 each   Assessment   Treatment Assessment trialed no UE support along rail for amb and his trunk is weak so he leans to the L and he looks down at his feet which causes a drastic shift in his weight and could cause him to fall; BLE strength is decreased limiting his amb and stability; poor endurance with no UE support, only able to walk about 1/3 the distance as he can with a RW due to WB through BUE as well with RW; pt would bnefit from strengtheing exercises as well as increasing endurance in BLE and overall as well; continue POC as per PT   Problem List Decreased strength;Decreased range of motion;Decreased endurance; Impaired balance;Decreased mobility; Decreased coordination;Decreased cognition; Impaired judgement;Orthopedic restrictions;Pain   Barriers to Discharge Inaccessible home environment   PT Barriers   Functional Limitation Car transfers;Stair negotiation;Transfers; Walking;Ramp negotiation   Plan   Treatment/Interventions ADL retraining;Functional transfer training;LE strengthening/ROM; Elevations; Therapeutic exercise; Endurance training;Patient/family training;Equipment eval/education; Bed mobility;Gait training   Progress Progressing toward goals   PT Therapy Minutes   PT Time In 1230   PT Time Out 1330   PT Total Time (minutes) 60   PT Mode of treatment - Individual (minutes) 60   PT Mode of treatment - Concurrent (minutes) 0   PT Mode of treatment - Group (minutes) 0   PT Mode of treatment - Co-treat (minutes) 0   PT Mode of Teatment - Total time(minutes) 60 minutes   Therapy Time missed   Time missed?  No

## 2018-11-29 NOTE — PROGRESS NOTES
Physical Medicine and Rehabilitation Progress Note  Janet Thomas 68 y o  male MRN: [id number]  Unit/Bed#: -01 Encounter: [id number]    Chief Complaints:  Concern he won't get better     Subjective/Interval Events:   Patient reports feeling down and like he won't get better with depression near 10 out of 10 but without SI  He reports anxiety is slightly up but better than it was  He reports having significant pain in both shoulders and down arms at times that is hard to control  He would like me to schedule oxy during day so he has better pain control during therapies  He notes occasional ightheadedness with activity but not bad  He denies headache, fever, chills, abdominal pain, calf pain, bladder or bowel problems or other complaints  ROS: A 10-point ROS was performed  Negative except as listed above  Overall Assessment/relevant history:   68 y o  male with PMH of HTN, HL, OCD, MDD, SHAQUILLE, CLBP, lumbar stenosis, lumbar radiculopathy who apparently fell at home hitting his head with residual R sided weakness and LOC who was initially believed to develop R sided weakness first causing the fall with possibility for stroke who was brought to [facility] on 11/13/18 prompting stroke alert/protocol  CT head and CTA head and negative were negative and patient received tPA  Patient was later noted to have some bilateral strength and sensory changes as well as ongoing imbalance and limb pain prompting additional imaging  MRI brain showed severe central canal stenosis with chronic mass effect on cord at C3-5 as well as severe bilateral NF narrowing from C2 to C5  NS was consulted who recommended surgical intervention and patient underwent C3-C6 posterior cervical decompressive laminectomy and posterior lateral fusion/fixation by Dr Marysol Montano on 11/19  Patient still has GOSIA drain in place with some serous drainage  Patient also had c/s by  Psych who felt patient mood currently stable    He has been only on Zoloft 100mg qday but at home he takes several other mood agents which he has been off of  Patient was evaluated by skilled therapies and was found to have significant decline in ADLs and ambulation appropriate for admission to [facility]      Patient [facility] to [facility] on 11/21/18 s/p fall causing cervical myelopathy and TBI with closed head injury and likely brief LOC s/p cervical decompression and fusion with decline in strength, balance, cognition, pain as well as significant insomnia and mood disorder  Functional status (recent):    ST: Significant improvements - today largely supervision   PT: Transfers and ambulation min assist   OT: Toileting and toilet transfers min assist     Functional status on admission to [facility]:  OT:  Lower body dressing max assist bathing moderate assist body dressing general transfers and toilet transfers minutes assist  PT:  Transfers and ambulation Min assist, stairs max assist      * Cervical myelopathy (HCC)   Assessment & Plan    -Following fall causing R sided weakness, imbalance, limb pain s/p decompression and fusion   -Recommend acute comprehensive interdisciplinary inpatient rehabilitation to include intensive skilled therapies (PT, OT) as outlined with oversight and management by rehabilitation physician as well as inpatient rehab level nursing, case management and weekly interdisciplinary team meetings  Traumatic brain injury with loss of consciousness [facility])   Assessment & Plan    Improving overall cognition  -Fall with closed head injury with brief LOC with residual deficits in cognition   -CT head without acute findings;  -Recommend all intensive skilled therapies with particular focus on SLP as   outlined with oversight and management by rehabilitation physician  Continue inpatient rehab level nursing, case management and weekly interdisciplinary team meetings    Skilled therapist, rehab nursing, rehab physician, CM, and supporting staff to provide appropriate teaching to patient (and if applicable to caregiver(s))     -Overstim precautions  -Sleep-agitation log/optimize sleep wake cycle  -Minimize sedating meds when appropriate  -Optimize mood d/o mgmt        S/P cervical spinal fusion   Assessment & Plan    -C3-C6 posterior cervical decompressive lami and PLF by Dr Kelly Hamm on   -Monitor incision  -Cervical collar   -Follow-up with NS during [facility] course PRN and after d/c       Pain   Assessment & Plan    Sub-optimally controlled   Acute cervical myelopathy and spinal surgery pain with associated myofascial pain on chronic low back pain and lumbar radiculopathy   -Schedule oxy 5mg BID prior to therapies  -Gabapentin increase to 400mg TID for neuropathic and adjuvant pain control  -Continue APAP schedule 975mg TID   -Continue Oxycodone 5mg Q4H PRN > may increase to 7 5mg PRN if needed   -Counseled on and continue to encourage deep breathing/relaxation/behavioral pain management techniques:     Deep breathin seconds in, 5 seconds out, 5 times per hour when awake and PRN when in pain or anticipate pain; avoid holding breath and tightening muscles and instead breathe slowly and deeply       OCD (obsessive compulsive disorder)   Assessment & Plan    Still with fair degree of depression;   >Continue Zyprexa 10mg QHS (previously on 15m QHS)   Zoloft 100mg qday   Buspar increase to 7 5mg BID (previously on 10mg BID)   Ramelteon 7 5mg QHS (previously on 15mg QHS)  >Recommend Zyprexa 5mg qday PRN for breakthrough restlessness/OCD/anxiey symptoms  OCD, MDD, SHAQUILLE hx - fairly severe in past including psych hospitalization but denies hx of SI    >Had been off of multiple chronic agents (Zyprexa 15mg QHS, Remeron 15mg QHS, Buspar 10mg BID) except for Zoloft 100mg Qday since being hospitalized over a week ago  -Follow-up with patient's outpatient psychiatrist if needed during course as well as prior to d/c  -Seen by [facility] psych prior to transfer who felt patient mood currently stable  -Psychology consult       Insomnia   Assessment & Plan    Improved overall  Mgmt as outlined above   Mgmt as outlined above     Positional lightheadedness   Assessment & Plan    Orthostatic vitals good today but still symptomatic at times  Continue orthostatic vitals  Abdominal binder when mobilizing +/- MALACHI hose      At risk for venous thromboembolism (VTE)   Assessment & Plan    Heparin, SCDs, and ambulation      Lumbar stenosis   Assessment & Plan    Mgmt as outlined above     History of hyperlipidemia   Assessment & Plan    Recent lipid panel wnl  Continue statin      Hypertension   Assessment & Plan    IM c/s to assit with mgmt  Monitor BP and orthostatics   MTP      Chronic low back pain   Assessment & Plan    Mgmt as outlined above     Lumbar radiculopathy   Assessment & Plan    Mgmt as outlined above     Hyponatremia   Assessment & Plan    Mild, monitor      Chronic idiopathic constipation   Assessment & Plan    Improved  Recommend colace/senna/miralax  PRN bowel regimen          # Cardiopulmonary:   Saturating well on room air  - Encourage out of bed activities, incentive spirometry and deep breathing to prevent atelectasis  - Ensure adequate hydration, volume status, and intermittently monitor Hb, BUN/Cr and sodium  - Monitor vitals at rest and with activity  - Orthostatics PRN for S/S of orthostasis;  Abdominal binder PRN     # Bladder care:    - monitor for retention, incontinence, signs/symptoms of UTI  - recommend voiding trial; nursing prompt to void followed by bladder scan starting Q4-6H or after each patient initiated void; nursing to record voided output and bladder scan totals; straight cath PRN >350-400 cc; if post void residual bladder scans are <150 cc x3 consecutive voids, can stop scans      # Skin:   - Nursing to turn patient Q2H if not adequately ambulatory, monitor for skin breakdown, rashes, and wounds if applicable  - GOSIA drain per NS     # Diet/Hydration:    Regular     Disposition:   Home with ELOS to late next week     Follow-up:   NS, PMR, Psych, PCP     CODE: Level 1: Full Code     Restrictions include: Fall precautions, cervical spine    ---------------------------------------------------------------------------------------------------------------------    Objective: Allergies and Medications per EMR    Physical Exam:  Temperature 97 5° F, pulse 55, respiratory rate 18, blood pressure 106/57, SpO2 97% on room air  General: Awake, alert in NAD  HENT: MMM, stable bilateral lower periorbital ecchymosis and L frontal ecchymosis   Neck: Cervical collar in place; incision healing appropriately   Respiratory: Unlabored breathing, breath sounds equal, Lungs CTA, no wheezes, rales, or rhonchi  Cardiovascular: Regular rate and rhythm, no murmurs, rubs, or gallops  Gastrointestinal: Soft, non-tender, non-distended, normoactive bowel sounds  SkiN/MSK/Extremities: no calf edema, no calf tenderness to palpation  Neurologic/Psych:   MENTAL STATUS/Affect: awake, orientation intact; somewhat depressed affect   Strength/MMT:  unchanged      Diagnostic Studies: reviewed, no new imaging  No results found      Laboratory:      Results from last 7 days  Lab Units 11/29/18 0511 11/26/18  0553   HEMOGLOBIN g/dL 10 9* 10 7*   HEMATOCRIT % 34 5* 32 5*   WBC Thousand/uL 4 72 6 53       Results from last 7 days  Lab Units 11/29/18 0511 11/26/18  0553   BUN mg/dL 9 10   POTASSIUM mmol/L 3 9 4 0   CHLORIDE mmol/L 104 103   CREATININE mg/dL 0 61 0 62            Patient Active Problem List   Diagnosis    Chronic idiopathic constipation    OCD (obsessive compulsive disorder)    Weakness    Depression    Anxiety    Cervical spinal stenosis    Cervical stenosis of spine    Hyponatremia    Cervical myelopathy (HCC)    S/P cervical spinal fusion    Lumbar radiculopathy    Chronic low back pain    Pain    Traumatic brain injury with loss of consciousness ([facility] )    Hypertension    History of hyperlipidemia    Insomnia    Lumbar stenosis    At risk for venous thromboembolism (VTE)    Positional lightheadedness       ** Please Note: Fluency Direct voice to text software may have been used in the creation of this document  **    35 minutes or greater spent face-to-face with patient during this encounter and with coordination of care in the interdisciplinary team conference  Please refer to Acute Rehabilitation Team Conference Note in EMR        Samantha Garcia MD, [facility]  Physical Medicine and Rehabilitation  Brain Injury Medicine

## 2018-11-29 NOTE — PROGRESS NOTES
11/29/18 0858   Pain Assessment   Pain Assessment 0-10   Pain Score 2   Pain Type Surgical pain   Pain Location Neck   Pain Orientation Posterior   Pain Radiating Towards b/l arms   Pain Descriptors Nagging; Throbbing   Pain Frequency Constant/continuous   Pain Onset Ongoing   Clinical Progression Gradually improving   Effect of Pain on Daily Activities dec function in b/l [address]  Pain Intervention(s) Rest;Ambulation/increased activity   Response to Interventions tolerated session   Restrictions/Precautions   Precautions Bed/chair alarms; Fall Risk;Pain;Spinal precautions;Supervision on toilet/commode   Braces or Orthoses C/S Collar   QI: Sit to [address] Provided by [place] No physical assistance   Sit to Stand CARE Score 4   QI: Chair/Bed-to-Chair Transfer   Assistance Needed Adaptive equipment;Supervision   Assistance Provided by [place] No physical assistance   Comment CS w RW   Chair/Bed-to-Chair Transfer CARE Score 4   Transfer Bed/Chair/Wheelchair   Limitations Noted In Confidence; Endurance;LE Strength   Adaptive Equipment Roller [employer], Chair, Wheelchair Transfer (FIM) 5 - Patient requires supervision/monitoring   QI: [address] Provided by [place] No physical assistance   Chivo Narayanan 83 Score 4   Toileting   Able to [address] down yes, up yes  Able to Manage Clothing Closures Yes   Manage Hygiene Bladder   Limitations Noted In Coordination; Safety;LE Strength   Findings ext time for snaps on pants   Toileting (FIM) 5 - Patient requires supervision/monitoring   QI: Toilet Transfer   Assistance Needed Supervision   Assistance Provided by [place] No physical assistance   Toilet Transfer CARE Score 4   Toilet Transfer   Surface Assessed Standard Toilet   Transfer Technique Standard   Limitations Noted In Problem Solving; Endurance;Confidence;LE Strength   Adaptive Equipment Grab Bar   Positioning Concerns Safety   Findings Pt utilized grab bar for sit<>stand transfer from toilet   Toilet Transfer (FIM) 5 - Patient requires supervision/monitoring   Health Management   Health Management Level of Assistance Moderate verbal cues   Health Management Pt engaged in collar management seated with forearm support at table  Pt edu given demo of pad removal and replacement on practice [place] collar  Pt demo ability to doff and don collars when given v/c for position of pads and straps  Pt demo physical FM ability to complete task but will req cont education and practice to inc indep with task sequencing  Pt reports that son will be available to assist with collar management upon discharge  f   Functional Standing Tolerance   Time 3min   Activity static stance at sink for hand washing after toileting   Exercise Tools   Exercise Tools Yes   Theraputty Pt engaged in bead retrievel from medium red theraputty focusing on pincer grasp and overall hand strength for self care and self feeding  Pt states that he "feels like his hands are getting stronger "    Cognition   Overall Cognitive Status Impaired   Arousal/Participation Alert; Cooperative   Attention Attends with cues to redirect   Orientation Level Oriented X4   Memory Decreased short term memory   Following Commands Follows multistep commands inconsistently   Comments Pt able to recall 3/3 spinal precautions during session   Activity Tolerance   Activity Tolerance Patient tolerated treatment well   Assessment   Treatment Assessment Pt participated in skilled OT session focusing on toileting, [facility], grasp strength, and collar management  Pt BP was 106/53 at start of session with ABD binder and TEDs doffed  OT donned binder and TEDs and pt's BP maintained ~120/60 throughout rest of session  Pt demo inc safety awareness and balance during all functional transfers and mobility this session   Pt cont to report dec confidence in progress and function req encouragement from OT to promote participation  Pt demo [facility] during bead task and toileting with still reports that R hand function is worse than L hand function  Pt engaged in pipe tree task focusing on problem solving, direction following, dynamic reach, gross grasp, object manipulation, and unsupported sit to inc indep with all functional tasks  Pt req ext time to manipulate pipes and complete task but had no noted dropping of pipes and was able to raise hands and maintain UE unsupported position while building pipe tree  Pt returned to recliner at end of session with all necessary items within reach  Pt would benefit from cont therapy focusing on b/l FM tasks, activity tolerance, and dynamic balance for functional tasks  Cont with POC  Prognosis Fair   Problem List Decreased strength;Decreased range of motion;Decreased endurance; Impaired balance;Decreased mobility; Decreased coordination;Decreased cognition; Impaired judgement;Orthopedic restrictions;Pain   Barriers to Discharge Inaccessible home environment   Plan   Treatment/Interventions Functional transfer training; Therapeutic exercise; Endurance training;Patient/family training   Progress Progressing toward goals   OT Therapy Minutes   OT Time In 0830   OT Time Out 1000   OT Total Time (minutes) 90   OT Mode of treatment - Individual (minutes) 90   OT Mode of treatment - Concurrent (minutes) 0   OT Mode of treatment - Group (minutes) 0   OT Mode of treatment - Co-treat (minutes) 0   OT Mode of Teatment - Total time(minutes) 90 minutes   Therapy Time missed   Time missed?  No

## 2018-11-29 NOTE — SOCIAL WORK
Met w/pt and reivewed team update and dc for 12/6  Pt needs encouragement re: current functional abilities as he states he doesn't think he'll be ready to go home next week  Following to assist w/dc planning needs

## 2018-11-29 NOTE — PROGRESS NOTES
Internal Medicine Progress Note  Patient: Steffi Charlton  Age/sex: 68 y o  male  Medical Record #: [id number]      ASSESSMENT/PLAN:  Steffi Charlton is seen and examined and management for following issues:    Fall, right sided weakness; s/p tPA:  radiologic studies did not show CVA     Left periorbital hematoma: stable (from hitting his head when he fell); will watch      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; s/p posterior cervical decompressive laminectomy with instrumented posterior lateral fusion fixation C3-6 on 11/19 [facility]):  watch incision; continue collar at all times     HTN/mild orthostasis: stopped Lopressor again last week (office note says stopped 2/2 low BP)  Had mild orthostasis yesterday AM = added TEDS/binder      OCD: meds per primary team     ABLA:  mild; required no transfusion; will watch         Subjective: offers no complaints        Scheduled Meds:    Current Facility-Administered Medications:  acetaminophen 975 mg Oral [facility] Eben Roper MD   atorvastatin 40 mg Oral Daily With Kourtney Hoff MD   bacitracin 1 large application Topical BID Eben Roper MD   bisacodyl 10 mg Rectal Daily PRN Eben Roper MD   bisacodyl 10 mg Rectal Once Eben Roper MD   busPIRone 7 5 mg Oral BID Eben Roper MD   docusate sodium 100 mg Oral BID Eben Roper MD   gabapentin 400 mg Oral TID Eben Roper MD   heparin (porcine) 5,000 Units Subcutaneous [facility] Eben Roper MD   magnesium hydroxide 30 mL Oral Daily PRN Miracle Mae DO   mirtazapine 7 5 mg Oral HS Eben Roper MD   OLANZapine 10 mg Oral HS Eben Roper MD   OLANZapine 5 mg Oral Daily PRN Eben Roper MD   oxyCODONE 5 mg Oral Q4H PRN Eben Roper MD   oxyCODONE 5 mg Oral BID Eben Roper MD   polyethylene glycol 17 g Oral Daily Eben Roper MD   polyethylene glycol 17 g Oral Once Eben Roper MD   senna-docusate sodium 1 tablet Oral BID Eben Roper MD   sertraline 100 mg Oral Daily Eben Roper MD Labs:       Results from last 7 days  Lab Units 11/29/18  0511 11/26/18  0553   WBC Thousand/uL 4 72 6 53   HEMOGLOBIN g/dL 10 9* 10 7*   HEMATOCRIT % 34 5* 32 5*   PLATELETS Thousands/uL 283 251       Results from last 7 days  Lab Units 11/29/18  0511 11/26/18  0553   POTASSIUM mmol/L 3 9 4 0   CHLORIDE mmol/L 104 103   CO2 mmol/L 26 27   BUN mg/dL 9 10   CREATININE mg/dL 0 61 0 62   CALCIUM mg/dL 8 7 9 2                No results found for: GLUCOSE    Labs reviewed    Physical Examination:  Vitals:   Vitals:    11/28/18 1311 11/28/18 1315 11/28/18 2100 11/29/18 0705   BP: 112/57 120/60 117/58 108/53   BP Location: Right arm Right arm Left arm Left arm   Pulse:  55 58 56   Resp:  18 19 18   Temp:  97 6 °F (36 4 °C) (!) 97 4 °F (36 3 °C) 98 1 °F (36 7 °C)   TempSrc:  Oral  Oral   SpO2:  97% 98% 98%   Weight:       Height:           CV:  +S1, S2;  RRR; no rub/murmur  Pulmonary:  BBS without crackles/wheeze/rhonci; resp are unlabored  Abdominal:  soft, +BS, ND/NT; no mass  Skin:  no rashes  Musculoskeletal:  no edema  :  no cristina  Neurological/Psych:  AAO;  LE 5/5; RUE 4/5 with grasp 4-/5, LUE 4+/5; no depression/anxiety      [x ] Total time spent: 30 Mins and greater than 50% of this time was spent counseling/coordinating care  ** Please Note: Dragon 360 Dictation voice to text software may have been used in the creation of this document   **

## 2018-11-30 PROCEDURE — G0515 COGNITIVE SKILLS DEVELOPMENT: HCPCS

## 2018-11-30 PROCEDURE — 97116 GAIT TRAINING THERAPY: CPT

## 2018-11-30 PROCEDURE — 97530 THERAPEUTIC ACTIVITIES: CPT

## 2018-11-30 PROCEDURE — 99232 SBSQ HOSP IP/OBS MODERATE 35: CPT

## 2018-11-30 PROCEDURE — 97535 SELF CARE MNGMENT TRAINING: CPT

## 2018-11-30 RX ADMIN — HEPARIN SODIUM 5000 UNITS: 5000 INJECTION INTRAVENOUS; SUBCUTANEOUS at 21:24

## 2018-11-30 RX ADMIN — DOCUSATE SODIUM 100 MG: 100 CAPSULE, LIQUID FILLED ORAL at 08:01

## 2018-11-30 RX ADMIN — GABAPENTIN 400 MG: 400 CAPSULE ORAL at 13:39

## 2018-11-30 RX ADMIN — BACITRACIN ZINC 1 LARGE APPLICATION: 500 OINTMENT TOPICAL at 17:15

## 2018-11-30 RX ADMIN — POLYETHYLENE GLYCOL 3350 17 G: 17 POWDER, FOR SOLUTION ORAL at 08:00

## 2018-11-30 RX ADMIN — DOCUSATE SODIUM 100 MG: 100 CAPSULE, LIQUID FILLED ORAL at 17:14

## 2018-11-30 RX ADMIN — BACITRACIN ZINC 1 LARGE APPLICATION: 500 OINTMENT TOPICAL at 08:01

## 2018-11-30 RX ADMIN — HEPARIN SODIUM 5000 UNITS: 5000 INJECTION INTRAVENOUS; SUBCUTANEOUS at 06:09

## 2018-11-30 RX ADMIN — SENNOSIDES AND DOCUSATE SODIUM 1 TABLET: 8.6; 5 TABLET ORAL at 08:01

## 2018-11-30 RX ADMIN — BUSPIRONE HYDROCHLORIDE 7.5 MG: 5 TABLET ORAL at 17:14

## 2018-11-30 RX ADMIN — SERTRALINE HYDROCHLORIDE 100 MG: 100 TABLET ORAL at 08:01

## 2018-11-30 RX ADMIN — ACETAMINOPHEN 975 MG: 325 TABLET, FILM COATED ORAL at 13:39

## 2018-11-30 RX ADMIN — MIRTAZAPINE 7.5 MG: 15 TABLET ORAL at 21:26

## 2018-11-30 RX ADMIN — SENNOSIDES AND DOCUSATE SODIUM 1 TABLET: 8.6; 5 TABLET ORAL at 17:14

## 2018-11-30 RX ADMIN — HEPARIN SODIUM 5000 UNITS: 5000 INJECTION INTRAVENOUS; SUBCUTANEOUS at 13:39

## 2018-11-30 RX ADMIN — OXYCODONE HYDROCHLORIDE 5 MG: 5 TABLET ORAL at 06:08

## 2018-11-30 RX ADMIN — GABAPENTIN 400 MG: 400 CAPSULE ORAL at 06:09

## 2018-11-30 RX ADMIN — OXYCODONE HYDROCHLORIDE 5 MG: 5 TABLET ORAL at 13:39

## 2018-11-30 RX ADMIN — ACETAMINOPHEN 975 MG: 325 TABLET, FILM COATED ORAL at 06:08

## 2018-11-30 RX ADMIN — GABAPENTIN 400 MG: 400 CAPSULE ORAL at 21:26

## 2018-11-30 RX ADMIN — ACETAMINOPHEN 975 MG: 325 TABLET, FILM COATED ORAL at 21:25

## 2018-11-30 RX ADMIN — OLANZAPINE 10 MG: 10 TABLET, FILM COATED ORAL at 21:27

## 2018-11-30 RX ADMIN — BUSPIRONE HYDROCHLORIDE 7.5 MG: 5 TABLET ORAL at 08:02

## 2018-11-30 NOTE — PROGRESS NOTES
11/30/18 1400   Pain Assessment   Pain Assessment 0-10   Pain Score 5   Pain Type Acute pain;Surgical pain   Pain Location Neck   Pain Orientation Posterior;Bilateral   Pain Radiating Towards BUE    Pain Descriptors Sharp   Pain Frequency Constant/continuous   Restrictions/Precautions   Precautions Bed/chair alarms;Cognitive; Fall Risk;Spinal precautions   Braces or Orthoses C/S Collar   Cognition   Overall Cognitive Status Impaired   Arousal/Participation Cooperative   Attention Attends with cues to redirect   Orientation Level Oriented to person;Oriented to place;Oriented to situation   Memory Decreased recall of recent events;Decreased short term memory   Following Commands Follows multistep commands with increased time or repetition   Subjective   Subjective Patient's son present for family training  Son agreeable to particiapte  Appeared "not present" this session  Son reports he is "obsessing" over something (h/o OCD)  QI: Roll Left and Right   Assistance Needed Supervision   Assistance Provided by [place] No physical assistance   Roll Left and Right CARE Score 4   QI: Sit to [address] Provided by [place] No physical assistance   Sit to Lying CARE Score 4   QI: Lying to Sitting on Side of Bed   Assistance Needed Supervision   Assistance Provided by [place] No physical assistance   Lying to Sitting on Side of Bed CARE Score 4   QI: Sit to [address] Provided by [place] No physical assistance   Sit to Stand CARE Score 4   QI: Chair/Bed-to-Chair Transfer   Assistance Needed Supervision   Assistance Provided by [place] No physical assistance   Chair/Bed-to-Chair Transfer CARE Score 4   Transfer Bed/Chair/Wheelchair   Limitations Noted In Balance;Confidence; Endurance   Adaptive Equipment Roller Walker   Stand Pivot Supervision   Sit to Stand Supervision   Stand to Sit Supervision   Supine to Sit Supervision   Sit to Supine Supervision Car Transfer Supervision   All Transfer Supervision   Findings Verbal cues for proper hand positioning during transfer    Bed, Chair, Wheelchair Transfer (FIM) 5 - Patient requires supervision/monitoring   QI: [address] Provided by [place] No physical assistance   Car Transfer CARE Score 4   QI: [address] Provided by [place] No physical assistance   Comment with RW   Walk 10 Feet CARE Score 4   QI: Walk 50 Feet with [address] Provided by [place] No physical assistance   Comment with RW   Walk 50 Feet with Two Turns CARE Score 4   QI: Walk 150 [address] Provided by [place] No physical assistance   Comment with RW    Walk 150 Feet CARE Score 4   QI: Walking 10 Feet on Uneven Surfaces   Assistance Needed Incidental touching   Assistance Provided by [place] Less than 25%   Comment ramp- assist with RW needed at times    Walking 10 Feet on Uneven Surfaces CARE Score 3   Ambulation   Does the patient walk? 2  Yes   Primary Discharge Mode of Locomotion Walk   Walk Assist Level Supervision;Distant Supervision   Gait Pattern Antalgic; Inconsistant Sandra;Decreased foot clearance   Assist Device Chase Maria Luz Arango Walked (feet) 150 ft  (x2 at DS ambulating with son )   Limitations Noted In Balance; Endurance; Heel Strike;Speed;Strength   Walking (FIM) 5 - Patient requires supervision/monitoring AND distance 150 feet or more, no rest   QI: Wheel 50 Feet with Two Turns   Reason if not Attempted Activity not applicable   Wheel 50 Feet with Two Turns CARE Score 9   QI: Wheel 150 Feet   Reason if not Attempted Activity not applicable   Wheel 623 Feet CARE Score 9   Wheelchair mobility   QI: Does the patient use a wheelchair? 0   No   QI: 1 Step (Curb)   Assistance Needed Physical assistance   Assistance Provided by [place] Less than 25%   Comment assist with RW on descend; son able to demonstrate understanding    1 Step (Curb) CARE Score 3   QI: 4 Steps   Assistance Needed Incidental touching   Assistance Provided by [place] Less than 25%   Comment CGA; 6"  steps   4 Steps CARE Score 3   QI: 12 Steps   Assistance Needed Incidental touching   Assistance Provided by [place] Less than 25%   Comment CGA; 6"  steps   12 Steps CARE Score 3   Stairs   Type Stairs  ()   # of Steps 12  (2 x6 )   Weight Bearing Precautions Fall Risk   Assist Devices Single Rail   Findings sideways approach utilized as son reports patient will be unable to access B rails at the same time    Stairs (FIM) 4 - Patient requires steadying assist or light touching AND patient goes up and down full flight (12- 14 stairs)   QI: Picking Up Object   Reason if not Attempted Safety concerns   Picking Up Object CARE Score 88   QI: Toilet Transfer   Assistance Needed Supervision   Assistance Provided by [place] No physical assistance   Toilet Transfer CARE Score 4   Toilet Transfer   Surface Assessed Standard Toilet   Limitations Noted In Balance; Endurance;UE Strength;LE Strength   Toilet Transfer (FIM) 5 - Patient requires supervision/monitoring   Therapeutic Interventions   Balance 100' ambulation with no AD with notable fatigue, increased fwd flexion and increased fall risk   Assessment   Treatment Assessment Patient and son engaged in 60 minute PT session focussing on family training  Anticiapted d/c home with son for 12/6/18  Spoke with OT in regards to possible dc home with HHPT/OT for home safety evaluation and progression to OPPT/OT  Discussed with son at length safety, mobility, and current recommendations  Son on board with use of RW at home but is concerned the RW may not fit in some rooms  Patient appeared more please with use of RW as it alows for increased (I)  At this time, patient requires S for bed mobility, transfers, gait and car transfers   CGA required for stairs usign 1 rail at sideways approach and assist with RW (Flavia/CGA) on ramp and curb  Will conitnue to progress patient and trial DS to progress functioanl mobility and ensure safety  Family/Caregiver Present yes   PT Family training done with: Son, Ruthy Plascencia    PT Barriers   Physical Impairment Decreased strength;Decreased range of motion;Decreased endurance; Impaired balance;Decreased mobility; Decreased skin integrity;Orthopedic restrictions;Pain   Functional Limitation Stair negotiation;Ramp negotiation   Plan   Treatment/Interventions Functional transfer training;Elevations; Endurance training;Patient/family training;Equipment eval/education; Bed mobility;Gait training; Compensatory technique education;OT   Progress Progressing toward goals   Recommendation   Recommendation Home with family support  (HHPT vs OPPT )   Equipment Recommended Walker   PT Therapy Minutes   PT Time In 1400   PT Time Out 1500   PT Total Time (minutes) 60   PT Mode of treatment - Individual (minutes) 60   PT Mode of treatment - Concurrent (minutes) 0   PT Mode of treatment - Group (minutes) 0   PT Mode of treatment - Co-treat (minutes) 0   PT Mode of Teatment - Total time(minutes) 60 minutes   Therapy Time missed   Time missed?  No

## 2018-11-30 NOTE — PROGRESS NOTES
11/30/18 1230   Pain Assessment   Pain Assessment 0-10   Pain Score 5   Pain Type Surgical pain   Pain Location Neck   Pain Orientation Posterior   Pain Radiating Towards b/l arms   Pain Descriptors Discomfort;Nagging;Radiating   Pain Frequency Constant/continuous   Pain Onset Ongoing   Clinical Progression Not changed   Effect of Pain on Daily Activities inc w/ functional mobility   Patient's Stated Pain Goal No pain   Hospital Pain Intervention(s) Repositioned; Rest   Restrictions/Precautions   Precautions Bed/chair alarms;Cognitive; Fall Risk;Pain;Spinal precautions;Supervision on toilet/commode   Braces or Orthoses C/S Collar   QI: Lower Body Dressing   Assistance Needed Physical assistance; Adaptive equipment   Assistance Provided by [place] 25%-49%   Comment A to feed over b/l LEs; attempted to utilize reacher but still demo'ing dec strength and problem solving   Lower Body Dressing CARE Score 3   Dressing/Undressing Clothing   Remove LB Clothes Pants; Undergarment   Don LB Clothes Pants; Undergarment   Adaptive Equipment Reacher   LB Dressing (FIM) 3 - Patient completes  50-74% of all tasks   QI: Sit to [address] Provided by [place] No physical assistance   Sit to Stand CARE Score 4   QI: Chair/Bed-to-Chair Transfer   Assistance Needed Supervision   Assistance Provided by [place] No physical assistance   Comment RW   Chair/Bed-to-Chair Transfer CARE Score 4   Transfer Bed/Chair/Wheelchair   Limitations Noted In Balance;Confidence;Pain Management;LE Strength   Adaptive Equipment Roller [employer], Chair, Wheelchair Transfer (FIM) 5 - Patient requires supervision/monitoring   QI: [address] Needed Physical assistance   Assistance Provided by [place] 25%-49%   Comment A for thoroughness due to dec ROM and grasp strength in b/l hands   [address] Score 3   Toileting   Able to [address] down yes, up yes     Able to [address] Yes Manage Hygiene Bowel   Limitations Noted In Coordination;ROM;LE Strength   Toileting (FIM) 3 - Patient completes  50-74% of all tasks   QI: Toilet Transfer   Assistance Needed Supervision   Assistance Provided by [place] No physical assistance   Toilet Transfer CARE Score 4   Toilet Transfer   Surface Assessed Standard Toilet   Transfer Technique Standard   Limitations Noted In Balance; Endurance;LE Strength   Adaptive Equipment Grab Bar   Toilet Transfer (FIM) 5 - Patient requires supervision/monitoring   Functional Standing Tolerance   Time 5min   Activity static stance with card [employer]  pt req v/c to straighten knees with ext stance   Cognition   Overall Cognitive Status Impaired   Arousal/Participation Alert; Cooperative   Attention Attends with cues to redirect   Orientation Level Oriented X4   Memory Decreased short term memory   Following Commands Follows multistep commands inconsistently   Activity Tolerance   Activity Tolerance Patient tolerated treatment well   Assessment   Treatment Assessment Pt participated in skilled OT session focusing on standing tolerance, toileting, LB dressing with  reacher, and collar management  Pt's son Jayashree Mitch present for most of session  Pt's son given verbal and physical demo of changing collar pads  Son given [place] collar hand out and demo understanding of how to manage collar  Pt reported concern regarding toileting safely at home  OT will order bedside commode for discharge to keep next to bed at night to inc indep and safety  Pt's son edu that pt req Flavia for LB dressing, bathing, and bowel hygiene 2* dec grasp strength and ROM  Son reports that he feels comfortable providing this support  Pt and son edu that pt will req assistance for all IADL tasks at this time due to dec problem solving, act tolerance, and strength in b/l UE  Son is available and willing to provide   Pt would benefit from cont therapy focusing on above listed deficits and cont family training to inc indep upon discharge  Cont with POC  Assessment of family training [facility] (son)   Prognosis Fair   Problem List Decreased strength;Decreased range of motion;Decreased endurance; Impaired balance;Decreased mobility; Decreased coordination;Decreased cognition; Impaired judgement;Orthopedic restrictions;Pain   Barriers to Discharge Inaccessible home environment   Plan   Treatment/Interventions ADL retraining;Functional transfer training; Therapeutic exercise; Endurance training;Patient/family training   Progress Progressing toward goals   OT Therapy Minutes   OT Time In 1230   OT Time Out 1345   OT Total Time (minutes) 75   OT Mode of treatment - Individual (minutes) 75   OT Mode of treatment - Concurrent (minutes) 0   OT Mode of treatment - Group (minutes) 0   OT Mode of treatment - Co-treat (minutes) 0   OT Mode of Teatment - Total time(minutes) 75 minutes   Therapy Time missed   Time missed?  No

## 2018-11-30 NOTE — PROGRESS NOTES
11/30/18 1000   Pain Assessment   Pain Assessment 0-10   Pain Score 4   Pain Type Surgical pain   Pain Location Neck   Pain Orientation Posterior   Pain Radiating Towards b/l arms   Pain Descriptors Discomfort;Aching   Pain Frequency Constant/continuous   Pain Onset Ongoing   Clinical Progression Gradually improving   Effect of Pain on Daily Activities dec UE activity tolerance   Hospital Pain Intervention(s) Repositioned; Rest   Response to Interventions tolerated session   Restrictions/Precautions   Precautions Bed/chair alarms;Cognitive; Fall Risk;Pain;Spinal precautions;Supervision on toilet/commode   Braces or Orthoses C/S Collar   QI: Sit to [address] Provided by [place] No physical assistance   Sit to Stand CARE Score 4   QI: Chair/Bed-to-Chair Transfer   Assistance Needed Supervision   Assistance Provided by [place] No physical assistance   Comment RW   Chair/Bed-to-Chair Transfer CARE Score 4   Transfer Bed/Chair/Wheelchair   Limitations Noted In Confidence;Balance; Endurance;Pain Management;LE Strength   Adaptive Equipment Roller Walker   Bed, Chair, Wheelchair Transfer (FIM) 5 - Patient requires supervision/monitoring   Kitchen Mobility   Kitchen-Mobility Level Walker   Kitchen Activity Retrieve items;Transport items   Kitchen Mobility Comments Pt engaged item retrievel with walker tray  Pt able to collect items from counter and low cabinet height  Pt unable to reach higher items 2* decreased ROM and strength in b/l UEs  Pt unable to reach items in low cabinets 2* balance  Pt edu to keep all common use items on countertop to inc safety  Recommended that pt has walker tray for home  Health Management   Health Management Level of Assistance Moderate verbal cues   Health Management Pt demo P carryover with [place] collar management  Pt able to physically peform pad change when given v/c for each step      Coordination   Fine Motor Pt engaged in FM task with rohith focusing on finger strength, in hand manipulaton, UE unsupported endurance, and problem solving to promote indep with feeding and self care tasks  Pt able to remove 8 screws from bird house  Pt req rest breaks between each screw 2* dec endurance in UE  Pt is demo'ing inc FM coordination  Cognition   Overall Cognitive Status Impaired   Arousal/Participation Alert; Cooperative   Attention Attends with cues to redirect   Orientation Level Oriented X4   Memory Decreased short term memory   Following Commands Follows multistep commands inconsistently   Activity Tolerance   Activity Tolerance Patient tolerated treatment well   Assessment   Treatment Assessment Pt participated in skilled OT session focusing on collar management, [address] and endurance, and kitchen mobility  Pt reports unhappiness that he will be discharging with RW  Pt re-edu on benefits of RW to inc safety and conserve energy upon discharge  Pt is demo inc fine motor for functional tasks  Pt would benefit from cont therapy focusing on act tolerance and b/l UE strength and coordination  Will be performing family training with son regarding collar management and ADL performance  Cont with POC  Prognosis Fair   Problem List Decreased strength;Decreased range of motion;Decreased endurance; Impaired balance;Decreased mobility; Decreased coordination;Decreased cognition; Impaired judgement;Orthopedic restrictions;Pain   Barriers to Discharge Inaccessible home environment   Plan   Treatment/Interventions ADL retraining;Functional transfer training; Therapeutic exercise; Endurance training;Patient/family training   Progress Progressing toward goals   OT Therapy Minutes   OT Time In 1000   OT Time Out 1100   OT Total Time (minutes) 60   OT Mode of treatment - Individual (minutes) 60   OT Mode of treatment - Concurrent (minutes) 0   OT Mode of treatment - Group (minutes) 0   OT Mode of treatment - Co-treat (minutes) 0   OT Mode of Teatment - Total time(minutes) 60 minutes Therapy Time missed   Time missed?  No

## 2018-11-30 NOTE — PROGRESS NOTES
11/30/18 1100   Pain Assessment   Pain Assessment No/denies pain   Pain Score No Pain   Effect of Pain on Daily Activities patient did not express complaints of pain this session  Occasional whincing noted    Restrictions/Precautions   Precautions Bed/chair alarms;Pain; Fall Risk;Spinal precautions   Braces or Orthoses C/S Collar   Cognition   Overall Cognitive Status Impaired   Arousal/Participation Cooperative   Attention Attends with cues to redirect   Orientation Level Oriented X4   Memory Decreased short term memory;Decreased recall of recent events   Following Commands Follows multistep commands with increased time or repetition   Subjective   Subjective patient agreeable to participate; offered little conversation this session    QI: Sit to [address] Provided by [place] No physical assistance   Comment trialed DS; continue to practice    Sit to Stand CARE Score 4   QI: Chair/Bed-to-Chair Transfer   Assistance Needed Supervision   Assistance Provided by [place] No physical assistance   Comment trialed DS; continue to practice    Chair/Bed-to-Chair Transfer CARE Score 4   Transfer Bed/Chair/Wheelchair   Limitations Noted In Balance;Pain Management; Sequencing;UE Strength;LE Strength   Adaptive Equipment Roller Walker   Stand Pivot Supervision   Sit to Stand Supervision   Stand to [place] Controls Supervision   All Transfer Supervision   Findings trialed DS; continue to practice    Bed, Chair, Wheelchair Transfer (FIM) 5 - Patient requires supervision/monitoring   QI: Walk 10 Feet   Assistance Needed Supervision   Assistance Provided by [place] No physical assistance   Comment trialed DS; continue to practice    Walk 10 Feet CARE Score 4   QI: Walk 50 Feet with [facility] Provided by [place] No physical assistance   Comment trialed DS; continue to practice    Walk 50 Feet with Two Turns CARE Score 4   QI: Walk 150 Feet   Assistance Needed Supervision   Assistance Provided by [place] No physical assistance   Comment trialed DS; continue to practice    Walk 150 Feet CARE Score 4   Ambulation   Does the patient walk? 2  Yes   Primary Discharge Mode of Locomotion Walk   Walk Assist Level Supervision;Distant Supervision   Gait Pattern Antalgic;Decreased foot clearance   Assist Device Roller Walker   Distance Walked (feet) 150 ft  (x4 with DS + ambulation in room at DS )   Limitations Noted In Balance; Endurance;Posture; Sequencing;Speed;Strength   Walking (FIM) 5 - Patient requires supervision/monitoring AND distance 150 feet or more, no rest   Wheelchair mobility   QI: Does the patient use a wheelchair? 0  No   QI: Picking Up Object   Reason if not Attempted Activity not applicable   Picking Up Object CARE Score 9   QI: Toilet Transfer   Assistance Needed Supervision   Assistance Provided by [place] No physical assistance   Comment trialed DS; continue to practice    Toilet Transfer CARE Score 4   Toilet Transfer   Surface Assessed Standard Toilet   Limitations Noted In Balance;Confidence   Findings trialed DS; continue to practice    Toilet Transfer (FIM) 5 - Patient requires supervision/monitoring   Assessment   Treatment Assessment Session focussed on trialing of DS in which patient able to perform well, continue to practice  Patient able to demosntrate proper hand placement during transfers, reduced flexion during ambulation and turns and good RW management  Patient with no LOB  during trials  Will continue to benefit from skilled PT intervention to progress functional mobility (I) and safety  PT Barriers   Physical Impairment Decreased endurance; Impaired balance;Decreased mobility; Decreased strength;Decreased range of motion;Decreased skin integrity;Orthopedic restrictions;Pain   Functional Limitation Stair negotiation   Plan   Treatment/Interventions LE strengthening/ROM; Therapeutic exercise; Endurance training;Equipment eval/education;Gait training;OT   Progress Progressing toward goals   Recommendation   Recommendation (HHPT vs OPPT )   Equipment Recommended Walker   PT Therapy Minutes   PT Time In 1100   PT Time Out 1130   PT Total Time (minutes) 30   PT Mode of treatment - Individual (minutes) 30   PT Mode of treatment - Concurrent (minutes) 0   PT Mode of treatment - Group (minutes) 0   PT Mode of treatment - Co-treat (minutes) 0   PT Mode of Teatment - Total time(minutes) 30 minutes   Therapy Time missed   Time missed?  No

## 2018-11-30 NOTE — PROGRESS NOTES
11/30/18 0815   Pain Assessment   Pain Assessment No/denies pain   Restrictions/Precautions   Precautions Bed/chair alarms;Cognitive; Fall Risk;Supervision on toilet/commode   Braces or Orthoses C/S Collar   Memory Skills   Memory (FIM) 4 - Recalls 2 of 3 steps   Social Interaction (FIM) 5 - Interacts appropriately with others 90% of time   Speech/Language/Cognition Assessmetn   Treatment Assessment Pt engaging in recall of daily events, which pt was able to recall last meal (breakfast) and activities completed in yesterday's therapy session, which needed minimal prompting  Pt engaging in reading comprehension task given weather forecast, where pt was 10/10 accurate in complete, just noting increased time to complete  When given category exclusion task given both concrete and abstract categories, pt was 15/15 given concrete items and was 15/15 given abstract items  Again noted increased processing time, but was thorough when completing tasks, noting ability to self correct errors  Pt continues to demonstrate improvement w/ cognitive lingustic skills at this time  Pt's BP's were checked when standing at beginning of session at 0815, which was 107/55 and then 15 min later at 0830 seated which was 112/55  Nsg given BP readings and pt denying symptoms  Pt did have teds and abdmonial binder on also  SLP Therapy Minutes   SLP Time In 36   SLP Time Out 0900   SLP Total Time (minutes) 45   SLP Mode of treatment - Individual (minutes) 45   SLP Mode of treatment - Concurrent (minutes) 0   SLP Mode of treatment - Group (minutes) 0   SLP Mode of treatment - Co-treat (minutes) 0   SLP Mode of Teatment - Total time(minutes) 45 minutes   Therapy Time missed   Time missed?  No   Daily FIM Score   Problem solving (FIM) 5 - Solves basic problems 90% of time   Comprehension (FIM) 5 - Understands basic directions and conversation   Expression (FIM) 6 - Expresses complex/abstract but requires:  more time

## 2018-11-30 NOTE — PROGRESS NOTES
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; s/p posterior cervical decompressive laminectomy with instrumented posterior lateral fusion fixation C3-6 on 11/19 [facility]):  watch incision; continue collar at all times     HTN/mild orthostasis: stopped Lopressor again last week (office note says stopped 2/2 low BP)  Had mild orthostasis 11/28 AM = added TEDS/binder and is much improved      OCD: meds per primary team     ABLA:  mild; required no transfusion; will watch         Subjective: offers no complaints        Scheduled Meds:    Current Facility-Administered Medications:  acetaminophen 975 mg Oral [facility] Sergey Keith MD   atorvastatin 40 mg Oral Daily With Suresh Carroll MD   bacitracin 1 large application Topical BID Sergey Keith MD   bisacodyl 10 mg Rectal Daily PRN Sergey Keith MD   bisacodyl 10 mg Rectal Once Sergey Keith MD   busPIRone 7 5 mg Oral BID Sergey Keith MD   docusate sodium 100 mg Oral BID Sergey Keith MD   gabapentin 400 mg Oral TID Sergey Keith MD   heparin (porcine) 5,000 Units Subcutaneous [facility] Sergey Keith MD   magnesium hydroxide 30 mL Oral Daily PRN Delano Guzman DO   mirtazapine 7 5 mg Oral HS Sergey Keith MD   OLANZapine 10 mg Oral HS Sergey Keith MD   OLANZapine 5 mg Oral Daily PRN Sergey Keith MD   oxyCODONE 5 mg Oral Q4H PRN Sergey Keith MD   oxyCODONE 5 mg Oral BID Sergey Keith MD   polyethylene glycol 17 g Oral Daily Sergey Keith MD   polyethylene glycol 17 g Oral Once Sergey Keith MD   senna-docusate sodium 1 tablet Oral BID Sergey Keith MD   sertraline 100 mg Oral Daily Sarah Nettles MD       Labs:       Results from last 7 days  Lab Units 11/29/18  0511 11/26/18  0553   WBC Thousand/uL 4 72 6 53   HEMOGLOBIN g/dL 10 9* 10 7*   HEMATOCRIT % 34 5* 32 5*   PLATELETS Thousands/uL 283 251       Results from last 7 days  Lab Units 11/29/18  0511 11/26/18  0553   POTASSIUM mmol/L 3 9 4 0   CHLORIDE mmol/L 104 103   CO2 mmol/L 26 27   BUN mg/dL 9 10   CREATININE mg/dL 0 61 0 62   CALCIUM mg/dL 8 7 9 2                No results found for: GLUCOSE    Labs reviewed    Physical Examination:  Vitals:   Vitals:    11/29/18 1352 11/29/18 1353 11/29/18 2117 11/30/18 0600   BP: 115/57 117/55 110/57 116/66   BP Location: Right arm Right arm Left arm Left arm   Pulse: 63 68 56 57   Resp:   20 20   Temp:   97 9 °F (36 6 °C) (!) 97 4 °F (36 3 °C)   TempSrc:   Oral Oral   SpO2:   98% 99%   Weight:       Height:           CV:  +S1, S2;  RRR; no rub/murmur  Pulmonary:  BBS without crackles/wheeze/rhonci; resp are unlabored  Abdominal:  soft, +BS, ND/NT; no mass  Skin:  no rashes  Musculoskeletal:  no edema  :  no cristina  Neurological/Psych:  AAO;  LE 5/5; RUE 4/5 with grasp 4-/5, LUE 4+/5; no depression/anxiety      [x ] Total time spent: 30 Mins and greater than 50% of this time was spent counseling/coordinating care  ** Please Note: Dragon 360 Dictation voice to text software may have been used in the creation of this document   **

## 2018-12-01 PROCEDURE — 97110 THERAPEUTIC EXERCISES: CPT

## 2018-12-01 PROCEDURE — 97530 THERAPEUTIC ACTIVITIES: CPT

## 2018-12-01 PROCEDURE — 97116 GAIT TRAINING THERAPY: CPT

## 2018-12-01 RX ADMIN — BACITRACIN ZINC 1 LARGE APPLICATION: 500 OINTMENT TOPICAL at 17:00

## 2018-12-01 RX ADMIN — OXYCODONE HYDROCHLORIDE 5 MG: 5 TABLET ORAL at 07:55

## 2018-12-01 RX ADMIN — BUSPIRONE HYDROCHLORIDE 7.5 MG: 5 TABLET ORAL at 07:58

## 2018-12-01 RX ADMIN — ACETAMINOPHEN 975 MG: 325 TABLET, FILM COATED ORAL at 13:00

## 2018-12-01 RX ADMIN — ACETAMINOPHEN 975 MG: 325 TABLET, FILM COATED ORAL at 21:53

## 2018-12-01 RX ADMIN — HEPARIN SODIUM 5000 UNITS: 5000 INJECTION INTRAVENOUS; SUBCUTANEOUS at 21:53

## 2018-12-01 RX ADMIN — POLYETHYLENE GLYCOL 3350 17 G: 17 POWDER, FOR SOLUTION ORAL at 07:56

## 2018-12-01 RX ADMIN — GABAPENTIN 400 MG: 400 CAPSULE ORAL at 06:16

## 2018-12-01 RX ADMIN — SERTRALINE HYDROCHLORIDE 100 MG: 100 TABLET ORAL at 07:57

## 2018-12-01 RX ADMIN — OLANZAPINE 10 MG: 10 TABLET, FILM COATED ORAL at 21:53

## 2018-12-01 RX ADMIN — ACETAMINOPHEN 975 MG: 325 TABLET, FILM COATED ORAL at 06:16

## 2018-12-01 RX ADMIN — SENNOSIDES AND DOCUSATE SODIUM 1 TABLET: 8.6; 5 TABLET ORAL at 17:00

## 2018-12-01 RX ADMIN — BACITRACIN ZINC 1 LARGE APPLICATION: 500 OINTMENT TOPICAL at 07:57

## 2018-12-01 RX ADMIN — DOCUSATE SODIUM 100 MG: 100 CAPSULE, LIQUID FILLED ORAL at 17:00

## 2018-12-01 RX ADMIN — DOCUSATE SODIUM 100 MG: 100 CAPSULE, LIQUID FILLED ORAL at 07:57

## 2018-12-01 RX ADMIN — HEPARIN SODIUM 5000 UNITS: 5000 INJECTION INTRAVENOUS; SUBCUTANEOUS at 06:16

## 2018-12-01 RX ADMIN — GABAPENTIN 400 MG: 400 CAPSULE ORAL at 21:53

## 2018-12-01 RX ADMIN — GABAPENTIN 400 MG: 400 CAPSULE ORAL at 13:01

## 2018-12-01 RX ADMIN — HEPARIN SODIUM 5000 UNITS: 5000 INJECTION INTRAVENOUS; SUBCUTANEOUS at 13:01

## 2018-12-01 RX ADMIN — BUSPIRONE HYDROCHLORIDE 7.5 MG: 5 TABLET ORAL at 17:00

## 2018-12-01 RX ADMIN — MIRTAZAPINE 7.5 MG: 15 TABLET ORAL at 21:53

## 2018-12-01 RX ADMIN — SENNOSIDES AND DOCUSATE SODIUM 1 TABLET: 8.6; 5 TABLET ORAL at 07:57

## 2018-12-01 RX ADMIN — OXYCODONE HYDROCHLORIDE 5 MG: 5 TABLET ORAL at 20:28

## 2018-12-01 RX ADMIN — OXYCODONE HYDROCHLORIDE 5 MG: 5 TABLET ORAL at 12:37

## 2018-12-01 NOTE — PROGRESS NOTES
12/01/18 0830   Pain Assessment   Pain Assessment 0-10   Pain Score 5   Pain Type Acute pain;Surgical pain   Pain Location Neck;Arm   Pain Orientation Bilateral   Pain Descriptors Sharp   Pain Frequency Intermittent   Pain Onset Ongoing   Clinical Progression Not changed   Patient's Stated Pain Goal No pain   Hospital Pain Intervention(s) Medication (See MAR); Repositioned   Response to Interventions tolerating well   Restrictions/Precautions   Precautions Bed/chair alarms;Cognitive; Fall Risk;Spinal precautions   Braces or Orthoses C/S Collar   Cognition   Overall Cognitive Status Impaired   Arousal/Participation Alert; Cooperative   Attention Attends with cues to redirect   Orientation Level Oriented X4   Memory Decreased recall of recent events;Decreased short term memory   Following Commands Follows multistep commands with increased time or repetition   Subjective   Subjective c/o on and off sharp pain from his neck down to his arms with movement  QI: Sit to Stand   Assistance Needed Supervision   Assistance Provided by [place] No physical assistance   Sit to Stand CARE Score 4   QI: Chair/Bed-to-Chair Transfer   Assistance Needed Supervision   Assistance Provided by [place] No physical assistance   Comment RW   Chair/Bed-to-Chair Transfer CARE Score 4   Transfer Bed/Chair/Wheelchair   Limitations Noted In Balance;Confidence; Endurance   Adaptive Equipment Roller Walker   Stand Pivot Supervision   Sit to Stand Supervision   Stand to W  IGNACIO  Prachi, Chair, Wheelchair Transfer (FIM) 5 - Patient requires supervision/monitoring   QI: [address] Provided by [place] No physical assistance   Comment RW   Walk 10 Feet CARE Score 4   QI: Walk 50 Feet with Two [address] Provided by [place] No physical assistance   Walk 50 Feet with Two Turns CARE Score 4   QI: Walk 150 Feet   Assistance Needed Supervision   Assistance Provided by [place] No physical assistance   Comment with RW   Walk 150 Feet CARE Score 4   Ambulation   Does the patient walk? 2  Yes   Primary Discharge Mode of Locomotion Walk   Walk Assist Level Supervision   Gait Pattern Inconsistant Sandra;Decreased foot clearance   Assist Device Roller Walker   Distance Walked (feet) 150 ft   Limitations Noted In Balance; Endurance; Heel Strike;Strength   Walking (FIM) 5 - Patient requires supervision/monitoring AND distance 150 feet or more, no rest   Wheelchair mobility   QI: Does the patient use a wheelchair? 0  No   QI: Picking Up Object   Reason if not Attempted Safety concerns   Picking Up Object CARE Score 88   Therapeutic Interventions   Strengthening Seated ther ex on BLE's using 2# ankle weights for LAQ, hip flexion, hip abduction with manual resistance, hip adduction with pillow squeezes, ankle DF/PF for 3 sets of 10 reps each  Standing with the support of RW for marching in place and heel raises for 3 sets of 10 reps each  Assessment   Treatment Assessment Patient c/o on and off 5/10 pain from his neck down to his arms today but he was still able to tolerate the entire therapy session with rest breaks in between  He ambulated 150 feet using RW with Supervision  Supervision with sit to stand transfers and SPT using RW  No LOB noted during transfers and gait  He will benefit from continued skilled PT services to improve BLE's strength to facilitate safety and with lesser assistance from caregivers during transfers and gait, to improve endurance with gait and improve standing balance to reduce risk for falls  Problem List Decreased strength;Decreased endurance; Impaired balance;Decreased mobility; Decreased coordination;Decreased cognition; Impaired judgement;Orthopedic restrictions;Pain   PT Barriers   Physical Impairment Decreased strength;Decreased endurance; Impaired balance;Decreased cognition;Orthopedic restrictions;Pain   Functional Limitation Stair negotiation; Walking;Transfers;Car transfers; Ramp negotiation;Standing   Plan   Treatment/Interventions Functional transfer training;LE strengthening/ROM; Elevations; Therapeutic exercise; Endurance training;Bed mobility;Gait training   Progress Progressing toward goals   Recommendation   Recommendation Home with family support;Home PT;Outpatient PT   Equipment Recommended Walker   PT Therapy Minutes   PT Time In 0830   PT Time Out 0900   PT Total Time (minutes) 30   PT Mode of treatment - Individual (minutes) 30   PT Mode of treatment - Concurrent (minutes) 0   PT Mode of treatment - Group (minutes) 0   PT Mode of treatment - Co-treat (minutes) 0   PT Mode of Teatment - Total time(minutes) 30 minutes   Therapy Time missed   Time missed?  No

## 2018-12-01 NOTE — PROGRESS NOTES
Physical Medicine and Rehabilitation Progress Note  Lisa Gamble 68 y o  male MRN: [id number]  Unit/Bed#: -01 Encounter: [id number]    Chief Complaints:  Difficulty walking     Subjective/Interval Events:   Patient reports still having difficulty walking and was hoping he would be better than he is doing by now  He reports strength and function is improving however  He was reviewed that overall he is doing well with adequate functional recovery given injury and surgery  Patient reports feeling a little less depressed  He reports shoulder and arm pain are better controlled  He denies current lightheadedness, fever, chills, calf pain, bladder or bowel problems or other complaints  ROS: A 10-point ROS was performed  Negative except as listed above  Overall Assessment/relevant history:   68 y o  male with PMH of HTN, HL, OCD, MDD, SHAQUILLE, CLBP, lumbar stenosis, lumbar radiculopathy who apparently fell at home hitting his head with residual R sided weakness and LOC who was initially believed to develop R sided weakness first causing the fall with possibility for stroke who was brought to [facility] on 11/13/18 prompting stroke alert/protocol  CT head and CTA head and negative were negative and patient received tPA  Patient was later noted to have some bilateral strength and sensory changes as well as ongoing imbalance and limb pain prompting additional imaging  MRI brain showed severe central canal stenosis with chronic mass effect on cord at C3-5 as well as severe bilateral NF narrowing from C2 to C5  NS was consulted who recommended surgical intervention and patient underwent C3-C6 posterior cervical decompressive laminectomy and posterior lateral fusion/fixation by Dr Levy Letters on 11/19  Patient still has GOSIA drain in place with some serous drainage  Patient also had c/s by  Psych who felt patient mood currently stable    He has been only on Zoloft 100mg qday but at home he takes several other mood agents which he has been off of  Patient was evaluated by skilled therapies and was found to have significant decline in ADLs and ambulation appropriate for admission to [facility]      Patient presented to [facility] on 11/21/18 s/p fall causing cervical myelopathy and TBI with closed head injury and likely brief LOC s/p cervical decompression and fusion with decline in strength, balance, cognition, pain as well as significant insomnia and mood disorder  Functional status (recent):    PT: Transfers and ambulation supervision at times     Functional status on admission to [facility]:  OT:  Lower body dressing max assist bathing moderate assist body dressing general transfers and toilet transfers minutes assist  PT:  Transfers and ambulation Min assist, stairs max assist      * Cervical myelopathy (HCC)   Assessment & Plan    -Following fall causing R sided weakness, imbalance, limb pain s/p decompression and fusion   -Recommend acute comprehensive interdisciplinary inpatient rehabilitation to include intensive skilled therapies (PT, OT) as outlined with oversight and management by rehabilitation physician as well as inpatient rehab level nursing, case management and weekly interdisciplinary team meetings  Traumatic brain injury with loss of consciousness [facility])   Assessment & Plan    Impaired cognition stable   -Fall with closed head injury with brief LOC with residual deficits in cognition   -CT head without acute findings;  -Recommend all intensive skilled therapies with particular focus on SLP as   outlined with oversight and management by rehabilitation physician  Continue inpatient rehab level nursing, case management and weekly interdisciplinary team meetings  Skilled therapist, rehab nursing, rehab physician, CM, and supporting staff to provide appropriate teaching to patient (and if applicable to caregiver(s))     -Overstim precautions  -Sleep-agitation log/optimize sleep wake cycle  -Minimize sedating meds when appropriate  -Optimize mood d/o mgmt        S/P cervical spinal fusion   Assessment & Plan    -C3-C6 posterior cervical decompressive lami and PLF by Dr Alea Lawler on   -Monitor incision  -Cervical collar   -Follow-up with NS during [facility] course PRN and after d/c       Pain   Assessment & Plan    Improving control with recent changes   Acute cervical myelopathy and spinal surgery pain with associated myofascial pain on chronic low back pain and lumbar radiculopathy   -Continue scheduled oxy 5mg BID prior to therapies  -Gabapentin continue 400mg TID for neuropathic and adjuvant pain control  -Continue APAP schedule 975mg TID   -Continue Oxycodone 5mg Q4H PRN > may increase to 7 5mg PRN if needed   -Counseled on and continue to encourage deep breathing/relaxation/behavioral pain management techniques:     Deep breathin seconds in, 5 seconds out, 5 times per hour when awake and PRN when in pain or anticipate pain; avoid holding breath and tightening muscles and instead breathe slowly and deeply       OCD (obsessive compulsive disorder)   Assessment & Plan    Still with fair degree of depression > stable > continue current regimen for now with multiple recent adjustments   >Continue Zyprexa 10mg QHS (previously on 15m QHS)   Zoloft 100mg qday   Buspar recently increased to 7 5mg BID (previously on 10mg BID)   Ramelteon 7 5mg QHS (previously on 15mg QHS)  >Recommend Zyprexa 5mg qday PRN for breakthrough restlessness/OCD/anxiey symptoms  OCD, MDD, SHAQUILLE hx - fairly severe in past including psych hospitalization but denies hx of SI    >Had been off of multiple chronic agents (Zyprexa 15mg QHS, Remeron 15mg QHS, Buspar 10mg BID) except for Zoloft 100mg Qday since being hospitalized over a week ago  -Follow-up with patient's outpatient psychiatrist if needed during course as well as prior to d/c  -Seen by  psych prior to transfer who felt patient mood currently stable  -Psychology consult       Insomnia   Assessment & Plan    Improved overall  Mgmt as outlined above   Mgmt as outlined above     Positional lightheadedness   Assessment & Plan    Improving  Continue orthostatic vitals  Abdominal binder when mobilizing +/- MALACHI hose      At risk for venous thromboembolism (VTE)   Assessment & Plan    Heparin, SCDs, and ambulation      Lumbar stenosis   Assessment & Plan    Mgmt as outlined above     History of hyperlipidemia   Assessment & Plan    Recent lipid panel wnl  Continue statin      Hypertension   Assessment & Plan    IM c/s to assit with mgmt  Monitor BP and orthostatics   MTP      Chronic low back pain   Assessment & Plan    Mgmt as outlined above     Lumbar radiculopathy   Assessment & Plan    Mgmt as outlined above     Hyponatremia   Assessment & Plan    Mild, monitor      Chronic idiopathic constipation   Assessment & Plan    Improved  Recommend colace/senna/miralax  PRN bowel regimen          # Cardiopulmonary:   Saturating well on room air  - Encourage out of bed activities, incentive spirometry and deep breathing to prevent atelectasis  - Ensure adequate hydration, volume status, and intermittently monitor Hb, BUN/Cr and sodium  - Monitor vitals at rest and with activity  - Orthostatics PRN for S/S of orthostasis;  Abdominal binder PRN     # Bladder care:    - monitor for retention, incontinence, signs/symptoms of UTI  - recommend voiding trial; nursing prompt to void followed by bladder scan starting Q4-6H or after each patient initiated void; nursing to record voided output and bladder scan totals; straight cath PRN >350-400 cc; if post void residual bladder scans are <150 cc x3 consecutive voids, can stop scans      # Skin:   - Nursing to turn patient Q2H if not adequately ambulatory, monitor for skin breakdown, rashes, and wounds if applicable  - GOSIA drain per NS     # Diet/Hydration:    Regular     Disposition:   Home with ELOS to late next week     Follow-up:   NS, PMR, Psych, PCP     CODE: Level 1: Full Code     Restrictions include: Fall precautions, cervical spine    ---------------------------------------------------------------------------------------------------------------------    Objective: Allergies and Medications per EMR    Physical Exam:  Temperature 98° F, pulse 85, respiratory rate 20, blood pressure 106/59, SpO2 98% on room air  General: Awake, alert in NAD  HENT: MMM, resolving bilateral lower periorbital ecchymosis   Neck: Cervical collar in place  Respiratory: Unlabored breathing, breath sounds equal, Lungs CTA, no wheezes, rales, or rhonchi  Cardiovascular: Regular rate and rhythm, no murmurs, rubs, or gallops  Gastrointestinal: Soft, non-tender, non-distended, normoactive bowel sounds  SkiN/MSK/Extremities: no calf edema, no calf tenderness to palpation  Neurologic/Psych:   MENTAL STATUS/Affect: awake, orientation intact; less depressed  Strength/MMT:  unchanged      Diagnostic Studies: reviewed, no new imaging  No results found      Laboratory:      Results from last 7 days  Lab Units 11/29/18 0511 11/26/18  0553   HEMOGLOBIN g/dL 10 9* 10 7*   HEMATOCRIT % 34 5* 32 5*   WBC Thousand/uL 4 72 6 53       Results from last 7 days  Lab Units 11/29/18 0511 11/26/18  0553   BUN mg/dL 9 10   POTASSIUM mmol/L 3 9 4 0   CHLORIDE mmol/L 104 103   CREATININE mg/dL 0 61 0 62            Patient Active Problem List   Diagnosis    Chronic idiopathic constipation    OCD (obsessive compulsive disorder)    Weakness    Depression    Anxiety    Cervical spinal stenosis    Cervical stenosis of spine    Hyponatremia    Cervical myelopathy (HCC)    S/P cervical spinal fusion    Lumbar radiculopathy    Chronic low back pain    Pain    Traumatic brain injury with loss of consciousness ([facility] [address] )    Hypertension    History of hyperlipidemia    Insomnia    Lumbar stenosis    At risk for venous thromboembolism (VTE)    Positional lightheadedness       ** Please Note: Fluency Direct voice to text software may have been used in the creation of this document  **    Total visit time:  At least 25 minutes, with more than 50% spent counseling/coordinating care        Stephanie Rahman MD, [facility]  Physical Medicine and Rehabilitation  Brain Injury Medicine

## 2018-12-01 NOTE — PROGRESS NOTES
12/01/18 1330   Pain Assessment   Pain Assessment 0-10   Pain Score 7   Pain Type Acute pain   Pain Location Groin;Leg   Pain Orientation Right   Pain Descriptors Sharp   Pain Onset Sudden   Effect of Pain on Daily Activities unable to tolerate functional mobilty during session   Hospital Pain Intervention(s) Repositioned;Rest;Distraction   Diversional Activities Other (Comment)  (OT )   Response to Interventions tolerated session   Restrictions/Precautions   Precautions Bed/chair alarms;Cognitive; Fall Risk;Supervision on toilet/commode;Spinal precautions   Braces or Orthoses C/S Collar   Grooming   Able To Initiate Tasks; Acquire Items; Wash/Dry Face;Wash/Dry Hands   Limitation Noted In Strength;Problem Solving   Findings Pt walked to sink with RW to wash hands  Pt reported inc pain in LLE with stance/mobility  Grooming (FIM) 5 - Patient requires supervision/monitoring   QI: Sit to [address] Provided by [place] No physical assistance   Sit to Stand CARE Score 4   QI: Chair/Bed-to-Chair Transfer   Assistance Needed Supervision   Assistance Provided by [place] No physical assistance   Comment RW   Chair/Bed-to-Chair Transfer CARE Score 4   Transfer Bed/Chair/Wheelchair   Limitations Noted In Pain Management;Confidence;LE Strength   Adaptive Equipment Roller Walker   Findings dec safety 2* pain   Bed, Chair, Wheelchair Transfer (FIM) 5 - Patient requires supervision/monitoring   Coordination   Fine Motor Pt engaged in task with nuts and bolts promoting problem solving, b/l integration, and b/l [address]  Pt req to match correct sized nut, washer, and bolt  Pt had inc difficulty with dec size of nut and bolt  Pt able to complete larger combo with R hand twisting nut but had to utilize L hand with smaller nuts/bolts  Pt cont to report and demo that his R hand is the poorer functioning hand at this time     Cognition   Overall Cognitive Status Impaired   Arousal/Participation Alert; Cooperative   Attention Attends with cues to redirect   Orientation Level Oriented X4   Memory Decreased recall of recent events;Decreased short term memory   Following Commands Follows multistep commands with increased time or repetition   Activity Tolerance   Activity Tolerance Patient limited by pain   Medical Staff Made Aware NSG aware   Assessment   Treatment Assessment Pt participated in skilled OT session focusing on functional mobility in pt room and [facility]  Pt session limited by pain in LLE but pt still completed mobility and transfers at CS level  Pt demo improvement in [facility] when cued to slow pace  Pt had noted dec frustration tolerance this session  Pt edu to stop task, take deep breathe, and start again when frustrated  Pt demo G carryover with strategy  Pt would benefit from cont therapy focusing on standing tolerance, ADL performance, and b/l UE gross/fine motor coordination to inc indep upon discharge  Cont with POC  Prognosis Fair   Problem List Decreased strength;Decreased endurance; Impaired balance;Decreased mobility; Decreased coordination;Decreased cognition; Impaired judgement;Orthopedic restrictions;Pain   Barriers to Discharge Inaccessible home environment   Plan   Treatment/Interventions Functional transfer training; Therapeutic exercise; Endurance training;Patient/family training   Progress Progressing toward goals   Recommendation   Equipment Recommended Bedside commode   OT Therapy Minutes   OT Time In 1330   OT Time Out 1400   OT Total Time (minutes) 30   OT Mode of treatment - Individual (minutes) 30   OT Mode of treatment - Concurrent (minutes) 0   OT Mode of treatment - Group (minutes) 0   OT Mode of treatment - Co-treat (minutes) 0   OT Mode of Teatment - Total time(minutes) 30 minutes   Therapy Time missed   Time missed?  No

## 2018-12-01 NOTE — PROGRESS NOTES
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; s/p posterior cervical decompressive laminectomy with instrumented posterior lateral fusion fixation C3-6 on 11/19 [facility]):  watch incision; continue collar at all times     HTN/mild orthostasis: stopped Lopressor again last week (office note says stopped 2/2 low BP)   Had mild orthostasis 11/28 AM = added TEDS/binder and is much improved      OCD: meds per primary team     ABLA:  mild; required no transfusion; will watch         Subjective: offers no complaints except some right anterior thigh pain that has subsided      Scheduled Meds:    Current Facility-Administered Medications:  acetaminophen 975 mg Oral [facility] Ann Washington MD   bacitracin 1 large application Topical BID Ann Washington MD   bisacodyl 10 mg Rectal Daily PRN Ann Washington MD   bisacodyl 10 mg Rectal Once Ann Washington MD   busPIRone 7 5 mg Oral BID Ann Washington MD   docusate sodium 100 mg Oral BID Ann Washington MD   gabapentin 400 mg Oral TID Ann Washington MD   heparin (porcine) 5,000 Units Subcutaneous [facility] Ann Washington MD   magnesium hydroxide 30 mL Oral Daily PRN Sudie Felty, DO   mirtazapine 7 5 mg Oral HS Ann Washington MD   OLANZapine 10 mg Oral HS Ann Washington MD   OLANZapine 5 mg Oral Daily PRN Ann Washington MD   oxyCODONE 5 mg Oral Q4H PRN Ann Washington MD   oxyCODONE 5 mg Oral BID Ann Washington MD   polyethylene glycol 17 g Oral Daily Ann Washington MD   polyethylene glycol 17 g Oral Once Ann Washington MD   senna-docusate sodium 1 tablet Oral BID Ann Washington MD   sertraline 100 mg Oral Daily [facility] Juliana Spencer MD       Labs:       Results from last 7 days  Lab Units 11/29/18  0511 11/26/18  0553   WBC Thousand/uL 4 72 6 53   HEMOGLOBIN g/dL 10 9* 10 7*   HEMATOCRIT % 34 5* 32 5*   PLATELETS Thousands/uL 283 251       Results from last 7 days  Lab Units 11/29/18  0511 11/26/18  0553   POTASSIUM mmol/L 3 9 4 0   CHLORIDE mmol/L 104 103   CO2 mmol/L 26 27   BUN mg/dL 9 10   CREATININE mg/dL 0 61 0 62   CALCIUM mg/dL 8 7 9 2                No results found for: GLUCOSE    Labs reviewed    Physical Examination:  Vitals:   Vitals:    11/30/18 0600 11/30/18 1348 11/30/18 2042 12/01/18 0644   BP: 116/66 106/59 114/53 111/56   BP Location: Left arm Left arm Left arm Left arm   Pulse: 57 85 71 55   Resp: 20 20 18 18   Temp: (!) 97 4 °F (36 3 °C) 98 °F (36 7 °C) 98 4 °F (36 9 °C) 98 2 °F (36 8 °C)   TempSrc: Oral Oral Oral Oral   SpO2: 99% 98% 98% 99%   Weight:       Height:           CV:  +S1, S2;  RRR; no rub/murmur  Pulmonary:  BBS without crackles/wheeze/rhonci; resp are unlabored  Abdominal:  soft, +BS, ND/NT; no mass  Skin:  no rashes  Musculoskeletal:  no edema  :  no cristina  Neurological/Psych:  AAO;  LE 5/5; RUE 4/5 with grasp 4-/5, LUE 4+/5; no depression/anxiety      [x ] Total time spent: 30 Mins and greater than 50% of this time was spent counseling/coordinating care  ** Please Note: Dragon 360 Dictation voice to text software may have been used in the creation of this document   **

## 2018-12-02 PROCEDURE — 97535 SELF CARE MNGMENT TRAINING: CPT

## 2018-12-02 PROCEDURE — G0515 COGNITIVE SKILLS DEVELOPMENT: HCPCS

## 2018-12-02 PROCEDURE — 97530 THERAPEUTIC ACTIVITIES: CPT

## 2018-12-02 PROCEDURE — 97110 THERAPEUTIC EXERCISES: CPT

## 2018-12-02 PROCEDURE — 97116 GAIT TRAINING THERAPY: CPT

## 2018-12-02 RX ORDER — MIDODRINE HYDROCHLORIDE 5 MG/1
2.5 TABLET ORAL
Status: DISCONTINUED | OUTPATIENT
Start: 2018-12-03 | End: 2018-12-04

## 2018-12-02 RX ADMIN — HEPARIN SODIUM 5000 UNITS: 5000 INJECTION INTRAVENOUS; SUBCUTANEOUS at 14:19

## 2018-12-02 RX ADMIN — BUSPIRONE HYDROCHLORIDE 7.5 MG: 5 TABLET ORAL at 17:53

## 2018-12-02 RX ADMIN — HEPARIN SODIUM 5000 UNITS: 5000 INJECTION INTRAVENOUS; SUBCUTANEOUS at 06:54

## 2018-12-02 RX ADMIN — BUSPIRONE HYDROCHLORIDE 7.5 MG: 5 TABLET ORAL at 08:09

## 2018-12-02 RX ADMIN — DOCUSATE SODIUM 100 MG: 100 CAPSULE, LIQUID FILLED ORAL at 08:08

## 2018-12-02 RX ADMIN — GABAPENTIN 400 MG: 400 CAPSULE ORAL at 14:19

## 2018-12-02 RX ADMIN — MIRTAZAPINE 7.5 MG: 15 TABLET ORAL at 21:58

## 2018-12-02 RX ADMIN — OLANZAPINE 10 MG: 10 TABLET, FILM COATED ORAL at 21:58

## 2018-12-02 RX ADMIN — HEPARIN SODIUM 5000 UNITS: 5000 INJECTION INTRAVENOUS; SUBCUTANEOUS at 21:58

## 2018-12-02 RX ADMIN — GABAPENTIN 400 MG: 400 CAPSULE ORAL at 06:54

## 2018-12-02 RX ADMIN — SENNOSIDES AND DOCUSATE SODIUM 1 TABLET: 8.6; 5 TABLET ORAL at 08:08

## 2018-12-02 RX ADMIN — OXYCODONE HYDROCHLORIDE 5 MG: 5 TABLET ORAL at 06:54

## 2018-12-02 RX ADMIN — SERTRALINE HYDROCHLORIDE 100 MG: 100 TABLET ORAL at 08:08

## 2018-12-02 RX ADMIN — POLYETHYLENE GLYCOL 3350 17 G: 17 POWDER, FOR SOLUTION ORAL at 08:08

## 2018-12-02 RX ADMIN — ACETAMINOPHEN 975 MG: 325 TABLET, FILM COATED ORAL at 14:19

## 2018-12-02 RX ADMIN — OXYCODONE HYDROCHLORIDE 5 MG: 5 TABLET ORAL at 14:19

## 2018-12-02 RX ADMIN — GABAPENTIN 400 MG: 400 CAPSULE ORAL at 21:58

## 2018-12-02 RX ADMIN — ACETAMINOPHEN 975 MG: 325 TABLET, FILM COATED ORAL at 21:58

## 2018-12-02 RX ADMIN — ACETAMINOPHEN 975 MG: 325 TABLET, FILM COATED ORAL at 06:54

## 2018-12-02 NOTE — PROGRESS NOTES
12/02/18 0900   Pain Assessment   Pain Score No Pain   Restrictions/Precautions   Precautions Bed/chair alarms; Fall Risk;Spinal precautions   Braces or Orthoses (TEDS and abdominal binder )   Cognition   Arousal/Participation Alert; Cooperative   Attention Attends with cues to redirect   Memory Decreased short term memory;Decreased recall of precautions   Following Commands Follows one step commands with increased time or repetition   Subjective   Subjective Pt  reported that he feels whoozy today and out of it  Vitals taken see below  Pt  though agreeable to participate in PT    QI: Sit to Stand   Assistance Needed Supervision   Sit to Stand CARE Score 4   QI: Chair/Bed-to-Chair Transfer   Assistance Needed Supervision   Chair/Bed-to-Chair Transfer CARE Score 4   Transfer Bed/Chair/Wheelchair   Adaptive Equipment Roller [employer], Chair, Wheelchair Transfer (FIM) 5 - Patient requires supervision/monitoring   QI: Walk 10 Feet   Assistance Needed Supervision   Walk 10 Feet CARE Score 4   QI: Walk 50 Feet with Two Turns   Assistance Needed Supervision   Walk 50 Feet with Two Turns CARE Score 4   QI: Walk 150 Feet   Assistance Needed Supervision   Walk 150 Feet CARE Score 4   Ambulation   Does the patient walk? 2  Yes   Primary Discharge Mode of Locomotion Walk   Walk Assist Level Close Supervision;Supervision   Gait Pattern Inconsistant Sandra; Forward Flexion   Assist Device Chase Maria Luz Nba Walked (feet) 150 ft  (X 2 )   Limitations Noted In Endurance;Posture   Walking (FIM) 5 - Patient requires supervision/monitoring AND distance 150 feet or more, no rest   Wheelchair mobility   QI: Does the patient use a wheelchair? 0   No   QI: 4 Steps   Assistance Needed Incidental touching   4 Steps CARE Score 4   QI: 12 Steps   Assistance Needed Incidental touching   12 Steps CARE Score 4   Stairs   Type Stairs  ( )   # of Steps 12   Weight Bearing Precautions Fall Risk   Assist Devices Single Rail;Bilateral Rail   Findings CGA, using B HR initially but then mostly used R railing sideways with CGA    Stairs (FIM) 4 - Patient requires steadying assist or light touching AND patient goes up and down full flight (12- 14 stairs)   Therapeutic Interventions   Strengthening seated hip abd and hamstring curl with green TB , add squeeze with ball,    Flexibility B hamstring and gastroc stretching    Equipment Use   NuStep Level 1 X 10 mins, LE only    Other Comments   Comments STM and mypfascial release on traps and rhomboids muscle    Assessment   Treatment Assessment Pt  tolerated session well, although was feeling whoozy at the beginning of session  BP taken in sitting and was 98/59mmhg HR 62 bpm, standing 113/53mmhg HR 71 bpm  Pt's "whooziness" does not get worse with standing and with mobility  CRNP made aware  Pt  reported feeling better after manual therapy as above and after B hamstring stretching  No LOB noted during session  Pt  expressed that he thinks he is not ready to go home yet this week  Educated pt  that he is now at S level with mobility but stated that he still needs alot of help with ADL's and wanted to be more indep with that  WIll follow up with team with this  Np pther complaints reported  COnt with POC as tolerated    Problem List Decreased strength;Decreased endurance; Impaired balance;Decreased mobility; Decreased coordination;Orthopedic restrictions;Pain   Barriers to Discharge Inaccessible home environment;Decreased caregiver support   PT Barriers   Physical Impairment Decreased strength;Decreased endurance; Impaired balance;Decreased mobility; Decreased coordination;Orthopedic restrictions;Pain   Functional Limitation Stair negotiation;Standing;Transfers; Walking   Plan   Treatment/Interventions Functional transfer training;LE strengthening/ROM; Elevations; Therapeutic exercise; Endurance training;Patient/family training;Equipment eval/education; Bed mobility;Gait training   Recommendation   Recommendation Home PT;Outpatient PT; Home with family support   Equipment Recommended Walker   PT Therapy Minutes   PT Time In 0900   PT Time Out 1000   PT Total Time (minutes) 60   PT Mode of treatment - Individual (minutes) 30   PT Mode of treatment - Concurrent (minutes) 30   PT Mode of treatment - Group (minutes) 0   PT Mode of treatment - Co-treat (minutes) 0   PT Mode of Teatment - Total time(minutes) 60 minutes   Therapy Time missed   Time missed?  No

## 2018-12-02 NOTE — PROGRESS NOTES
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; s/p posterior cervical decompressive laminectomy with instrumented posterior lateral fusion fixation C3-6 on 11/19 [facility]):  watch incision; continue collar at all times      HTN/mild orthostasis: stopped Lopressor again last week (office note says stopped 2/2 low BP)   Had mild orthostasis 11/28 AM = added TEDS/binder; today with binder/TEDS on felt whoozy sitting in chair 98/59 HR 71 and stand 113/53 HR 62 (not worsened standing) = will start Midodrine 2 5mg TID       OCD: meds per primary team     ABLA:  mild; required no transfusion; will watch         Subjective: offers no complaints except some dizziness as above      Scheduled Meds:    Current Facility-Administered Medications:  acetaminophen 975 mg Oral [facility] Julianna Hein MD   bacitracin 1 large application Topical BID Julianna Hein MD   bisacodyl 10 mg Rectal Daily PRN Julianna Hein MD   bisacodyl 10 mg Rectal Once Julianna Hein MD   busPIRone 7 5 mg Oral BID Julianna Hein MD   docusate sodium 100 mg Oral BID Julianna Hein MD   gabapentin 400 mg Oral TID Julianna Hein MD   heparin (porcine) 5,000 Units Subcutaneous [facility] Julianna Hein MD   magnesium hydroxide 30 mL Oral Daily PRN Narciso Charles DO   mirtazapine 7 5 mg Oral HS Julianna Hein MD   OLANZapine 10 mg Oral HS Julianna Hein MD   OLANZapine 5 mg Oral Daily PRN Julianna Hein MD   oxyCODONE 5 mg Oral Q4H PRN Julianna Hein MD   oxyCODONE 5 mg Oral BID Julianna Hein MD   polyethylene glycol 17 g Oral Daily Julianna Hein MD   polyethylene glycol 17 g Oral Once Yao Buchanan Reji Medrano MD   senna-docusate sodium 1 tablet Oral BID Sergey Keith MD   sertraline 100 mg Oral Daily Sergey Keith MD       Labs:       Results from last 7 days  Lab Units 11/29/18  0511 11/26/18  0553   WBC Thousand/uL 4 72 6 53   HEMOGLOBIN g/dL 10 9* 10 7*   HEMATOCRIT % 34 5* 32 5*   PLATELETS Thousands/uL 283 251       Results from last 7 days  Lab Units 11/29/18  0511 11/26/18  0553   POTASSIUM mmol/L 3 9 4 0   CHLORIDE mmol/L 104 103   CO2 mmol/L 26 27   BUN mg/dL 9 10   CREATININE mg/dL 0 61 0 62   CALCIUM mg/dL 8 7 9 2                No results found for: GLUCOSE    Labs reviewed    Physical Examination:  Vitals:   Vitals:    12/01/18 0644 12/01/18 1401 12/01/18 2034 12/02/18 0514   BP: 111/56 111/59 101/52 105/51   BP Location: Left arm Right arm Left arm Left arm   Pulse: 55 62 69 (!) 54   Resp: 18 18 18 18   Temp: 98 2 °F (36 8 °C) 97 9 °F (36 6 °C) 98 8 °F (37 1 °C) 98 °F (36 7 °C)   TempSrc: Oral Oral Oral Oral   SpO2: 99% 99% 97% 97%   Weight:       Height:           CV:  +S1, S2;  RRR; no rub/murmur  Pulmonary:  BBS without crackles/wheeze/rhonci; resp are unlabored  Abdominal:  soft, +BS, ND/NT; no mass  Skin:  no rashes  Musculoskeletal:  no edema  :  no cristina  Neurological/Psych:  AAO;  LE 5/5; RUE 4/5 with grasp 4-/5, LUE 4+/5; no depression/anxiety      [x ] Total time spent: 30 Mins and greater than 50% of this time was spent counseling/coordinating care  ** Please Note: Dragon 360 Dictation voice to text software may have been used in the creation of this document   **

## 2018-12-02 NOTE — PROGRESS NOTES
12/02/18 1300   Pain Assessment   Pain Assessment 0-10   Pain Score 5   Pain Type Surgical pain   Pain Location Arm;Neck   Pain Orientation Bilateral   Hospital Pain Intervention(s) Repositioned; Rest   Response to Interventions tolerated session w/o pain impacting engagement   Restrictions/Precautions   Precautions Cognitive;Bed/chair alarms; Fall Risk   Braces or Orthoses C/S Collar   Memory Skills   Memory (FIM) 4 - Recognizes/recalls/performs 75-89%   Social Interaction (FIM) 5 - Needs monitoring/encouragement  to participate/interact   Speech/Language/Cognition Assessmetn   Treatment Assessment Pt participated in skilled ST session w/ focus on cognitive linguistic skills  Upon entering pt's room, pt pleasant and easily engaged, however, initiated discussion regarding his d/c date and feeling unprepared  Engaged in discussion regarding pt's progress and that he will have assist from son at home, however, pt continued to state that he is not yet ready  Pt's MD and OT/PT staff to be informed of conversation as pt noted to perseverate on this throughout remainder of session but was agreeable to completing additional tasks  When engaged in a functional money management task, pt demonstrated ability to accurately break down given amts into change in 9/10 trials, benefiting from verbal cues to increase awareness of error but was able to correct calculation independently  Lastly, pt engaged in a safety awareness task where he was presented w/ picture scenarios related to the home  Pt demonstrated ability to ID unsafe situations in 8/8 trials, noting the need for min contextual cues to increase ability to provide safe solutions  Pt will continue to benefit from skilled ST intervention to maximize functional cognitive linguistic skills for improved safety and independence at this time      SLP Therapy Minutes   SLP Time In 1300   SLP Time Out 1330   SLP Total Time (minutes) 30   SLP Mode of treatment - Individual (minutes) 30   SLP Mode of treatment - Concurrent (minutes) 0   SLP Mode of treatment - Group (minutes) 0   SLP Mode of treatment - Co-treat (minutes) 0   SLP Mode of Teatment - Total time(minutes) 30 minutes   Therapy Time missed   Time missed?  No   Daily FIM Score   Problem solving (FIM) 4 - Solves basic problems 75-89% of time   Comprehension (FIM) 5 - Understands basic directions and conversation   Expression (FIM) 6 - Expresses complex/abstract but requires:  more time

## 2018-12-02 NOTE — PROGRESS NOTES
12/02/18 1330   Pain Assessment   Pain Assessment 0-10   Pain Score 5   Pain Type Surgical pain   Pain Location Neck;Arm   Pain Orientation Bilateral   Pain Descriptors Sharp   Pain Frequency Constant/continuous   Pain Onset Ongoing   Patient's Stated Pain Goal No pain   Hospital Pain Intervention(s) Repositioned; Rest   Response to Interventions limits act tolerance and UE function   Restrictions/Precautions   Precautions Bed/chair alarms;Cognitive; Fall Risk;Spinal precautions;Supervision on toilet/commode   Braces or Orthoses C/S Collar   Grooming   Able To Initiate Tasks; Acquire Items;Comb/Brush Hair;Wash/Dry Face;Wash/Dry Hands   Limitation Noted In Strength; Other  (confidence)   Findings [employer] safety awareness and balance during grooming tasks at sink   Grooming (FIM) 5 - Patient requires supervision/monitoring   QI: Sit to Stand   Assistance Needed Supervision   Sit to Stand CARE Score 4   QI: Chair/Bed-to-Chair Transfer   Assistance Needed Supervision   Assistance Provided by [place] No physical assistance   Comment RW   Chair/Bed-to-Chair Transfer CARE Score 4   Transfer Bed/Chair/Wheelchair   Limitations Noted In Pain Management;Confidence   Adaptive Equipment Roller Walker   Findings Pt transfers are limited by dec self-confidence  Pt cont to question how well he is functional and is hesitant to RW  Pt informed on functionl gains and benefits of RW to inc safety  Bed, Chair, Wheelchair Transfer (FIM) 5 - Patient requires supervision/monitoring   QI: [address] Provided by [place] No physical assistance   Comment bladder hygiene   Toileting Hygiene CARE Score 4   Toileting   Able to [address] down yes, up yes     Able to Manage Clothing Closures Yes   Manage Hygiene Bladder   Limitations Noted In Coordination;LE Strength   Toileting (FIM) 5 - Patient requires supervision/monitoring   QI: Toilet Transfer   Assistance Needed Supervision   Assistance Provided by [place] No physical assistance   Comment RW   Toilet Transfer CARE Score 4   Toilet Transfer   Surface Assessed Standard Toilet   Transfer Technique Standard   Limitations Noted In Confidence; Endurance;LE Strength   Adaptive Equipment Grab Bar   Toilet Transfer (FIM) 5 - Patient requires supervision/monitoring   Cognition   Overall Cognitive Status Impaired   Arousal/Participation Alert; Cooperative   Attention Attends with cues to redirect   Orientation Level Oriented X4   Memory Decreased short term memory;Decreased recall of precautions   Following Commands Follows one step commands with increased time or repetition   Assessment   Treatment Assessment Pt participated in skilled OT session focusing on discussion regarding home modification, task modification, and functional progress since admission  Pt expresses fears regarding discharge Thursday and reports that he does not feel ready or safe to go home so soon  Pt cont to state that he is "unable to walk or do anything to take care of himself " OT discussed pt's progress so far and engaged in convo regarding cont therapy after discharge to cont progress  Pt does not want son to have to assist upon discharge but son has already verbalized willingness to provide assistance with bathing and hygiene  Pt given information regarding adaptive equipment (long handled sponge, shower seat, toilet aides) to inc indep with ADLs at home  Pt re-edu on walker trays and baskets to assist in transportation of items for meals  Pt's dec confidence and self-reported anxiety cont to be limiting factor in indep with functional performance at this time  Pt reports debating whether he should go home vs  SNF and would like to discuss with son, doctor, and rest of treatment team on Monday  Pt would benefit from cont therapy focusing on ADL performance to inc indep and feeling of success regarding self-care  Cont with POC     Prognosis Fair   Problem List Decreased strength;Decreased endurance; Impaired balance;Decreased mobility; Decreased coordination;Orthopedic restrictions;Pain   Barriers to Discharge Inaccessible home environment;Decreased caregiver support   Plan   Treatment/Interventions ADL retraining;Functional transfer training; Therapeutic exercise; Endurance training;Cognitive reorientation;Patient/family training;Bed mobility   Progress Slow progress, cognitive deficits   OT Therapy Minutes   OT Time In 1330   OT Time Out 1420   OT Total Time (minutes) 50   OT Mode of treatment - Individual (minutes) 50   OT Mode of treatment - Concurrent (minutes) 0   OT Mode of treatment - Group (minutes) 0   OT Mode of treatment - Co-treat (minutes) 0   OT Mode of Teatment - Total time(minutes) 50 minutes   Therapy Time missed   Time missed?  No

## 2018-12-03 ENCOUNTER — PATIENT OUTREACH (OUTPATIENT)
Dept: OTHER | Facility: HOSPITAL | Age: 73
End: 2018-12-03

## 2018-12-03 LAB
ANION GAP SERPL CALCULATED.3IONS-SCNC: 3 MMOL/L (ref 4–13)
BASOPHILS # BLD AUTO: 0 THOUSANDS/ΜL (ref 0–0.1)
BASOPHILS NFR BLD AUTO: 0 % (ref 0–1)
BUN SERPL-MCNC: 10 MG/DL (ref 5–25)
CALCIUM SERPL-MCNC: 9.7 MG/DL (ref 8.3–10.1)
CHLORIDE SERPL-SCNC: 101 MMOL/L (ref 100–108)
CO2 SERPL-SCNC: 29 MMOL/L (ref 21–32)
CREAT SERPL-MCNC: 0.71 MG/DL (ref 0.6–1.3)
EOSINOPHIL # BLD AUTO: 0.16 THOUSAND/ΜL (ref 0–0.61)
EOSINOPHIL NFR BLD AUTO: 2 % (ref 0–6)
ERYTHROCYTE [DISTWIDTH] IN BLOOD BY AUTOMATED COUNT: 14.1 % (ref 11.6–15.1)
GFR SERPL CREATININE-BSD FRML MDRD: 93 ML/MIN/1.73SQ M
GLUCOSE SERPL-MCNC: 90 MG/DL (ref 65–140)
HCT VFR BLD AUTO: 34.7 % (ref 36.5–49.3)
HGB BLD-MCNC: 11.2 G/DL (ref 12–17)
IMM GRANULOCYTES # BLD AUTO: 0.04 THOUSAND/UL (ref 0–0.2)
IMM GRANULOCYTES NFR BLD AUTO: 1 % (ref 0–2)
LYMPHOCYTES # BLD AUTO: 1.51 THOUSANDS/ΜL (ref 0.6–4.47)
LYMPHOCYTES NFR BLD AUTO: 21 % (ref 14–44)
MCH RBC QN AUTO: 30.6 PG (ref 26.8–34.3)
MCHC RBC AUTO-ENTMCNC: 32.3 G/DL (ref 31.4–37.4)
MCV RBC AUTO: 95 FL (ref 82–98)
MONOCYTES # BLD AUTO: 0.84 THOUSAND/ΜL (ref 0.17–1.22)
MONOCYTES NFR BLD AUTO: 12 % (ref 4–12)
NEUTROPHILS # BLD AUTO: 4.63 THOUSANDS/ΜL (ref 1.85–7.62)
NEUTS SEG NFR BLD AUTO: 64 % (ref 43–75)
NRBC BLD AUTO-RTO: 0 /100 WBCS
PLATELET # BLD AUTO: 295 THOUSANDS/UL (ref 149–390)
PMV BLD AUTO: 11.7 FL (ref 8.9–12.7)
POTASSIUM SERPL-SCNC: 4.2 MMOL/L (ref 3.5–5.3)
RBC # BLD AUTO: 3.66 MILLION/UL (ref 3.88–5.62)
SODIUM SERPL-SCNC: 133 MMOL/L (ref 136–145)
WBC # BLD AUTO: 7.18 THOUSAND/UL (ref 4.31–10.16)

## 2018-12-03 PROCEDURE — 97535 SELF CARE MNGMENT TRAINING: CPT

## 2018-12-03 PROCEDURE — 97530 THERAPEUTIC ACTIVITIES: CPT

## 2018-12-03 PROCEDURE — 85025 COMPLETE CBC W/AUTO DIFF WBC: CPT | Performed by: NURSE PRACTITIONER

## 2018-12-03 PROCEDURE — G0515 COGNITIVE SKILLS DEVELOPMENT: HCPCS

## 2018-12-03 PROCEDURE — 99232 SBSQ HOSP IP/OBS MODERATE 35: CPT

## 2018-12-03 PROCEDURE — 97110 THERAPEUTIC EXERCISES: CPT

## 2018-12-03 PROCEDURE — 99024 POSTOP FOLLOW-UP VISIT: CPT | Performed by: PHYSICIAN ASSISTANT

## 2018-12-03 PROCEDURE — 97116 GAIT TRAINING THERAPY: CPT

## 2018-12-03 PROCEDURE — 80048 BASIC METABOLIC PNL TOTAL CA: CPT | Performed by: NURSE PRACTITIONER

## 2018-12-03 RX ADMIN — BUSPIRONE HYDROCHLORIDE 7.5 MG: 5 TABLET ORAL at 08:20

## 2018-12-03 RX ADMIN — SERTRALINE HYDROCHLORIDE 100 MG: 100 TABLET ORAL at 08:19

## 2018-12-03 RX ADMIN — BACITRACIN ZINC 1 LARGE APPLICATION: 500 OINTMENT TOPICAL at 08:20

## 2018-12-03 RX ADMIN — OLANZAPINE 10 MG: 10 TABLET, FILM COATED ORAL at 21:09

## 2018-12-03 RX ADMIN — HEPARIN SODIUM 5000 UNITS: 5000 INJECTION INTRAVENOUS; SUBCUTANEOUS at 13:51

## 2018-12-03 RX ADMIN — ACETAMINOPHEN 975 MG: 325 TABLET, FILM COATED ORAL at 06:35

## 2018-12-03 RX ADMIN — ACETAMINOPHEN 975 MG: 325 TABLET, FILM COATED ORAL at 21:09

## 2018-12-03 RX ADMIN — SENNOSIDES AND DOCUSATE SODIUM 1 TABLET: 8.6; 5 TABLET ORAL at 17:49

## 2018-12-03 RX ADMIN — HEPARIN SODIUM 5000 UNITS: 5000 INJECTION INTRAVENOUS; SUBCUTANEOUS at 21:09

## 2018-12-03 RX ADMIN — GABAPENTIN 400 MG: 400 CAPSULE ORAL at 21:09

## 2018-12-03 RX ADMIN — OXYCODONE HYDROCHLORIDE 5 MG: 5 TABLET ORAL at 13:51

## 2018-12-03 RX ADMIN — GABAPENTIN 400 MG: 400 CAPSULE ORAL at 06:35

## 2018-12-03 RX ADMIN — MIDODRINE HYDROCHLORIDE 2.5 MG: 5 TABLET ORAL at 11:43

## 2018-12-03 RX ADMIN — MIDODRINE HYDROCHLORIDE 2.5 MG: 5 TABLET ORAL at 06:34

## 2018-12-03 RX ADMIN — BACITRACIN ZINC 1 LARGE APPLICATION: 500 OINTMENT TOPICAL at 17:49

## 2018-12-03 RX ADMIN — MIDODRINE HYDROCHLORIDE 2.5 MG: 5 TABLET ORAL at 16:43

## 2018-12-03 RX ADMIN — OXYCODONE HYDROCHLORIDE 5 MG: 5 TABLET ORAL at 06:35

## 2018-12-03 RX ADMIN — BUSPIRONE HYDROCHLORIDE 7.5 MG: 5 TABLET ORAL at 17:49

## 2018-12-03 RX ADMIN — GABAPENTIN 400 MG: 400 CAPSULE ORAL at 13:51

## 2018-12-03 RX ADMIN — ACETAMINOPHEN 975 MG: 325 TABLET, FILM COATED ORAL at 13:51

## 2018-12-03 RX ADMIN — DOCUSATE SODIUM 100 MG: 100 CAPSULE, LIQUID FILLED ORAL at 17:49

## 2018-12-03 RX ADMIN — HEPARIN SODIUM 5000 UNITS: 5000 INJECTION INTRAVENOUS; SUBCUTANEOUS at 06:35

## 2018-12-03 RX ADMIN — MIRTAZAPINE 7.5 MG: 15 TABLET ORAL at 21:09

## 2018-12-03 NOTE — DISCHARGE INSTR - APPOINTMENTS
[facility] [phone] folding commode and standard walker to be delivered to patient room 973 prior to dc 12/6

## 2018-12-03 NOTE — PROGRESS NOTES
12/03/18 1105   Pain Assessment   Pain Assessment No/denies pain   Pain Score No Pain   Restrictions/Precautions   Precautions Fall Risk;Pain;Spinal precautions   Braces or Orthoses C/S Collar   General   Change In Medical/Functional Status Pt started on medication for dizziness- unable to obtain orthos this session, notified OT to perform during their session    Cognition   Overall Cognitive Status Impaired   Arousal/Participation Cooperative   Attention Attends with cues to redirect   Orientation Level Oriented to person;Oriented to place;Oriented to situation   Memory Decreased short term memory;Decreased recall of recent events   Following Commands Follows one step commands with increased time or repetition   Comments Pt continues to perseverate on utilization of RW, how he fell, and his surgery  Attempted to educate and inform him of his current progress however, patient with difficulty realizing this  Subjective   Subjective Patient agreebale to particiapte in PT session   Reports he feel he is not ready to go home despite conversation about his progress and how his son demonstrated ability to safely assist PRN    QI: Sit to [address] Provided by [place] No physical assistance   Comment DS with RW   Sit to Stand CARE Score 4   QI: Chair/Bed-to-Chair Transfer   Assistance Needed Supervision   Assistance Provided by [place] No physical assistance   Comment DS with RW    Chair/Bed-to-Chair Transfer CARE Score 4   Transfer Bed/Chair/Wheelchair   Limitations Noted In Balance;Confidence   Adaptive Equipment Roller Walker   Stand Pivot Supervision   Sit to Stand Supervision   Stand to Sit Supervision   All Transfer Supervision   Findings DISTANT SUPERVISION    Bed, Chair, Wheelchair Transfer (FIM) 5 - Patient requires supervision/monitoring   QI: Walk 10 Feet   Assistance Needed Supervision   Assistance Provided by [place] No physical assistance   Comment DS WITH RW    Walk 10 Feet CARE Score 4   QI: Walk 50 Feet with Two [facility] Provided by [place] No physical assistance   Comment DISTANT SUPERVISION with RW   Walk 50 Feet with Two Turns CARE Score 4   QI: Walk 150 Feet   Assistance Needed Supervision   Assistance Provided by [place] No physical assistance   Comment DISTANT SUPERVISION with RW   Walk 150 Feet CARE Score 4   Ambulation   Does the patient walk? 2  Yes   Primary Discharge Mode of Locomotion Walk   Walk Assist Level Distant Supervision   Gait Pattern Antalgic;Decreased foot clearance; Inconsistant Sandra   Assist Device Roller Walker   Distance Walked (feet) 200 ft  (x3)   Limitations Noted In Balance; Endurance; Heel Strike; Sequencing   Findings Continue to reinforce need for use of RW   Walking (FIM) 5 - Patient requires supervision/monitoring AND distance 150 feet or more, no rest   QI: Wheel 50 Feet with Two Turns   Reason if not Attempted Activity not applicable   Wheel 50 Feet with Two Turns CARE Score 9   QI: Wheel 150 Feet   Reason if not Attempted Activity not applicable   Wheel 350 Feet CARE Score 9   Wheelchair mobility   QI: Does the patient use a wheelchair? 0  No   QI: 4 Steps   Assistance Needed Incidental touching   Assistance Provided by [place] Less than 25%   Comment CGA/CS using 1 railing at sideways approach    4 Steps CARE Score 3   Stairs   Type Stairs  ()   # of Steps 6   Weight Bearing Precautions Fall Risk   Assist Devices Single Rail   Findings sideways   Stairs (FIM) 2 - Patient goes up and down 4 - 11 stairs regardless of assist/device/setup   QI: Picking Up Object   Reason if not Attempted Safety concerns   Picking Up Object CARE Score 88   Therapeutic Interventions   Flexibility BLE HS/heel cord stretching: TERT 6 minutes each    Assessment   Treatment Assessment Patient engaged in PT treatment session focussing on transfers, gait and stairs   Patient officially progressing to DS this date with ambulation and transfers using RW  Pt continues to perseverate on use of RW; continue to reinforce  Able to perform  steps at [facility] using 1 rail at sideways approach  Focus of next session: full flight and trial/inititate BRP  Will continue to benefit from skilled PT intervention to progress functioanl mobility (I) and safety  Spoke with OT in regards to home PT/OT initaiily at d/c  Order placed to  for RW and BSC  Family/Caregiver Present No    PT Barriers   Physical Impairment Decreased strength;Decreased endurance;Decreased range of motion; Impaired balance;Decreased mobility; Impaired judgement;Decreased skin integrity;Orthopedic restrictions;Pain   Functional Limitation Stair negotiation   Plan   Treatment/Interventions Functional transfer training;LE strengthening/ROM; Elevations; Therapeutic exercise; Endurance training;Cognitive reorientation;Patient/family training;Equipment eval/education;Gait training;Bed mobility; Compensatory technique education;OT   Progress Progressing toward goals   Recommendation   Recommendation Home PT   Equipment Recommended Walker   PT Equipment ordered [facility]    Date ordered 12/01/18   Discharge Summary Planning for d/c home 12/6/18    PT Therapy Minutes   PT Time In 1100   PT Time Out 1130   PT Total Time (minutes) 30   PT Mode of treatment - Individual (minutes) 30   PT Mode of treatment - Concurrent (minutes) 0   PT Mode of treatment - Group (minutes) 0   PT Mode of treatment - Co-treat (minutes) 0   PT Mode of Teatment - Total time(minutes) 30 minutes   Therapy Time missed   Time missed?  No

## 2018-12-03 NOTE — PROGRESS NOTES
12/03/18 1230   Pain Assessment   Pain Assessment 0-10   Restrictions/Precautions   Precautions Fall Risk;Pain;Spinal precautions   Braces or Orthoses C/S Collar   Grooming   Able To Initiate Tasks; Wash/Dry Hands   Limitation Noted In Other  (Confidence)   Findings handwashing following toileting tasks, able to complete at S level w/ no concerns noted  Grooming (FIM) 5 - Patient requires supervision/monitoring   QI: Putting On/Taking [address] Provided by [place] No physical assistance   Comment to don/doff socks   Putting On/Taking Off Footwear CARE Score 4   Dressing/Undressing Clothing   Remove LB Clothes Socks   Don UB Clothes Other  (Abdominal Binder)   Don LB Clothes Socks   Limitations Noted In Strength   Positioning Supported Sit   Findings Pt able to don Abd binder without challenge  Pt able to don/doff socks w/ crossed-leg tech and inc time to complete while seated in recliner  LB Dressing (FIM) 5 - Patient requires supervision/monitoring   QI: Lying to Sitting on Side of Bed   Assistance Needed Supervision   Assistance Provided by [place] No physical assistance   Lying to Sitting on Side of Bed CARE Score 4   QI: Sit to [address] Provided by [place] No physical assistance   Comment DS w/ RW   Sit to Stand CARE Score 4   QI: Chair/Bed-to-Chair Transfer   Assistance Needed Supervision   Assistance Provided by [place] No physical assistance   Comment DS w/ RW   Chair/Bed-to-Chair Transfer CARE Score 4   Transfer Bed/Chair/Wheelchair   Findings Pt progressed to DS w/ RW, pt cont to req encouragement to inc confidence w/ fxnl xfers, however no safety concerns noted during session     Bed, Chair, Wheelchair Transfer (FIM) 5 - Patient requires supervision/monitoring   QI: [address] Needed Physical assistance   Assistance Provided by [place] Less than 25%   Comment bowel hygiene   Toileting Hygiene CARE Score 3   Toileting   Able to Pull Clothing down yes, up yes  Able to Manage Clothing Closures Yes   Manage Hygiene Bladder; Bowel   Limitations Noted In LE Strength   Findings Pt requesting to check bowel hygiene for thoroughness, however pt was clean demo pt able to complete tasks  Pt req inc time to complete, no LOB noted  Toileting (FIM) 4 - Patient completes 75% of all tasks   QI: [address] Provided by [place] No physical assistance   Toilet Transfer CARE Score 4   Toilet Transfer   Surface Assessed Standard Toilet   Transfer Technique Standard   Limitations Noted In Confidence;LE Strength   Adaptive Equipment Grab Bar   Findings Pt able to perform demo G safety awareness and utilization of grab bars  Toilet Transfer (FIM) 5 - Patient requires supervision/monitoring   Kitchen Mobility   Kitchen-Mobility Level Walker   Kitchen Activity Retrieve items;Transport items   Kitchen Mobility Comments Pt performed kitchen mobility focusing on RW management and safety strategies to implement in kitchen  Pt able to retrieve item from drawers and [place] cabinets without challenge  Pt retrieved items from fridge w/ occ vc's to move closer to item to dec bending/fxnl reach  Recommending commonly used items be placed high in fridge or placed on countertop to inc accessibility  Add'lly recommending light snacks to be within reach when home alone  Pt demo G attention and understanding stating, "I usually don't eat much at night, my son is only gone from 5-8PM "   Functional Standing Tolerance   Time 5 mintues 20 seconds   Activity static standing balance   Comments Pt participated in raza ther act while standing at table w/ unilateral support to focus on fxnl standing tolerance, static standing balance, and alternating UE release  BP monitored t/o act  Sitting /54 HR 60  Standing x1 minute: /59 HR 69  After standing ther act (x5:20min) BP: 121/58   Pt tolerated well w/ no c/o fatigue  Exercise Tools   Theraputty Pt participated in small item retrieval from red theraputty to facilitate R pincer grasp to maximize [facility] for ADL participation  Pt performed while seated unsupported at table, no c/o pain or fatigue during act  Assessment   Treatment Assessment Pt seen for skilled OT session focusing on toileting, LB dressing, and kitchen mobility  Spoke w/ pt extensively on course of therapy and overall progress as pt cont to demo poor confidence w/ performance  Emotional support and encouragement provided at this time  Pt provided w/ handout to dec self-limiting behaviors and encourage positive affirmations daily, staff made aware to assist w/ carryover of same  Add'lly pt expressed, " I opened up my ketchup packet today and I didn't think I could " Pt encouraged to trial opening containers on meal tray prior to requesting A to maximize fxnl indep  Spoke w/ PT regarding pt's current LOF and pt progressed to DS w/ RW, NSG staff made aware of same; pt made aware of plan to trial BRPs prior to d/c and pt agreeable w/ slow transition in order to simulate PLOF  Pt will cont to benefit from skilled services focusing on b/l [facility], self-care management training, self-monitoring strategies, and self-efficacy to return to PLOF  Pt was left resting in bed w/ alarm activated and all needs within reach  Prognosis Fair   Problem List Decreased strength;Decreased endurance; Impaired balance;Decreased mobility; Decreased coordination;Orthopedic restrictions;Pain   Plan   Treatment/Interventions Functional transfer training; Therapeutic exercise; Endurance training;Patient/family training;Equipment eval/education;Gait training;ADL retraining   Progress Progressing toward goals   OT Therapy Minutes   OT Time In 1230   OT Time Out 1400   OT Total Time (minutes) 90   OT Mode of treatment - Individual (minutes) 90   OT Mode of treatment - Concurrent (minutes) 0   OT Mode of treatment - Group (minutes) 0   OT Mode of treatment - Co-treat (minutes) 0   OT Mode of Teatment - Total time(minutes) 90 minutes   Therapy Time missed   Time missed?  No

## 2018-12-03 NOTE — PROGRESS NOTES
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; s/p posterior cervical decompressive laminectomy with instrumented posterior lateral fusion fixation C3-6 on 11/19 [facility]):  watch incision; continue collar at all times      HTN/mild orthostasis: stopped Lopressor again last week (office note says stopped 2/2 low BP)   Had mild orthostasis 11/28 AM = added TEDS/binder; yesterday with binder/TEDS on felt whoozy sitting in chair 98/59 HR 71 and stand 113/53 HR 62 (not worsened standing) = started Midodrine 2 5mg TID today       OCD: meds per primary team     ABLA:  mild; required no transfusion; will watch         Subjective: offers no complaints of dizziness today when I saw him in [place] at 11am      Scheduled Meds:    Current Facility-Administered Medications:  acetaminophen 975 mg Oral [facility] Marian Steve MD   bacitracin 1 large application Topical BID Marian Steve MD   bisacodyl 10 mg Rectal Daily PRN Marian Steve MD   bisacodyl 10 mg Rectal Once Marian Steve MD   busPIRone 7 5 mg Oral BID Marian Steve MD   docusate sodium 100 mg Oral BID Marian Steve MD   gabapentin 400 mg Oral TID Marian Steve MD   heparin (porcine) 5,000 Units Subcutaneous [facility] Marian Steve MD   magnesium hydroxide 30 mL Oral Daily PRN Tyler Door, DO   midodrine 2 5 mg Oral TID AC TAZ Gan   mirtazapine 7 5 mg Oral HS Marian Steve MD   OLANZapine 10 mg Oral HS Marian Steve MD   OLANZapine 5 mg Oral Daily PRN Marian Steve MD   oxyCODONE 5 mg Oral Q4H PRN Marian Steve MD   oxyCODONE 5 mg Oral BID Marian Steve MD   polyethylene glycol 17 g Oral Daily Smooth Guerra MD   polyethylene glycol 17 g Oral Once Smooth Guerra MD   senna-docusate sodium 1 tablet Oral BID Smooth Guerra MD   sertraline 100 mg Oral Daily Smooth Guerra MD       Labs:       Results from last 7 days  Lab Units 12/03/18  0704 11/29/18  0511   WBC Thousand/uL 7 18 4 72   HEMOGLOBIN g/dL 11 2* 10 9*   HEMATOCRIT % 34 7* 34 5*   PLATELETS Thousands/uL 295 283       Results from last 7 days  Lab Units 12/03/18  0704 11/29/18  0511   POTASSIUM mmol/L 4 2 3 9   CHLORIDE mmol/L 101 104   CO2 mmol/L 29 26   BUN mg/dL 10 9   CREATININE mg/dL 0 71 0 61   CALCIUM mg/dL 9 7 8 7                No results found for: GLUCOSE    Labs reviewed    Physical Examination:  Vitals:   Vitals:    12/02/18 0514 12/02/18 1335 12/02/18 2015 12/03/18 0500   BP: 105/51 104/53 102/50 104/52   BP Location: Left arm Left arm Left arm Left arm   Pulse: (!) 54 74 63 60   Resp: 18 18 18 18   Temp: 98 °F (36 7 °C) 97 7 °F (36 5 °C) 98 °F (36 7 °C) 98 1 °F (36 7 °C)   TempSrc: Oral Oral Oral Oral   SpO2: 97% 99% 98% 96%   Weight:       Height:           CV:  +S1, S2;  RRR; no rub/murmur  Pulmonary:  BBS without crackles/wheeze/rhonci; resp are unlabored  Abdominal:  soft, +BS, ND/NT; no mass  Skin:  no rashes  Musculoskeletal:  no edema  :  no cristina  Neurological/Psych:  AAO;  LE 5/5; RUE 4/5 with grasp 4-/5, LUE 4+/5; no depression/anxiety      [x ] Total time spent: 30 Mins and greater than 50% of this time was spent counseling/coordinating care  ** Please Note: Dragon 360 Dictation voice to text software may have been used in the creation of this document   **

## 2018-12-03 NOTE — PROGRESS NOTES
Physical Medicine and Rehabilitation Progress Note  Maurizio Minium 68 y o  male MRN: [id number]  Unit/Bed#: -01 Encounter: [id number]    Chief Complaints:  Decline in ability to walk    Subjective/Interval Events:   Patient reports still having frustration that he is not walking as well as he wants  He also feels like he should be somewhat better than he is before going home  Patient reviewed his functional status with ADLs and ambulation and appropriateness for discharge setting  We reviewed risks of injury both at home and SNF setting  We reviewed appropriate expectations and recovery time  Patient reports still fair degree of neck and shoulder pain at times but it typically is brief and improves after certain movements  He denies worsening strength, sensation, balance, lightheadedness, fever, calf pain, SOB, cough, bladder or bowel problems or other complaints  ROS: A 10-point ROS was performed  Negative except as listed above  Overall Assessment/relevant history:   68 y o  male with PMH of HTN, HL, OCD, MDD, SHAQUILLE, CLBP, lumbar stenosis, lumbar radiculopathy who apparently fell at home hitting his head with residual R sided weakness and LOC who was initially believed to develop R sided weakness first causing the fall with possibility for stroke who was brought to [facility] on 11/13/18 prompting stroke alert/protocol  CT head and CTA head and negative were negative and patient received tPA  Patient was later noted to have some bilateral strength and sensory changes as well as ongoing imbalance and limb pain prompting additional imaging  MRI brain showed severe central canal stenosis with chronic mass effect on cord at C3-5 as well as severe bilateral NF narrowing from C2 to C5  NS was consulted who recommended surgical intervention and patient underwent C3-C6 posterior cervical decompressive laminectomy and posterior lateral fusion/fixation by Dr Antoinette Hayden on 11/19    Patient still has GOSIA drain in place with some serous drainage  Patient also had c/s by  Psych who felt patient mood currently stable  He has been only on Zoloft 100mg qday but at home he takes several other mood agents which he has been off of  Patient was evaluated by skilled therapies and was found to have significant decline in ADLs and ambulation appropriate for admission to [facility]      Patient [facility] to [facility] on [facility] s/p fall causing cervical myelopathy and TBI with closed head injury and likely brief LOC s/p cervical decompression and fusion with decline in strength, balance, cognition, pain as well as significant insomnia and mood disorder  Functional status (recent):    *OT: Grooming, toilet transfers, gen transfers supervision  PT: Transfers and ambulation supervision (close at times)  ST: Memory and problem solving min assist     Functional status on admission to [facility]:  OT:  Lower body dressing max assist bathing moderate assist body dressing general transfers and toilet transfers minutes assist  PT:  Transfers and ambulation Min assist, stairs max assist      * Cervical myelopathy (HCC)   Assessment & Plan    -Following fall causing R sided weakness, imbalance, limb pain s/p decompression and fusion   -Possible pre-fall cervical myelopathy based on imaging and history that attributed to fall in first place   -Recommend acute comprehensive interdisciplinary inpatient rehabilitation to include intensive skilled therapies (PT, OT) as outlined with oversight and management by rehabilitation physician as well as inpatient rehab level nursing, case management and weekly interdisciplinary team meetings              Traumatic brain injury with loss of consciousness [facility])   Assessment & Plan    Impaired cognition stable but improved from presentation to [address] with closed head injury with brief LOC with residual deficits in cognition   -CT head without acute findings;  -Recommend all intensive skilled therapies with particular focus on SLP as   outlined with oversight and management by rehabilitation physician  Continue inpatient rehab level nursing, case management and weekly interdisciplinary team meetings  Skilled therapist, rehab nursing, rehab physician, CM, and supporting staff to provide appropriate teaching to patient (and if applicable to caregiver(s))     -Overstim precautions  -Sleep-agitation log/optimize sleep wake cycle  -Minimize sedating meds when appropriate  -Optimize mood d/o mgmt        S/P cervical spinal fusion   Assessment & Plan    -C3-C6 posterior cervical decompressive lami and PLF by Dr Terri Dykes on   -Monitor incision  -Cervical collar   -Follow-up with NS during [facility] course PRN and after d/c       Pain   Assessment & Plan    Improved adequately   Acute cervical myelopathy and spinal surgery pain with associated myofascial pain on chronic low back pain and lumbar radiculopathy   -Continue scheduled oxy 5mg BID prior to therapies  -Gabapentin continue 400mg TID for neuropathic and adjuvant pain control  -Continue APAP schedule 975mg TID   -Continue Oxycodone 5mg Q4H PRN  -Counseled on and continue to encourage deep breathing/relaxation/behavioral pain management techniques:     Deep breathin seconds in, 5 seconds out, 5 times per hour when awake and PRN when in pain or anticipate pain; avoid holding breath and tightening muscles and instead breathe slowly and deeply       OCD (obsessive compulsive disorder)   Assessment & Plan    Still with fair degree of depression > stable > continue current regimen for now with multiple recent adjustments   Follow-up with patient outpatient psych prior to discharge   >Continue Zyprexa 10mg QHS (previously on 15m QHS)   Zoloft 100mg qday   Buspar recently increased to 7 5mg BID (previously on 10mg BID)   Ramelteon 7 5mg QHS (previously on 15mg QHS)  >Recommend Zyprexa 5mg qday PRN for breakthrough restlessness/OCD/anxiey symptoms  OCD, MDD, SHAQUILLE hx - fairly severe in past including psych hospitalization but denies hx of SI    >Had been off of multiple chronic agents (Zyprexa 15mg QHS, Remeron 15mg QHS, Buspar 10mg BID) except for Zoloft 100mg Qday since being hospitalized over a week ago  -Follow-up with patient's outpatient psychiatrist if needed during course as well as prior to d/c  -Seen by [facility] psych prior to transfer who felt patient mood currently stable  -Psychology consult       Insomnia   Assessment & Plan    Improved overall  Mgmt as outlined above   Mgmt as outlined above     Positional lightheadedness   Assessment & Plan    Off BP meds recently with borderline low BP and symptomatic orthostasis   Abdominal binder when mobilizing +/- MALACHI hose   Midodrine started per IM     At risk for venous thromboembolism (VTE)   Assessment & Plan    Heparin, SCDs, and ambulation      Lumbar stenosis   Assessment & Plan    Mgmt as outlined above     History of hyperlipidemia   Assessment & Plan    Recent lipid panel wnl  Continue statin      Hypertension   Assessment & Plan    IM c/s to assit with mgmt  Monitor BP and orthostatics   Off BP meds recently with borderline low BP and symptomatic orthostasis      Chronic low back pain   Assessment & Plan    Mgmt as outlined above     Lumbar radiculopathy   Assessment & Plan    Mgmt as outlined above  Follow-up with PMR and spine after d/c     Hyponatremia   Assessment & Plan    Mild 134 on 12/3, monitor      Chronic idiopathic constipation   Assessment & Plan    Improved  Recommend colace/senna/miralax  PRN bowel regimen          # Cardiopulmonary:   Saturating well on room air  - Encourage out of bed activities, incentive spirometry and deep breathing to prevent atelectasis  - Ensure adequate hydration, volume status, and intermittently monitor Hb, BUN/Cr and sodium  - Monitor vitals at rest and with activity  - Orthostatics PRN for S/S of orthostasis;  Abdominal binder PRN     # Bladder care: - monitor for retention, incontinence, signs/symptoms of UTI  - recommend voiding trial; nursing prompt to void followed by bladder scan starting Q4-6H or after each patient initiated void; nursing to record voided output and bladder scan totals; straight cath PRN >350-400 cc; if post void residual bladder scans are <150 cc x3 consecutive voids, can stop scans      # Skin:   - Nursing to turn patient Q2H if not adequately ambulatory, monitor for skin breakdown, rashes, and wounds if applicable  - GOSIA drain per NS     # Diet/Hydration:    Regular     Disposition:   Home with [facility] this Thursday      Follow-up:   NS/Spine, PMR, Psych, PCP     CODE: Level 1: Full Code     Restrictions include: Fall precautions, cervical spine    ---------------------------------------------------------------------------------------------------------------------    Objective: Allergies and Medications per EMR    Physical Exam:  Vitals:    12/03/18 0500   BP: 104/52   Pulse: 60   Resp: 18   Temp: 98 1 °F (36 7 °C)   SpO2: 96%     General: Awake, alert in NAD  HENT: MMM  Neck: Cervical collar in place, incision healing appropriately   Respiratory: Unlabored breathing, breath sounds equal, Lungs CTA, no wheezes, rales, or rhonchi  Cardiovascular: Regular rate and rhythm, no murmurs, rubs, or gallops  Gastrointestinal: Soft, non-tender, non-distended, normoactive bowel sounds  SkiN/MSK/Extremities: no calf edema, no calf tenderness to palpation     Neurologic/Psych:      MENTAL STATUS/Affect: awake, orientation intact; euthymic     Strength/MMT:  RUE strength 4+/5, RLE strength 5-/5, LLE Strength 5-5, LUE strength 5/5      Diagnostic Studies: reviewed, no new imaging  No results found      Laboratory:      Results from last 7 days  Lab Units 12/03/18  0704 11/29/18  0511   HEMOGLOBIN g/dL 11 2* 10 9*   HEMATOCRIT % 34 7* 34 5*   WBC Thousand/uL 7 18 4 72       Results from last 7 days  Lab Units 12/03/18  0704 11/29/18  0511   BUN mg/dL 10 9   POTASSIUM mmol/L 4 2 3 9   CHLORIDE mmol/L 101 104   CREATININE mg/dL 0 71 0 61            Patient Active Problem List   Diagnosis    Chronic idiopathic constipation    OCD (obsessive compulsive disorder)    Weakness    Depression    Anxiety    Cervical spinal stenosis    Cervical stenosis of spine    Hyponatremia    Cervical myelopathy (HCC)    S/P cervical spinal fusion    Lumbar radiculopathy    Chronic low back pain    Pain    Traumatic brain injury with loss of consciousness ([address] )    Hypertension    History of hyperlipidemia    Insomnia    Lumbar stenosis    At risk for venous thromboembolism (VTE)    Positional lightheadedness       ** Please Note: Fluency Direct voice to text software may have been used in the creation of this document  **    Total visit time:  At least 25 minutes, with more than 50% spent counseling/coordinating care        Emilee Draper MD, [address] [facility]  Physical Medicine and Rehabilitation  Brain Injury Medicine

## 2018-12-03 NOTE — SOCIAL WORK
ECIN referral to [employer] [phone] folding commode and standard walker to be delivered to patient room 973 prior to dc 12/6

## 2018-12-03 NOTE — PROGRESS NOTES
12/03/18 0900   Pain Assessment   Pain Assessment No/denies pain   Restrictions/Precautions   Precautions Cognitive; Fall Risk;Spinal precautions   Braces or Orthoses C/S Collar   Memory Skills   Memory (FIM) 4 - Recalls 2 of 3 steps   Social Interaction (FIM) 5 - Interacts appropriately with others 90% of time   Speech/Language/Cognition Assessmetn   Treatment Assessment Pt engaging in generalized conversation about how pt's weekend had gone, and pt initiated conversation in regards to not wanting to leave from this facility, stating "I don't feel ready " Upon increased discussion and questions to pt in regards to functional abilities, pt continues to not demonstrate insight to current abilities at this time depsite maximal conversations w/ multiple therapists about overall progress currently and how family training had occurred, preparing for d/c this week  Pt continues to require encouragement and verbal/visual demonstration of accomplishments at s time  Pt continue to remain agreeable for session, where pt given logical solutions task to read a map  Pt was 15/17 accurate in completing task, needing minimal cues to sequence order of going to shops in a manner which was logical  Pt was able to correct errors verbally  Pt then engaging in verbal compare/contrast task given 2 concrete items, where pt was to determine the similarities and differences  Pt's ability to determine similarities was 9/10 accurate and ability to state the differences was 10/10 accurate  Pt only needing minimal prompting x1 to determine similarity between 2 items  Overall, when completing structured tasks, pt is able to complete at a basic functional level   Continues to requier encouragement to ensure that task was completed appropriately, etc     SLP Therapy Minutes   SLP Time In 0900   SLP Time Out 1000   SLP Total Time (minutes) 60   SLP Mode of treatment - Individual (minutes) 60   SLP Mode of treatment - Concurrent (minutes) 0   SLP Mode of treatment - Group (minutes) 0   SLP Mode of treatment - Co-treat (minutes) 0   SLP Mode of Teatment - Total time(minutes) 60 minutes   Therapy Time missed   Time missed?  No   Daily FIM Score   Problem solving (FIM) 4 - Solves basic problems 75-89% of time   Comprehension (FIM) 5 - Understands basic directions and conversation   Expression (FIM) 6 - Expresses complex/abstract but requires:  more time

## 2018-12-03 NOTE — PROGRESS NOTES
Progress Note - Neurosurgery   Tomas Lamb 68 y o  male MRN: [id number]  Unit/Bed#: -01 Encounter: [id number]    Assessment:  1  POD#14 posterior cervical decompressive laminectomy and instrumented posterior lateral fusion fixation C3-6 bilateral  1  Severe central canal stenosis and chronic mass effect on the cord at C3-5 and C4-5  2  Severe bilateral neural foraminal narrowing at C2-3 to C4-5  2  Status post fall  3  CVA - tPA administered 11/13/2018  4  Anxiety  5  Depression  6  Constipation  7  OCD     Plan:  · Exam:  Alert and oriented x3, moving all extremities without focal weakness, 4+/5 RUE but otherwise strength 5/5, normal to LT, decreased PP LLE sparing the foot, JPS 3/3, DST intact, no sy or clonus noted, no drift, incision clean dry intact, [place] collar on  · Imaging reviewed personally and with attending  Results are as follows:  ? X-ray spine cervical 11/20/2018:  Status post posterior spinal hardware fusion from C3-C6 with intact hardware   ? MRI cervical spine without contrast 11/14/2018:  Disc and facet osteophytosis resulting in severe central canal stenosis and chronic mass effect on the cord at C3-4 and C4-5  Severe bilateral neural foraminal narrowing from C2-3 to C4-5  No evidence of cord signal abnormality  Cord signal change noted when reviewed with attendings  ? MRI brain without contrast 11/14/2018:  No acute ischemia  Left frontal scalp soft tissue swelling  ? CT head without contrast 11/14/2018:  No acute intracranial abnormality  ? CTA stroke head and neck 11/13/2018:  Normal CTA of the head and neck  ? CT stroke alert brain 11/13/2018:  No acute intracranial disease  Supraorbital left frontal scalp hematoma  · [place] Driscoll collar to be worn at all times, Lynne collar for showering  · Medical management per primary team  · DVT ppx:  Heparin, SCDs  · Mobilize as tolerated with assistance  · Neurosurgery will follow as needed during hospitalization  Please call with questions or concerns  ? Patient will follow up as scheduled outpatient for 6 week post op visit  Subjective/Objective   Chief Complaint: "I am okay " / POD#14 posterior cervical decompressive laminectomy and instrumented posterior lateral fusion fixation C3-6 bilateral    Subjective: Patient complains of 5-6/10 posterior neck pain  He says at times pain is excruciating but it is okay right now  He feels he has getting better since surgery but is upset to know he will have to leave rehab with a walker  He is able to ambulate to the bathroom  He denies headache, dizziness, chest pain, shortness of breath, abdominal pain, nausea or vomiting, new numbness/weakness/tingling  Objective:  Lying in bed, [place] collar on, NAD  I/O       12/01 0701 - 12/02 0700 12/02 0701 - 12/03 0700 12/03 0701 - 12/04 0700    P  O  1258 758 600    Total Intake(mL/kg) 1258 (17) 758 (10 2) 600 (8 1)    Urine (mL/kg/hr) 2075 (1 2) 2250 (1 3) 500 (1 5)    Total Output 2075 2250 500    Net -817 -1492 +100           Unmeasured Urine Occurrence 3 x      Unmeasured Stool Occurrence  1 x           Invasive Devices          No matching active lines, drains, or airways          Physical Exam:  Vitals: Blood pressure 104/52, pulse 60, temperature 98 1 °F (36 7 °C), temperature source Oral, resp  rate 18, height 5' 7" (1 702 m), weight 74 kg (163 lb 2 3 oz), SpO2 96 %  ,Body mass index is 25 55 kg/m²      General appearance: alert, appears stated age, cooperative and no distress  Head: Normocephalic, without obvious abnormality, atraumatic  Eyes: EOMI, PERRL  Neck: supple, symmetrical, trachea midline and NT, incision clean dry intact, [place] collar on  Back: no kyphosis present, no tenderness to percussion or palpation  Lungs: non labored breathing  Heart: regular heart rate  Neurologic:   Mental status: Alert, oriented x3, thought content appropriate  Cranial nerves: grossly intact (Cranial nerves II-XII)  Sensory: normal to LT, dullness to PP in the left lower extremity sparing the left foot, JPS 3/3, DST intact  Motor: moving all extremities without focal weakness   RUE:  4+/5 throughout,  strength 4/5   LUE:  5/5 throughout   BLE:  5/5 throughout  Reflexes: 2+ and symmetric, no sy or clonus noted  Coordination: finger to nose normal bilaterally, no drift bilaterally    Lab Results:    Results from last 7 days  Lab Units 12/03/18  0704 11/29/18  0511   WBC Thousand/uL 7 18 4 72   HEMOGLOBIN g/dL 11 2* 10 9*   HEMATOCRIT % 34 7* 34 5*   PLATELETS Thousands/uL 295 283   NEUTROS PCT % 64 50   MONOS PCT % 12 16*       Results from last 7 days  Lab Units 12/03/18  0704 11/29/18  0511   POTASSIUM mmol/L 4 2 3 9   CHLORIDE mmol/L 101 104   CO2 mmol/L 29 26   BUN mg/dL 10 9   CREATININE mg/dL 0 71 0 61   CALCIUM mg/dL 9 7 8 7                 No results found for: TROPONINT  ABG:No results found for: PHART, GLQ5XGY, PO2ART, DIQ1RWV, V3XKKRHX, BEART, SOURCE    Imaging Studies: I have personally reviewed pertinent reports  and I have personally reviewed pertinent films in PACS    EKG, Pathology, and Other Studies: I have personally reviewed pertinent reports        VTE Pharmacologic Prophylaxis: Heparin    VTE Mechanical Prophylaxis: sequential compression device

## 2018-12-04 PROCEDURE — 97112 NEUROMUSCULAR REEDUCATION: CPT

## 2018-12-04 PROCEDURE — 97530 THERAPEUTIC ACTIVITIES: CPT

## 2018-12-04 PROCEDURE — 97535 SELF CARE MNGMENT TRAINING: CPT

## 2018-12-04 PROCEDURE — G0515 COGNITIVE SKILLS DEVELOPMENT: HCPCS

## 2018-12-04 PROCEDURE — 99233 SBSQ HOSP IP/OBS HIGH 50: CPT

## 2018-12-04 PROCEDURE — 97116 GAIT TRAINING THERAPY: CPT

## 2018-12-04 RX ORDER — BUSPIRONE HYDROCHLORIDE 10 MG/1
10 TABLET ORAL 2 TIMES DAILY
Status: DISCONTINUED | OUTPATIENT
Start: 2018-12-04 | End: 2018-12-06 | Stop reason: HOSPADM

## 2018-12-04 RX ORDER — MIDODRINE HYDROCHLORIDE 5 MG/1
5 TABLET ORAL
Status: DISCONTINUED | OUTPATIENT
Start: 2018-12-04 | End: 2018-12-06 | Stop reason: HOSPADM

## 2018-12-04 RX ADMIN — BACITRACIN ZINC 1 LARGE APPLICATION: 500 OINTMENT TOPICAL at 17:06

## 2018-12-04 RX ADMIN — OXYCODONE HYDROCHLORIDE 5 MG: 5 TABLET ORAL at 06:06

## 2018-12-04 RX ADMIN — MIDODRINE HYDROCHLORIDE 5 MG: 5 TABLET ORAL at 17:06

## 2018-12-04 RX ADMIN — OLANZAPINE 10 MG: 10 TABLET, FILM COATED ORAL at 21:32

## 2018-12-04 RX ADMIN — MIRTAZAPINE 7.5 MG: 15 TABLET ORAL at 21:32

## 2018-12-04 RX ADMIN — GABAPENTIN 400 MG: 400 CAPSULE ORAL at 21:31

## 2018-12-04 RX ADMIN — BUSPIRONE HYDROCHLORIDE 7.5 MG: 5 TABLET ORAL at 08:16

## 2018-12-04 RX ADMIN — SERTRALINE HYDROCHLORIDE 100 MG: 100 TABLET ORAL at 08:15

## 2018-12-04 RX ADMIN — GABAPENTIN 400 MG: 400 CAPSULE ORAL at 06:06

## 2018-12-04 RX ADMIN — ACETAMINOPHEN 975 MG: 325 TABLET, FILM COATED ORAL at 21:32

## 2018-12-04 RX ADMIN — ACETAMINOPHEN 975 MG: 325 TABLET, FILM COATED ORAL at 06:06

## 2018-12-04 RX ADMIN — SENNOSIDES AND DOCUSATE SODIUM 1 TABLET: 8.6; 5 TABLET ORAL at 17:06

## 2018-12-04 RX ADMIN — ACETAMINOPHEN 975 MG: 325 TABLET, FILM COATED ORAL at 14:07

## 2018-12-04 RX ADMIN — HEPARIN SODIUM 5000 UNITS: 5000 INJECTION INTRAVENOUS; SUBCUTANEOUS at 21:32

## 2018-12-04 RX ADMIN — DOCUSATE SODIUM 100 MG: 100 CAPSULE, LIQUID FILLED ORAL at 17:06

## 2018-12-04 RX ADMIN — HEPARIN SODIUM 5000 UNITS: 5000 INJECTION INTRAVENOUS; SUBCUTANEOUS at 06:06

## 2018-12-04 RX ADMIN — MIDODRINE HYDROCHLORIDE 2.5 MG: 5 TABLET ORAL at 06:05

## 2018-12-04 RX ADMIN — OXYCODONE HYDROCHLORIDE 5 MG: 5 TABLET ORAL at 14:07

## 2018-12-04 RX ADMIN — MIDODRINE HYDROCHLORIDE 2.5 MG: 5 TABLET ORAL at 11:30

## 2018-12-04 RX ADMIN — GABAPENTIN 400 MG: 400 CAPSULE ORAL at 14:07

## 2018-12-04 RX ADMIN — BUSPIRONE HYDROCHLORIDE 10 MG: 10 TABLET ORAL at 17:06

## 2018-12-04 RX ADMIN — HEPARIN SODIUM 5000 UNITS: 5000 INJECTION INTRAVENOUS; SUBCUTANEOUS at 14:07

## 2018-12-04 NOTE — PROGRESS NOTES
Internal Medicine Progress Note  Patient: Sera Martin  Age/sex: 68 y o  male  Medical Record #: [id number]      ASSESSMENT/PLAN:  Sera Martin is seen and examined and management for following issues:    Fall, right sided weakness; s/p tPA:  radiologic studies did not show CVA     Left periorbital hematoma: stable (from hitting his head when he fell); will watch      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; s/p posterior cervical decompressive laminectomy with instrumented posterior lateral fusion fixation C3-6 on 11/19 [facility]):  watch incision; continue collar at all times      HTN/mild orthostasis: started Midodrine 2 5mg TID monday; orthos this AM sit 114/55, stand 160 systolic and then 803 systolic (with binder and TEDS) = increase to 5mg TID and try to get rid of the binder for home       OCD: meds per primary team     ABLA:  mild; required no transfusion; will watch         Subjective: offers no complaints of dizziness today when I saw him in [facility] at 11am      Scheduled Meds:    Current Facility-Administered Medications:  acetaminophen 975 mg Oral [facility] Chelsey Hernandez MD   bacitracin 1 large application Topical BID Chelsey MD Mary   bisacodyl 10 mg Rectal Daily PRN Chelsey Notice, MD   bisacodyl 10 mg Rectal Once Chelsey Notice, MD   busPIRone 7 5 mg Oral BID Chelsey MD Mary   docusate sodium 100 mg Oral BID Chelsey MD Mary   gabapentin 400 mg Oral TID Chelsey MD Mary   heparin (porcine) 5,000 Units Subcutaneous [facility] Chelseybraulio Hernandez MD   magnesium hydroxide 30 mL Oral Daily PRN Yaron West, DO   midodrine 2 5 mg Oral TID AC TAZ Lamar   mirtazapine 7 5 mg Oral HS Chelsey Notice, MD   OLANZapine 10 mg Oral HS Chelsey Notice, MD   OLANZapine 5 mg Oral Daily PRN Chelsey MD Mary   oxyCODONE 5 mg Oral Q4H PRN Chelsey Hernandez MD   oxyCODONE 5 mg Oral BID Chelsey MD Mary   polyethylene glycol 17 g Oral Daily Chelsey MD Mary   polyethylene glycol 17 g Oral Once Chelsey Notice, MD   senna-docusate sodium 1 tablet Oral BID Shan Erickosn MD   sertraline 100 mg Oral Daily Shan Erickson MD       Labs:       Results from last 7 days  Lab Units 12/03/18  0704 11/29/18  0511   WBC Thousand/uL 7 18 4 72   HEMOGLOBIN g/dL 11 2* 10 9*   HEMATOCRIT % 34 7* 34 5*   PLATELETS Thousands/uL 295 283       Results from last 7 days  Lab Units 12/03/18  0704 11/29/18  0511   POTASSIUM mmol/L 4 2 3 9   CHLORIDE mmol/L 101 104   CO2 mmol/L 29 26   BUN mg/dL 10 9   CREATININE mg/dL 0 71 0 61   CALCIUM mg/dL 9 7 8 7                No results found for: GLUCOSE    Labs reviewed    Physical Examination:  Vitals:   Vitals:    12/03/18 0500 12/03/18 1330 12/03/18 2030 12/04/18 0501   BP: 104/52 121/58 111/53 113/57   BP Location: Left arm Right arm Left arm Left arm   Pulse: 60 63 57 57   Resp: 18 18 18 18   Temp: 98 1 °F (36 7 °C) 97 7 °F (36 5 °C) 98 4 °F (36 9 °C) 97 5 °F (36 4 °C)   TempSrc: Oral Oral Oral Oral   SpO2: 96% 98% 97% 97%   Weight:       Height:           CV:  +S1, S2;  RRR; no rub/murmur  Pulmonary:  BBS without crackles/wheeze/rhonci; resp are unlabored  Abdominal:  soft, +BS, ND/NT; no mass  Skin:  no rashes  Musculoskeletal:  no edema  :  no cristina  Neurological/Psych:  AAO;  LE 5/5; RUE 4/5 with grasp 4-/5, LUE 4+/5; no depression/anxiety      [x ] Total time spent: 30 Mins and greater than 50% of this time was spent counseling/coordinating care  ** Please Note: Dragon 360 Dictation voice to text software may have been used in the creation of this document   **

## 2018-12-04 NOTE — PROGRESS NOTES
12/04/18 0830   Pain Assessment   Pain Assessment No/denies pain   Restrictions/Precautions   Precautions Fall Risk;Spinal precautions   Braces or Orthoses C/S Collar   Memory Skills   Memory (FIM) 5 - [place] cues patient   Social Interaction (FIM) 5 - Interacts appropriately with others 90% of time   Speech/Language/Cognition Assessmetn   Treatment Assessment Pt engaging in a number of activities today targeting functional problem solving and remoter recall  Pt completing functional math task given 3 sets of coin amounts to add  Pt was 15/15 accurate in completing additional of all coin amounts  Upon review of items to wear to aid in maintaining appropriate BP's, where pt was wearing abdominal binder  Pt not wearing MALACHI stockings, which SLP retrieved and placed onto pt  BP's were recorded and were as follows: sitting at 0855:114/55; sitting at 0915: 114/57; standing at 0916: 108/48; standing at 0925: 114/46  Pt completing category matrix task given 4 categories and then to determine an item which starts given an initial letter, where pt was12/16 accurte in identifying items on own  When given semantic cues, pt was able to increase acccuracy of task to 16/16  When completing verbal problem solving task to evaluate solutions which were already given  Pt was to determine whether the solutions are good vs  bad and then give a reason  Pt was 6/7 accurate to determine whether the solution was good or bad  Given the reasoning as to why, pt remained 6/7 accurate w/ task  Will continue to maximize cognitive linguistic skills at this time  SLP Therapy Minutes   SLP Time In 0830   SLP Time Out 0930   SLP Total Time (minutes) 60   SLP Mode of treatment - Individual (minutes) 60   SLP Mode of treatment - Concurrent (minutes) 0   SLP Mode of treatment - Group (minutes) 0   SLP Mode of treatment - Co-treat (minutes) 0   SLP Mode of Teatment - Total time(minutes) 60 minutes   Therapy Time missed   Time missed?  No   Daily FIM Score   Problem solving (FIM) 5 - Solves basic problems 90% of time   Comprehension (FIM) 5 - Understands basic directions and conversation   Expression (FIM) 5 - Needs help/cues only RARELY (< 10% of the time)

## 2018-12-04 NOTE — CONSULTS
Consultation - Neuropsychology    Kam Keys 68 y o  male MRN: [id number]  Unit/Bed#: -01 Encounter: [id number]    Assessment/Plan     Assessment:  Pt has history of anxiety, depression, and obsessive-compulsive disorder  Pt presented as anxious and had difficulties focusing on rational and reality-based progress on his goals  Pt repeatedly told this writer "I can't walk" yet pt can walk with a walker- pt has specific criteria for himself related to rehab goals, and if he is not displaying these goals specifically, he identifies this as no progress  Pt displays strict dichotomous thinking (all or nothing, black or white) and anxiety-related cognitive distortions, and has difficulties creating more adaptive thoughts when presented evidence and facts  Psychoeducation related to cognitive distortions, and cognitive-behavioral coping skills was presented  Pt was encouraged to utilize these skills  Dx: F41 8 Anxiety disorder (other); history of OCD  Code: [id number]    Plan:   Pt will be afforded rehab psychology services while at [facility] to help pt cope with illness  History of Present Illness   Physician Requesting Consult: Nkechi Ocampo MD  Reason for Consult / Principal Problem: coping, adjustment  Patient is a 68 y o  male   Primary complaints include: agitation, anxiety, concern about health problems, fear of impending doom, fearfulness, feeling depressed, increased irritability and poor concentration  Psychosocial Stressors: health  Inpatient consult to Neuropsychology  Consult performed by: Billie Mehta ordered by: Tab West          Psychiatric Review Of Systems:  sleep: no  appetite changes: no  weight changes: no  energy/anergy: no  interest/pleasure/anhedonia: no  somatic symptoms: yes  anxiety/panic: yes  jona: no  guilty/hopeless: no  self injurious behavior/risky behavior: no    Historical Information   Past Psychiatric History: history of anxiety, depression, OCD  Substance Abuse History:  Use of Alcohol: denied and 0 out of 4 on CAGE    Smoking history: none noted  Family Psychiatric History: none noted  Social History  Education: high school diploma/GED  Marital history:   Living arrangement, social support: The patient lives in home with son  Occupational History: retired  Functioning Relationships: good relationship with children  Other Pertinent History: None    Traumatic History:   Abuse: none noted  Other Traumatic Events: fall that lead to recent injury and hospitalization      Past Medical History:   Diagnosis Date    Anxiety     Chronic low back pain     Depression     Hypercholesterolemia     Hypertension     OCD (obsessive compulsive disorder)        Medical Review Of Systems:  Review of Systems    Meds/Allergies   current meds:   Current Facility-Administered Medications   Medication Dose Route Frequency    acetaminophen (TYLENOL) tablet 975 mg  975 mg Oral Q8H [facility]    bacitracin topical ointment 1 large application  1 large application Topical BID    bisacodyl (DULCOLAX) rectal suppository 10 mg  10 mg Rectal Daily PRN    bisacodyl (DULCOLAX) rectal suppository 10 mg  10 mg Rectal Once    busPIRone (BUSPAR) tablet 7 5 mg  7 5 mg Oral BID    docusate sodium (COLACE) capsule 100 mg  100 mg Oral BID    gabapentin (NEURONTIN) capsule 400 mg  400 mg Oral TID    heparin (porcine) subcutaneous injection 5,000 Units  5,000 Units Subcutaneous Q8H [facility]    magnesium hydroxide (MILK OF MAGNESIA) 400 mg/5 mL oral suspension 30 mL  30 mL Oral Daily PRN    midodrine (PROAMATINE) tablet 2 5 mg  2 5 mg Oral TID AC    mirtazapine (REMERON) tablet 7 5 mg  7 5 mg Oral HS    OLANZapine (ZyPREXA) tablet 10 mg  10 mg Oral HS    OLANZapine (ZyPREXA) tablet 5 mg  5 mg Oral Daily PRN    oxyCODONE (ROXICODONE) IR tablet 5 mg  5 mg Oral Q4H PRN    oxyCODONE (ROXICODONE) IR tablet 5 mg  5 mg Oral BID    polyethylene glycol (MIRALAX) packet 17 g  17 g Oral Daily    polyethylene glycol (MIRALAX) packet 17 g  17 g Oral Once    senna-docusate sodium (SENOKOT S) 8 6-50 mg per tablet 1 tablet  1 tablet Oral BID    sertraline (ZOLOFT) tablet 100 mg  100 mg Oral Daily     No Known Allergies    Objective   Vital signs in last 24 hours:  Temp:  [97 5 °F (36 4 °C)-98 4 °F (36 9 °C)] 97 5 °F (36 4 °C)  HR:  [57-63] 57  Resp:  [18] 18  BP: (111-121)/(53-58) 113/57      Intake/Output Summary (Last 24 hours) at 12/04/18 0856  Last data filed at 12/04/18 0252   Gross per 24 hour   Intake              840 ml   Output             2975 ml   Net            -2135 ml       Mental Status Evaluation:  Appearance:  age appropriate   Behavior:  normal   Speech:  normal pitch and normal volume   Mood:  anxious   Affect:  mood-congruent   Language:  WNL   Thought Process:  logical and perserverative   Thought Content:  normal   Perceptual Disturbances: None   Risk Potential: none   Sensorium:  person, place, time/date and situation   Cognition:  grossly intact   Consciousness:  alert and awake    Attention: attention span appeared shorter than expected for age   Intellect: within normal limits   Fund of Knowledge: vocabulary: WNL   Insight:  fair   Judgment: age appropriate   Muscle Strength and Tone: see PT eval   Gait/Station: see PT eval   Motor Activity: no abnormal movements

## 2018-12-04 NOTE — PROGRESS NOTES
Physical Medicine and Rehabilitation Progress Note  Aileen Luna 68 y o  male MRN: [id number]  Unit/Bed#: -01 Encounter: [id number]    Chief Complaints:  Concern about discharge    Subjective/Interval Events:   Patient reports still being fearful about being discharged home and not being ready  Myself and therapy spent considerable time reviewing patient's concerns which including toileting hygiene and some other ADLs  He was able to perform all of these with supervision level of assist   He felt better when he saw that he could indeed do these  Patient reports neck pain only significant if he is doing a bending like movement without shooting pain down arms or worsening strength or sensation  He notes some less lightheadedness  Patient notes fair amount of anxiety with his overall condition and recovery  He denies fever, chills, sweats, calf pain, depression or other new complaints  ROS: A 10-point ROS was performed  Negative except as listed above  Spoke with son by phone and he states patient can get very anxious prior to changing environments and this has happened in past   Spent extended time with patient and son to discuss medical conditions, medical/rehab plan/disposition  Overall Assessment/relevant history:   68 y o  male with PMH of HTN, HL, OCD, MDD, SHAQUILLE, CLBP, lumbar stenosis, lumbar radiculopathy who apparently fell at home hitting his head with residual R sided weakness and LOC who was initially believed to develop R sided weakness first causing the fall with possibility for stroke who was [facility] prompting stroke alert/protocol  CT head and CTA head and negative were negative and patient received tPA  Patient was later noted to have some bilateral strength and sensory changes as well as ongoing imbalance and limb pain prompting additional imaging    MRI brain showed severe central canal stenosis with chronic mass effect on cord at C3-5 as well as severe bilateral NF narrowing from C2 to C5  NS was consulted who recommended surgical intervention and patient underwent C3-C6 posterior cervical decompressive laminectomy and posterior lateral fusion/fixation by Dr Dot Curran on 11/19  Patient still has GOSIA drain in place with some serous drainage  Patient also had c/s by  Psych who felt patient mood currently stable  He has been only on Zoloft 100mg qday but at home he takes several other mood agents which he has been off of  Patient was evaluated by skilled therapies and was found to have significant decline in ADLs and ambulation appropriate for admission to [facility]      Patient presented to [facility] on 11/21/18 s/p fall causing cervical myelopathy and TBI with closed head injury and likely brief LOC s/p cervical decompression and fusion with decline in strength, balance, cognition, pain as well as significant insomnia and mood disorder  Functional status (recent):    OT: Shower transfers min assist otherwise ADLs largely supervision   PT: Transfers and ambulation and stairs supervision     Functional status on admission to [facility]:  OT:  Lower body dressing max assist bathing moderate assist body dressing general transfers and toilet transfers minutes assist  PT:  Transfers and ambulation Min assist, stairs max assist      * Cervical myelopathy (HCC)   Assessment & Plan    -Following fall causing R sided weakness, imbalance, limb pain s/p decompression and fusion   -Possible pre-fall cervical myelopathy based on imaging and history that attributed to fall in first place   -Recommend acute comprehensive interdisciplinary inpatient rehabilitation to include intensive skilled therapies (PT, OT) as outlined with oversight and management by rehabilitation physician as well as inpatient rehab level nursing, case management and weekly interdisciplinary team meetings              Traumatic brain injury with loss of consciousness ([facility] [address] ) Assessment & Plan    Impaired cognition stable but improved from presentation to [id number] [address] with closed head injury with brief LOC with residual deficits in cognition   -CT head without acute findings;  -Recommend all intensive skilled therapies with particular focus on SLP as   outlined with oversight and management by rehabilitation physician  Continue inpatient rehab level nursing, case management and weekly interdisciplinary team meetings  Skilled therapist, rehab nursing, rehab physician, CM, and supporting staff to provide appropriate teaching to patient (and if applicable to caregiver(s))  -Overstim precautions  -Sleep-agitation log/optimize sleep wake cycle  -Minimize sedating meds when appropriate  -Optimize mood d/o mgmt        S/P cervical spinal fusion   Assessment & Plan    -C3-C6 posterior cervical decompressive lami and PLF by Dr Grant Colón on   -Monitor incision  -Cervical collar   -Follow-up with NS during [facility] course PRN and after d/c       Pain   Assessment & Plan    Improved adequately   Acute cervical myelopathy and spinal surgery pain with associated myofascial pain on chronic low back pain and lumbar radiculopathy   -Continue scheduled oxy 5mg BID prior to therapies  -Gabapentin continue 400mg TID for neuropathic and adjuvant pain control  -Continue APAP schedule 975mg TID   -Continue Oxycodone 5mg Q4H PRN  -Counseled on and continue to encourage deep breathing/relaxation/behavioral pain management techniques:     Deep breathin seconds in, 5 seconds out, 5 times per hour when awake and PRN when in pain or anticipate pain; avoid holding breath and tightening muscles and instead breathe slowly and deeply       OCD (obsessive compulsive disorder)   Assessment & Plan    Depression improving;  Anxiety fairly significant particularly with discharge coming up; sleeping adequately;   Continue neuropsych/CBT, reassurance, exposure therapy   Follow-up with patient outpatient psych prior to discharge   >Continue Zyprexa 10mg QHS (previously on 15m QHS)   Zoloft 100mg qday   Buspar increase back to home 10mg BID   Ramelteon 7 5mg QHS (previously on 15mg QHS)  >Recommend Zyprexa 5mg qday PRN for breakthrough restlessness/OCD/anxiey symptoms  OCD, MDD, SHAQUILLE hx - fairly severe in past including psych hospitalization but denies hx of SI    >Had been off of multiple chronic agents (Zyprexa 15mg QHS, Remeron 15mg QHS, Buspar 10mg BID) except for Zoloft 100mg Qday since being hospitalized over a week ago  -Follow-up with patient's outpatient psychiatrist if needed during course as well as prior to d/c  -Seen by [facility] psych prior to transfer who felt patient mood currently stable  -Psychology consult       Insomnia   Assessment & Plan    Improved overall  Mgmt as outlined above   Mgmt as outlined above     Positional lightheadedness   Assessment & Plan    Off BP meds recently with borderline low BP and symptomatic orthostasis   Abdominal binder when mobilizing +/- MALACHI hose   Midodrine increasing dose to improve further symptoms     At risk for venous thromboembolism (VTE)   Assessment & Plan    Heparin, SCDs, and ambulation      Lumbar stenosis   Assessment & Plan    Mgmt as outlined above     History of hyperlipidemia   Assessment & Plan    Recent lipid panel wnl  Per son patient is not supposed to be on statin     Hypertension   Assessment & Plan    IM c/s to assit with mgmt  Monitor BP and orthostatics   Off BP meds recently with borderline low BP and symptomatic orthostasis      Chronic low back pain   Assessment & Plan    Mgmt as outlined above     Lumbar radiculopathy   Assessment & Plan    Mgmt as outlined above  Follow-up with PMR and spine after d/c     Hyponatremia   Assessment & Plan    Mild 134 on 12/3, monitor      Chronic idiopathic constipation   Assessment & Plan    Improved  Recommend colace/senna/miralax  PRN bowel regimen          # Cardiopulmonary:   Saturating well on room air  - Encourage out of bed activities, incentive spirometry and deep breathing to prevent atelectasis  - Ensure adequate hydration, volume status, and intermittently monitor Hb, BUN/Cr and sodium  - Monitor vitals at rest and with activity  - Orthostatics PRN for S/S of orthostasis; Abdominal binder PRN     # Bladder care:    - monitor for retention, incontinence, signs/symptoms of UTI  - recommend voiding trial; nursing prompt to void followed by bladder scan starting Q4-6H or after each patient initiated void; nursing to record voided output and bladder scan totals; straight cath PRN >350-400 cc; if post void residual bladder scans are <150 cc x3 consecutive voids, can stop scans      # Skin:   - Nursing to turn patient Q2H if not adequately ambulatory, monitor for skin breakdown, rashes, and wounds if applicable  - GOSIA drain per NS     # Diet/Hydration:    Regular     Disposition:   Home with [facility] this Thursday      Follow-up:   NS/Spine, PMR, Psych, PCP     CODE: Level 1: Full Code     Restrictions include: Fall precautions, cervical spine    ---------------------------------------------------------------------------------------------------------------------    Objective:     Allergies and Medications per EMR    Physical Exam:  Vitals:    12/04/18 0501   BP: 113/57   Pulse: 57   Resp: 18   Temp: 97 5 °F (36 4 °C)   SpO2: 97%     General: Awake, alert in NAD  HENT: MMM  Neck: Cervical collar in place, incision healing appropriately   Respiratory: Unlabored breathing, breath sounds equal, Lungs CTA, no wheezes, rales, or rhonchi  Cardiovascular: Regular rate and rhythm, no murmurs, rubs, or gallops  Gastrointestinal: Soft, non-tender, non-distended, normoactive bowel sounds  SkiN/MSK/Extremities: no calf edema, no calf tenderness to palpation     Neurologic/Psych:      MENTAL STATUS/Affect: awake, orientation intact; initially anxious but later appeared more positive and confident     Strength/MMT:  RUE strength 4+/5, RLE strength 5-/5, LLE Strength 5-5, LUE strength 5/5      Diagnostic Studies: reviewed, no new imaging  No results found  Laboratory:      Results from last 7 days  Lab Units 12/03/18  0704 11/29/18  0511   HEMOGLOBIN g/dL 11 2* 10 9*   HEMATOCRIT % 34 7* 34 5*   WBC Thousand/uL 7 18 4 72       Results from last 7 days  Lab Units 12/03/18  0704 11/29/18  0511   BUN mg/dL 10 9   POTASSIUM mmol/L 4 2 3 9   CHLORIDE mmol/L 101 104   CREATININE mg/dL 0 71 0 61            Patient Active Problem List   Diagnosis    Chronic idiopathic constipation    OCD (obsessive compulsive disorder)    Weakness    Depression    Anxiety    Cervical spinal stenosis    Cervical stenosis of spine    Hyponatremia    Cervical myelopathy (HCC)    S/P cervical spinal fusion    Lumbar radiculopathy    Chronic low back pain    Pain    Traumatic brain injury with loss of consciousness ([address] )    Hypertension    History of hyperlipidemia    Insomnia    Lumbar stenosis    At risk for venous thromboembolism (VTE)    Positional lightheadedness       ** Please Note: Fluency Direct voice to text software may have been used in the creation of this document  **    35 minutes or greater spent face-to-face with patient during this encounter and with coordination of care        Aleks Arias MD, [id number]  Physical Medicine and Rehabilitation  Brain Injury Medicine

## 2018-12-04 NOTE — PROGRESS NOTES
12/04/18 1230   Pain Assessment   Pain Assessment 0-10   Pain Score 5   Pain Type Surgical pain   Pain Location Neck   Restrictions/Precautions   Precautions Fall Risk;Spinal precautions;Pain   Braces or Orthoses C/S Collar   QI: [address] Provided by [place] No physical assistance   Oral Hygiene CARE Score 6   Grooming   Able To Initiate Tasks;Comb/Brush Hair;Wash/Dry Face;Wash/Dry Hands;Brush/Clean Teeth   Findings Pt standing at sink to complete grooming  Pt able to stand and complete oral hygiene with use of RW positioned in front of him for support  Grooming (FIM) 6 - Patient requires assistive device/extra time/safety concerns but completes independently   QI: Shower/Bathe Self   Assistance Needed Supervision   Assistance Provided by [place] No physical assistance   Comment Pt completed bathing while seated on shower chair  Pt attempted crossing leg over opposing knee to wash feet and utlized reacher with washcloth to wash feet  Pt did order LHAE for d/c and will utlize LH sponge  Pt [place] to  shower to wash nancy and buttock  Pt with good balance while bathing nancy and buttock  Shower/Bathe Self CARE Score 4   Bathing   Assessed Bath Style Shower   Anticipated D/C Bath Style Tub   Able to Gather/Transport No   Able to [employer] Temperature No   Able to Wash/Rinse/Dry (body part) Left Arm;Right Arm;L Upper Leg;R Upper Leg;L Lower Leg/Foot;R Lower Leg/Foot;Chest;Abdomen;Perineal Area; Buttocks   Limitations Noted in Balance; Endurance   Positioning Seated;Standing   Bathing (FIM) 5 - Patient requires supervision/monitoring but completes 10/10 parts   Tub/Shower Transfer   Limitations Noted In Balance; Coordination; Safety;LE Strength   Findings pt requires steadying assist to complete stand pivot transfer wiith use of grab bar to step into shower      Shower Transfer (FIM) 4 - Patient requires steadying assist or light touching   QI: Upper Body Dressing Assistance Needed Verbal cues   Assistance Provided by [place] No physical assistance   Upper Body Dressing CARE Score 4   QI: Lower Body Dressing   Assistance Needed Physical assistance;Verbal cues   Assistance Provided by [place] Less than 25%   Comment Pt able to use LHAE reacher to thread pants and undewear on  Pt requires max verbal cueing to problem solve how to position reacher to don underwear and pants  Pt did get RLE caught in underwear requiring incidental assistance due to toenail getting caught  Lower Body Dressing CARE Score 3   QI: Putting On/Taking Off Footwear   Assistance Needed Verbal cues   Assistance Provided by [place] No physical assistance   Comment Pt able to doff socks by crossing leg over opposing knee however due to pain in neck pt unabel to don by crossing leg and thus introduced sock aid  With max verbal cueing for problem solving positioning of sock  Putting On/Taking Off Footwear CARE Score 4   QI: Picking Up Object   Assistance Needed Adaptive equipment   Assistance Provided by [place] No physical assistance   Comment pt retrieved sock from standing with use of reacher   Picking Up Object CARE Score 6   Dressing/Undressing Clothing   Remove UB Clothes Pullover Shirt   Remove LB Clothes Undergarment;Pants;Socks;TEDs   [address]; Undergarment;Socks;TEDs   Limitations Noted In Balance; Coordination;Strength;ROM   Adaptive Equipment Reacher;Sock Aide;Elastic Laces   Positioning Sit Edge Of Bed;Standing   UB Dressing (FIM) 5 - Patient requires verbal cues   LB Dressing (FIM) 5 - Patient requires supervision/monitoring   QI: Sit to Stand   Assistance Needed Adaptive equipment   Assistance Provided by [place] No physical assistance   Sit to Stand CARE Score 6   QI: Chair/Bed-to-Chair Transfer   Assistance Needed Adaptive equipment   Assistance Provided by [place] No physical assistance   Chair/Bed-to-Chair Transfer CARE Score 6   Transfer Bed/Chair/Wheelchair   Stand Pivot Modified Independent   Sit to Stand (mod i)   Stand to Sit (mod i)   Bed, Chair, Wheelchair Transfer (FIM) 6 - Patient requires assistive device/extra time/safety concerns but completes independently   QI: [address] Provided by [place] No physical assistance   [address] Score 6   Toileting   Able to [address] down yes, up yes  Able to Manage Clothing Closures No   Manage Hygiene Bladder; Bowel   Toileting (FIM) 6 - Patient requires assistive device/extra time/safety concerns but completes independently   QI: Toilet Transfer   Assistance Needed Adaptive equipment   Assistance Provided by [place] No physical assistance   Toilet Transfer CARE Score 6   Toilet Transfer   Surface Assessed Standard Toilet   Transfer Technique Stand Pivot   Adaptive Equipment Grab Bar   Toilet Transfer (FIM) 6 - Patient requires assistive device/extra time/safety concerns but completes independently   Cognition   Overall Cognitive Status Impaired   Arousal/Participation Alert; Cooperative   Attention Attends with cues to redirect   Orientation Level Oriented X4   Memory Decreased short term memory;Decreased recall of precautions   Following Commands Follows multistep commands inconsistently   Activity Tolerance   Activity Tolerance Patient tolerated treatment well   Assessment   Treatment Assessment Pt engaged in OT treatment session with focus ADL routine and LHAE  Pt issued Rehana Reyes  Pt able to doff socks wtih crossing leg over opposing knee but required LHAE to don socks  Pt required verbal cueing for problem solving but with verbal cueing pt is able to complete  Please use LH sponge for bathing  Spoke with son at length over the phone  Pt will have long hand rail by bathroom door to use due to decreased accesibility with walker due to tight space  Son will order shower chair on [place] today   Son is aware of patient's anxiety and how his anxiety leads to decreased confidence and decreased insight into his ability to complete tasks  Pt was [place] to complete bowel hygiene care while in stance using R and L hand to wipe  Pt states I can't wipe  Pt re-encouraged and educated that he compelted wiping in this session  Pt continues to bend forward which causes him to tilt his chin down  Pt reeducated on spinal precautiosn and educated on purpose of using LHAE to prevent that positioning  Pt son will come in on day of d/c Thursday at 12:30 for family training  OT Family training done with: spoke to son regarding pt's functional level and DME needs  set up family training for Thrusday at 200   Problem List Decreased strength;Decreased range of motion  (anxious behavior)   Plan   Treatment/Interventions ADL retraining;Functional transfer training;Equipment eval/education   Progress Progressing toward goals   Recommendation   OT Discharge Recommendation Home OT   OT Equipment ordered son will order shower chair  LHAE kit ordered, commode ordered   Date ordered 12/04/18   OT Therapy Minutes   OT Time In 1230   OT Time Out 1400   OT Total Time (minutes) 90   OT Mode of treatment - Individual (minutes) 90   OT Mode of treatment - Concurrent (minutes) 0   OT Mode of treatment - Group (minutes) 0   OT Mode of treatment - Co-treat (minutes) 0   OT Mode of Teatment - Total time(minutes) 90 minutes   Therapy Time missed   Time missed?  No

## 2018-12-04 NOTE — PROGRESS NOTES
12/04/18 1500   Pain Assessment   Pain Assessment 0-10   Pain Score No Pain   Restrictions/Precautions   Precautions Fall Risk;Spinal precautions   Braces or Orthoses C/S Collar   General   Change In Medical/Functional Status BRP with RW   Cognition   Overall Cognitive Status Impaired   Arousal/Participation Alert; Cooperative   Attention Attends with cues to redirect   Orientation Level Oriented X4   Memory Decreased short term memory;Decreased recall of precautions   Following Commands Follows multistep commands inconsistently   Subjective   Subjective reports he feels good after stretching and is concerned about having to urinate frequently throughout the night   QI: Roll Left and Right   Assistance Needed Independent   Roll Left and Right CARE Score 6   QI: Sit to [address] to Lying CARE Score 6   QI: Lying to Sitting on Side of Bed   Assistance Needed Independent   Lying to Sitting on Side of Bed CARE Score 6   QI: Sit to [address] to Stand CARE Score 6   QI: Chair/Bed-to-Chair Transfer   Assistance Needed Independent   Chair/Bed-to-Chair Transfer CARE Score 6   Transfer Bed/Chair/Wheelchair   Limitations Noted In Balance;UE Strength;LE Strength   [address] [facility] [address] [facility] to Sit Independent   Supine to Sit Independent   Sit to Supine Independent   Findings BRP   Bed, Chair, Wheelchair Transfer (FIM) 6 - Patient requires assistive device/extra time/safety concerns but completes independently   QI: Walk 10 Feet   Assistance Needed Independent   Comment BRP   Walk 10 Feet CARE Score 6   QI: Walk 50 Feet with Two Turns   Assistance Needed Supervision   Walk 50 Feet with Two Turns CARE Score 4   QI: Walk 150 Feet   Assistance Needed Supervision   Walk 150 Feet CARE Score 4   Ambulation   Does the patient walk? 2   Yes   Primary Discharge Mode of Locomotion Walk   Walk Assist Level Supervision   Gait Pattern Inconsistant Sandra; Slow Sandra;Narrow YEN;Step through; Improper weight shift   Assist Device Jacquelineer Maria Luz Arango Walked (feet) 150 ft  (x4)   Limitations Noted In Balance;Posture;Speed;Strength;Swing   Walking (FIM) 5 - Patient requires supervision/monitoring AND distance 150 feet or more, no rest   QI: 4 Steps   Assistance Needed Supervision   Comment FF   4 Steps CARE Score 4   QI: 12 Steps   Assistance Needed Supervision   Comment FF   12 Steps CARE Score 4   Stairs   Type Stairs   # of Steps 12   Weight Bearing Precautions Fall Risk   Assist Devices Single Rail   Findings sideways down and forward up, both non-reciprocal   Stairs (FIM) 5 - Patient requires supervision/monitoring AND goes up and down full flight (12- 14 stairs)   QI: Toilet Transfer   Assistance Needed Independent   Toilet Transfer CARE Score 6   Toilet Transfer   Findings BRP   Toilet Transfer (FIM) 6 - Patient requires assistive device/extra time/safety concerns but completes independently   Therapeutic Interventions   Flexibility hamstring and gastroc 60"x2   Assessment   Treatment Assessment FF sideways with non-reciprocal pattern and good balance, VC to not look down as to avoid changing COG too much and not have the strength and balance to correct; trialed short amb with RW to rail in [place] and then used rail on either side to simulate an area he will have to navigate at home, safe with walking forward or sideways and he is able to use rail on L or R; BRP explained and used teachback for confirmation of understanding; continue to increase balance with no UE support as appropriate, continue to use RW for mobility in room when not in therapy; continue POC as per PT   Problem List Decreased strength;Decreased range of motion;Decreased endurance; Impaired balance;Orthopedic restrictions   Barriers to Discharge Inaccessible home environment;Decreased caregiver support   PT Barriers   Functional Limitation Walking;Standing   Plan   Treatment/Interventions LE strengthening/ROM;ADL retraining;Elevations; Therapeutic exercise; Endurance training;Patient/family training;Equipment eval/education; Bed mobility;Gait training   Progress Progressing toward goals   Recommendation   Recommendation Home PT   Equipment Recommended Walker   PT Therapy Minutes   PT Time In 1500   PT Time Out 1530   PT Total Time (minutes) 30   PT Mode of treatment - Individual (minutes) 30   PT Mode of treatment - Concurrent (minutes) 0   PT Mode of treatment - Group (minutes) 0   PT Mode of treatment - Co-treat (minutes) 0   PT Mode of Teatment - Total time(minutes) 30 minutes   Therapy Time missed   Time missed?  No

## 2018-12-04 NOTE — PROGRESS NOTES
12/04/18 1000   Pain Assessment   Pain Assessment 0-10   Pain Score No Pain   Restrictions/Precautions   Precautions Fall Risk;Spinal precautions   Braces or Orthoses C/S Collar   Cognition   Overall Cognitive Status Impaired   Arousal/Participation Cooperative; Alert   Attention Attends with cues to redirect   Orientation Level Oriented X4   Memory Decreased short term memory;Decreased recall of recent events   Following Commands Follows one step commands with increased time or repetition   Subjective   Subjective reports he feels better but he feels his balance is still not too good   QI: Sit to Stand   Assistance Needed Supervision   Comment DS   Sit to Stand CARE Score 4   QI: Chair/Bed-to-Chair Transfer   Assistance Needed Supervision   Comment DS   Chair/Bed-to-Chair Transfer CARE Score 4   Transfer Bed/Chair/Wheelchair   Limitations Noted In Balance;UE Strength;LE Strength;Confidence   Adaptive Equipment Roller Walker   Stand Pivot Supervision   Sit to Stand Supervision   Stand to [address]  [place], Chair, Wheelchair Transfer (FIM) 5 - Patient requires supervision/monitoring   QI: Walk 10 Feet   Assistance Needed Supervision   Comment DS   Walk 10 Feet CARE Score 4   QI: Walk 50 Feet with Two Turns   Assistance Needed Incidental touching   Comment no AD   Walk 50 Feet with Two Turns CARE Score 4   QI: Walk 150 Feet   Assistance Needed Incidental touching   Comment no AD   Walk 150 Feet CARE Score 4   Ambulation   Does the patient walk? 2  Yes   Primary Discharge Mode of Locomotion Walk   Walk Assist Level Contact Guard   Gait Pattern Inconsistant Sandra; Slow Sandra;Narrow YEN;Step through; Improper weight shift   Distance Walked (feet) 300 ft  (x2)   Limitations Noted In Balance; Endurance; Heel Strike;Posture;Speed;Strength;Swing   Findings CG with no AD   Walking (FIM) 4 - Patient requires steadying assist or light touching AND distance 150 feet or more, no rest   Therapeutic Interventions Flexibility hamstring and gastroc 60"x2   Balance amb no AD; lateral amb, retro amb   Equipment Use   NuStep 10 mins level 2   Assessment   Treatment Assessment GT for balance and for correcting his lean to the L; lean may be attributed to weak abdominals to the R or weakness in the L proximal hip; exercises for L hip and R abdominals reveal that the hip is weaker and can be strengthened to increase styability and improve posture; overall balance is good with no UE support, CG with gait belt at thsi time but no LOB; amb continues to be slow and pt keeps looking down, VC to look ahead and not down and he attributes it to old habits; continue POC as per PT   Problem List Decreased strength;Decreased endurance; Impaired balance;Decreased mobility; Decreased coordination;Orthopedic restrictions;Pain;Decreased range of motion   Barriers to Discharge Inaccessible home environment;Decreased caregiver support   PT Barriers   Functional Limitation Stair negotiation; Walking;Standing;Ramp negotiation;Car transfers   Plan   Treatment/Interventions ADL retraining;LE strengthening/ROM; Elevations; Therapeutic exercise; Endurance training;Patient/family training;Equipment eval/education; Bed mobility;Gait training   Progress Progressing toward goals   PT Therapy Minutes   PT Time In 1000   PT Time Out 1100   PT Total Time (minutes) 60   PT Mode of treatment - Individual (minutes) 60   PT Mode of treatment - Concurrent (minutes) 0   PT Mode of treatment - Group (minutes) 0   PT Mode of treatment - Co-treat (minutes) 0   PT Mode of Teatment - Total time(minutes) 60 minutes   Therapy Time missed   Time missed?  No

## 2018-12-04 NOTE — CONSULTS
Consultation/Progress note - Buzz Velásquez 68 y o  male MRN: [id number]    Unit/Bed#: -01 Encounter: [id number]      Assessment/Plan     Assessment:  Pt participated in psychology services to address coping skills, anxiety, adjustment, and motivation in rehab  Pt presented in anxious mood, full range affect  Discussed progress on rehab goals; pt has tentative dc date this week; reviewed cognitive-behavioral coping skills to help pt reduce anxiety, create more adaptive thoughts, and maintain motivation  Provided supportive counseling  Pt was engaged in session; he is making progress on addressing rehab goals; however, continues to have difficulty managing negative thoughts and identifying progress  Pt was encouraged to verbalize progress as well as concerns with treatment team each day  Dx: F41 8 Anxiety disorder (other)  Code: [id number]  Plan:  Continue psychology services while pt is at [facility]       Consults    Review of Systems    Historical Information   Past Medical History:   Diagnosis Date    Anxiety     Chronic low back pain     Depression     Hypercholesterolemia     Hypertension     OCD (obsessive compulsive disorder)      Past Surgical History:   Procedure Laterality Date    CERVICAL FUSION Bilateral 11/19/2018    Procedure: Posterior cervical decompressive laminectomy and instrumented posterior lateral fusion fixation C3-6;  Surgeon: Mortimer Rush, MD;  Location: [facility];  Service: Neurosurgery    HERNIA REPAIR       Social History   History   Alcohol Use No     History   Drug Use No     History   Smoking Status    Never Smoker   Smokeless Tobacco    Never Used   Meds/Allergies   current meds:   Current Facility-Administered Medications   Medication Dose Route Frequency    acetaminophen (TYLENOL) tablet 975 mg  975 mg Oral Q8H [address]    bacitracin topical ointment 1 large application  1 large application Topical BID    bisacodyl (DULCOLAX) rectal suppository 10 mg  10 mg Rectal Daily PRN    bisacodyl (DULCOLAX) rectal suppository 10 mg  10 mg Rectal Once    busPIRone (BUSPAR) tablet 7 5 mg  7 5 mg Oral BID    docusate sodium (COLACE) capsule 100 mg  100 mg Oral BID    gabapentin (NEURONTIN) capsule 400 mg  400 mg Oral TID    heparin (porcine) subcutaneous injection 5,000 Units  5,000 Units Subcutaneous Q8H [address]    magnesium hydroxide (MILK OF MAGNESIA) 400 mg/5 mL oral suspension 30 mL  30 mL Oral Daily PRN    midodrine (PROAMATINE) tablet 2 5 mg  2 5 mg Oral TID AC    mirtazapine (REMERON) tablet 7 5 mg  7 5 mg Oral HS    OLANZapine (ZyPREXA) tablet 10 mg  10 mg Oral HS    OLANZapine (ZyPREXA) tablet 5 mg  5 mg Oral Daily PRN    oxyCODONE (ROXICODONE) IR tablet 5 mg  5 mg Oral Q4H PRN    oxyCODONE (ROXICODONE) IR tablet 5 mg  5 mg Oral BID    polyethylene glycol (MIRALAX) packet 17 g  17 g Oral Daily    polyethylene glycol (MIRALAX) packet 17 g  17 g Oral Once    senna-docusate sodium (SENOKOT S) 8 6-50 mg per tablet 1 tablet  1 tablet Oral BID    sertraline (ZOLOFT) tablet 100 mg  100 mg Oral Daily     No Known Allergies    Objective   Vitals: Blood pressure 113/57, pulse 57, temperature 97 5 °F (36 4 °C), temperature source Oral, resp  rate 18, height 5' 7" (1 702 m), weight 74 kg (163 lb 2 3 oz), SpO2 97 %      Intake/Output Summary (Last 24 hours) at 12/04/18 1001  Last data filed at 12/04/18 0937   Gross per 24 hour   Intake              720 ml   Output             3075 ml   Net            -2355 ml     Invasive Devices          No matching active lines, drains, or airways          Physical Exam

## 2018-12-05 PROCEDURE — 97110 THERAPEUTIC EXERCISES: CPT

## 2018-12-05 PROCEDURE — 97116 GAIT TRAINING THERAPY: CPT

## 2018-12-05 PROCEDURE — 97535 SELF CARE MNGMENT TRAINING: CPT

## 2018-12-05 PROCEDURE — G0515 COGNITIVE SKILLS DEVELOPMENT: HCPCS

## 2018-12-05 PROCEDURE — 99232 SBSQ HOSP IP/OBS MODERATE 35: CPT

## 2018-12-05 PROCEDURE — 97530 THERAPEUTIC ACTIVITIES: CPT

## 2018-12-05 RX ADMIN — ACETAMINOPHEN 975 MG: 325 TABLET, FILM COATED ORAL at 21:55

## 2018-12-05 RX ADMIN — HEPARIN SODIUM 5000 UNITS: 5000 INJECTION INTRAVENOUS; SUBCUTANEOUS at 13:33

## 2018-12-05 RX ADMIN — MIRTAZAPINE 7.5 MG: 15 TABLET ORAL at 21:56

## 2018-12-05 RX ADMIN — OXYCODONE HYDROCHLORIDE 5 MG: 5 TABLET ORAL at 06:24

## 2018-12-05 RX ADMIN — GABAPENTIN 400 MG: 400 CAPSULE ORAL at 06:24

## 2018-12-05 RX ADMIN — MIDODRINE HYDROCHLORIDE 5 MG: 5 TABLET ORAL at 11:44

## 2018-12-05 RX ADMIN — HEPARIN SODIUM 5000 UNITS: 5000 INJECTION INTRAVENOUS; SUBCUTANEOUS at 06:24

## 2018-12-05 RX ADMIN — SERTRALINE HYDROCHLORIDE 100 MG: 100 TABLET ORAL at 08:28

## 2018-12-05 RX ADMIN — DOCUSATE SODIUM 100 MG: 100 CAPSULE, LIQUID FILLED ORAL at 19:11

## 2018-12-05 RX ADMIN — OXYCODONE HYDROCHLORIDE 5 MG: 5 TABLET ORAL at 13:33

## 2018-12-05 RX ADMIN — GABAPENTIN 400 MG: 400 CAPSULE ORAL at 13:35

## 2018-12-05 RX ADMIN — BUSPIRONE HYDROCHLORIDE 10 MG: 10 TABLET ORAL at 19:11

## 2018-12-05 RX ADMIN — OLANZAPINE 10 MG: 10 TABLET, FILM COATED ORAL at 21:55

## 2018-12-05 RX ADMIN — MIDODRINE HYDROCHLORIDE 5 MG: 5 TABLET ORAL at 06:24

## 2018-12-05 RX ADMIN — GABAPENTIN 400 MG: 400 CAPSULE ORAL at 21:55

## 2018-12-05 RX ADMIN — ACETAMINOPHEN 975 MG: 325 TABLET, FILM COATED ORAL at 06:24

## 2018-12-05 RX ADMIN — DOCUSATE SODIUM 100 MG: 100 CAPSULE, LIQUID FILLED ORAL at 08:29

## 2018-12-05 RX ADMIN — ACETAMINOPHEN 975 MG: 325 TABLET, FILM COATED ORAL at 13:33

## 2018-12-05 RX ADMIN — SENNOSIDES AND DOCUSATE SODIUM 1 TABLET: 8.6; 5 TABLET ORAL at 19:11

## 2018-12-05 RX ADMIN — HEPARIN SODIUM 5000 UNITS: 5000 INJECTION INTRAVENOUS; SUBCUTANEOUS at 21:56

## 2018-12-05 RX ADMIN — BUSPIRONE HYDROCHLORIDE 10 MG: 10 TABLET ORAL at 08:27

## 2018-12-05 RX ADMIN — MIDODRINE HYDROCHLORIDE 5 MG: 5 TABLET ORAL at 16:27

## 2018-12-05 RX ADMIN — SENNOSIDES AND DOCUSATE SODIUM 1 TABLET: 8.6; 5 TABLET ORAL at 08:29

## 2018-12-05 NOTE — PROGRESS NOTES
12/05/18 1030   Pain Assessment   Pain Assessment No/denies pain   Restrictions/Precautions   Precautions Spinal precautions   QI: Roll Left and Right   Assistance Needed Independent   Roll Left and Right CARE Score 6   QI: Sit to Lying   Assistance Needed Independent   Sit to Lying CARE Score 6   QI: Lying to Sitting on Side of Bed   Assistance Needed Independent   Lying to Sitting on Side of Bed CARE Score 6   QI: Sit to Stand   Assistance Needed Independent   Sit to Stand CARE Score 6   QI: Chair/Bed-to-Chair Transfer   Assistance Needed Independent; Adaptive equipment   Chair/Bed-to-Chair Transfer CARE Score 6   Transfer Bed/Chair/Wheelchair   Bed, Chair, Wheelchair Transfer (FIM) 6 - Patient requires assistive device/extra time/safety concerns but completes independently   QI: Car Transfer   Assistance Needed Independent; Adaptive equipment   Car Transfer CARE Score 6   QI: Walk 10 Feet   Assistance Needed Independent; Adaptive equipment   Walk 10 Feet CARE Score 6   QI: Walk 50 Feet with Two [address]; Adaptive equipment   Walk 50 Feet with Two Turns CARE Score 6   QI: Walk 150 Feet   Assistance Needed Independent; Adaptive equipment   Walk 150 Feet CARE Score 6   QI: Walking 10 Feet on Uneven Surfaces   Assistance Needed Supervision; Adaptive equipment   Walking 10 Feet on Uneven Surfaces CARE Score 4   Ambulation   Does the patient walk? 2  Yes   Primary Discharge Mode of Locomotion Walk   Walk Assist Level Modified Independent   Assist Device Roller Walker   Distance Walked (feet) 350 ft   Limitations Noted In Posture   Walking (FIM) 6 - Patient requires assistive device/extra time/safety concerns but completes independently AND distance 150 feet or more, no rest   Wheelchair mobility   QI: Does the patient use a wheelchair? 0  No   QI: 1 Step (Curb)   Assistance Needed Supervision; Adaptive equipment   1 Step (Curb) CARE Score 4   QI: 4 Steps   Assistance Needed Supervision; Adaptive equipment   4 Steps CARE Score 4   QI: 12 Steps   Assistance Needed Supervision; Adaptive equipment   12 Steps CARE Score 4   Stairs   Type Stairs;Curb;Ramp   # of Steps 12   Assist Devices Single Rail   Stairs (FIM) 5 - Patient requires supervision/monitoring AND goes up and down full flight (12- 14 stairs)   Therapeutic Interventions   Strengthening STS x 10 reps, LAQ w/ 2lbs x 40 reps   Flexibility passive stretch B HS and calves x 3 min   Equipment Use   NuStep 15 min B LE only lvl 2   Assessment   Treatment Assessment Pt participating well  Pt able to perform all functional transfers using RW at Mod I level  Pt requires supervison on ramp, curb and stairs for safety  Plan   Treatment/Interventions LE strengthening/ROM; Functional transfer training; Therapeutic exercise; Endurance training;Equipment eval/education;Patient/family training;Bed mobility;Gait training   Progress Progressing toward goals   Recommendation   Recommendation Home with family support   Equipment Recommended Walker   PT Therapy Minutes   PT Time In 1030   PT Time Out 1110   PT Total Time (minutes) 40   PT Mode of treatment - Individual (minutes) 40   PT Mode of treatment - Concurrent (minutes) 0   PT Mode of treatment - Group (minutes) 0   PT Mode of treatment - Co-treat (minutes) 0   PT Mode of Teatment - Total time(minutes) 40 minutes   Therapy Time missed   Time missed?  No

## 2018-12-05 NOTE — PROGRESS NOTES
12/05/18 0900   Pain Assessment   Pain Assessment No/denies pain   Restrictions/Precautions   Precautions Fall Risk;Spinal precautions   Braces or Orthoses C/S Collar   Memory Skills   Memory (FIM) 5 - [place] cues patient   Social Interaction (FIM) 6 - Interacts appropriately with others BUT requires extra  time   Speech/Language/Cognition Assessmetn   Treatment Assessment Pt engaging in category matrix task given 4 categories and 4 initial letters to begin for items in those categories  Pt was 13/16 accurte in completing task, needing both semantic and visual cues to determine parts of a car  Otherwise, pt able to complete task on own give increased time  Pt circling back to evaluating solutions task where a scenario was presented and then pt was to determine if the preselected solution was good or bad and a reason why  Pt was 4/5 accurate in determining if the solution was good vs  bad, but able to correct error once discussing reasoning  Lastly, pt completed higher level category exclusion task given 5 words and pt was to determine the word which does not belong  Pt was 16/20 accurate in completing task, but when reviewing errors, pt was able to verbalize word lists and accurately choose correct items  SLP Therapy Minutes   SLP Time In 0900   SLP Time Out 1000   SLP Total Time (minutes) 60   SLP Mode of treatment - Individual (minutes) 60   SLP Mode of treatment - Concurrent (minutes) 0   SLP Mode of treatment - Group (minutes) 0   SLP Mode of treatment - Co-treat (minutes) 0   SLP Mode of Teatment - Total time(minutes) 60 minutes   Therapy Time missed   Time missed?  No   Daily FIM Score   Problem solving (FIM) 5 - Solves basic problems 90% of time   Comprehension (FIM) 5 - Understands basic directions and conversation   Expression (FIM) 5 - Needs help/cues only RARELY (< 10% of the time)

## 2018-12-05 NOTE — PROGRESS NOTES
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; s/p posterior cervical decompressive laminectomy with instrumented posterior lateral fusion fixation C3-6 on 11/19 [facility]):  watch incision; continue collar at all times      HTN/mild orthostasis: started Midodrine 2 5mg TID monday; increase to 5mg TID in order to  try to get rid of the binder for home       OCD: meds per primary team     ABLA:  mild; required no transfusion; will watch         Subjective: offers no complaints of dizziness today when I saw him in [place] [address]:    Current Facility-Administered Medications:  acetaminophen 975 mg Oral [facility] Anne Marie Storey MD   bacitracin 1 large application Topical BID Anne Marie Storey MD   bisacodyl 10 mg Rectal Daily PRN Anne Marie Storey MD   bisacodyl 10 mg Rectal Once Anne Marie Storey MD   busPIRone 10 mg Oral BID Anne Marie Storey MD   docusate sodium 100 mg Oral BID Anne Marie Storey MD   gabapentin 400 mg Oral TID Anne Marie Storey MD   heparin (porcine) 5,000 Units Subcutaneous [facility] Anne Marie Storey MD   magnesium hydroxide 30 mL Oral Daily PRN Eleazar Fritz DO   midodrine 5 mg Oral TID AC TAZ Hernandez   mirtazapine 7 5 mg Oral HS Anne Marie Storey MD   OLANZapine 10 mg Oral HS Anne Marie Storey MD   OLANZapine 5 mg Oral Daily PRN Anne Marie Storey MD   oxyCODONE 5 mg Oral Q4H PRN Anne Marie Storey MD   oxyCODONE 5 mg Oral BID Anne Marie Storey MD   polyethylene glycol 17 g Oral Daily Anne Marie Storey MD   polyethylene glycol 17 g Oral Once Anne Marie Storey MD   senna-docusate sodium 1 tablet Oral BID Anne Marie Storey MD   sertraline 100 mg Oral Daily Marian Steve MD       Labs:       Results from last 7 days  Lab Units 12/03/18  0704 11/29/18  0511   WBC Thousand/uL 7 18 4 72   HEMOGLOBIN g/dL 11 2* 10 9*   HEMATOCRIT % 34 7* 34 5*   PLATELETS Thousands/uL 295 283       Results from last 7 days  Lab Units 12/03/18  0704 11/29/18  0511   POTASSIUM mmol/L 4 2 3 9   CHLORIDE mmol/L 101 104   CO2 mmol/L 29 26   BUN mg/dL 10 9   CREATININE mg/dL 0 71 0 61   CALCIUM mg/dL 9 7 8 7                No results found for: GLUCOSE    Labs reviewed    Physical Examination:  Vitals:   Vitals:    12/04/18 1402 12/04/18 1708 12/04/18 2024 12/05/18 0553   BP: 116/57 113/57 100/52 107/50   BP Location: Right arm Left arm Left arm Left arm   Pulse: 63  61 (!) 53   Resp: 18 18 18   Temp:   97 5 °F (36 4 °C) 97 6 °F (36 4 °C)   TempSrc:   Oral Oral   SpO2: 97%  98% 97%   Weight:    77 3 kg (170 lb 6 7 oz)   Height:           CV:  +S1, S2;  RRR; no rub/murmur  Pulmonary:  BBS without crackles/wheeze/rhonci; resp are unlabored  Abdominal:  soft, +BS, ND/NT; no mass  Skin:  no rashes  Musculoskeletal:  no edema  :  no cristina  Neurological/Psych:  AAO;  LE 5/5; RUE 4/5 with grasp 4-/5, LUE 4+/5; no depression/anxiety      [x ] Total time spent: 30 Mins and greater than 50% of this time was spent counseling/coordinating care  ** Please Note: Dragon 360 Dictation voice to text software may have been used in the creation of this document   **

## 2018-12-05 NOTE — PROGRESS NOTES
12/05/18 0700   Pain Assessment   Pain Assessment 0-10   Pain Score 9   Pain Type Surgical pain   Pain Location Neck; Shoulder   Pain Orientation Bilateral;Posterior   Pain Descriptors Sharp   Pain Frequency Intermittent   Pain Onset Ongoing   Clinical Progression Not changed   Effect of Pain on Daily Activities cont to occur during functional mobility with    Hospital Pain Intervention(s) Repositioned;Rest;Distraction   Restrictions/Precautions   Precautions Fall Risk;Spinal precautions   Braces or Orthoses C/S Collar   QI: [address] Provided by [place] No physical assistance   Eating CARE Score 6   Eating Assessment   Food To Mouth Yes   Able To Cut Yes   Positioning Upright;Out of Bed   Meal Assessed Breakfast   Opens Packages Yes   Findings Pt able to manage all packing and cut all foods when given ext time to complete tasks   Eating (FIM) 6 - Patient requires increased time or safety concern   QI: [address] Provided by [place] No physical assistance   Comment in stance at sink   Oral Hygiene CARE Score 6   Grooming   Able To Initiate Tasks; Acquire Items;Comb/Brush Hair;Wash/Dry Face;Brush/Clean Teeth;Wash/Dry Hands   Limitation Noted In Coordination; Safety;Strength   Findings Pt standing at sink to complete grooming  Pt supported by RW and sink countertop to complete tasks  Grooming (FIM) 6 - Patient requires assistive device/extra time/safety concerns but completes independently   QI: Shower/Bathe Self   Assistance Needed Adaptive equipment; Independent   Assistance Provided by [place] No physical assistance   Comment completed bathing while seated on shower chair  pt able to bathe all parts of body using LH sponge for b/l feet and back      Shower/Bathe Self CARE Score 6   Bathing   Assessed Bath Style Tub   Anticipated D/C Bath Style Tub   Able to Gather/Transport Yes   Able to Adjust Water Temperature Yes   Able to Wash/Rinse/Dry (body part) Left Arm;Right Arm;L Upper Leg;R Upper Leg;L Lower Leg/Foot;R Lower Leg/Foot;Chest;Abdomen;Perineal Area; Buttocks   Limitations Noted in Endurance;Strength   Positioning Seated;Standing   Findings  Pt able to reach all parts of body with LHAE to inc indpep   Bathing (FIM) 6 - Patient requires assistive device/extra time/safety concerns but completes independently   Tub/Shower Transfer   Limitations Noted In Endurance;Problem Solving; Safety;LE Strength   Adaptive Equipment Grab Bars;Seat with Back   Assessed Tub/shower combo   Findings Pt able to lift b/l to perform step over transfer with LUE support on grab bar and RUE on RW to maintain balance  Pt req min v/c for safety due to dec problem solving   Tub Transfer (FIM) 5 - Patient requires supervision/monitoring   QI: Upper Body Dressing   Assistance Needed Verbal cues; Supervision   Assistance Provided by [place] No physical assistance   Comment Pt req v/c to manage shirt over C/S collar   Upper Body Dressing CARE Score 4   QI: Lower Body Dressing   Assistance Needed Adaptive equipment; Independent   Assistance Provided by [place] No physical assistance   Comment ext time to use LHAE   Lower Body Dressing CARE Score 6   QI: Putting On/Taking Off [address] [place] [address]; Adaptive equipment   Assistance Provided by [place] No physical assistance   Comment G carryover with using sockaide   Putting On/Taking Off Footwear CARE Score 6   Dressing/Undressing Clothing   Able to  [facility]; Undergarment;Socks   Don [address]; Undergarment;Socks   Limitations Noted In Coordination; Endurance;Strength;ROM   Adaptive Equipment Reacher;Sock Aide   Positioning Supported Sit;Standing   Findings When given ext time to problem solve, pt able to dress using LHAE 2* limited ROM   UB Dressing (FIM) 5 - Patient requires verbal cues   LB Dressing (FIM) 6 - Patient requires assistive device/extra time/safety concerns but completes independently   QI: Roll Left and Right   Assistance Needed Independent   Assistance Provided by [place] No physical assistance   Roll Left and Right CARE Score 6   QI: Sit to [address] Provided by [place] No physical assistance   Sit to Lying CARE Score 6   QI: Lying to Sitting on Side of Bed   Assistance Needed Independent   Assistance Provided by [place] No physical assistance   Lying to Sitting on Side of Bed CARE Score 6   QI: Sit to [address]; Adaptive equipment   Assistance Provided by [place] No physical assistance   Sit to Stand CARE Score 6   QI: Chair/Bed-to-Chair Transfer   Assistance Needed Independent; Adaptive equipment   Assistance Provided by [place] No physical assistance   Comment BRP w RW   Chair/Bed-to-Chair Transfer CARE Score 6   Transfer Bed/Chair/Wheelchair   Limitations Noted In Pain Management;LE Strength;Confidence   Adaptive Equipment Roller Walker   Findings BRP with RW   Bed, Chair, Wheelchair Transfer (FIM) 6 - Patient requires assistive device/extra time/safety concerns but completes independently   QI: [address] Provided by [place] No physical assistance   Comment Pt able to reach nancy and rear for hygiene with alternating support from [address] Score 6   Toileting   Able to [address] down yes, up yes  Able to Manage Clothing Closures Yes   Manage Hygiene Bladder; Bowel   Limitations Noted In Problem Solving;ROM   Findings Pt able to safely and thoroughly perform hygiene in stance   Toileting (FIM) 6 - Patient requires assistive device/extra time/safety concerns but completes independently   QI: Toilet Transfer   Assistance Needed Independent; Adaptive equipment   Assistance Provided by [place] No physical assistance   Comment RW   Toilet Transfer CARE Score 6 Toilet Transfer   Surface Assessed Standard Toilet   Transfer Technique Standard   Limitations Noted In Confidence;LE Strength   Adaptive Equipment Grab Bar   Findings Pt req A from grab bar to safely transfer stand>sit on toilet  Pt will have commode for over toilet at home to allow for inc indep with toileting   Toilet Transfer (FIM) 6 - Patient requires assistive device/extra time/safety concerns but completes independently   Exercise Tools   Theraputty Pt participated in bead retrievel from medium red theraputty to promote b/l pincer grasp for self care and feeding  Pt demo'ing inc strength and coordination with task  Cognition   Overall Cognitive Status Impaired   Arousal/Participation Alert; Cooperative   Attention Attends with cues to redirect   Orientation Level Oriented X4   Memory Decreased short term memory;Decreased recall of precautions   Following Commands Follows multistep commands inconsistently   Activity Tolerance   Activity Tolerance Patient tolerated treatment well   Assessment   Treatment Assessment Pt participated in skilled OT session focusing on ADL retraining, LHAE, functional mobility, and [facility]  Pt cont to report inc pain in neck and shoulders during functional mobility and UE functional reach but tolerated session  Pt stated "I don't think I can do this by myself" in regards to bathing and dressing but was able to complete all tasks with ext time to manage LHAE and occasional v/c for technique with UB dressing over C/S Collar  Pt demo G safety with RW while perform mobility in room, bathroom, and around hallways  Pt cont to request A for tasks but them demo ability to accomplish task indep including managing packaging at breakfast  Pt does cont to req v/c at times to maintain spinal precautions and will req A for collar management upon discharge  Son reports he is available and has verbalized and demo'd success with collar care   Pt will be discharged with [facility], commode for over toilet, and RW to inc indep with ADL tasks at home  Pt would benefit from home therapy focusing on ADL performance, endurance, and overall strength to inc safety and functional performance at home  Prognosis Fair   Problem List Decreased strength;Decreased range of motion;Decreased endurance;Orthopedic restrictions   Plan   Treatment/Interventions Therapeutic exercise; Endurance training;ADL retraining   Progress Progressing toward goals   Recommendation   OT Discharge Recommendation Home OT   OT - OK to Discharge Yes   OT Therapy Minutes   OT Time In 0700   OT Time Out 0830   OT Total Time (minutes) 90   OT Mode of treatment - Individual (minutes) 90   OT Mode of treatment - Concurrent (minutes) 0   OT Mode of treatment - Group (minutes) 0   OT Mode of treatment - Co-treat (minutes) 0   OT Mode of Teatment - Total time(minutes) 90 minutes   Therapy Time missed   Time missed?  No

## 2018-12-05 NOTE — PROGRESS NOTES
12/05/18 1230   Pain Assessment   Pain Assessment 0-10   Pain Score 7   Pain Type Surgical pain   Pain Location Neck; Shoulder   Restrictions/Precautions   Precautions Fall Risk;Spinal precautions   Braces or Orthoses C/S Collar   QI: Lower Body Dressing   Assistance Needed Adaptive equipment;Verbal cues   Assistance Provided by [place] No physical assistance   Comment Pt with P frustration tolerance during [facility] practice in PM session  Pt able to don hospital pants with reacher with v/c  Lower Body Dressing CARE Score 4   QI: Putting On/Taking Off Footwear   Assistance Needed Adaptive equipment;Verbal cues   Assistance Provided by [place] No physical assistance   Comment Pt attempted to put sock on sockaide with sockaid upside down  Pt req vuing for orientation of equipment during session  Putting On/Taking Off Footwear CARE Score 4   QI: Roll Left and Right   Assistance Needed Independent   Assistance Provided by [place] No physical assistance   Roll Left and Right CARE Score 6   QI: Sit to [address] Provided by [place] No physical assistance   Sit to Lying CARE Score 6   QI: Sit to [address] Provided by [place] No physical assistance   Comment RW   Sit to Stand CARE Score 6   QI: Chair/Bed-to-Chair Transfer   Assistance Needed Independent   Assistance Provided by [place] No physical assistance   Comment RW   Chair/Bed-to-Chair Transfer CARE Score 6   Transfer Bed/Chair/Wheelchair   Adaptive Equipment Roller [employer], Chair, Wheelchair Transfer (FIM) 6 - Patient requires assistive device/extra time/safety concerns but completes independently   Cognition   Overall Cognitive Status Impaired   Arousal/Participation Alert; Cooperative   Attention Difficulty attending to directions   Orientation Level Oriented X4   Memory Decreased short term memory;Decreased recall of precautions   Following Commands Follows multistep commands inconsistently   Activity Tolerance   Activity Tolerance Patient tolerated treatment well   Assessment   Treatment Assessment Pt participated in [place] mass practice to inc indep with ADL tasks upon discharge  Pt practiced doffing/donning socks and pants during session  Although pt able to utilize tools in AM session, pt had noted difficulty problem solving tools in PM  Pt req v/c for orientation of sock aide and maintenance of spinal precautions throughout session  Pt cont to bend forward to pickup items from ground req v/c to utilize reacher to maintain precautions  Pt would benefit from cont practice with LHAE items  Pt's son will be present for FT session at 200 tomorrow with OT to become educated on [place] use for ADL indep  Prognosis Fair   Problem List Decreased strength;Decreased range of motion;Decreased endurance;Orthopedic restrictions   Plan   Treatment/Interventions Therapeutic exercise; Endurance training;ADL retraining   Progress Progressing toward goals   Recommendation   OT Discharge Recommendation Home OT   OT - OK to Discharge Yes   OT Therapy Minutes   OT Time In 1230   OT Time Out 1300   OT Total Time (minutes) 30   OT Mode of treatment - Individual (minutes) 30   OT Mode of treatment - Concurrent (minutes) 0   OT Mode of treatment - Group (minutes) 0   OT Mode of treatment - Co-treat (minutes) 0   OT Mode of Teatment - Total time(minutes) 30 minutes   Therapy Time missed   Time missed?  No

## 2018-12-06 VITALS
DIASTOLIC BLOOD PRESSURE: 62 MMHG | WEIGHT: 170.42 LBS | BODY MASS INDEX: 26.75 KG/M2 | TEMPERATURE: 97.5 F | HEART RATE: 59 BPM | HEIGHT: 67 IN | OXYGEN SATURATION: 96 % | RESPIRATION RATE: 20 BRPM | SYSTOLIC BLOOD PRESSURE: 132 MMHG

## 2018-12-06 PROBLEM — I95.1 ORTHOSTATIC HYPOTENSION: Status: ACTIVE | Noted: 2018-11-26

## 2018-12-06 PROCEDURE — 97535 SELF CARE MNGMENT TRAINING: CPT

## 2018-12-06 PROCEDURE — 99239 HOSP IP/OBS DSCHRG MGMT >30: CPT

## 2018-12-06 RX ORDER — OLANZAPINE 10 MG/1
10 TABLET ORAL
Qty: 30 TABLET | Refills: 0 | Status: SHIPPED | OUTPATIENT
Start: 2018-12-06

## 2018-12-06 RX ORDER — GABAPENTIN 400 MG/1
400 CAPSULE ORAL 3 TIMES DAILY
Qty: 90 CAPSULE | Refills: 0 | Status: SHIPPED | OUTPATIENT
Start: 2018-12-06 | End: 2018-12-20 | Stop reason: SDUPTHER

## 2018-12-06 RX ORDER — MIRTAZAPINE 7.5 MG/1
7.5 TABLET, FILM COATED ORAL
Qty: 30 TABLET | Refills: 0 | Status: SHIPPED | OUTPATIENT
Start: 2018-12-06 | End: 2019-02-19

## 2018-12-06 RX ORDER — OXYCODONE HYDROCHLORIDE 5 MG/1
5 TABLET ORAL 3 TIMES DAILY PRN
Qty: 30 TABLET | Refills: 0 | Status: SHIPPED | OUTPATIENT
Start: 2018-12-06 | End: 2019-01-08

## 2018-12-06 RX ORDER — MIDODRINE HYDROCHLORIDE 5 MG/1
5 TABLET ORAL
Qty: 90 TABLET | Refills: 0 | Status: SHIPPED | OUTPATIENT
Start: 2018-12-06 | End: 2018-12-20 | Stop reason: SDUPTHER

## 2018-12-06 RX ORDER — POLYETHYLENE GLYCOL 3350 17 G/17G
17 POWDER, FOR SOLUTION ORAL DAILY PRN
Qty: 14 EACH | Refills: 0 | Status: SHIPPED | OUTPATIENT
Start: 2018-12-06 | End: 2019-01-08

## 2018-12-06 RX ORDER — DOCUSATE SODIUM 100 MG/1
100 CAPSULE, LIQUID FILLED ORAL 2 TIMES DAILY
Qty: 60 CAPSULE | Refills: 0 | Status: SHIPPED | OUTPATIENT
Start: 2018-12-06

## 2018-12-06 RX ORDER — BUSPIRONE HYDROCHLORIDE 10 MG/1
10 TABLET ORAL 2 TIMES DAILY
Qty: 60 TABLET | Refills: 0 | Status: SHIPPED | OUTPATIENT
Start: 2018-12-06

## 2018-12-06 RX ORDER — SENNOSIDES 8.6 MG
2 TABLET ORAL
Qty: 30 EACH | Refills: 0 | Status: SHIPPED | OUTPATIENT
Start: 2018-12-06 | End: 2019-01-08

## 2018-12-06 RX ORDER — ACETAMINOPHEN 500 MG
1000 TABLET ORAL 3 TIMES DAILY PRN
Qty: 100 TABLET | Refills: 0 | Status: SHIPPED | OUTPATIENT
Start: 2018-12-06 | End: 2019-02-19

## 2018-12-06 RX ADMIN — MIDODRINE HYDROCHLORIDE 5 MG: 5 TABLET ORAL at 11:34

## 2018-12-06 RX ADMIN — BUSPIRONE HYDROCHLORIDE 10 MG: 10 TABLET ORAL at 08:37

## 2018-12-06 RX ADMIN — SERTRALINE HYDROCHLORIDE 100 MG: 100 TABLET ORAL at 08:36

## 2018-12-06 RX ADMIN — ACETAMINOPHEN 975 MG: 325 TABLET, FILM COATED ORAL at 06:44

## 2018-12-06 RX ADMIN — DOCUSATE SODIUM 100 MG: 100 CAPSULE, LIQUID FILLED ORAL at 08:36

## 2018-12-06 RX ADMIN — OXYCODONE HYDROCHLORIDE 5 MG: 5 TABLET ORAL at 06:43

## 2018-12-06 RX ADMIN — HEPARIN SODIUM 5000 UNITS: 5000 INJECTION INTRAVENOUS; SUBCUTANEOUS at 06:44

## 2018-12-06 RX ADMIN — GABAPENTIN 400 MG: 400 CAPSULE ORAL at 06:43

## 2018-12-06 RX ADMIN — MIDODRINE HYDROCHLORIDE 5 MG: 5 TABLET ORAL at 06:43

## 2018-12-06 NOTE — PROGRESS NOTES
12/06/18 1245   Pain Assessment   Pain Assessment No/denies pain   Pain Score No Pain   Restrictions/Precautions   Precautions Fall Risk;Spinal precautions   Braces or Orthoses C/S Collar   QI: Lower Body Dressing   Assistance Needed Adaptive equipment;Verbal cues   Assistance Provided by [place] No physical assistance   Lower Body Dressing CARE Score 4   QI: Putting On/Taking Off Footwear   Assistance Needed Adaptive equipment;Verbal cues   Assistance Provided by [place] No physical assistance   Putting On/Taking Off Footwear CARE Score 4   QI: [address] Provided by [place] No physical assistance   Picking Up Object CARE Score 6   Dressing/Undressing Clothing   Remove LB Clothes Pants;Socks   Don LB Clothes Pants;Socks;TEDs   LB Dressing (FIM) 5 - Patient requires verbal cues   QI: Sit to [address] equipment   Assistance Provided by [place] No physical assistance   Sit to Stand CARE Score 6   QI: Chair/Bed-to-Chair Transfer   Assistance Needed Adaptive equipment   Assistance Provided by [place] No physical assistance   Chair/Bed-to-Chair Transfer CARE Score 6   Transfer Bed/Chair/Wheelchair   Adaptive Equipment Roller [employer], Chair, Wheelchair Transfer (FIM) 6 - Patient requires assistive device/extra time/safety concerns but completes independently   Cognition   Overall Cognitive Status Impaired   Arousal/Participation Alert; Cooperative   Attention Difficulty dividing attention   Orientation Level Oriented X4   Memory Decreased long term memory;Decreased short term memory   Following Commands Follows multistep commands with increased time or repetition   Activity Tolerance   Activity Tolerance Patient tolerated treatment well   Assessment   Treatment Assessment Pt and son engaged in family training session with focus on training with use of LHAE for LB dressing   Son given training on how to provide prompts to increase pt's independence with LHAE  Son also trainined on how to on binder and MALACHI stockings if pt's BP's are low  MD present during training and pt's son with anxiety surrounding taking pt's BP  MD explained use of automatic cuff and therapist indicated that home RN and therapists can continue to train son and pt on how to independently complete BP checks  Pt able to use LHAE with use of cueing today to complete LB dressing  Pt son reports no further questsions  Son shown how to set up commode and discussed using bedside commode until home therapy can assess accessibility of bathroom with the use of grab bars that son installed  Son in agreement to use bedside commode  Son and pt with no further concerns regarding d/c home  Son educated on how to tighten c/s collar if needed  Pt is safet od /c home with son's support  Son adn pt given contact n umber if he has any further questions  OT Family training done with: see above-   Assessment of family training Addison Vicente (SON)   Problem List (anxious behavior)   Recommendation   OT Discharge Recommendation Home OT   Equipment Recommended Bedside commode   OT Equipment ordered Fercho Lainez, son ordering shower chair  OT Therapy Minutes   OT Time In 1857   OT Time Out 1345   OT Total Time (minutes) 60   OT Mode of treatment - Individual (minutes) 60   OT Mode of treatment - Concurrent (minutes) 0   OT Mode of treatment - Group (minutes) 0   OT Mode of treatment - Co-treat (minutes) 0   OT Mode of Teatment - Total time(minutes) 60 minutes   Therapy Time missed   Time missed?  No

## 2018-12-06 NOTE — PROGRESS NOTES
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; s/p posterior cervical decompressive laminectomy with instrumented posterior lateral fusion fixation C3-6 on 11/19 [facility]):  watch incision; continue collar at all times      HTN/mild orthostasis: continue Midodrine 5mg TID; considering BPs with the binder on last 24 hrs, seems he still needs to have it for home    OCD: meds per primary team     ABLA:  mild; required no transfusion         Subjective: offers no complaints of dizziness today       Scheduled Meds:    Current Facility-Administered Medications:  acetaminophen 975 mg Oral [facility] Margarita Nolasco MD   bacitracin 1 large application Topical BID Margarita Nolasco MD   bisacodyl 10 mg Rectal Daily PRN Margarita Nolasco MD   bisacodyl 10 mg Rectal Once Margarita Nolasco MD   busPIRone 10 mg Oral BID Margarita Nolasco MD   docusate sodium 100 mg Oral BID Margarita Nolasco MD   gabapentin 400 mg Oral TID Margarita Nolasco MD   heparin (porcine) 5,000 Units Subcutaneous [facility] Margarita Nolasco MD   magnesium hydroxide 30 mL Oral Daily PRN Pam Bonner DO   midodrine 5 mg Oral TID TAZ Lu   mirtazapine 7 5 mg Oral HS Margarita Nolasco MD   OLANZapine 10 mg Oral HS Margarita Nolasco MD   OLANZapine 5 mg Oral Daily PRN Margarita Nolasco MD   oxyCODONE 5 mg Oral Q4H PRN Margarita Nolasco MD   oxyCODONE 5 mg Oral BID Margarita Nolasco MD   polyethylene glycol 17 g Oral Daily Margarita Nolasco MD   polyethylene glycol 17 g Oral Once Margarita Nolasco MD   senna-docusate sodium 1 tablet Oral BID Margarita Nolasco MD   sertraline 100 mg Oral Daily Margarita Nolasco MD       Labs: Results from last 7 days  Lab Units 12/03/18  0704   WBC Thousand/uL 7 18   HEMOGLOBIN g/dL 11 2*   HEMATOCRIT % 34 7*   PLATELETS Thousands/uL 295       Results from last 7 days  Lab Units 12/03/18  0704   POTASSIUM mmol/L 4 2   CHLORIDE mmol/L 101   CO2 mmol/L 29   BUN mg/dL 10   CREATININE mg/dL 0 71   CALCIUM mg/dL 9 7                No results found for: GLUCOSE    Labs reviewed    Physical Examination:  Vitals:   Vitals:    12/05/18 1319 12/05/18 2114 12/06/18 0500 12/06/18 0644   BP: 116/62 106/52 105/60 132/62   BP Location: Right arm Left arm Left arm Right arm   Pulse: 86 60  59   Resp: 20 18  20   Temp: 97 7 °F (36 5 °C) 97 9 °F (36 6 °C)  97 5 °F (36 4 °C)   TempSrc: Oral Oral  Oral   SpO2: 99% 96%  96%   Weight:       Height:           CV:  +S1, S2;  RRR; no rub/murmur  Pulmonary:  BBS without crackles/wheeze/rhonci; resp are unlabored  Abdominal:  soft, +BS, ND/NT; no mass  Skin:  no rashes  Musculoskeletal:  no edema  :  no cristina  Neurological/Psych:  AAO;  LE 5/5; RUE 4/5 with grasp 4-/5, LUE 4+/5; no depression/anxiety      [x ] Total time spent: 30 Mins and greater than 50% of this time was spent counseling/coordinating care  ** Please Note: Dragon 360 Dictation voice to text software may have been used in the creation of this document   **

## 2018-12-06 NOTE — DISCHARGE SUMMARY
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    Discharge Diagnoses:       OCD (obsessive compulsive disorder)    Weakness    Depression    Anxiety    Cervical spinal stenosis    Cervical stenosis of spine    Hyponatremia    Cervical myelopathy (HCC)    S/P cervical spinal fusion    Lumbar radiculopathy    Chronic low back pain    Pain    Traumatic brain injury with loss of consciousness ([facility] [address] )    Hypertension    History of hyperlipidemia    Insomnia    Lumbar stenosis    Orthostatic hypotension       Acute Rehabilitation Center Course:   (with significant findings, care, complications, treatment, and services provided):       65 y o  male with PMH of HTN, HL, OCD, MDD, SHAQUILLE, CLBP, lumbar stenosis, lumbar radiculopathy who apparently fell at home hitting his head with residual R sided weakness and LOC who was initially believed to develop R sided weakness first causing the fall with possibility for stroke who was brought to [facility] on 11/13/18 prompting stroke alert/protocol   CT head and CTA head and negative were negative and patient received tPA   Patient was later noted to have some bilateral strength and sensory changes as well as ongoing imbalance and limb pain prompting additional imaging   MRI brain showed severe central canal stenosis with chronic mass effect on cord at C3-5 as well as severe bilateral NF narrowing from C2 to C5   NS was consulted who recommended surgical intervention and patient underwent C3-C6 posterior cervical decompressive laminectomy and posterior lateral fusion/fixation by Dr Kat Green on 11/19  Priscilla Mi still has GOSIA drain in place with some serous drainage   Patient also had c/s by  Psych who felt patient mood currently stable   He has been only on Zoloft 100mg qday but at home he takes several other mood agents which he has been off of   Patient was evaluated by skilled therapies and was found to have significant decline in ADLs and ambulation appropriate for [facility] to [facility]      Patient presented to [facility] on 11/21/18 s/p fall causing cervical myelopathy and TBI with closed head injury and likely brief LOC s/p cervical decompression and fusion with decline in strength, balance, cognition, pain as well as significant insomnia and mood disorder       He had only been receiving Zoloft 100mg qday for last week and his chronic regimen was resumed carefully and uptitrated as indicated  Insomnia, depression and impulsive thoughts did improve  He did have some spike in anxiety prior to discharge but this has stabilized  Patient will discharge home on Zyprexa 10mg QHS, Remeron 7 5mg QHS, Buspar 10mg BID, and Zoloft 100mg qday  Gabapentin was uptitrated for neuropathic pain and adjuvant pain control which has been helpful and may also stabilize mood somewhat  He will discharge on 400mg TID and will follow-up with PMR after d/c  He will also d/c on PRN APAP and limited supply of Oxycodone 5mg for breakthrough pain that will be managed by son  He was noted to have some symptomatic orthostasis requiring abdominal binder, MALACHI hose, and midodrine with assistance in mgmt from IM team   He will d/c on midodrine 5mg TID with plan to keep BP log and follow-up with PCP after d/c  Patient's function and balance has steadily improved during [facility] course  Patient participated in a comprehensive interdisciplinary inpatient rehabilitation program which included involvment of MD, therapies (PT, OT, and SLP), RN, CM/SW, dietary, and psychology services   He was able to be advanced to a modified independent and supervision level of assist and considered adequately safe for discharge home with family and home health therapies with appropriate physician follow-up  Please see below for patient's day to day management of medical needs  Functional status on admission to [facility]:  OT:  Lower body dressing max assist bathing moderate assist body dressing general transfers and toilet transfers minutes assist  PT:  Transfers and ambulation Min assist, stairs max assist  ST: Problem solving mod assist; Comprehension and memory min assist, expression, social interaction supervision     Functional Status Upon Discharge:  OT: UB dressing, tub transfers supervision, Grooming, bathing, LB dressing toileting Gen/Toilet transfers mod-indep  PT: Transfers, ambulation 350 ft mod indep; stairs supervision  ST: comprehension, expression, problem solving, and memory supervision; social interaction mod-indep    * Cervical myelopathy ([address] )   Assessment & Plan    -Following fall causing R sided weakness, imbalance, limb pain s/p decompression and fusion   -Possible pre-fall cervical myelopathy based on imaging and history that attributed to fall in first place   -Completed acute comprehensive interdisciplinary inpatient rehabilitation with intensive skilled therapies (PT, OT) as previously outlined with oversight and management by rehabilitation physician as well as inpatient rehab level nursing, case management and weekly interdisciplinary team meetings  Traumatic brain injury with loss of consciousness [facility])   Assessment & Plan    Impaired cognition stable but improved from presentation to [id number] [address] [place] with closed head injury with brief LOC with residual deficits in cognition   -CT head without acute findings;  -Completed all intensive skilled therapies in [facility] with particular focus on SLP as  outlined with oversight and management by rehabilitation physician         S/P cervical spinal fusion   Assessment & Plan    -C3-C6 posterior cervical decompressive lami and PLF by Dr Carolyn David on   -Monitor incision  -Cervical collar   -Follow-up with NS during [facility]C course PRN and after d/c       Pain   Assessment & Plan    Adequately controlled  Acute cervical myelopathy and spinal surgery pain with associated myofascial pain on chronic low back pain and lumbar radiculopathy   Discharge on:  -APAP 100mg TID PRN, Oxycodone IR 5mg 2-3 times daily PRN BTP  -Gabapentin continue 400mg TID for neuropathic and adjuvant pain control    -Counseled on and continue to encourage deep breathing/relaxation/behavioral pain management techniques:     Deep breathin seconds in, 5 seconds out, 5 times per hour when awake and PRN when in pain or anticipate pain; avoid holding breath and tightening muscles and instead breathe slowly and deeply    -Follow-up with PMR after d/c     OCD (obsessive compulsive disorder)   Assessment & Plan    Mood overall improving; Meds titrated back up and patient will discharge on  Zoloft 100mg qday, Buspar 10mg BID, Zyprexa 10mg QHS, Remeron 7 5mg QHS  (Also now on gabapentin 400mg TID largely for pain - but may play role in mood stabilization)  Follow-up with psychiatry, Dr Naima Estrada, and psychology after d/c  >Call placed and voicemail left with Dr Naima Estrada to discuss course but have not heard back as of yet    OCD, anxiety, depression, insomnia hx:  >Fairly significant insomnia, mood d/o/depression sub-optimal control earlier in course which has improved  >Developed some increased anxiety a few days prior to discharge but stable for d/c and follow-up plan  >Had been off of multiple chronic agents (Zyprexa 15mg QHS, Remeron 15mg QHS, Buspar 10mg BID) except for Zoloft 100mg Qday since being hospitalized over a week ago);  Seen by [facility] psych prior to transfer who felt patient mood stable at that time  >Neuropsych c/s'd and assisted with mgmt during course       Insomnia   Assessment & Plan    Improved overall  Mgmt as outlined above        Orthostatic hypotension   Assessment & Plan Symptomatic particularly early in course > Improving  Midodrine 5mg TID before meals; check BP at home with BP log and follow-up with PCP  Abdominal binder when mobilizing +/- MALACHI hose        Lumbar stenosis   Assessment & Plan    Mgmt as outlined above     History of hyperlipidemia   Assessment & Plan    Recent lipid panel wnl  Per son patient is not supposed to be on statin and this was discontinued      Hypertension   Assessment & Plan    IM c/s'd assited with mgmt  BP on low side with + orthostasis during course not requiring anti-HTN meds     Chronic low back pain   Assessment & Plan    Mgmt as outlined above     Lumbar radiculopathy   Assessment & Plan    Chronic, likely LLE radiculopathy   Mgmt as outlined above  Follow-up with PMR and spine after d/c     Hyponatremia   Assessment & Plan    Mild 134 on 12/3     Chronic idiopathic constipation   Assessment & Plan    Improved   Recommend colace  Prn senna/miralax          # Bladder care:  function intact     # Diet/Hydration:    Regular     Disposition:   Home with son and HH PT, OT, ST, RN     Follow-up:   NS/Spine, PMR, Psychiatry, Psychology, PCP     CODE: Level 1: Full Code     Restrictions include:  Fall precautions, cervical spine    Imaging:  No results found  Pertinent Recent Labs (See [facility] EMR or request additional records if needed):  Results for Yessica De La Rosa (MRN [id number]) as of 12/6/2018 14:28   Ref   Range 12/3/2018 07:04   Sodium Latest Ref Range: 136 - 145 mmol/L 133 (L)   Potassium Latest Ref Range: 3 5 - 5 3 mmol/L 4 2   Chloride Latest Ref Range: 100 - 108 mmol/L 101   CO2 Latest Ref Range: 21 - 32 mmol/L 29   Anion Gap Latest Ref Range: 4 - 13 mmol/L 3 (L)   BUN Latest Ref Range: 5 - 25 mg/dL 10   Creatinine Latest Ref Range: 0 60 - 1 30 mg/dL 0 71   Glucose, Random Latest Ref Range: 65 - 140 mg/dL 90   Calcium Latest Ref Range: 8 3 - 10 1 mg/dL 9 7   eGFR Latest Units: ml/min/1 73sq m 93   WBC Latest Ref Range: 4 31 - 10 16 Thousand/uL 7 18   Red Blood Cell Count Latest Ref Range: 3 88 - 5 62 Million/uL 3 66 (L)   Hemoglobin Latest Ref Range: 12 0 - 17 0 g/dL 11 2 (L)   HCT Latest Ref Range: 36 5 - 49 3 % 34 7 (L)   MCV Latest Ref Range: 82 - 98 fL 95   MCH Latest Ref Range: 26 8 - 34 3 pg 30 6   MCHC Latest Ref Range: 31 4 - 37 4 g/dL 32 3   RDW Latest Ref Range: 11 6 - 15 1 % 14 1   Platelet Count Latest Ref Range: 149 - 390 Thousands/uL 295       Procedures Performed During [facility]C Admission: None    Discharge Physical Examination:  Vitals:    12/06/18 0644   BP: 132/62   Pulse: 59   Resp: 20   Temp: 97 5 °F (36 4 °C)   SpO2: 96%   General: Awake, alert in NAD  HENT: MMM  Neck: Cervical collar in place, incision healing appropriately   Respiratory: Unlabored breathing, breath sounds equal, Lungs CTA, no wheezes, rales, or rhonchi  Cardiovascular: Regular rate and rhythm, no murmurs, rubs, or gallops  Gastrointestinal: Soft, non-tender, non-distended, normoactive bowel sounds  SkiN/MSK/Extremities: no calf edema, no calf tenderness to palpation     Neurologic/Psych:      MENTAL STATUS/Affect: awake, orientation intact;euthymic     Strength/MMT:  RUE strength 4+/5, RLE strength 5-/5, LLE Strength 5-5, LUE strength 5/5      HPI:   Femi Simmons is a 68 y o  male with PMH of HTN, HL, OCD, MDD, SHAQUILLE, CLBP, lumbar stenosis, lumbar radiculopathy who apparently fell at home hitting his head with residual R sided weakness and LOC who was initially believed to develop R sided weakness first causing the fall with possibility for stroke who was brought to [facility] on 11/13/18 prompting stroke alert/protocol  CT head and CTA head and negative were negative and patient received tPA  Patient was later noted to have some bilateral strength and sensory changes as well as ongoing imbalance and limb pain prompting additional imaging    MRI brain showed severe central canal stenosis with chronic mass effect on cord at C3-5 as well as severe bilateral NF narrowing from C2 to C5  NS was consulted who recommended surgical intervention and patient underwent C3-C6 posterior cervical decompressive laminectomy and posterior lateral fusion/fixation by Dr Yanira Rodríguez on 11/19  Patient still has GOSIA drain in place with some serous drainage  Patient also had c/s by  Psych who felt patient mood currently stable  He has been only on Zoloft 100mg qday but at home he takes several other mood agents which he has been off of  Patient was evaluated by skilled therapies and was found to have significant decline in ADLs and ambulation appropriate for admission to [place] [id number]      On evaluation, patient reports mild-moderate neck pain helped somewhat with pain medication  He reports he was having severe 4 limb pain initially after fall but this improved since surgery now with just some occasional limb pain as some pain that is chronic in his low back and radiating down L leg at times  He reports not sleeping much at all last several days  He notes being fairly anxious about his condition and has some difficulty slowing his thoughts down  He denies depression, hallucinations  Patient not sure when last BM was  He notes having some gas  He reports that he was having significant urgency and some soiling himself with misplacing the urinal and has been using a condom cath  He reports that urgency has improved and he can feel when he has to urinate  He denies dysuria  Patient reports having more trouble with memory since the fall and putting things together  He stated he did have headache initially but not currently  He notes occasional lightheadedness with movements  He denies visual changes, vertigo, light or sound sensitivity, nausea, abdominal pain, calf pain, SOB, fever, chills, or other complaints        Review of Systems:     Complete review of systems obtained      Please see HPI for details with other significant symptoms or history listed here:    Otherwise, 14 point review of systems completed and was otherwise unremarkable  Condition at Discharge: good     Discharge instructions/Information to patient and family:   See after visit summary for information provided to patient and family  Provisions for Follow-Up Care:  See after visit summary for information related to follow-up care and any pertinent home health orders  Disposition: Home with son    Planned Readmission:  No    Discharge Statement   Total time spent examining patient, counseling patient (or patient/family) on condition, medication, rehabilitation/medical plan, and coordinating care on day of discharge: at least 45 minutes  Greater than 50% of the total time was spent examining patient, answering all questions, directly discussing plan of care and post-discharge instructions with patient (or patient and family)  Additional time spent on coordinating care and other discharge activities  Discharge Medications:  See after visit summary for reconciled discharge medications provided to patient and family

## 2018-12-06 NOTE — PROGRESS NOTES
Physical Medicine and Rehabilitation Progress Note  Gertrude Murillo 68 y o  male MRN: [id number]  Unit/Bed#: -01 Encounter: [id number]    Chief Complaints:  Decline in walking     Subjective/Interval Events:   Patient reports feeling like he is walking better and was surprised how he was better able to do ADLs yesterday  He still reports some concerns with discharging home but was again reviewed overall condition, discharge plan, and recovery  He reports no significant lightheadedness  He reports pain overall controlled unless he leans forward when his neck will hurt  Patient denies worsening strength, fever, chills, sweats, calf pain or other new complaints  ROS: A 10-point ROS was performed  Negative except as listed above  Overall Assessment/relevant history:   68 y o  male with PMH of HTN, HL, OCD, MDD, SHAQUILLE, CLBP, lumbar stenosis, lumbar radiculopathy who apparently fell at home hitting his head with residual R sided weakness and LOC who was initially believed to develop R sided weakness first causing the fall with possibility for stroke who was brought to [facility] on 11/13/18 prompting stroke alert/protocol  CT head and CTA head and negative were negative and patient received tPA  Patient was later noted to have some bilateral strength and sensory changes as well as ongoing imbalance and limb pain prompting additional imaging  MRI brain showed severe central canal stenosis with chronic mass effect on cord at C3-5 as well as severe bilateral NF narrowing from C2 to C5  NS was consulted who recommended surgical intervention and patient underwent C3-C6 posterior cervical decompressive laminectomy and posterior lateral fusion/fixation by Dr Radha Ramires on 11/19  Patient still has GOSIA drain in place with some serous drainage  Patient also had c/s by  Psych who felt patient mood currently stable    He has been only on Zoloft 100mg qday but at home he takes several other mood agents which he has been off of  Patient was evaluated by skilled therapies and was found to have significant decline in ADLs and ambulation appropriate for admission to [facility]      Patient presented to [facility] on 11/21/18 s/p fall causing cervical myelopathy and TBI with closed head injury and likely brief LOC s/p cervical decompression and fusion with decline in strength, balance, cognition, pain as well as significant insomnia and mood disorder  Functional status on admission to [facility]:  OT:  Lower body dressing max assist bathing moderate assist body dressing general transfers and toilet transfers minutes assist  PT:  Transfers and ambulation Min assist, stairs max assist      * Cervical myelopathy (HCC)   Assessment & Plan    -Following fall causing R sided weakness, imbalance, limb pain s/p decompression and fusion   -Possible pre-fall cervical myelopathy based on imaging and history that attributed to fall in first place   -Recommend acute comprehensive interdisciplinary inpatient rehabilitation to include intensive skilled therapies (PT, OT) as outlined with oversight and management by rehabilitation physician as well as inpatient rehab level nursing, case management and weekly interdisciplinary team meetings  Traumatic brain injury with loss of consciousness [facility])   Assessment & Plan    Impaired cognition stable but improved from presentation to [id number] [address] with closed head injury with brief LOC with residual deficits in cognition   -CT head without acute findings;  -Recommend all intensive skilled therapies with particular focus on SLP as   outlined with oversight and management by rehabilitation physician  Continue inpatient rehab level nursing, case management and weekly interdisciplinary team meetings  Skilled therapist, rehab nursing, rehab physician, CM, and supporting staff to provide appropriate teaching to patient (and if applicable to caregiver(s))     -Overstim precautions  -Sleep-agitation log/optimize sleep wake cycle  -Minimize sedating meds when appropriate  -Optimize mood d/o mgmt        S/P cervical spinal fusion   Assessment & Plan    -C3-C6 posterior cervical decompressive lami and PLF by Dr Britton Wick on   -Monitor incision  -Cervical collar   -Follow-up with NS during [facility] course PRN and after d/c       Pain   Assessment & Plan    Overall adequately controlled    Acute cervical myelopathy and spinal surgery pain with associated myofascial pain on chronic low back pain and lumbar radiculopathy   -Continue scheduled oxy 5mg BID prior to therapies  -Gabapentin continue 400mg TID for neuropathic and adjuvant pain control  -Continue APAP schedule 975mg TID   -Continue Oxycodone 5mg Q4H PRN  -Counseled on and continue to encourage deep breathing/relaxation/behavioral pain management techniques:     Deep breathin seconds in, 5 seconds out, 5 times per hour when awake and PRN when in pain or anticipate pain; avoid holding breath and tightening muscles and instead breathe slowly and deeply       OCD (obsessive compulsive disorder)   Assessment & Plan    Mood stable  Depression improving;  Anxiety fairly significant particularly with discharge coming up; sleeping adequately  Continue neuropsych/CBT, reassurance, exposure therapy   Follow-up with patient outpatient psych prior to discharge   >Continue Zyprexa 10mg QHS (previously on 15m QHS)   Zoloft 100mg qday   Buspar home 10mg BID   Ramelteon 7 5mg QHS (previously on 15mg QHS)  >Recommend Zyprexa 5mg qday PRN for breakthrough restlessness/OCD/anxiey symptoms  OCD, MDD, SHAQUILLE hx - fairly severe in past including psych hospitalization but denies hx of SI    >Had been off of multiple chronic agents (Zyprexa 15mg QHS, Remeron 15mg QHS, Buspar 10mg BID) except for Zoloft 100mg Qday since being hospitalized over a week ago  -Follow-up with patient's outpatient psychiatrist if needed during course as well as prior to d/c  -Seen by [facility] psych prior to transfer who felt patient mood currently stable  -Psychology consult       Insomnia   Assessment & Plan    Improved overall  Mgmt as outlined above   Mgmt as outlined above     Positional lightheadedness   Assessment & Plan    Improving  Off BP meds recently with borderline low BP and symptomatic orthostasis   Abdominal binder when mobilizing +/- MALACHI hose   Midodrine      At risk for venous thromboembolism (VTE)   Assessment & Plan    Heparin, SCDs, and ambulation      Lumbar stenosis   Assessment & Plan    Mgmt as outlined above     History of hyperlipidemia   Assessment & Plan    Recent lipid panel wnl  Per son patient is not supposed to be on statin     Hypertension   Assessment & Plan    IM c/s to assit with mgmt  Monitor BP and orthostatics   Off BP meds recently with borderline low BP and symptomatic orthostasis      Chronic low back pain   Assessment & Plan    Mgmt as outlined above     Lumbar radiculopathy   Assessment & Plan    Mgmt as outlined above  Follow-up with PMR and spine after d/c     Hyponatremia   Assessment & Plan    Mild 134 on 12/3, monitor      Chronic idiopathic constipation   Assessment & Plan    Improved  Recommend colace/senna/miralax  PRN bowel regimen          # Cardiopulmonary:   Saturating well on room air  - Encourage out of bed activities, incentive spirometry and deep breathing to prevent atelectasis  - Ensure adequate hydration, volume status, and intermittently monitor Hb, BUN/Cr and sodium  - Monitor vitals at rest and with activity  - Orthostatics PRN for S/S of orthostasis;  Abdominal binder PRN     # Bladder care:    - monitor for retention, incontinence, signs/symptoms of UTI  - recommend voiding trial; nursing prompt to void followed by bladder scan starting Q4-6H or after each patient initiated void; nursing to record voided output and bladder scan totals; straight cath PRN >350-400 cc; if post void residual bladder scans are <150 cc x3 consecutive voids, can stop scans      # Skin:   - Nursing to turn patient Q2H if not adequately ambulatory, monitor for skin breakdown, rashes, and wounds if applicable  - GOSIA drain per NS     # Diet/Hydration:    Regular     Disposition:   Home with [facility] this Thursday      Follow-up:   NS/Spine, PMR, Psych, PCP     CODE: Level 1: Full Code     Restrictions include: Fall precautions, cervical spine    ---------------------------------------------------------------------------------------------------------------------    Objective: Allergies and Medications per EMR    Physical Exam:  Vitals:    12/05/18 1319   BP: 116/62   Pulse: 86   Resp: 20   Temp: 97 7 °F (36 5 °C)   SpO2: 99%     General: Awake, alert in NAD  HENT: MMM  Neck: Cervical collar in place   Respiratory: Unlabored breathing, breath sounds equal, Lungs CTA, no wheezes, rales, or rhonchi  Cardiovascular: Regular rate and rhythm, no murmurs, rubs, or gallops  Gastrointestinal: Soft, non-tender, non-distended, normoactive bowel sounds  SkiN/MSK/Extremities: no calf edema, no calf tenderness to palpation     Neurologic/Psych:      MENTAL STATUS/Affect: awake, orientation intact; affect euthymic       Diagnostic Studies: reviewed, no new imaging  No results found      Laboratory:      Results from last 7 days  Lab Units 12/03/18  0704 11/29/18  0511   HEMOGLOBIN g/dL 11 2* 10 9*   HEMATOCRIT % 34 7* 34 5*   WBC Thousand/uL 7 18 4 72       Results from last 7 days  Lab Units 12/03/18  0704 11/29/18  0511   BUN mg/dL 10 9   POTASSIUM mmol/L 4 2 3 9   CHLORIDE mmol/L 101 104   CREATININE mg/dL 0 71 0 61            Patient Active Problem List   Diagnosis    Chronic idiopathic constipation    OCD (obsessive compulsive disorder)    Weakness    Depression    Anxiety    Cervical spinal stenosis    Cervical stenosis of spine    Hyponatremia    Cervical myelopathy (HCC)    S/P cervical spinal fusion    Lumbar radiculopathy    Chronic low back pain    Pain    Traumatic brain injury with loss of consciousness ([facility] [address] )    Hypertension    History of hyperlipidemia    Insomnia    Lumbar stenosis    At risk for venous thromboembolism (VTE)    Positional lightheadedness       ** Please Note: Fluency Direct voice to text software may have been used in the creation of this document  **    Total visit time:  At least 25 minutes, with more than 50% spent counseling/coordinating care      Alanna Alvarado MD, [facility]  Physical Medicine and Rehabilitation  Brain Injury Medicine

## 2018-12-06 NOTE — SPEECH THERAPY NOTE
SLP Discharge Summary    Pt was discharged to home w/ family support/supervision on 12/6/2018  At time of d/c, pt was able to achieve all 5/5 LTG's  Pt completed formalized cognitive assessment (CLQT+), where overall score when compared to age matched peers between 65-80 yrs, deems pt to demonstrate mild cognitive impairments (score of 2 6 out of 4 0)  Upon f/u sessions, pt is noted to have slow processing time and during structured tasks, pt states that things "just don't click " Pt made steady progress at this time when engaging in structured executive function and memory tasks  Pt is able to complete many items at supervision level when basic level, but when challenged given higher level items, pt does require increased verbal cues to complete tasks  Pt still states being in a "fog" but ongoing review of items that are completed each session in all disciplines, pt making progress and recommending to d/c home w/ son support/supervision  Home care SLP services can assess pt to determine the continued need for services at this time

## 2018-12-06 NOTE — TEAM CONFERENCE
Acute RehabilitationTeam Conference Note  Date: 12/6/2018   Time: 11:11 AM       Patient Name:  Emely Lozano       Medical Record Number: [id number]   YOB: 1945  Sex:  Male          Room/Bed:  [facility] 973-01  Payor Info:  Payor: Ale Arrant / Plan: MEDICARE A AND B / Product Type: Medicare A & B Fee for Service /      Admitting Diagnosis: History of neck surgery [[id number] [id number]]   Admit Date/Time:  11/21/2018  1:22 PM  Admission Comments: No comment available     Primary Diagnosis:  Cervical myelopathy ([facility] [address] )  Principal Problem: Cervical myelopathy ([facility] [address] )    Patient Active Problem List    Diagnosis Date Noted    Positional lightheadedness 11/26/2018    S/P cervical spinal fusion 11/22/2018    Lumbar radiculopathy 11/22/2018    Chronic low back pain 11/22/2018    Pain 11/22/2018    Traumatic brain injury with loss of consciousness ([facility] [address] ) 11/22/2018    Hypertension 11/22/2018    History of hyperlipidemia 11/22/2018    Insomnia 11/22/2018    Lumbar stenosis 11/22/2018    At risk for venous thromboembolism (VTE) 11/22/2018    Cervical myelopathy ([facility] [address] ) 11/21/2018    Hyponatremia 11/16/2018    Cervical spinal stenosis 11/15/2018    Depression 11/14/2018    Anxiety 11/14/2018    Weakness 11/13/2018    Cervical stenosis of spine 11/13/2018    Chronic idiopathic constipation 01/09/2017    OCD (obsessive compulsive disorder) 01/09/2017       Physical Therapy:    Weight Bearing Status: Full Weight Bearing  Transfers: Modified Independent  Bed Mobility: Modified Independent  Amulation Distance (ft): 150 feet  Ambulation: Supervision (BRP)  Assistive Device for Ambulation: Roller Walker  Wheelchair Mobility Distance: 0 ft  Number of Stairs: 12  Assistive Device for Stairs: Right Hand Rail  Stair Assistance: Supervision  Ramp: Supervision  Assistive Device for Ramp: Roller Walker  Discharge Recommendations: Home with:  [address] with[de-identified] Family Support, Home Physical Therapy    12/4/2018 MARCEL, PTA  Continues to make progress with strength and balance and has been progressed to BRP with RW at this time; pt continues to view the RW negatively but does understand it is for his safety and to prevent another fall; pt continues to be very worried about new information regarding his recovery and status but is able to remain calm when things are explained; FF with single rail sideways down and forward up; continue to increase strength, balance, and endurance for safe and functional mobility/activity; continue POC as per PT    11/27/18  Patient making improvements with skilled PT intervention but is limited by 10/10 sharp/shooting pain in neck, mood/affect, and self limiting behaviors  At this time, patient is performing bed mobility at S, transfers and gait at [facility], and stairs at [facility]  At [facility], patient lives with son whom he reports works during the day  Apartment has FF to enter with B railing  Stairs are also a barrier to safe d/c home at this time  Will require skilled PT intervention to progress functional mobility (I) and safety  11/28/18  To accommodate medicare guidelines, patient being re-teamed 11/29/18  Patient making improvements with skilled PT intervention but is limited by 10/10 sharp/shooting pain in neck, mood/affect, and self limiting behaviors  At this time, patient is performing bed mobility at S, transfers and gait at [facility], and stairs at [facility]  At [facility], patient lives with son whom he reports works during the day  Apartment has FF to enter with B railing  Stairs are also a barrier to safe d/c home at this time  Will require skilled PT intervention to progress functional mobility (I) and safety           Occupational Therapy:  Eating: Modified Independent  Grooming: Modified Independent  Bathing: Modified Independent  Bathing: Modified Independent  Upper Body Dressing: Supervision  Lower Body Dressing: Modified Independent  Toileting: Modified Independent  Tub/Shower Transfer: Supervision  Toilet Transfer: Modified Independent  Cognition: Exceptions to WNL  Cognition: Decreased Memory, Decreased Safety  Orientation: Person, Place, Time, Situation  Discharge Recommendations: Home with:  [address] with[de-identified] Family Support, Home Occupational Therapy       Pt is demonstrating good progress with occupational therapy and is progressing toward mod indep level goals for ADL, IADL, and functional transfers/mobility  Pt continues to present with impairments in ROM, pain management, and overall strength/endurance  With AE, pt has demo inc indep with bathing and dressing tasks and can now complete at overall mod Indep level  Pt has support from son upon discharge to provide assistance with C/S Collar management and IADL performance  Pt will benefit from skilled rehab OT services at home level to address above mentioned barriers and maximize functional independence in baseline areas of occupation to meet established treatment goals with overall inc indep  Speech Therapy:  Mode of Communication: Verbal  Cognition: Exceptions to WNL  Cognition: Decreased Memory, Decreased Executive Functions  Orientation: Person, Time, Situation  Discharge Recommendations: Home with:  [address] with[de-identified] 24 Hour Supervision, Family Support  Pt currently being followed for cognitive linguistic tx  Completed formalized cognitive assessment (CLQT+), where overall score when compared to age matched peers between 65-80 yrs, deems pt to demonstrate mild cognitive impairments (score of 2 6 out of 4 0)  Upon f/u sessions, pt is noted to have slow processing time and during structured tasks, pt states that things "just don't click " Ongoing education given to pt in regards to role of SLP and activities which are completed in sessions aid in improvement of pt's "fog " Will continue to benefit from SLP services to maximize cognitive linguistic skills at this time      Pt will be updated for team again on 11/28/2018 to maintain medicare requirements for regular team day, where no overt changes occurred from prior note on 11/27/18  Update from week 12/5/2018: Pt continues to be followed for cognitive linguistic tx  Pt making steady progress at this time when engaging in structured executive function and memory tasks  Pt is able to complete many items at supervision level when basic level, but when challenged given higher level items, pt does require increased verbal cues to complete tasks  Pt still states being in a "fog" but ongoing review of items that are completed each session in all disciplines, pt making progress and recommending to d/c home w/ son support/supervision  Nursing Notes:  Appetite: Good  Diet Type: Regular/House                           Type of Wound (LDA):  Incision           Incision 11/19/18 Cervical Neck Posterior (Active)   Incision Description REJI 12/5/2018  9:00 PM   Jazmine-wound Assessment REJI 12/5/2018  9:00 PM   Closure None 12/5/2018  9:00 PM   Drainage Amount Other (Comment) 12/2/2018  6:01 AM   Dressing Open to air 12/5/2018  9:00 PM   Dressing Changed Changed 11/26/2018  9:15 AM   Patient Tolerance Tolerated well 11/23/2018  4:22 PM   Dressing Status Other (Comment) 12/5/2018  9:00 PM           Type of Wound Patient/Family Education: Yes  Bladder: 6 - Modified Coleman        Bowel: 6 - Modified Coleman     Bowel Patient/Family Education: Yes  Pain Location: Nose, Arm  Pain Orientation: Bilateral, Posterior  Pain Score: 10                       Hospital Pain Intervention(s): Repositioned, Rest, Distraction  Pain Patient/Family Education: Yes  Medication Management/Safety  Injectable:  (heparin)  Safe Administration: Yes  Medication Patient/Family Education Complete: Yes    Admitted to [facility] s/p fall at home hitting head on floor- no LOC- Related as a cva on admission with TPA ( 11/13)-MRI of the brain was normal   CTH was w/o acute changes and CTA was normal  MRI of the cervical spine showed severe central canal stenosis and chronic mass effect on the cord at C3-4 and C4-5  11/19:posterior cervical decompressive laminectomy with instrumented posterior lateral fusion fixation C3-6 - Dr Campbell Quintana from Neurosurgery  Incision to posterior neck/head-staples removed  Wears Saxis collar aat  Pain managed with tylenol, gabapentin, oxycodone  Measuring Orthostatic BP's daily after 8am daily and monitoring I&O's  He needs set up with meals, uses build up equipment on utensils  Pt has anxiety related to upcoming changes  This week we will continue to  monitor lab values and vital signs, work on pain management  We will promote healing and prevent skin breakdown  Pt will work on methods to decrease anxiety and be encouraged to be more independent  Pt will work on safety with transfers and remain free from falls  Patient to be discharged on 12/6/18  Case Management:     Discharge Planning  Living Arrangements: Children  Support Systems: Children  Assistance Needed: will need at d/c  Type of Current Residence: Private residence  [address]: No  Pt has achieved goals and is returning home today with [address] [place] [address] services for rn pt and ot  Is the patient actively participating in therapies? yes  List any modifications to the treatment plan:     Barriers Interventions   All functional barriers resolved                      Is the patient making expected progress toward goals?  yes  List any update or changes to goals:     Medical Goals: Patient will be medically stable for discharge to [facility] upon completion of rehab program and Patient will be able to manage medical conditions and comorbid conditions with medications and follow up upon completion of rehab program    Weekly Team Goals:   Rehab Team Goals  ADL Team Goal: Patient will require assist with ADLs with least restrictive device upon completion of rehab program  Bowel/Bladder Team Goal: Patient will return to premorbid level for bladder/bowel management upon completion of rehab program  Transfer Team Goal: Patient will be independent with transfers with least restrictive device upon completion of rehab program  Locomotion Team Goal: Patient will be independent with locomotion with least restrictive device upon completion of rehab program  Cognitive Team Goal: Patient will be independent for basic  tasks and require supervision for complex tasks upon completion of rehab program    Discussion: pt is d/c' ing home today with son and [facility] services through [facility] for rn pt and ot  Pt is supervision/mod I for all functional mobility    Anticipated Discharge Date:  12/6/18  [facility] Team Members Present: The following team members are supervising care for this patient and were present during this Weekly Team Conference      Physician: Dr Nita Tenorio MD  : RONEL Deluna  Registered Nurse: Mallory Ty, RN, BSN, CRRN  Physical Therapist: Nita Trejo DPT  Occupational Therapist: Avram Ormond, MS, OTR/L  Speech Therapist: Ortiz Guerra MA, CCC-SLP  Other:

## 2018-12-06 NOTE — DISCHARGE INSTRUCTIONS
Discharge Instructions - Neurosurgery     Please keep incision clean and dry  Avoid applying creams, lotion or antiseptic to incision area   Allow steri-strips to fall off  Please remove them completely in 10 days   Check the wound daily  If the incision becomes red, swollen, tender, warm, or has increased drainage please notify MD immediately   May shower 3 days after surgery, but do not soak in a tub and no swimming   No heavy lifting greater than 5 - 10lbs   May walk as tolerated   Please wear collar as if directed by MD   Wear C-Collar for 6 weeks or until cleared by Neurosurgery   May cover incision with dry sterile bandage daily while using collar   Please take pain medications to relieved incision pain, and muscle relaxants to prevent spasms as directed by MD or Extender  See Med  Rec  completed at Discharge   Please take over the counter stool softeners such as colace or senna-s to avoid constipation while on narcotics   No driving for 2 weeks or while on narcotics   Returning to work will be discussed at follow up appt   Do not take ibuprofen, Naproxen/Aleve or any NSAID: May take Tylenol instead   If taking Coumadin, Aspirin, or Plavix, you may resume these medications once cleared by Neurosurgery   Follow-up as scheduled for a 2 week incision check and staple/suture removal   Follow-up 6 weeks after surgery with repeat cervical spine upright x-rays to be completed prior to visit  **Please notify MD immediately if you experience a fever of 101  F or have increased neck or arm pain  New numbness and/or weakness in your arm  Difficulty swallowing or breathing especially while lying down  Numbness or weakness in arms or legs  **    Discharge Instructions - [address] [place] [address] Place    Please see your doctors listed in the follow up providers section of your discharge paperwork, and take the discharge paperwork with you to your appointments      Should you develop feelings of significant hopelessness, severe depression, severe anxiety, or suicide you should call 911 or obtain transportation to nearest ER  National Suicide Prevention Lifeline is 0-369.742.1840 and is available 24 hrs/day  If you have any questions or concerns regarding your acute rehabilitation stay including issues with medications, rehabilitation, and follow-up plan, please call:   [address] at [phone]  Should you develop fevers, chills, new weakness, changes in sensation, difficulty speaking, facial weakness, confusion, or other concerning symptoms please call 911 and/or obtain transportation to nearest ER immediately  You are restricted from driving until cleared by outpatient physician and cleared through a formal driving evaluation  Please note changes may have been made to your medications please refer to your discharge paperwork for your current medications and take this list with you to all your doctors appointments for your doctors to review  Please do not resume a home medication unless the medication reconciliation sheet indicates to do so, please do not assume that a medication that you were given a prescription for is the same as a medication you have at home based on both medications having the same name as dosages and frequency may have changed  You will be given a limited supply of pain medications on discharge; should you require additional refills/medications, your PCP and/or surgeon may prescribe at his/her discretion  This can be quite helpful particularly for severe acute pain  They can increase your risk of falling and injury  Note it is advisable to limit use of pain medications as they can become habit forming and lead to addiction  Please do not drive or operate heavy machinery while on these medications  Do not drink alcohol while on current medications as this can increase your risk of injury or severe complication  Please check your blood pressure prior to taking your blood pressure medications and keep a log that you will bring with you to your follow-up doctors' appointments  >Please contact your family doctor or cardiologist immediately for a blood pressure below 100/50 and do not take your blood pressure medications until speaking with them  >Please contact your family doctor or cardiologist as soon as possible for blood pressure greater than 160/100    Please avoid NSAID (including but not limited to advil, aleve, motrin, naproxen, ibuprofen, mobic, meloxicam, diclofenac etc) medications as NSAID medications may increase your risk of bleeding (which can be life-threatening),  delay bone healing  Please wear your cervical collar at all times until cleared by [address] [facility]    Please follow your spine precautions as instructed until cleared by [address] [facility]    no pushing/pulling/lifting greater than 5 lbs until cleared by [address] [place] [address]    Unless specifically noted in your medication list provided to you in your discharge paper work, please discuss with your family doctor prior to resuming any vitamins, minerals, supplements you may have been taking prior to your hospitalization     Please note a summary of your hospital stay with relevant information for your doctors will try to be sent to them  Please confirm with your doctors at your follow up visits that they have received this summary and have them contact [address] [place] [address] if they have not received them along with any other medical records they may require  Ada Betts Phone Number:  [phone]                              Cervical Spinal Stenosis   WHAT YOU NEED TO KNOW:   Cervical spinal stenosis is narrowing of the spinal canal in your neck  Your spinal canal holds your spinal cord  When your spinal canal narrows, it may put pressure on your spinal cord   Cervical spinal stenosis is a chronic (long-term) condition  DISCHARGE INSTRUCTIONS:   Seek care immediately:   · You injure your neck, and your pain and symptoms worsen  · You have new or increased trouble walking  · You cannot control when you urinate or have a bowel movement  · You have more numbness, tingling, or weakness than before  Contact your healthcare provider if:   · Your pain does not get better or gets worse, even after you take medicines  · You have questions or concerns about your condition or care  Medicines: You may  need any of the following:  · NSAIDs , such as ibuprofen, help decrease swelling and pain  NSAIDs can cause stomach bleeding or kidney problems in certain people  If you take blood thinner medicine, always ask your healthcare provider if NSAIDs are safe for you  Always read the medicine label and follow directions  · Acetaminophen  decreases pain and fever  It is available without a doctor's order  Ask how much to take and how often to take it  Follow directions  Read the labels of all other medicines you are using to see if they also contain acetaminophen, or ask your doctor or pharmacist  Acetaminophen can cause liver damage if not taken correctly  Do not use more than 4 grams (4,000 milligrams) total of acetaminophen in one day  · Prescription pain medicine  may be given  Ask how to take this medicine safely  · Muscle relaxers  help decrease pain and muscle spasms  · Take your medicine as directed  Contact your healthcare provider if you think your medicine is not helping or if you have side effects  Tell him or her if you are allergic to any medicine  Keep a list of the medicines, vitamins, and herbs you take  Include the amounts, and when and why you take them  Bring the list or the pill bottles to follow-up visits  Carry your medicine list with you in case of an emergency  Manage your symptoms:   · Go to physical and occupational therapy  as directed  A physical therapist teaches you exercises to help improve movement and strength, and to decrease pain  An occupational therapist teaches you skills to help with your daily activities  · Apply heat on your neck for 20 to 30 minutes every 2 hours for as many days as directed  Heat helps decrease pain and muscle spasms  · Apply ice  on your neck for 15 to 20 minutes every hour or as directed  Use an ice pack, or put crushed ice in a plastic bag  Cover it with a towel before you apply it to your skin  Ice helps prevent tissue damage and decreases swelling and pain  · Avoid certain activities  Some activities may worsen your condition or cause more injury  Do not ride motorcycles or horses, climb ladders, or play football or other contact sports  Ask your provider which activities are safe for you to do  Follow up with your healthcare provider as directed:  Write down your questions so you remember to ask them during your visits  © 2017 [address] Information is for End User's use only and may not be sold, redistributed or otherwise used for commercial purposes  All illustrations and images included in CareNotes® are the copyrighted property of A D A M [employer] Inc  or Carroll Johnston  The above information is an  only  It is not intended as medical advice for individual conditions or treatments  Talk to your doctor, nurse or pharmacist before following any medical regimen to see if it is safe and effective for you  After Arthrodesis   DISCHARGE INSTRUCTIONS:   Call 911 if:   · You feel lightheaded, short of breath, or have chest pain  · You cough up blood  Seek care immediately if:   · Your arm or leg feels warm, tender, and painful  · Your arm or leg looks swollen and red  · Your leg or arm is pale, numb, or cool to the touch  · Blood soaks through your bandage  · Your stitches or staples come apart  · Your splint or cast comes off      · You have severe pain, even after you take medicine  · You feel lightheaded, short of breath, and have chest pain  · You cough up blood  Contact your healthcare provider if:   · You have a fever or chills  · Your wound is red, swollen, or draining pus  · You have pain and swelling in your joint that does not get better with medicine  · You have questions or concerns about your condition or care  Medicines:   · Prescription pain medicine  will usually be given  Ask your healthcare provider how to take this medicine safely  · Take your medicine as directed  Contact your healthcare provider if you think your medicine is not helping or if you have side effects  Tell him or her if you are allergic to any medicine  Keep a list of the medicines, vitamins, and herbs you take  Include the amounts, and when and why you take them  Bring the list or the pill bottles to follow-up visits  Carry your medicine list with you in case of an emergency  Care for yourself at home:   · Apply ice  on your joint for 15 to 20 minutes every hour or as directed  Use an ice pack, or put crushed ice in a plastic bag  Cover it with a towel  Ice helps prevent tissue damage and decreases swelling and pain  · Elevate  your joint above the level of your heart as often as you can  This will help decrease swelling and pain  Prop your joint on pillows or blankets to keep it elevated comfortably  · Use support devices as directed  You may need to wear a splint, soft or hard cast, or walking boot to keep your joint stable as it heals  · Follow activity restrictions as directed  You will need to limit certain activities while your joint heals and fuses together  If you had arthrodesis in your knee, ankle, or foot, you will need to keep weight off your joint for up to 12 weeks  Use crutches as directed  You will be allowed to slowly put more weight on your joint over time  · Care for your wound as directed   Carefully wash the wound with soap and water  Dry the area and put on new, clean bandages as directed  Change your bandages when they get wet or dirty  Follow up with your healthcare provider as directed: You will need to return to have your stitches or staples removed  Write down your questions so you remember to ask them during your visits  © 2017 [address] Information is for End User's use only and may not be sold, redistributed or otherwise used for commercial purposes  All illustrations and images included in CareNotes® are the copyrighted property of A D A M [employer] Inc  or Carroll Johnston  The above information is an  only  It is not intended as medical advice for individual conditions or treatments  Talk to your doctor, nurse or pharmacist before following any medical regimen to see if it is safe and effective for you  Spinal Cord Injury   WHAT YOU NEED TO KNOW:   What is a spinal cord? Your spinal cord is made up of nerves and nerve fibers  Nerves send signals back and forth between your brain and different parts of your body  The signals tell your muscles when to move and your body when to feel sensations, such as pain  Your spinal cord is protected by vertebrae that make up your spinal column  Your spinal column is divided into four levels  The levels include the cervical (neck), thoracic (upper to middle back), lumbar (lower back), and sacral (tailbone) vertebrae  What causes a spinal cord injury? A spinal cord injury (SCI) can happen with trauma or disease to the spinal column  An SCI may occur if your spinal column presses down on or pinches your spinal cord  This can cause swelling or bruising of your spinal cord  Damage to your spinal column, disease, and infection can cause an injury in your spinal cord   What are the types of SCI? You have an incomplete  injury if you have some feeling or movement below the level of injury   You have a complete  injury if you have no movement or feeling below the level of injury  · Tetraplegia  usually happens at a level from C1 to T1  You may not have any feeling or movement of your arms and legs  You also may not be able to move your head and neck  This may also be called quadriplegia  · Paraplegia  usually happens at a level from T2 to S5  You may have a loss of feeling or movement in your chest, stomach, hips, legs and feet  How is an SCI diagnosed? Your healthcare provider will do tests to see if you have any movement or feeling in your arms and legs  You may also need tests such as MRI, CT, or X-ray, to show any damage to your spinal cord  You may be given contrast material to help show the damage to your spinal cord better  Tell your healthcare providers if have ever had an allergic reaction to contrast material   What is the immediate treatment for an SCI? You may have to be put in a firm brace or have traction to your spine  The brace and traction are used to prevent movement of your spinal column  Movement of your spinal column may cause more damage to your spinal cord  You may also need medicines to keep you from moving  You may need a ventilator to help you breathe if your injury affects your lungs  You may need surgery to remove the part of your spinal column that is damaging or blocking your spinal cord  What health problems are common with an SCI? These complications can become life-threatening:  · Pressure ulcers (bedsores)    · Deep vein thrombosis    · Hyperreflexia caused by an irritant to nerves below the level of injury    · Lung conditions such as pneumonia, pulmonary blood clots, collapsing of your lung  Why is rehabilitation after an SCI important? You will begin rehabilitation after your hospitalization  The goal of rehabilitation is to help you function with an SCI   The rehabilitation team includes:  · A doctor that specializes in physical medicine and rehabilitation    · Physical therapists that teach you exercises to build strength and use of adaptive devices such as wheelchairs    · Occupational therapists that teach you how to do activities of daily living, such as grooming and toileting routines    · Rehabilitation psychologists that help you cope emotionally with your SCI    · Rehabilitation nurses, dietitians, social workers, and other specialists that monitor your condition  CARE AGREEMENT:   You have the right to help plan your care  Learn about your health condition and how it may be treated  Discuss treatment options with your caregivers to decide what care you want to receive  You always have the right to refuse treatment  The above information is an  only  It is not intended as medical advice for individual conditions or treatments  Talk to your doctor, nurse or pharmacist before following any medical regimen to see if it is safe and effective for you  © 2017 [address] Information is for End User's use only and may not be sold, redistributed or otherwise used for commercial purposes  All illustrations and images included in CareNotes® are the copyrighted property of A D A M [employer] Inc  or Carroll Preston  Anxiety   WHAT YOU SHOULD KNOW:   Anxiety is a condition that causes you to feel excessive worry, uneasiness, or fear  Family or work stress, smoking, caffeine, and alcohol can increase your risk for anxiety  Certain medicines or health conditions can also increase your risk  Anxiety may begin gradually, and can become a long-term condition if it is not managed or treated  AFTER YOU LEAVE:   Medicines:   · Medicines  can help you feel more calm and relaxed, and decrease your symptoms  · Take your medicine as directed  Contact your healthcare provider if you think your medicine is not helping or if you have side effects  Tell him if you are allergic to any medicine  Keep a list of the medicines, vitamins, and herbs you take   Include the amounts, and when and why you take them  Bring the list or the pill bottles to follow-up visits  Carry your medicine list with you in case of an emergency  Follow up with your healthcare provider within 2 weeks or as directed:  Write down your questions so you remember to ask them during your visits  Manage anxiety:   · Go to counseling as directed  Cognitive behavioral therapy can help you understand and change how you react to events that trigger your symptoms  · Find ways to manage your symptoms  Activities such as exercise, meditation, or listening to music can help you relax  · Practice deep breathing  Breathing can change how your body reacts to stress  Focus on taking slow, deep breaths several times a day, or during an anxiety attack  Breathe in through your nose, and out through your mouth  · Avoid caffeine  Caffeine can make your symptoms worse  Avoid foods or drinks that are meant to increase your energy level  · Limit or avoid alcohol  Ask your healthcare provider if alcohol is safe for you  You may not be able to drink alcohol if you take certain anxiety or depression medicines  Limit alcohol to 1 drink per day if you are a woman  Limit alcohol to 2 drinks per day if you are a man  A drink of alcohol is 12 ounces of beer, 5 ounces of wine, or 1½ ounces of liquor  Contact your healthcare provider if:   · Your symptoms get worse or do not get better with treatment  · You think your medicine may be causing side effects  · Your anxiety keeps you from doing your regular daily activities  · You have new symptoms since your last visit  · You have questions or concerns about your condition or care  Seek care immediately or call 911 if:   · You have chest pain, tightness, or heaviness that may spread to your shoulders, arms, jaw, neck, or back  · You feel like hurting yourself or someone else  · You feel dizzy, lightheaded, or faint    © 2014 [facility]  Information is for End User's use only and may not be sold, redistributed or otherwise used for commercial purposes  All illustrations and images included in CareNotes® are the copyrighted property of [employer] Inc  or Carroll Johnston  The above information is an  only  It is not intended as medical advice for individual conditions or treatments  Talk to your doctor, nurse or pharmacist before following any medical regimen to see if it is safe and effective for you  Pain Management After Surgery   WHAT YOU NEED TO KNOW:   Why is it important to manage my pain after surgery? It is important to manage your pain after surgery so you can rest and heal  Pain management will also help you return to your normal activities  How can I manage my pain with medicines? Your healthcare provider will tell you what medicines to take for pain  Take your medicines as directed  You may need any of the following:  · Acetaminophen  helps decrease pain  Follow directions  This medicine can cause liver damage if not taken correctly  · NSAIDs , such as ibuprofen, help decrease swelling, pain, and fever  This medicine is available with or without a doctor's order  NSAIDs can cause stomach bleeding or kidney problems in certain people  If you take blood thinner medicine, always ask your healthcare provider if NSAIDs are safe for you  Always read the medicine label and follow directions  · Prescription pain medicine  can be given as a pill, a patch, or a cream  Ask how to take this medicine safely  · Anxiety medicine  may help you feel less anxious and decrease your pain  What else can I do to help decrease my pain? · Apply heat  on your surgery area for 20 to 30 minutes every 2 hours for as directed  Heat helps decrease pain and muscle spasms  · Apply ice  on your surgery area for 15 to 20 minutes every hour or as directed  Use an ice pack, or put crushed ice in a plastic bag   Cover it with a towel  Ice helps prevent tissue damage and decreases swelling and pain  · Massage therapy  may help relax tight muscles and decrease pain  · Physical therapy  teaches you exercises to help improve movement and strength and decrease pain  · Self-hypnosis  is a way to direct your attention to something other than your pain  For example, you might repeat a positive statement about ignoring the pain or seeing the pain in a positive way  · Music  may help increase your energy level and improve your mood  It may help reduce pain by triggering your body to release endorphins  These are natural body chemicals that decrease pain  · Distraction  teaches you to focus your attention on something other than pain  For example, play cards, visit with family, or watch TV  · Transcutaneous electrical nerve stimulation (TENS)  is a portable device that is placed over the area of pain  It uses mild, safe electrical signals to help control pain  · Spinal cord stimulation (SCS)  uses mild, safe electrical signals to relax the nerves that cause your pain  An electrode is implanted near your spinal cord during a procedure  When should I contact my healthcare provider? · Your pain does not go away, or gets worse, even after you take medicine  · You feel too sleepy or groggy  · You have itching, a rash, or nausea from your medicine  · You have questions or concerns about your condition or care  When should I seek immediate care or call 911? · You have severe pain  CARE AGREEMENT:   You have the right to help plan your care  Learn about your health condition and how it may be treated  Discuss treatment options with your caregivers to decide what care you want to receive  You always have the right to refuse treatment  The above information is an  only  It is not intended as medical advice for individual conditions or treatments   Talk to your doctor, nurse or pharmacist before following any medical regimen to see if it is safe and effective for you  © 2016 [address] is for End User's use only and may not be sold, redistributed or otherwise used for commercial purposes  All illustrations and images included in CareNotes® are the copyrighted property of [employer]  or Carroll Johnston  Rehabilitative Pain Management   WHAT YOU NEED TO KNOW:   Rehabilitative pain management includes treatments and therapies to treat pain while you recover from an injury or illness  If pain is not treated, it can decrease your appetite, sleeping, and energy  It can also affect your mood and your relationships  DISCHARGE INSTRUCTIONS:   Contact your healthcare provider if:   · The medicine you are taking makes you sleepier than usual or confused  · You have pain even after you take your pain medicine  · You have a new pain or the pain seems different than before  · You have constipation from prescription pain medicine that is not helped with treatment  · You have questions or concerns about your condition or care  Medicines: You may need any of the following:  · NSAIDs , such as ibuprofen, help decrease swelling, pain, and fever  NSAIDs can cause stomach bleeding or kidney problems in certain people  If you take blood thinner medicine, always ask if NSAIDs are safe for you  Always read the medicine label and follow directions  Do not give these medicines to children under 10months of age without direction from your child's healthcare provider  · Narcotics  are used for moderate to severe pain  · Muscle relaxers  help decrease pain and muscle spasms  · Steroids  decrease inflammation that causes pain  · Take your medicine as directed  Contact your healthcare provider if you think your medicine is not helping or if you have side effects  Tell him or her if you are allergic to any medicine   Keep a list of the medicines, vitamins, and herbs you take  Include the amounts, and when and why you take them  Bring the list or the pill bottles to follow-up visits  Carry your medicine list with you in case of an emergency  Take pain medicine safely:   · Do not suddenly stop taking narcotic pain medicine  If you have been taking narcotics for longer than 2 weeks, a sudden stop may cause dangerous side effects  Ask your healthcare provider for more information before you stop taking your medicine  · Time your medicine correctly  Take your pain medicine 30 minutes before exercise or physical therapy  This helps decrease pain to help meet your treatment goals  You may need to take medicine before you go to bed  This may help you sleep and not be woken by pain  · Take your medicine as directed  Take only the amount prescribed or recommended by your healthcare provider  Too much medicine may cause breathing problems or other health issues  · Watch for side effects  Some foods, alcohol, and other medicines may cause side effects when you take pain medicine  Ask your healthcare provider how to prevent these problems  · Prevent constipation  This is a common side effect of prescription pain medicine  Eat foods high in fiber, such as raw fruit, vegetables, beans, and whole-grain bread and cereal  Ask your healthcare provider how much liquid to drink each day and which liquids are best for you  Exercise and activity may also help decrease the risk for constipation  Other ways you can help control pain:   · Heat and ice  help decrease pain  Heat also relieves muscle spasms  Ice may help prevent tissue damage  Your healthcare provider may recommend only heat or ice, or you may be told to alternate  For heat, use a heat pack, heating pad, or a warm washcloth  The temperature should not be hot enough to burn your skin  Apply heat for 20 to 30 minutes every 2 hours for as many days as directed   For ice, use an ice pack, or put crushed ice in a plastic bag  Cover it with a towel before you place it on your skin  Apply ice for 15 to 20 minutes every hour or as directed  · Exercise  can help relieve pain and increase your energy  Talk to your healthcare provider about how much exercise to get each day and which exercises are best for you  A physical therapist can teach you exercises to help improve movement and strength, and to decrease pain  · Devices  help you move and decrease pain  They help you to remove pressure from the injury and provide extra support  Assistive devices include a splint, cane, crutches, or a walker  Knee sleeves and braces help decrease pain by giving your knees extra support  Arch supports and orthotics are devices that are put in your shoes to help you stand, walk, or run correctly  Follow up with your healthcare provider as directed:  Write down your questions so you remember to ask them during your visits  © 2017 [address] [employer] Information is for End User's use only and may not be sold, redistributed or otherwise used for commercial purposes  All illustrations and images included in CareNotes® are the copyrighted property of [employer]  or Carroll Johnston  The above information is an  only  It is not intended as medical advice for individual conditions or treatments  Talk to your doctor, nurse or pharmacist before following any medical regimen to see if it is safe and effective for you  Hypotension   WHAT YOU NEED TO KNOW:   Hypotension is a condition that causes your blood pressure (BP) to drop lower than it should be  Hypotension may be mild, serious, or life-threatening  DISCHARGE INSTRUCTIONS:   Medicines:   · Alpha-adrenoreceptor agonists: These medicines may increase your BP and decrease your symptoms  · Steroids: This medicine helps prevent salt loss from your body   Steroids may also help increase the amount of fluid in your body and raise your BP     · Vasopressors: These medicines help constrict (make smaller) your blood vessels and increase your BP  Vasopressor medicines may increase the blood flow to your brain and help decrease your symptoms  · Antidiuretic hormone: This medicine helps control your BP and helps decrease your need to urinate during the night  · Antiparkinson medicine: This medicine may help increase your standing BP and decrease your symptoms  · Take your medicine as directed  Contact your healthcare provider if you think your medicine is not helping or if you have side effects  Tell him or her if you are allergic to any medicine  Keep a list of the medicines, vitamins, and herbs you take  Include the amounts, and when and why you take them  Bring the list or the pill bottles to follow-up visits  Carry your medicine list with you in case of an emergency  Follow up with your healthcare provider or specialist as directed:  Write down your questions so you remember to ask them during your visits  Check your blood pressure: You may need to check your BP at home  Record the results and bring them with you to follow-up visits  Ask when and how often to check your BP  You may need to wear a BP monitor for up to 24 hours  This will record your blood pressure during your normal daily activities  The monitor will take your BP every 15 to 30 minutes  Try to keep still while your BP is taken  Avoid heavy activity, such as exercise, while you are wearing the monitor  Manage your symptoms:   · Change positions slowly:  When you get out of bed, sit up first, then slowly move your legs to the side of the bed  If you are not having any symptoms, slowly stand up  If you have symptoms, sit down right away  · Exercise and do physical counter maneuvers:  Ask your healthcare provider or specialist about the best exercise plan for you  Physical counter maneuvers may help to increase your BP and increase blood flow to your heart   They include crossing your legs, squatting, and bending at the waist  You can also rise up on your toes while you are standing, and tighten your thigh muscles  · Drink liquids as directed:  Ask your healthcare provider or specialist how much liquid to drink each day and which liquids are best for you  Drink 500 milliliters (½ liter) of liquid quickly in the morning or before meals to help increase your BP  Your healthcare provider may tell you to drink 2 cups of coffee with, or after, breakfast and lunch  The caffeine in the coffee can help prevent a drop in your BP  Your healthcare provider may also give you caffeine pills  · Change how you eat meals: If your BP drops after eating large meals, try to eat smaller meals more often  Eat foods low in carbohydrates and cholesterol to help prevent BP drops after you eat  Ask if you need to increase the amount of sodium (salt) you eat each day  · Raise the head of your bed:  Raise the head of your bed 4 to 8 inches  This may help prevent morning BP drops and decrease the need to urinate during the night  Avoid things that make your hypotension worse:   · Do not drink alcohol:  Alcohol can make your symptoms worse  Ask for information if you need help quitting  · Avoid straining:  Activities and movements that cause you to strain can cause a drop in your BP  Activities to avoid include lifting, coughing, and other movements that increase the feeling of pressure in your chest     · Avoid the heat: This can cause a decrease in your BP  Stay inside during very hot days, or limit the amount of time you are outside  Do not take hot baths  Contact your healthcare provider or specialist if:   · You vomit several times or have diarrhea, and you cannot drink liquid  · You have a fever  · You have new or increased symptoms, such as dizziness, weakness, or fainting  · Your legs, ankles, and feet are swollen, or you gain weight for no known reason      · You have questions or concerns about your condition or care  Seek care immediately or call 911 if:   · You become confused or cannot speak  · You urinate very little or not at all  · You have a seizure  · You have chest pain or trouble breathing  · You have changes in vision or cannot see  © 2017 [address] [employer] Information is for End User's use only and may not be sold, redistributed or otherwise used for commercial purposes  All illustrations and images included in CareNotes® are the copyrighted property of [employer]  or Carroll Johnston  The above information is an  only  It is not intended as medical advice for individual conditions or treatments  Talk to your doctor, nurse or pharmacist before following any medical regimen to see if it is safe and effective for you

## 2018-12-07 ENCOUNTER — TELEPHONE (OUTPATIENT)
Dept: NEUROSURGERY | Facility: CLINIC | Age: 73
End: 2018-12-07

## 2018-12-07 ENCOUNTER — TRANSITIONAL CARE MANAGEMENT (OUTPATIENT)
Dept: FAMILY MEDICINE CLINIC | Facility: CLINIC | Age: 73
End: 2018-12-07

## 2018-12-07 ENCOUNTER — TELEPHONE (OUTPATIENT)
Dept: NEUROLOGY | Facility: CLINIC | Age: 73
End: 2018-12-07

## 2018-12-07 NOTE — CASE MANAGEMENT
Team dc summary - pt made good progress and was able to return home with son and [address] [place] [address] services through [facility] for rn pt and ot  Pt received a walker and commode through [facility]  Son present for dc instructions and aware of pts functional ability

## 2018-12-07 NOTE — PHYSICAL THERAPY NOTE
PT DISCHARGE SUMMARY    Patient made good progress with skilled PT intervention  Patient able to progress to modified (I) with bed mobility, transfers, and gait of appx 350' using RW  Patient was also able to verbalize and demonstrate understanding of CS collar and precautions  Patient did not meet LTG of mod(I) on 15 steps but demonstrated safety of 12 steps to enter home using sideways approach and 1 rail at supervision  During stay, patient was overall limited by affect/mood and will continue to benefit from skilled PT intervention at home (home PT set up by LM) with progression to OPPT  Family training performed with son on 11/30/18 in which he was able to demonstrate guarding technique on stairs and verbalized understanding of recommendation for continued use of RW in patient's home  A roller walker was ordered by CM prior to d/c       Alex Pena PT

## 2018-12-07 NOTE — TELEPHONE ENCOUNTER
----- Message from Antonio Cheema MD sent at 12/6/2018  9:54 AM EST -----  Regarding: HFU  Post-[facility] Follow-up with Dr Lionel Hale

## 2018-12-07 NOTE — TELEPHONE ENCOUNTER
Called Minerva Diane on primary contact number after surgery 11/19/2018 with hospital discharge of 12/6/2018 to check in on recovery and provide post surgical instructions  Got voicemail, left message to callback

## 2018-12-08 NOTE — PROGRESS NOTES
OT DISCHARGE SUMMARY      Pt made good progress during acute rehab stay and achieved all LTGs  Pt is mod I for bathing, LB dressing, fxnl transfers with RW, and toileting  Pt is supervision for UB dressing due to management of c/s collar  Son Addison Vicente was present for two session of family training  Son was trained on management of c/s collar and how to cue patient appropriately for LB dressing with use of LHAE  Pt continues to be limited by severe anxious behavior and fear of falling  Pt requires verbal cueing at times due to anxious beahvior  Pt will d/c home with son's support and home OT/PT services to ensure good carryover of techniques in his home environment  Pt received commode, RW, and LHAE for d/c  Son independently purchased shower chair       Brittney Vizcaino, OT

## 2018-12-11 ENCOUNTER — TRANSITIONAL CARE MANAGEMENT (OUTPATIENT)
Dept: FAMILY MEDICINE CLINIC | Facility: CLINIC | Age: 73
End: 2018-12-11

## 2018-12-12 ENCOUNTER — PATIENT OUTREACH (OUTPATIENT)
Dept: CASE MANAGEMENT | Facility: OTHER | Age: 73
End: 2018-12-12

## 2018-12-12 NOTE — PROGRESS NOTES
Per team conference 12/6/18, pt d/c 12/6/18 with son and contd [facility] services through [facility] for SN/PT/OT  Pt is supervision/mod I for all functional mobility

## 2018-12-19 ENCOUNTER — PATIENT OUTREACH (OUTPATIENT)
Dept: OTHER | Facility: HOSPITAL | Age: 73
End: 2018-12-19

## 2018-12-19 ENCOUNTER — TELEPHONE (OUTPATIENT)
Dept: FAMILY MEDICINE CLINIC | Facility: CLINIC | Age: 73
End: 2018-12-19

## 2018-12-20 ENCOUNTER — OFFICE VISIT (OUTPATIENT)
Dept: FAMILY MEDICINE CLINIC | Facility: CLINIC | Age: 73
End: 2018-12-20
Payer: MEDICARE

## 2018-12-20 ENCOUNTER — TELEPHONE (OUTPATIENT)
Dept: FAMILY MEDICINE CLINIC | Facility: CLINIC | Age: 73
End: 2018-12-20

## 2018-12-20 VITALS
HEIGHT: 67 IN | HEART RATE: 68 BPM | DIASTOLIC BLOOD PRESSURE: 64 MMHG | TEMPERATURE: 98.5 F | SYSTOLIC BLOOD PRESSURE: 102 MMHG | WEIGHT: 172.2 LBS | BODY MASS INDEX: 27.03 KG/M2

## 2018-12-20 DIAGNOSIS — G95.9 CERVICAL MYELOPATHY (HCC): Primary | ICD-10-CM

## 2018-12-20 DIAGNOSIS — R42 POSITIONAL LIGHTHEADEDNESS: ICD-10-CM

## 2018-12-20 DIAGNOSIS — S06.9X9D TRAUMATIC BRAIN INJURY WITH LOSS OF CONSCIOUSNESS, SUBSEQUENT ENCOUNTER: ICD-10-CM

## 2018-12-20 DIAGNOSIS — R52 PAIN: ICD-10-CM

## 2018-12-20 PROCEDURE — 99495 TRANSJ CARE MGMT MOD F2F 14D: CPT | Performed by: FAMILY MEDICINE

## 2018-12-20 RX ORDER — GABAPENTIN 400 MG/1
400 CAPSULE ORAL 2 TIMES DAILY
Qty: 90 CAPSULE | Refills: 0 | Status: SHIPPED | OUTPATIENT
Start: 2018-12-20 | End: 2019-01-07 | Stop reason: SDUPTHER

## 2018-12-20 RX ORDER — MIDODRINE HYDROCHLORIDE 5 MG/1
7.5 TABLET ORAL 2 TIMES DAILY
Qty: 60 TABLET | Refills: 0
Start: 2018-12-20 | End: 2019-01-07 | Stop reason: SDUPTHER

## 2018-12-20 NOTE — PROGRESS NOTES
Patient ID: Salvador Mir is a 68 y o  male  HPI: 68 y o male presents for sophia visit after an admission to decompress his spinal cord in cervical area after falling  He did well with the surgery and is now home  He denies any problems with depression, appetite or sleep  He also denies any cervical pain or arm weakness  Its hard to get meds into him three times a day, so his son asked if we could change his dosing to bid  He denies any fatigue or dizziness at this time  TCM Call (since 11/19/2018)     Date and time call was made  12/11/2018 12:17 PM    Patient was hospitialized at  [facility]    Date of Admission  11/21/18    Date of discharge  12/06/18    Diagnosis  Cervical Myelopathy    Disposition  Home; Home health services    Were the patients medications reviewed and updated  Yes    Current Symptoms  Weakness      TCM Call (since 11/19/2018)     Scheduled for follow up? Yes    Patients specialists  Other (comment); Psychiatrist; Neurologist    Neurologist name  Mack Belle 1 month    Psychiatrist name  Fermin Taylor ! week -[facility]     Other specialists names  Neurosurgical Dr Kristyn Soto 1-8-19 1:45 6 week F/u     Other specialists contcat #  [address] -1 month    Did you obtain your prescribed medications  Yes    Do you need help managing your prescriptions or medications  Yes    Why type of assitance do you need  son just makes sure he is taking appropriate meds     Is transportation to your appointment needed  Yes    Specify why  unable to drive (son supplies Transportation)    I have advised the patient to call PCP with any new or worsening symptoms  M  [address] members    Support System  Family    The type of support provided  Emotional; Physical    Do you have social support  Yes, as much as I need    Are you recieving any outpatient services  Yes    What type of services  [facility]    Are you recieving home care services  Yes    Types of home care services  Home PT; Nurse visit; Other (comment)    Comment  OT and speech language Pathology    Are you using any community resources  No        SUBJECTIVE    Family History   Problem Relation Age of Onset    Diabetes type II Father     Glaucoma Son     Panic disorder Son      Social History     Social History    Marital status:      Spouse name: N/A    Number of children: N/A    Years of education: N/A     Occupational History    Not on file  Social History Main Topics    Smoking status: Never Smoker    Smokeless tobacco: Never Used    Alcohol use No    Drug use: No    Sexual activity: No     Other Topics Concern    Not on file     Social History Narrative    No narrative on file     Past Medical History:   Diagnosis Date    Anxiety     Chronic low back pain     Depression     Hypercholesterolemia     Hypertension     OCD (obsessive compulsive disorder)      Past Surgical History:   Procedure Laterality Date    CERVICAL FUSION Bilateral 11/19/2018    Procedure: Posterior cervical decompressive laminectomy and instrumented posterior lateral fusion fixation C3-6;  Surgeon: Holly Archuleta MD;  Location: [facility];  Service: Neurosurgery    HERNIA REPAIR       No Known Allergies    Current Outpatient Prescriptions:     acetaminophen (TYLENOL) 500 mg tablet, Take 2 tablets (1,000 mg total) by mouth 3 (three) times a day as needed for mild pain or moderate pain, Disp: 100 tablet, Rfl: 0    busPIRone (BUSPAR) 10 mg tablet, Take 1 tablet (10 mg total) by mouth 2 (two) times a day, Disp: 60 tablet, Rfl: 0    docusate sodium (COLACE) 100 mg capsule, Take 1 capsule (100 mg total) by mouth 2 (two) times a day Hold for loose bowel movements  , Disp: 60 capsule, Rfl: 0    gabapentin (NEURONTIN) 400 mg capsule, Take 1 capsule (400 mg total) by mouth 3 (three) times a day, Disp: 90 capsule, Rfl: 0    midodrine (PROAMATINE) 5 mg tablet, Take 1 tablet (5 mg total) by mouth 3 (three) times a day before meals, Disp: 90 tablet, Rfl: 0    mirtazapine (REMERON) 7 5 MG tablet, Take 1 tablet (7 5 mg total) by mouth daily at bedtime, Disp: 30 tablet, Rfl: 0    Multiple Vitamins-Minerals (MULTIVITAMIN MEN 50+) TABS, Take by mouth, Disp: , Rfl:     OLANZapine (ZyPREXA) 10 mg tablet, Take 1 tablet (10 mg total) by mouth daily at bedtime, Disp: 30 tablet, Rfl: 0    oxyCODONE (ROXICODONE) 5 mg immediate release tablet, Take 1 tablet (5 mg total) by mouth 3 (three) times a day as needed for severe pain for up to 30 doses Max Daily Amount: 15 mg, Disp: 30 tablet, Rfl: 0    polyethylene glycol (MIRALAX) 17 g packet, Take 17 g by mouth daily as needed (Constipation), Disp: 14 each, Rfl: 0    senna (SENOKOT) 8 6 mg, Take 2 tablets (17 2 mg total) by mouth daily at bedtime as needed for constipation, Disp: 30 each, Rfl: 0    sertraline (ZOLOFT) 100 mg tablet, Take 100 mg by mouth daily  , Disp: , Rfl:     Review of Systems  Constitutional:     Denies fever, chills ,fatigue ,weakness ,weight loss, weight gain     ENT: Denies earache ,loss of hearing ,nosebleed, nasal discharge,nasal congestion ,sore throat ,hoarseness  Pulmonary: Denies shortness of breath ,cough  ,dyspnea on exertion, orthopnea  ,PND   Cardiovascular:  Denies bradycardia , tachycardia  ,palpations, lower extremity edema leg, claudication  Breast:  Denies new or changing breast lumps ,nipple discharge ,nipple changes  Abdomen:  Denies abdominal pain , anorexia , indigestion, nausea, vomiting, constipation, diarrhea  Musculoskeletal: Denies myalgias, arthralgias, joint swelling, joint stiffness , limb pain, limb swelling  Gu: denies dysuria, polyuria  Skin: Denies skin rash, skin lesion, skin wound, itching, dry skin  Neuro: Denies headache, numbness, tingling, confusion, loss of consciousness, dizziness, vertigo  Psychiatric: Denies feelings of depression, suicidal ideation, anxiety, sleep disturbances    OBJECTIVE  /64   Pulse 68   Temp 98 5 °F (36 9 °C)   Ht 5' 7" (1 702 m)   Wt 78 1 kg (172 lb 3 2 oz)   BMI 26 97 kg/m²   Constitutional:   NAD, well appearing and well nourished      ENT:   Conjunctiva and lids: no injection, edema, or discharge     Pupils and iris: TOOTIE bilaterally    External inspection of ears and nose: normal without deformities or discharge  Otoscopic exam: Canals patent without erythema  Nasal mucosa, septum and turbinates: Normal or edema or discharge       Oropharynx:  Moist mucosa, normal tongue and tonsils without lesions  No erythema        Pulmonary:Respiratory effort normal rate and rhythm, no increased work of breathing  Auscultation of lungs:  Clear bilaterally with no adventitious breath sounds       Cardiovascular: regular rate and rhythm, S1 and S2, no murmur, no edema and/or varicosities of LE      Abdomen: Soft and non-distended     Positive bowel sounds      No heptomegaly or splenomegaly      Gu: no suprapubic tenderness or CVA tenderness, no urethral discharge  Lymphatic:  No anterior or posterior cervical lymphadenopathy         Musculoskeletal:  Gait and station: Normal gait      Digits and nails normal without clubbing or cyanosis       Inspection/palpation of joints, bones, and muscles:  No joint tenderness, swelling, full active and passive range of motion       Skin: Normal skin turgor and no rashes      Neuro:      Normal reflexes     Normal sensation    Psych:   alert and oriented to person, place and time     normal mood and affect       Assessment/Plan:Diagnoses and all orders for this visit:    Pain  -     gabapentin (NEURONTIN) 400 mg capsule; Take 1 capsule (400 mg total) by mouth 2 (two) times a day    Positional lightheadedness  -     midodrine (PROAMATINE) 5 mg tablet;  Take 1 5 tablets (7 5 mg total) by mouth 2 (two) times a day        Reviewed with patient plan to treat with above and will inform visiting nurses of the changes that were made      Patient instructed to call in 72 hours if not feeling better or if symptoms worsen

## 2018-12-26 ENCOUNTER — PATIENT OUTREACH (OUTPATIENT)
Dept: OTHER | Facility: HOSPITAL | Age: 73
End: 2018-12-26

## 2018-12-26 NOTE — PROGRESS NOTES
Patient discharged from [address] [place] [address] [facility]NA services on 12/19/18, neck incision is closed and patient is doing well  Will hand off to embedded Outpatient Care Manager to follow up

## 2018-12-27 ENCOUNTER — EPISODE CHANGES (OUTPATIENT)
Dept: CASE MANAGEMENT | Facility: OTHER | Age: 73
End: 2018-12-27

## 2018-12-28 ENCOUNTER — TELEPHONE (OUTPATIENT)
Dept: FAMILY MEDICINE CLINIC | Facility: CLINIC | Age: 73
End: 2018-12-28

## 2018-12-28 NOTE — TELEPHONE ENCOUNTER
Received call from [address] [place] [address] from [facility], she was calling with an update on his BP & Pain Level,   BP: 123/65  Pain Level: 3

## 2018-12-31 ENCOUNTER — PATIENT OUTREACH (OUTPATIENT)
Dept: FAMILY MEDICINE CLINIC | Facility: CLINIC | Age: 73
End: 2018-12-31

## 2019-01-07 ENCOUNTER — TELEPHONE (OUTPATIENT)
Dept: NEUROSURGERY | Facility: CLINIC | Age: 74
End: 2019-01-07

## 2019-01-07 ENCOUNTER — TELEPHONE (OUTPATIENT)
Dept: FAMILY MEDICINE CLINIC | Facility: CLINIC | Age: 74
End: 2019-01-07

## 2019-01-07 ENCOUNTER — HOSPITAL ENCOUNTER (OUTPATIENT)
Dept: RADIOLOGY | Facility: HOSPITAL | Age: 74
Discharge: HOME | End: 2019-01-07
Payer: MEDICARE

## 2019-01-07 DIAGNOSIS — M48.02 CERVICAL SPINAL STENOSIS: ICD-10-CM

## 2019-01-07 DIAGNOSIS — Z98.1 S/P CERVICAL SPINAL FUSION: ICD-10-CM

## 2019-01-07 DIAGNOSIS — R52 PAIN: ICD-10-CM

## 2019-01-07 DIAGNOSIS — M48.02 CERVICAL STENOSIS OF SPINE: ICD-10-CM

## 2019-01-07 DIAGNOSIS — R42 POSITIONAL LIGHTHEADEDNESS: ICD-10-CM

## 2019-01-07 DIAGNOSIS — Z98.890 POST-OPERATIVE STATE: ICD-10-CM

## 2019-01-07 DIAGNOSIS — G95.9 CERVICAL MYELOPATHY (HCC): ICD-10-CM

## 2019-01-07 PROCEDURE — 72040 X-RAY EXAM NECK SPINE 2-3 VW: CPT

## 2019-01-07 RX ORDER — MIDODRINE HYDROCHLORIDE 5 MG/1
7.5 TABLET ORAL 2 TIMES DAILY
Qty: 60 TABLET | Refills: 3
Start: 2019-01-07 | End: 2019-01-07 | Stop reason: SDUPTHER

## 2019-01-07 RX ORDER — MIDODRINE HYDROCHLORIDE 5 MG/1
7.5 TABLET ORAL 2 TIMES DAILY
Qty: 15 TABLET | Refills: 1 | Status: SHIPPED | OUTPATIENT
Start: 2019-01-07 | End: 2019-07-09 | Stop reason: SDUPTHER

## 2019-01-07 RX ORDER — MIDODRINE HYDROCHLORIDE 5 MG/1
7.5 TABLET ORAL 2 TIMES DAILY
Qty: 180 TABLET | Refills: 3 | Status: SHIPPED | OUTPATIENT
Start: 2019-01-07 | End: 2019-01-07 | Stop reason: SDUPTHER

## 2019-01-07 RX ORDER — GABAPENTIN 400 MG/1
400 CAPSULE ORAL 2 TIMES DAILY
Qty: 180 CAPSULE | Refills: 3 | Status: SHIPPED | OUTPATIENT
Start: 2019-01-07 | End: 2019-01-08 | Stop reason: SDUPTHER

## 2019-01-07 RX ORDER — MIDODRINE HYDROCHLORIDE 5 MG/1
7.5 TABLET ORAL 2 TIMES DAILY
Qty: 60 TABLET | Refills: 3 | Status: SHIPPED | OUTPATIENT
Start: 2019-01-07 | End: 2019-01-07 | Stop reason: SDUPTHER

## 2019-01-07 RX ORDER — GABAPENTIN 400 MG/1
400 CAPSULE ORAL 2 TIMES DAILY
Qty: 180 CAPSULE | Refills: 3 | Status: SHIPPED | OUTPATIENT
Start: 2019-01-07 | End: 2019-01-07 | Stop reason: SDUPTHER

## 2019-01-07 NOTE — TELEPHONE ENCOUNTER
Patients son called back again, Can we please call in Midodrine to [facility] today because he is out of the medication as of now   ( it got printed instead of sent to pharmacy)    Please send refills to [facility] for Midodrine 7 5mg and Gabapentin 400mg

## 2019-01-08 ENCOUNTER — OFFICE VISIT (OUTPATIENT)
Dept: NEUROSURGERY | Facility: CLINIC | Age: 74
End: 2019-01-08

## 2019-01-08 VITALS
RESPIRATION RATE: 16 BRPM | SYSTOLIC BLOOD PRESSURE: 120 MMHG | HEART RATE: 62 BPM | DIASTOLIC BLOOD PRESSURE: 54 MMHG | WEIGHT: 169 LBS | HEIGHT: 67 IN | TEMPERATURE: 96.6 F | BODY MASS INDEX: 26.53 KG/M2

## 2019-01-08 DIAGNOSIS — R42 POSITIONAL LIGHTHEADEDNESS: ICD-10-CM

## 2019-01-08 DIAGNOSIS — R52 PAIN: ICD-10-CM

## 2019-01-08 DIAGNOSIS — Z09 POSTOPERATIVE EXAMINATION: Primary | ICD-10-CM

## 2019-01-08 PROCEDURE — 99024 POSTOP FOLLOW-UP VISIT: CPT | Performed by: NEUROLOGICAL SURGERY

## 2019-01-08 RX ORDER — GABAPENTIN 400 MG/1
400 CAPSULE ORAL 2 TIMES DAILY
Qty: 60 CAPSULE | Refills: 0 | Status: SHIPPED | OUTPATIENT
Start: 2019-01-08 | End: 2019-02-19

## 2019-01-08 NOTE — LETTER
January 8, 2019     [facility], [address] [place] [address]  [id number]    Patient: Dominic Daly   YOB: 1945   Date of Visit: 1/8/2019       Dear Dr Fabian Barreto: Thank you for referring Dominic Daly to me for evaluation  Below are my notes for this consultation  If you have questions, please do not hesitate to call me  I look forward to following your patient along with you  Sincerely,        Markel Hyde MD        CC: No Recipients  Markel Hyde MD  1/8/2019  2:30 PM  Sign at close encounter  Neurosurgery Office Note  Dominic Daly is a 68 y o  male    Type of Visit: Post-op        Diagnoses and all orders for this visit:    Postoperative examination  -     XR spine cervical complete 4 or 5 vw non injury; Future  -     Ambulatory referral to Occupational Therapy; Future  -     Ambulatory referral to Physical Therapy; Future          DISCUSSION SUMMARY  20-year-old male status post a posterior cervical decompression and fixation fusion for severe spinal stenosis  He had central cord syndrome and had fallen from a myelopathy on multiple occasions  He sustained a spinal cord injury and has improved tremendously  He has continued need for physical therapy and occupational therapy specifically for hand movement and gait and balance training  I have taken the liberty of writing for both of these  With regards to his collar, I have recommended a slow taper over these next 6 weeks for complete removal of the collar decreasing its use by 2 hours per day per 2 weeks until off    Continued follow-up is important and I have taken liberty of writing for this to occur      CHIEF COMPLAINT  Patient presents for 6 week pov with C spine X-rays    [facility] NEUROSURGERY PROCEDURES  11/19/18 (DKO) Posterior cervical decompressive laminectomy and instrumented posterior lateral fusion fixation C3-6        HISTORY OF PRESENT ILLNESS  Patient has known history of susceptible to disease hypertension hyperlipidemia lumbar stenosis who presents to the hospital as a stroke alert   The patient was reportedly found face down by the son   After further review however, the patient was noted to have a cervical spine findings concerning for severe central canal stenosis with associated mass effect on cord see 3 -5 and C4-5   Patient had severe bilateral neural foraminal narrowing at C2-3 to C4-5   He eventually underwent the following: Posterior cervical decompressive laminectomy and instrumented posterior lateral fusion fixation fixation C3-5 and C4-5  REVIEW OF SYSTEMS  Review of Systems   Constitutional: Positive for activity change (using cervical collar)  HENT: Negative  Eyes: Negative  Respiratory: Negative  Cardiovascular: Negative  Gastrointestinal: Negative  Endocrine: Negative  Genitourinary: Negative  Musculoskeletal: Negative  Skin: Negative  Allergic/Immunologic: Negative  Neurological: Positive for weakness (hands)  Hematological: Negative  Psychiatric/Behavioral: Negative  All other systems reviewed and are negative        The patient's ROS was reviewed by MD FARRELL reviewed the ROS    Active Ambulatory Problems     Diagnosis Date Noted    Chronic idiopathic constipation 01/09/2017    OCD (obsessive compulsive disorder) 01/09/2017    Weakness 11/13/2018    Depression 11/14/2018    Anxiety 11/14/2018    Cervical spinal stenosis 11/15/2018    Cervical stenosis of spine 11/13/2018    Hyponatremia 11/16/2018    Cervical myelopathy ([address] ) 11/21/2018    S/P cervical spinal fusion 11/22/2018    Lumbar radiculopathy 11/22/2018    Chronic low back pain 11/22/2018    Pain 11/22/2018    Traumatic brain injury with loss of consciousness ([address] ) 11/22/2018    Hypertension 11/22/2018    History of hyperlipidemia 11/22/2018    Insomnia 11/22/2018    Lumbar stenosis 11/22/2018    Orthostatic hypotension 11/26/2018 Resolved Ambulatory Problems     Diagnosis Date Noted    No Resolved Ambulatory Problems     Past Medical History:   Diagnosis Date    Anxiety     Chronic low back pain     Depression     Hypercholesterolemia     Hypertension     OCD (obsessive compulsive disorder)        Past Surgical History:   Procedure Laterality Date    CERVICAL FUSION Bilateral 11/19/2018    Procedure: Posterior cervical decompressive laminectomy and instrumented posterior lateral fusion fixation C3-6;  Surgeon: Jaiden Doss MD;  [facility];  Service: Neurosurgery    HERNIA REPAIR         History   Smoking Status    Never Smoker   Smokeless Tobacco    Never Used       History   Alcohol Use No       History   Drug Use No       Vitals:    01/08/19 1323   BP: 120/54   BP Location: Right arm   Patient Position: Sitting   Cuff Size: Adult   Pulse: 62   Resp: 16   Temp: (!) 96 6 °F (35 9 °C)   TempSrc: Tympanic   Weight: 76 7 kg (169 lb)   Height: 5' 7" (1 702 m)         Current Outpatient Prescriptions:     acetaminophen (TYLENOL) 500 mg tablet, Take 2 tablets (1,000 mg total) by mouth 3 (three) times a day as needed for mild pain or moderate pain, Disp: 100 tablet, Rfl: 0    busPIRone (BUSPAR) 10 mg tablet, Take 1 tablet (10 mg total) by mouth 2 (two) times a day, Disp: 60 tablet, Rfl: 0    docusate sodium (COLACE) 100 mg capsule, Take 1 capsule (100 mg total) by mouth 2 (two) times a day Hold for loose bowel movements  , Disp: 60 capsule, Rfl: 0    gabapentin (NEURONTIN) 400 mg capsule, Take 1 capsule (400 mg total) by mouth 2 (two) times a day, Disp: 60 capsule, Rfl: 0    midodrine (PROAMATINE) 5 mg tablet, Take 1 5 tablets (7 5 mg total) by mouth 2 (two) times a day for 5 days, Disp: 15 tablet, Rfl: 1    mirtazapine (REMERON) 7 5 MG tablet, Take 1 tablet (7 5 mg total) by mouth daily at bedtime, Disp: 30 tablet, Rfl: 0    Multiple Vitamins-Minerals (MULTIVITAMIN MEN 50+) TABS, Take by mouth, Disp: , Rfl:    OLANZapine (ZyPREXA) 10 mg tablet, Take 1 tablet (10 mg total) by mouth daily at bedtime, Disp: 30 tablet, Rfl: 0    sertraline (ZOLOFT) 100 mg tablet, Take 100 mg by mouth daily  , Disp: , Rfl:     The following portions of the patient's history were reviewed by MD and updated by MA as appropriate: allergies, current medications, past family history, past medical history, past social history, past surgical history and problem list       Physical Exam  Awake and alert  His has a wide gait with a wandering baseline  He cannot perform tandem gait because of balance difficulties  He has diminished rapid opening of his hands with his right hand being worse than the left  Fine motor control of the hands is also severely diminished  His incision is clean and dry  I removed the drain stitch    RESULTS/DATA  X-rays of the cervical spine AP and lateral plane demonstrate the instrumentation to be in good position without signs of loosening

## 2019-01-08 NOTE — PROGRESS NOTES
Neurosurgery Office Note  Kirt Brito is a 68 y o  male    Type of Visit: Post-op        Diagnoses and all orders for this visit:    Postoperative examination  -     XR spine cervical complete 4 or 5 vw non injury; Future  -     Ambulatory referral to Occupational Therapy; Future  -     Ambulatory referral to Physical Therapy; Future          DISCUSSION SUMMARY  70-year-old male status post a posterior cervical decompression and fixation fusion for severe spinal stenosis  He had central cord syndrome and had fallen from a myelopathy on multiple occasions  He sustained a spinal cord injury and has improved tremendously  He has continued need for physical therapy and occupational therapy specifically for hand movement and gait and balance training  I have taken the liberty of writing for both of these  With regards to his collar, I have recommended a slow taper over these next 6 weeks for complete removal of the collar decreasing its use by 2 hours per day per 2 weeks until off    Continued follow-up is important and I have taken liberty of writing for this to occur      CHIEF COMPLAINT  Patient presents for 6 week pov with C spine X-rays    [facility] NEUROSURGERY PROCEDURES  11/19/18 (DKO) Posterior cervical decompressive laminectomy and instrumented posterior lateral fusion fixation C3-6        HISTORY OF PRESENT ILLNESS  Patient has known history of susceptible to disease hypertension hyperlipidemia lumbar stenosis who presents to the hospital as a stroke alert   The patient was reportedly found face down by the son   After further review however, the patient was noted to have a cervical spine findings concerning for severe central canal stenosis with associated mass effect on cord see 3 -5 and C4-5   Patient had severe bilateral neural foraminal narrowing at C2-3 to C4-5   He eventually underwent the following: Posterior cervical decompressive laminectomy and instrumented posterior lateral fusion fixation fixation C3-5 and C4-5  REVIEW OF SYSTEMS  Review of Systems   Constitutional: Positive for activity change (using cervical collar)  HENT: Negative  Eyes: Negative  Respiratory: Negative  Cardiovascular: Negative  Gastrointestinal: Negative  Endocrine: Negative  Genitourinary: Negative  Musculoskeletal: Negative  Skin: Negative  Allergic/Immunologic: Negative  Neurological: Positive for weakness (hands)  Hematological: Negative  Psychiatric/Behavioral: Negative  All other systems reviewed and are negative        The patient's ROS was reviewed by MD FARRELL reviewed the ROS    Active Ambulatory Problems     Diagnosis Date Noted    Chronic idiopathic constipation 01/09/2017    OCD (obsessive compulsive disorder) 01/09/2017    Weakness 11/13/2018    Depression 11/14/2018    Anxiety 11/14/2018    Cervical spinal stenosis 11/15/2018    Cervical stenosis of spine 11/13/2018    Hyponatremia 11/16/2018    Cervical myel[address] ([facility] ) 11/21/2018    S/P cervical spinal fusion 11/22/2018    Lumbar radiculopathy 11/22/2018    Chronic low back pain 11/22/2018    Pain 11/22/2018    Traumatic brain injury with loss of consciousness ([facility] [address] ) 11/22/2018    Hypertension 11/22/2018    History of hyperlipidemia 11/22/2018    Insomnia 11/22/2018    Lumbar stenosis 11/22/2018    Orthostatic hypotension 11/26/2018     Resolved Ambulatory Problems     Diagnosis Date Noted    No Resolved Ambulatory Problems     Past Medical History:   Diagnosis Date    Anxiety     Chronic low back pain     Depression     Hypercholesterolemia     Hypertension     OCD (obsessive compulsive disorder)        Past Surgical History:   Procedure Laterality Date    CERVICAL FUSION Bilateral 11/19/2018    Procedure: Posterior cervical decompressive laminectomy and instrumented posterior lateral fusion fixation C3-6;  Surgeon: Rocco Ortega MD;  [facility];  Service: Neurosurgery    HERNIA REPAIR         History   Smoking Status    Never Smoker   Smokeless Tobacco    Never Used       History   Alcohol Use No       History   Drug Use No       Vitals:    01/08/19 1323   BP: 120/54   BP Location: Right arm   Patient Position: Sitting   Cuff Size: Adult   Pulse: 62   Resp: 16   Temp: (!) 96 6 °F (35 9 °C)   TempSrc: Tympanic   Weight: 76 7 kg (169 lb)   Height: 5' 7" (1 702 m)         Current Outpatient Prescriptions:     acetaminophen (TYLENOL) 500 mg tablet, Take 2 tablets (1,000 mg total) by mouth 3 (three) times a day as needed for mild pain or moderate pain, Disp: 100 tablet, Rfl: 0    busPIRone (BUSPAR) 10 mg tablet, Take 1 tablet (10 mg total) by mouth 2 (two) times a day, Disp: 60 tablet, Rfl: 0    docusate sodium (COLACE) 100 mg capsule, Take 1 capsule (100 mg total) by mouth 2 (two) times a day Hold for loose bowel movements  , Disp: 60 capsule, Rfl: 0    gabapentin (NEURONTIN) 400 mg capsule, Take 1 capsule (400 mg total) by mouth 2 (two) times a day, Disp: 60 capsule, Rfl: 0    midodrine (PROAMATINE) 5 mg tablet, Take 1 5 tablets (7 5 mg total) by mouth 2 (two) times a day for 5 days, Disp: 15 tablet, Rfl: 1    mirtazapine (REMERON) 7 5 MG tablet, Take 1 tablet (7 5 mg total) by mouth daily at bedtime, Disp: 30 tablet, Rfl: 0    Multiple Vitamins-Minerals (MULTIVITAMIN MEN 50+) TABS, Take by mouth, Disp: , Rfl:     OLANZapine (ZyPREXA) 10 mg tablet, Take 1 tablet (10 mg total) by mouth daily at bedtime, Disp: 30 tablet, Rfl: 0    sertraline (ZOLOFT) 100 mg tablet, Take 100 mg by mouth daily  , Disp: , Rfl:     The following portions of the patient's history were reviewed by MD and updated by MA as appropriate: allergies, current medications, past family history, past medical history, past social history, past surgical history and problem list       Physical Exam  Awake and alert  His has a wide gait with a wandering baseline  He cannot perform tandem gait because of balance difficulties  He has diminished rapid opening of his hands with his right hand being worse than the left  Fine motor control of the hands is also severely diminished  His incision is clean and dry  I removed the drain stitch    RESULTS/DATA  X-rays of the cervical spine AP and lateral plane demonstrate the instrumentation to be in good position without signs of loosening

## 2019-01-08 NOTE — TELEPHONE ENCOUNTER
Will run out before mail order arrives, Also requesting Midodrine 7 5 BID but the last script states for 15 days, is this to be continued ? To [facility]

## 2019-01-10 ENCOUNTER — OFFICE VISIT (OUTPATIENT)
Dept: NEUROLOGY | Facility: CLINIC | Age: 74
End: 2019-01-10
Payer: MEDICARE

## 2019-01-10 VITALS
WEIGHT: 169.2 LBS | BODY MASS INDEX: 26.56 KG/M2 | HEART RATE: 71 BPM | DIASTOLIC BLOOD PRESSURE: 68 MMHG | SYSTOLIC BLOOD PRESSURE: 120 MMHG | HEIGHT: 67 IN

## 2019-01-10 DIAGNOSIS — R25.2 SPASTICITY: ICD-10-CM

## 2019-01-10 DIAGNOSIS — R29.898 WEAKNESS OF RIGHT ARM: ICD-10-CM

## 2019-01-10 DIAGNOSIS — S14.109S INJURY OF CERVICAL SPINAL CORD, SEQUELA (HCC): Primary | ICD-10-CM

## 2019-01-10 DIAGNOSIS — R26.81 GAIT INSTABILITY: ICD-10-CM

## 2019-01-10 PROCEDURE — 99215 OFFICE O/P EST HI 40 MIN: CPT | Performed by: PHYSICAL MEDICINE & REHABILITATION

## 2019-01-10 NOTE — PROGRESS NOTES
Physical Medicine & Rehabilitation New Patient Evaluation  Teodoro Alonso 68 y o  male      ASSESSMENT/PLAN:     68 y o  male with PMH of HTN, HL, OCD, MDD, SHAQUILLE, CLBP, lumbar stenosis, lumbar radiculopathy who apparently fell at home hitting his head with RUE weakness 11/13/18 prompting stroke alert/protocol   CT head and CTA head and negative were negative and patient received tPA   Patient was later noted to have some bilateral strength and sensory changes as well as ongoing imbalance and limb pain prompting additional imaging   MRI brain showed severe central canal stenosis with chronic mass effect on cord at C3-5 as well as severe bilateral NF narrowing from C2 to C5   NSGY was consulted who recommended surgical intervention and patient underwent C3-C6 posterior cervical decompressive laminectomy and posterior lateral fusion/fixation by Dr Vivi Morales on 11/19  Post-operatively patient began his rehabilitation at the [place] [address]  He was discharged home with home care  Patient presents today for follow-up after discharge from acute rehabilitation center  He is completing home therapy shortly  He has been cleared to participate in outpatient rehabilitation  I have rewritten his prescriptions as I strongly recommended patient participated in a neuro rehabilitation program due to his developing spasticity  Patient and son agreeable  Referral made to [address] [place] [address] site  Diagnoses and all orders for this visit:    Injury of cervical spinal cord, sequela ([facility] [address] )  -     Ambulatory referral to Physical Therapy; Future  -     Ambulatory referral to Occupational Therapy; Future    Gait instability  -     Ambulatory referral to Physical Therapy; Future    Spasticity  -     Ambulatory referral to Physical Therapy; Future  -     Ambulatory referral to Occupational Therapy; Future    Weakness of right arm  -     Ambulatory referral to Occupational Therapy;  Future      *I have spent 60  minutes with Patient and family today in which greater than 50% of this time was spent in counseling/coordination of care regarding Intructions for management, Patient and family education and Impressions  HPI:   Gracy Chris 68 y o  male right handed, with  has a past medical history of Anxiety; Chronic low back pain; Depression; Hypercholesterolemia; Hypertension; and OCD (obsessive compulsive disorder)  Old records were reviewed personally  68 y o  male with PMH of HTN, HL, OCD, MDD, SHAQUILLE, CLBP, lumbar stenosis, lumbar radiculopathy who apparently fell at home hitting his head with RUE weakness 11/13/18 prompting stroke alert/protocol   CT head and CTA head and negative were negative and patient received tPA   Patient was later noted to have some bilateral strength and sensory changes as well as ongoing imbalance and limb pain prompting additional imaging   MRI brain showed severe central canal stenosis with chronic mass effect on cord at C3-5 as well as severe bilateral NF narrowing from C2 to C5   NSGY was consulted who recommended surgical intervention and patient underwent C3-C6 posterior cervical decompressive laminectomy and posterior lateral fusion/fixation by Dr Denise Neff on 11/19  Post-operatively patient began his rehabilitation at the [place] [address]  He was discharged home with home care  Imaging: I personally reviewed pertinent imaging      SUBJECTIVE:  Patient presents today in the office for follow-up after his acute rehabilitation stay for cervical myelopathy/cervical spinal cord injury  Patient is accompanied by his son today  He ambulates with supervision into the office  Patient is completed home care therapies  He is seen Neurosurgery yesterday with cleared him for outpatient therapy  Patient is being weaned off his cervical collar additionally  Patient has developed weakness of his right arm as well as muscle tightness    Patient and his son report prior to his recent fall he had been falling and had bad balance  He is non compliant and refuses to use an assistive device but has both a cane and a walker  His son his install grab bars and railings all over the home  Postoperatively, patient continues to have mild gait instability but this has slightly improved per his report  Patient denies excessive pain  Denies any bowel or bladder incontinence  Denies any recent falls      Expanded Social History:  Patient lives with child in a single family home  Driving: No     Function:   Current Level of Function:   Supervision with transfers, ambulation without assistive device  Min assist with self-care     Review of Systems   Constitutional: Negative  HENT: Negative  Eyes: Negative  Respiratory: Negative  Cardiovascular: Negative  Gastrointestinal: Negative  Endocrine: Negative  Genitourinary: Negative  Musculoskeletal: Negative  Skin: Negative  Allergic/Immunologic: Negative  Neurological: Negative  Right arm weakness   Hematological: Negative  Psychiatric/Behavioral: Negative          OBJECTIVE:   /68 (BP Location: Left arm, Patient Position: Sitting, Cuff Size: Adult)   Pulse 71   Ht 5' 7" (1 702 m)   Wt 76 7 kg (169 lb 3 2 oz)   BMI 26 50 kg/m²      Physical Exam   Constitutional: He appears well-developed and well-nourished  HENT:   Head: Normocephalic and atraumatic  Eyes: Pupils are equal, round, and reactive to light  EOM are normal    Cardiovascular: Normal rate and regular rhythm  Pulmonary/Chest: Breath sounds normal  He has no wheezes  He has no rales  Abdominal: Soft  Bowel sounds are normal  He exhibits no distension  There is no tenderness  Skin: Skin is warm  Psychiatric: He has a normal mood and affect  Nursing note and vitals reviewed  Musculoskeletal:   Passive Range of Motion (PROM) and Victor Manuel Scale (CLEMENTE):  Revised Victor Manuel Tardieu Scale (RATS) Key:  0:  No increase in muscle tone    1:  Slight increase in tone manifested by a "catch" followed by release  2:  Mild increase in tone  3:  Moderate increase in tone  4:  Severe increase in tone  *: <10s clonus  **: >10s clonus  R1: Angle first catch  R2: End range of motion  Patient (seated or supine) for arm testing  Patient (seated or supine) for leg testing  Normal strength and tone left arm and leg  RIGHTPROM R1 RIGHT CLEMENTE  0-4 RIGHT PROM R2   ARM      Shoulder adductors:   0° to 180°  2    Elbow flexors:   150° to 0°  2-3    Elbow extensors:   0° to 150°  2    Forearm pronators:   90° P to 90° S  2-3    Wrist flexors:   90° F to 90° E  2    Finger flexors:  90° F to 0°  2    LEG      Hip flexors:  120° F to 30° E  0    Hip adductors:   30° Add to 50° Abd  0    Knee extensors:   0° to 135°  0    Knee flexors:   135° to 0°  0    Ankle plantar flexors:   50° PF to 20° DF  0    Ankle invertors:   40° Inv to 20° Ev  0      Neuro:  AAOx3  Gait:   Unsteady gait with slow gait speed and postural instability  Patient is unable to tandem walk due to loss of balance  Right upper extremity with flexor spastic tone  Motor Exam:   Right Left Site Right Left Site   3 5 S Ab:  Shoulder Abductors 4 4 HF:  Hip Flexors   4- 5 EF:  Elbow Flexors 4 4 H Ab:   Hip Abductors   3 4 EE:  Elbow Extensors 4 4 KF: Knee Flexors   1 5 WE:  Wrist Extensors 4 4 KE:  Knee Extensors   4 5 FF:  Finger Flexors 4 4 DR:  Dorsi Flexors   3  HI:  Hand Intrinsics 4 4 EHL: Ext Cm Longus   4 5  4 4 PF:  Plantar Flexors         Labs:   Lab Results   Component Value Date    WBC 7 18 12/03/2018    HGB 11 2 (L) 12/03/2018    HCT 34 7 (L) 12/03/2018    MCV 95 12/03/2018     12/03/2018     Lab Results   Component Value Date    CALCIUM 9 7 12/03/2018    K 4 2 12/03/2018    CO2 29 12/03/2018     12/03/2018    BUN 10 12/03/2018    CREATININE 0 71 12/03/2018         Past Medical History:   Diagnosis Date    Anxiety     Chronic low back pain     Depression     Hypercholesterolemia     Hypertension     OCD (obsessive compulsive disorder)        Patient Active Problem List    Diagnosis Date Noted    Orthostatic hypotension 11/26/2018    S/P cervical spinal fusion 11/22/2018    Lumbar radiculopathy 11/22/2018    Chronic low back pain 11/22/2018    Pain 11/22/2018    Traumatic brain injury with loss of consciousness ([facility] [address] ) 11/22/2018    Hypertension 11/22/2018    History of hyperlipidemia 11/22/2018    Insomnia 11/22/2018    Lumbar stenosis 11/22/2018    Cervical myelopathy ([facility]) 11/21/2018    Hyponatremia 11/16/2018    Cervical spinal stenosis 11/15/2018    Depression 11/14/2018    Anxiety 11/14/2018    Weakness 11/13/2018    Cervical stenosis of spine 11/13/2018    Chronic idiopathic constipation 01/09/2017    OCD (obsessive compulsive disorder) 01/09/2017       Past Surgical History:   Procedure Laterality Date    CERVICAL FUSION Bilateral 11/19/2018    Procedure: Posterior cervical decompressive laminectomy and instrumented posterior lateral fusion fixation C3-6;  Surgeon: Wallace Hdz MD;  [facility];  Service: Neurosurgery    HERNIA REPAIR         Family History   Problem Relation Age of Onset    Diabetes type II Father     Glaucoma Son     Panic disorder Son        Social History     No Known Allergies      Current Outpatient Prescriptions:     acetaminophen (TYLENOL) 500 mg tablet, Take 2 tablets (1,000 mg total) by mouth 3 (three) times a day as needed for mild pain or moderate pain, Disp: 100 tablet, Rfl: 0    busPIRone (BUSPAR) 10 mg tablet, Take 1 tablet (10 mg total) by mouth 2 (two) times a day, Disp: 60 tablet, Rfl: 0    docusate sodium (COLACE) 100 mg capsule, Take 1 capsule (100 mg total) by mouth 2 (two) times a day Hold for loose bowel movements   (Patient taking differently: Take 100 mg by mouth daily Hold for loose bowel movements  ), Disp: 60 capsule, Rfl: 0    gabapentin (NEURONTIN) 400 mg capsule, Take 1 capsule (400 mg total) by mouth 2 (two) times a day, Disp: 60 capsule, Rfl: 0    midodrine (PROAMATINE) 5 mg tablet, Take 1 5 tablets (7 5 mg total) by mouth 2 (two) times a day for 5 days, Disp: 15 tablet, Rfl: 1    mirtazapine (REMERON) 7 5 MG tablet, Take 1 tablet (7 5 mg total) by mouth daily at bedtime, Disp: 30 tablet, Rfl: 0    Multiple Vitamins-Minerals (MULTIVITAMIN MEN 50+) TABS, Take by mouth, Disp: , Rfl:     OLANZapine (ZyPREXA) 10 mg tablet, Take 1 tablet (10 mg total) by mouth daily at bedtime, Disp: 30 tablet, Rfl: 0    sertraline (ZOLOFT) 100 mg tablet, Take 100 mg by mouth daily  , Disp: , Rfl:

## 2019-02-15 ENCOUNTER — TELEPHONE (OUTPATIENT)
Dept: NEUROSURGERY | Facility: CLINIC | Age: 74
End: 2019-02-15

## 2019-02-18 ENCOUNTER — PATIENT OUTREACH (OUTPATIENT)
Dept: FAMILY MEDICINE CLINIC | Facility: CLINIC | Age: 74
End: 2019-02-18

## 2019-02-18 ENCOUNTER — HOSPITAL ENCOUNTER (OUTPATIENT)
Dept: RADIOLOGY | Facility: HOSPITAL | Age: 74
Discharge: HOME | End: 2019-02-18
Attending: NEUROLOGICAL SURGERY
Payer: MEDICARE

## 2019-02-18 DIAGNOSIS — Z09 POSTOPERATIVE EXAMINATION: ICD-10-CM

## 2019-02-18 PROCEDURE — 72040 X-RAY EXAM NECK SPINE 2-3 VW: CPT

## 2019-02-19 ENCOUNTER — OFFICE VISIT (OUTPATIENT)
Dept: NEUROSURGERY | Facility: CLINIC | Age: 74
End: 2019-02-19
Payer: MEDICARE

## 2019-02-19 VITALS
TEMPERATURE: 97.2 F | HEART RATE: 71 BPM | RESPIRATION RATE: 16 BRPM | DIASTOLIC BLOOD PRESSURE: 73 MMHG | BODY MASS INDEX: 24.64 KG/M2 | WEIGHT: 157 LBS | HEIGHT: 67 IN | SYSTOLIC BLOOD PRESSURE: 117 MMHG

## 2019-02-19 DIAGNOSIS — M48.02 CERVICAL SPINAL STENOSIS: Primary | ICD-10-CM

## 2019-02-19 DIAGNOSIS — G89.29 CHRONIC MIDLINE LOW BACK PAIN WITHOUT SCIATICA: ICD-10-CM

## 2019-02-19 DIAGNOSIS — M54.50 CHRONIC MIDLINE LOW BACK PAIN WITHOUT SCIATICA: ICD-10-CM

## 2019-02-19 DIAGNOSIS — Z98.1 S/P CERVICAL SPINAL FUSION: ICD-10-CM

## 2019-02-19 PROCEDURE — 99213 OFFICE O/P EST LOW 20 MIN: CPT | Performed by: NEUROLOGICAL SURGERY

## 2019-02-19 RX ORDER — MIRTAZAPINE 15 MG/1
TABLET, FILM COATED ORAL
Status: ON HOLD | COMMUNITY
Start: 2019-01-22 | End: 2020-03-01 | Stop reason: SDUPTHER

## 2019-02-19 NOTE — PROGRESS NOTES
Neurosurgery Office Note  Salvador Mir is a 76 y o  male    Type of Visit: Follow-up        Diagnoses and all orders for this visit:    Cervical spinal stenosis    S/P cervical spinal fusion    Chronic midline low back pain without sciatica  -     Ambulatory referral to Physical Therapy; Future    Other orders  -     mirtazapine (REMERON) 15 mg tablet          DISCUSSION SUMMARY  58-year-old male status post decompressive cervical laminectomy and fixation fusion who comes back for routine follow-up  Imaging studies demonstrate the instrumentation is in good position without loosening or movement  The patient has no neck pain  He did complain of back pain today and this was evaluated thoroughly  He does have an abnormal station with a hyper extended LS spine posture  In addition to this he has severe tightness of the hamstrings when attempting to flex forward  I recommended that he go through physical therapy to try to improve his gait and station as well  CHIEF COMPLAINT  Patient presents for routine 6 week follow up with Cspine Xrays after PT/OT  [facility] PROCEDURES  11/19/18 DKO: Posterior cervical decompressive laminectomy and instrumented posterior lateral fusion fixation C3-6  HISTORY OF PRESENT ILLNESS  Patient has known history of susceptible to disease hypertension hyperlipidemia lumbar stenosis who presents to the hospital as a stroke alert  The patient was reportedly found face down by the son  After further review however, the patient was noted to have a cervical spine findings concerning for severe central canal stenosis with associated mass effect on cord see 3 -5 and C4-5  Patient had severe bilateral neural foraminal narrowing at C2-3 to C4-5  He eventually underwent the following: Posterior cervical decompressive laminectomy and instrumented posterior lateral fusion fixation fixation C3-5 and C4-5    Complains of back pain  He does not relate radiculopathic pain but pain in the hamstrings when trying to bend over from tightness  He denies neck pain  Says that his hands are cold but he is concerned that this is a long-term problem  This may be primarily from the spinal cord or maybe another issue such as Raynaud's phenomenon  He is hyper focused on having bowel movements and changes is diet and avoids protein because of this    REVIEW OF SYSTEMS  Review of Systems   Constitutional: Negative  HENT: Negative  Eyes: Negative  Respiratory: Negative  Cardiovascular: Negative  Gastrointestinal: Negative  Endocrine: Negative  Genitourinary: Negative  Musculoskeletal: Negative  Skin: Negative  Allergic/Immunologic: Negative  Neurological: Negative  Hematological: Negative  Psychiatric/Behavioral: Negative  All other systems reviewed and are negative        I reviewed the ROS    MEDICAL HISTORY  Active Ambulatory Problems     Diagnosis Date Noted    Chronic idiopathic constipation 01/09/2017    OCD (obsessive compulsive disorder) 01/09/2017    Weakness 11/13/2018    Depression 11/14/2018    Anxiety 11/14/2018    Cervical spinal stenosis 11/15/2018    Cervical stenosis of spine 11/13/2018    Hyponatremia 11/16/2018    Cervical myelopathy (HCC) 11/21/2018    S/P cervical spinal fusion 11/22/2018    Lumbar radiculopathy 11/22/2018    Chronic low back pain 11/22/2018    Pain 11/22/2018    Traumatic brain injury with loss of consciousness ([facility] [address] ) 11/22/2018    Hypertension 11/22/2018    History of hyperlipidemia 11/22/2018    Insomnia 11/22/2018    Lumbar stenosis 11/22/2018    Orthostatic hypotension 11/26/2018     Resolved Ambulatory Problems     Diagnosis Date Noted    No Resolved Ambulatory Problems     Past Medical History:   Diagnosis Date    Anxiety     Chronic low back pain     Depression     Hypercholesterolemia     Hypertension     OCD (obsessive compulsive disorder)        Past Surgical History:   Procedure Laterality Date    CERVICAL FUSION Bilateral 11/19/2018    Procedure: Posterior cervical decompressive laminectomy and instrumented posterior lateral fusion fixation C3-6;  Surgeon: Torsten Luevano MD;  [facility];  Service: Neurosurgery    HERNIA REPAIR         Social History     Tobacco Use   Smoking Status Never Smoker   Smokeless Tobacco Never Used       Social History     Substance and Sexual Activity   Alcohol Use No       Social History     Substance and Sexual Activity   Drug Use No       Vitals:    02/19/19 1316   BP: 117/73   BP Location: Right arm   Patient Position: Sitting   Cuff Size: Adult   Pulse: 71   Resp: 16   Temp: (!) 97 2 °F (36 2 °C)   TempSrc: Tympanic   Weight: 71 2 kg (157 lb)   Height: 5' 7" (1 702 m)         Current Outpatient Medications:     busPIRone (BUSPAR) 10 mg tablet, Take 1 tablet (10 mg total) by mouth 2 (two) times a day, Disp: 60 tablet, Rfl: 0    docusate sodium (COLACE) 100 mg capsule, Take 1 capsule (100 mg total) by mouth 2 (two) times a day Hold for loose bowel movements   (Patient taking differently: Take 100 mg by mouth daily Hold for loose bowel movements  ), Disp: 60 capsule, Rfl: 0    mirtazapine (REMERON) 15 mg tablet, , Disp: , Rfl:     Multiple Vitamins-Minerals (MULTIVITAMIN MEN 50+) TABS, Take by mouth, Disp: , Rfl:     OLANZapine (ZyPREXA) 10 mg tablet, Take 1 tablet (10 mg total) by mouth daily at bedtime, Disp: 30 tablet, Rfl: 0    sertraline (ZOLOFT) 100 mg tablet, Take 100 mg by mouth daily  , Disp: , Rfl:     midodrine (PROAMATINE) 5 mg tablet, Take 1 5 tablets (7 5 mg total) by mouth 2 (two) times a day for 5 days, Disp: 15 tablet, Rfl: 1     The following portions of the patient's history were reviewed by MD and updated by MA as appropriate: allergies, current medications, past family history, past medical history, past social history, past surgical history and problem list       Physical Exam  Awake and alert  Incision is well-healed  His neck is in midline position  There is atrophy and thinness and tightness of the skin of his hands bilaterally  His hands are very cold  His station and gait demonstrate a retro pulse hyper extended LS spine position with a wide-based gait  He has a shuffling turned around  His flexion is at the LS spine is approximately 90°  His extension at the LS spine is approximately 20°  His normal station is in a hyperextended position  There is no pain to palpation of the LS spine  He does complain of tightness and pain in the hamstrings when bending forward  RESULTS/DATA  X-rays of the cervical spine demonstrate the instrumentation to be in adequate position without signs of loosening or breakage  There is sign of fusion  The MRI preoperatively performed demonstrates spinal stenosis in the region of the fusion

## 2019-02-19 NOTE — PATIENT INSTRUCTIONS
Considered taking the B complex vitamins to aid in spinal cord rehabilitation  Physical therapy of the cervical spine as well as the lumbosacral spine for range of motion and strengthening as well as gait and balance training will be very important for you going forward  I agree with the use of private gym clubs 2 of I had a sustained environment for exercise and range of motion      Consider adding protein to your diet

## 2019-02-25 ENCOUNTER — TELEPHONE (OUTPATIENT)
Dept: FAMILY MEDICINE CLINIC | Facility: CLINIC | Age: 74
End: 2019-02-25

## 2019-02-25 NOTE — TELEPHONE ENCOUNTER
David with you or nurse?     About 1 week , has urine urgency, cant urinate , no pn, no burning, when he does urinate its a normal flow, doesn't trickle ,

## 2019-02-26 ENCOUNTER — TELEPHONE (OUTPATIENT)
Dept: FAMILY MEDICINE CLINIC | Facility: CLINIC | Age: 74
End: 2019-02-26

## 2019-02-26 ENCOUNTER — CLINICAL SUPPORT (OUTPATIENT)
Dept: FAMILY MEDICINE CLINIC | Facility: CLINIC | Age: 74
End: 2019-02-26
Payer: MEDICARE

## 2019-02-26 DIAGNOSIS — R39.198 DIFFICULTY URINATING: Primary | ICD-10-CM

## 2019-02-26 DIAGNOSIS — R33.9 URINARY RETENTION: Primary | ICD-10-CM

## 2019-02-26 LAB
SL AMB  POCT GLUCOSE, UA: NORMAL
SL AMB LEUKOCYTE ESTERASE,UA: NORMAL
SL AMB POCT BILIRUBIN,UA: NORMAL
SL AMB POCT BLOOD,UA: NORMAL
SL AMB POCT CLARITY,UA: CLEAR
SL AMB POCT COLOR,UA: YELLOW
SL AMB POCT KETONES,UA: NORMAL
SL AMB POCT NITRITE,UA: NORMAL
SL AMB POCT PH,UA: 6.5
SL AMB POCT SPECIFIC GRAVITY,UA: 1.01
SL AMB POCT URINE PROTEIN: NORMAL
SL AMB POCT UROBILINOGEN: 0.2

## 2019-02-26 PROCEDURE — 81002 URINALYSIS NONAUTO W/O SCOPE: CPT | Performed by: FAMILY MEDICINE

## 2019-02-26 NOTE — TELEPHONE ENCOUNTER
Referral for urology put in Pt's chart and Pt's brother aware and # to urology was also given in case they do not hear from them so they can call and schedule appoint

## 2019-02-26 NOTE — TELEPHONE ENCOUNTER
Pt's brother brought in urine sample and everything was Neg however he has been having difficulty urinating for several days ,what is the next step

## 2019-02-26 NOTE — PROGRESS NOTES
Pt was here yesterday and was unable to urinate ,he was given a cup to take home an brought in a specimen today ,however it was clear with no problems ,no blood,no leauk ,but his brother said he has been having difficult urinating for a few days

## 2019-04-30 ENCOUNTER — TELEPHONE (OUTPATIENT)
Dept: OTHER | Facility: OTHER | Age: 74
End: 2019-04-30

## 2019-05-16 ENCOUNTER — TELEPHONE (OUTPATIENT)
Dept: FAMILY MEDICINE CLINIC | Facility: CLINIC | Age: 74
End: 2019-05-16

## 2019-07-09 DIAGNOSIS — R42 POSITIONAL LIGHTHEADEDNESS: ICD-10-CM

## 2019-07-09 RX ORDER — MIDODRINE HYDROCHLORIDE 5 MG/1
7.5 TABLET ORAL 2 TIMES DAILY
Qty: 270 TABLET | Refills: 1 | Status: SHIPPED | OUTPATIENT
Start: 2019-07-09 | End: 2020-01-07

## 2019-07-11 ENCOUNTER — OFFICE VISIT (OUTPATIENT)
Dept: FAMILY MEDICINE CLINIC | Facility: CLINIC | Age: 74
End: 2019-07-11
Payer: MEDICARE

## 2019-07-11 VITALS
DIASTOLIC BLOOD PRESSURE: 68 MMHG | BODY MASS INDEX: 24.8 KG/M2 | TEMPERATURE: 98.5 F | HEIGHT: 67 IN | HEART RATE: 74 BPM | SYSTOLIC BLOOD PRESSURE: 112 MMHG | WEIGHT: 158 LBS

## 2019-07-11 DIAGNOSIS — I10 ESSENTIAL HYPERTENSION: ICD-10-CM

## 2019-07-11 DIAGNOSIS — E78.00 HYPERCHOLESTEROLEMIA: ICD-10-CM

## 2019-07-11 DIAGNOSIS — N40.0 BENIGN PROSTATIC HYPERPLASIA WITHOUT LOWER URINARY TRACT SYMPTOMS: ICD-10-CM

## 2019-07-11 DIAGNOSIS — M54.16 LUMBAR RADICULOPATHY: ICD-10-CM

## 2019-07-11 DIAGNOSIS — R53.83 OTHER FATIGUE: ICD-10-CM

## 2019-07-11 DIAGNOSIS — L30.9 ECZEMA, UNSPECIFIED TYPE: Primary | ICD-10-CM

## 2019-07-11 PROCEDURE — 99214 OFFICE O/P EST MOD 30 MIN: CPT | Performed by: FAMILY MEDICINE

## 2019-07-11 RX ORDER — PREDNISONE 10 MG/1
TABLET ORAL
Qty: 26 TABLET | Refills: 0 | Status: SHIPPED | OUTPATIENT
Start: 2019-07-11 | End: 2019-08-20 | Stop reason: ALTCHOICE

## 2019-07-12 NOTE — PROGRESS NOTES
Patient ID: Jadiel Balbuena is a 76 y o  male  HPI: 76 y o male presents for follow up of lumbar radiculopathy, hypertension , depression and complains of worsening eczema on arms and legs        SUBJECTIVE    Family History   Problem Relation Age of Onset    Diabetes type II Father     Glaucoma Son     Panic disorder Son      Social History     Socioeconomic History    Marital status:      Spouse name: Not on file    Number of children: Not on file    Years of education: Not on file    Highest education level: Not on file   Occupational History    Not on file   Social Needs    Financial resource strain: Not on file    Food insecurity:     Worry: Not on file     Inability: Not on file    Transportation needs:     Medical: Not on file     Non-medical: Not on file   Tobacco Use    Smoking status: Never Smoker    Smokeless tobacco: Never Used   Substance and Sexual Activity    Alcohol use: No    Drug use: No    Sexual activity: Never   Lifestyle    Physical activity:     Days per week: Not on file     Minutes per session: Not on file    Stress: Not on file   Relationships    Social connections:     Talks on phone: Not on file     Gets together: Not on file     Attends Zoroastrian service: Not on file     Active member of club or organization: Not on file     Attends meetings of clubs or organizations: Not on file     Relationship status: Not on file    Intimate partner violence:     Fear of current or ex partner: Not on file     Emotionally abused: Not on file     Physically abused: Not on file     Forced sexual activity: Not on file   Other Topics Concern    Not on file   Social History Narrative    Not on file     Past Medical History:   Diagnosis Date    Anxiety     Chronic low back pain     Depression     Hypercholesterolemia     Hypertension     OCD (obsessive compulsive disorder)      Past Surgical History:   Procedure Laterality Date    CERVICAL FUSION Bilateral 11/19/2018 Procedure: Posterior cervical decompressive laminectomy and instrumented posterior lateral fusion fixation C3-6;  Surgeon: Flora Stanford MD;  [facility];  Service: Neurosurgery    HERNIA REPAIR       No Known Allergies    Current Outpatient Medications:     busPIRone (BUSPAR) 10 mg tablet, Take 1 tablet (10 mg total) by mouth 2 (two) times a day, Disp: 60 tablet, Rfl: 0    docusate sodium (COLACE) 100 mg capsule, Take 1 capsule (100 mg total) by mouth 2 (two) times a day Hold for loose bowel movements   (Patient taking differently: Take 100 mg by mouth daily Hold for loose bowel movements  ), Disp: 60 capsule, Rfl: 0    midodrine (PROAMATINE) 5 mg tablet, Take 1 5 tablets (7 5 mg total) by mouth 2 (two) times a day for 5 days, Disp: 270 tablet, Rfl: 1    mirtazapine (REMERON) 15 mg tablet, , Disp: , Rfl:     Multiple Vitamins-Minerals (MULTIVITAMIN MEN 50+) TABS, Take by mouth, Disp: , Rfl:     OLANZapine (ZyPREXA) 10 mg tablet, Take 1 tablet (10 mg total) by mouth daily at bedtime, Disp: 30 tablet, Rfl: 0    sertraline (ZOLOFT) 100 mg tablet, Take 100 mg by mouth daily  , Disp: , Rfl:     predniSONE 10 mg tablet, 3 tabs po bid x2 days, then 2 tabs po bid x2 days, then 1 tab bid x2 days, then 1 daily until done , Disp: 26 tablet, Rfl: 0    Review of Systems  Constitutional:     Denies fever, chills ,fatigue ,weakness ,weight loss, weight gain     ENT: Denies earache ,loss of hearing ,nosebleed, nasal discharge,nasal congestion ,sore throat ,hoarseness  Pulmonary: Denies shortness of breath ,cough  ,dyspnea on exertion, orthopnea  ,PND   Cardiovascular:  Denies bradycardia , tachycardia  ,palpations, lower extremity edema leg, claudication  Breast:  Denies new or changing breast lumps ,nipple discharge ,nipple changes  Abdomen:  Denies abdominal pain , anorexia , indigestion, nausea, vomiting, constipation, diarrhea  Musculoskeletal: Denies myalgias,  joint swelling, joint stiffness ,limb swelling+ lumbar back pain with radiation intermittently down both legs  Gu: denies dysuria, polyuria  Skin: Denies skin rash, skin lesion, skin wound,+ itching, dry skin; scaling on arms and legs and large red, patches on arms and legs  Neuro: Denies headache, numbness, tingling, confusion, loss of consciousness, dizziness, vertigo  Psychiatric: Denies feelings of depression, suicidal ideation, anxiety, sleep disturbances    OBJECTIVE  /68   Pulse 74   Temp 98 5 °F (36 9 °C)   Ht 5' 7" (1 702 m)   Wt 71 7 kg (158 lb)   BMI 24 75 kg/m²   Constitutional:   NAD, well appearing and well nourished      ENT:   Conjunctiva and lids: no injection, edema, or discharge     Pupils and iris: TOOTIE bilaterally    External inspection of ears and nose: normal without deformities or discharge  Otoscopic exam: Canals patent without erythema  Nasal mucosa, septum and turbinates: Normal or edema or discharge         Oropharynx:  Moist mucosa, normal tongue and tonsils without lesions  No erythema        Pulmonary:Respiratory effort normal rate and rhythm, no increased work of breathing  Auscultation of lungs:  Clear bilaterally with no adventitious breath sounds       Cardiovascular: regular rate and rhythm, S1 and S2, no murmur, no edema and/or varicosities of LE      Abdomen: Soft and non-distended     Positive bowel sounds      No heptomegaly or splenomegaly      Gu: no suprapubic tenderness or CVA tenderness, no urethral discharge  Lymphatic:  No anterior or posterior cervical lymphadenopathy         Musculoskeletal:  Gait and station: Normal gait      Digits and nails normal without clubbing or cyanosis       Inspection/palpation of joints, bones, and muscles:  No joint tenderness, swelling, full active and passive range of motion       Skin: Normal skin turgor and erythematous, scaling patches on right forearm and bilateral anterior thigh areas         Neuro:    Normal reflexes     Psych:   alert and oriented to person, place and time     normal mood and affect       Assessment/Plan:Diagnoses and all orders for this visit:    Eczema, unspecified type  -     predniSONE 10 mg tablet; 3 tabs po bid x2 days, then 2 tabs po bid x2 days, then 1 tab bid x2 days, then 1 daily until done  Lumbar radiculopathy  -     Ambulatory referral to Pain Management; Future    Essential hypertension  -     Comprehensive metabolic panel; Future    Benign prostatic hyperplasia without lower urinary tract symptoms  -     PSA Total, Diagnostic; Future    Hypercholesterolemia  -     Lipid Panel with Direct LDL reflex; Future    Other fatigue  -     CBC and Platelet; Future  -     TSH, 3rd generation; Future        Reviewed with patient plan to treat with above plan      Patient instructed to call in 72 hours if not feeling better or if symptoms worsen

## 2019-07-13 ENCOUNTER — TELEPHONE (OUTPATIENT)
Dept: OTHER | Facility: OTHER | Age: 74
End: 2019-07-13

## 2019-07-13 NOTE — TELEPHONE ENCOUNTER
Steve Aguilar 1945  CONFIDENTIALTY NOTICE: This fax transmission is intended only for the addressee  It contains information that is legally privileged,  confidential or otherwise protected from use or disclosure  If you are not the intended recipient, you are strictly prohibited from reviewing,  disclosing, copying using or disseminating any of this information or taking any action in reliance on or regarding this information  If you have  received this fax in error, please notify us immediately by telephone so that we can arrange for its return to us  Page:   Call Id: [id number]  Health Call  Standard Call Report  Health Call  Patient Name: Steve Aguilar  Gender: Male  : 1945  Age: 76 Y 11 M 3 D  Return Phone  Number: [phone] (Home)  Address: [address] [place] [address]  City/State/Zip: [address] [place] [address]  Practice Name: [address] [place] [address]  Practice Charged:  Physician:  [address] [place] [address] Name: Austin Sexton  Relationship To  Patient: Son  Return Phone Number: [phone] (Home)  Presenting Problem: "I contacted the office to refill my  dad's BP medication and they sent it  to a mail order pharmacy however  they PO Box where it was shipped to  is only open Mon-Fri  I would need  a short prescription to get to Monday  until the PO Box is open  The script  can be sent to [facility] on [facility] in  [place] "  Service Type: Triage  Charged Service 1: Messages  Pharmacy Name and  Number:  Nurse Assessment  Protocols  Protocol Title Nurse Date/Time  Disp  Time Disposition Final User  2019 4:50:34 PM Send to [address] [place] [address] BENJAMÍN Farley Annette  Comments  User: Denise Short Date/Time: 2019 5:59:00 PM  Son states that it is all taken care of now  No further action is needed

## 2019-08-19 ENCOUNTER — TELEPHONE (OUTPATIENT)
Dept: FAMILY MEDICINE CLINIC | Facility: CLINIC | Age: 74
End: 2019-08-19

## 2019-08-19 NOTE — TELEPHONE ENCOUNTER
Yesterday morning he found his dad on the floor   He said he thinks he passed out but didn't remember  He said he felt dizzy but doesn't remember how he got on the floor  He was awake and not hurt when he found him  He didn't want to go to ER wanted to come in to see you about his BP     Can you see him tomorrow?

## 2019-08-20 ENCOUNTER — OFFICE VISIT (OUTPATIENT)
Dept: FAMILY MEDICINE CLINIC | Facility: CLINIC | Age: 74
End: 2019-08-20
Payer: MEDICARE

## 2019-08-20 VITALS
TEMPERATURE: 98.3 F | HEART RATE: 74 BPM | DIASTOLIC BLOOD PRESSURE: 68 MMHG | WEIGHT: 153 LBS | SYSTOLIC BLOOD PRESSURE: 100 MMHG | HEIGHT: 67 IN | BODY MASS INDEX: 24.01 KG/M2

## 2019-08-20 DIAGNOSIS — R55 SYNCOPE, UNSPECIFIED SYNCOPE TYPE: Primary | ICD-10-CM

## 2019-08-20 PROCEDURE — 99213 OFFICE O/P EST LOW 20 MIN: CPT | Performed by: FAMILY MEDICINE

## 2019-08-20 NOTE — PROGRESS NOTES
The patient has a history of falls  I did complete a risk assessment for falls  A plan of care for falls was documented  Pt is in process of undrergoing a work up for syncope, but I spoke with pt and son at length regarding the fact that if work up is negative; pt would benefit from vestibular therapy and gait training for stability  Patient ID: Juan Carlos Bean is a 76 y o  male  HPI: 76 y o male presents after experiencing a syncopal episode 2 days ago when he was sitting at the breakfast table eating his cereal when he suddenly fell  He denies any chest pain, dyspnea, or visual changes  He told his son he was ,"dizzy," but has not been since  His son denies any slurred speech        SUBJECTIVE    Family History   Problem Relation Age of Onset    Diabetes type II Father     Glaucoma Son     Panic disorder Son      Social History     Socioeconomic History    Marital status:      Spouse name: Not on file    Number of children: Not on file    Years of education: Not on file    Highest education level: Not on file   Occupational History    Not on file   Social Needs    Financial resource strain: Not on file    Food insecurity:     Worry: Not on file     Inability: Not on file    Transportation needs:     Medical: Not on file     Non-medical: Not on file   Tobacco Use    Smoking status: Never Smoker    Smokeless tobacco: Never Used   Substance and Sexual Activity    Alcohol use: No    Drug use: No    Sexual activity: Never   Lifestyle    Physical activity:     Days per week: Not on file     Minutes per session: Not on file    Stress: Not on file   Relationships    Social connections:     Talks on phone: Not on file     Gets together: Not on file     Attends Rastafari service: Not on file     Active member of club or organization: Not on file     Attends meetings of clubs or organizations: Not on file     Relationship status: Not on file    Intimate partner violence:     Fear of current or ex partner: Not on file     Emotionally abused: Not on file     Physically abused: Not on file     Forced sexual activity: Not on file   Other Topics Concern    Not on file   Social History Narrative    Not on file     Past Medical History:   Diagnosis Date    Anxiety     Chronic low back pain     Depression     Hypercholesterolemia     Hypertension     OCD (obsessive compulsive disorder)      Past Surgical History:   Procedure Laterality Date    CERVICAL FUSION Bilateral 11/19/2018    Procedure: Posterior cervical decompressive laminectomy and instrumented posterior lateral fusion fixation C3-6;  Surgeon: Nasir Sorto MD;  [facility];  Service: Neurosurgery    HERNIA REPAIR       No Known Allergies    Current Outpatient Medications:     busPIRone (BUSPAR) 10 mg tablet, Take 1 tablet (10 mg total) by mouth 2 (two) times a day, Disp: 60 tablet, Rfl: 0    docusate sodium (COLACE) 100 mg capsule, Take 1 capsule (100 mg total) by mouth 2 (two) times a day Hold for loose bowel movements   (Patient taking differently: Take 100 mg by mouth daily Hold for loose bowel movements  ), Disp: 60 capsule, Rfl: 0    midodrine (PROAMATINE) 5 mg tablet, Take 1 5 tablets (7 5 mg total) by mouth 2 (two) times a day for 5 days, Disp: 270 tablet, Rfl: 1    mirtazapine (REMERON) 15 mg tablet, , Disp: , Rfl:     Multiple Vitamins-Minerals (MULTIVITAMIN MEN 50+) TABS, Take by mouth, Disp: , Rfl:     OLANZapine (ZyPREXA) 10 mg tablet, Take 1 tablet (10 mg total) by mouth daily at bedtime, Disp: 30 tablet, Rfl: 0    sertraline (ZOLOFT) 100 mg tablet, Take 100 mg by mouth daily  , Disp: , Rfl:     Review of Systems  Constitutional:     Denies fever, chills ,fatigue ,weakness ,weight loss, weight gain     ENT: Denies earache ,loss of hearing ,nosebleed, nasal discharge,nasal congestion ,sore throat ,hoarseness  Pulmonary: Denies shortness of breath ,cough  ,dyspnea on exertion, orthopnea  ,PND Cardiovascular:  Denies bradycardia , tachycardia  ,palpations, lower extremity edema leg, claudication  Breast:  Denies new or changing breast lumps ,nipple discharge ,nipple changes  Abdomen:  Denies abdominal pain , anorexia , indigestion, nausea, vomiting, constipation, diarrhea  Musculoskeletal: Denies myalgias, arthralgias, joint swelling, joint stiffness , limb pain, limb swelling  Gu: denies dysuria, polyuria  Skin: Denies skin rash, skin lesion, skin wound, itching, dry skin  Neuro: Denies headache, numbness, tingling, confusion,  Dizziness,+ intermittent vertigo and solitary episode of syncope  Psychiatric: Denies feelings of depression, suicidal ideation, anxiety, sleep disturbances    OBJECTIVE  /68   Pulse 74   Temp 98 3 °F (36 8 °C)   Ht 5' 7" (1 702 m)   Wt 69 4 kg (153 lb)   BMI 23 96 kg/m²   Constitutional:   NAD, well appearing and well nourished      ENT:   Conjunctiva and lids: no injection, edema, or discharge     Pupils and iris: TOOTIE bilaterally    External inspection of ears and nose: normal without deformities or discharge  Otoscopic exam: Canals patent without erythema  Nasal mucosa, septum and turbinates: Normal or edema or discharge         Oropharynx:  Moist mucosa, normal tongue and tonsils without lesions  No erythema        Pulmonary:Respiratory effort normal rate and rhythm, no increased work of breathing   Auscultation of lungs:  Clear bilaterally with no adventitious breath sounds       Cardiovascular: regular rate and rhythm, S1 and S2, no murmur, no edema and/or varicosities of LE      Abdomen: Soft and non-distended     Positive bowel sounds      No heptomegaly or splenomegaly      Gu: no suprapubic tenderness or CVA tenderness, no urethral discharge  Lymphatic:  No anterior or posterior cervical lymphadenopathy         Musculoskeletal:  Gait and station: Normal gait      Digits and nails normal without clubbing or cyanosis       Inspection/palpation of joints, bones, and muscles:  No joint tenderness, swelling, full active and passive range of motion       Skin: Normal skin turgor and no rashes      Neuro:    Normal  CN 2-12     Normal reflexes     Normal sensation    Psych:   alert and oriented to person, place and time     normal mood and affect       Assessment/Plan:Diagnoses and all orders for this visit:    Syncope, unspecified syncope type  -     MRI brain wo contrast; Future  -     Holter monitor - 48 hour; Future    Other orders  -     Cancel: Ambulatory referral to Gastroenterology; Future  -     Cancel: TDAP VACCINE GREATER THAN OR EQUAL TO 8YO IM  -     Cancel: PNEUMOCOCCAL CONJUGATE VACCINE 13-VALENT GREATER THAN 6 MONTHS  -     Cancel: Hepatitis C antibody; Future        Reviewed with patient plan to treat with above workup  If negative, he will undergo vestibular therapy and reconditioning via PT     Patient instructed to call in 72 hours if not feeling better or if symptoms worsen

## 2020-01-07 DIAGNOSIS — R42 POSITIONAL LIGHTHEADEDNESS: ICD-10-CM

## 2020-01-07 RX ORDER — MIDODRINE HYDROCHLORIDE 5 MG/1
TABLET ORAL
Qty: 258 TABLET | Refills: 0 | Status: SHIPPED | OUTPATIENT
Start: 2020-01-07 | End: 2020-03-11 | Stop reason: ALTCHOICE

## 2020-01-30 ENCOUNTER — HOSPITAL ENCOUNTER (EMERGENCY)
Facility: HOSPITAL | Age: 75
Discharge: HOME | End: 2020-01-30
Attending: EMERGENCY MEDICINE | Admitting: EMERGENCY MEDICINE
Payer: MEDICARE

## 2020-01-30 ENCOUNTER — APPOINTMENT (EMERGENCY)
Dept: CT IMAGING | Facility: HOSPITAL | Age: 75
End: 2020-01-30
Payer: MEDICARE

## 2020-01-30 VITALS
HEART RATE: 64 BPM | SYSTOLIC BLOOD PRESSURE: 105 MMHG | OXYGEN SATURATION: 99 % | WEIGHT: 147.49 LBS | RESPIRATION RATE: 18 BRPM | DIASTOLIC BLOOD PRESSURE: 59 MMHG | BODY MASS INDEX: 23.1 KG/M2

## 2020-01-30 DIAGNOSIS — S01.81XA FACIAL LACERATION, INITIAL ENCOUNTER: ICD-10-CM

## 2020-01-30 DIAGNOSIS — T07.XXXA ABRASIONS OF MULTIPLE SITES: ICD-10-CM
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
## 2020-01-30 LAB
ANION GAP SERPL CALCULATED.3IONS-SCNC: 9 MMOL/L (ref 4–13)
ATRIAL RATE: 65 BPM
BASOPHILS # BLD AUTO: 0.01 THOUSANDS/ΜL (ref 0–0.1)
BASOPHILS NFR BLD AUTO: 0 % (ref 0–1)
BILIRUB UR QL STRIP: NEGATIVE
BUN SERPL-MCNC: 12 MG/DL (ref 5–25)
CALCIUM SERPL-MCNC: 8.7 MG/DL (ref 8.3–10.1)
CHLORIDE SERPL-SCNC: 95 MMOL/L (ref 100–108)
CLARITY UR: CLEAR
CO2 SERPL-SCNC: 27 MMOL/L (ref 21–32)
COLOR UR: YELLOW
CREAT SERPL-MCNC: 0.91 MG/DL (ref 0.6–1.3)
EOSINOPHIL # BLD AUTO: 0.01 THOUSAND/ΜL (ref 0–0.61)
EOSINOPHIL NFR BLD AUTO: 0 % (ref 0–6)
ERYTHROCYTE [DISTWIDTH] IN BLOOD BY AUTOMATED COUNT: 13.4 % (ref 11.6–15.1)
GFR SERPL CREATININE-BSD FRML MDRD: 83 ML/MIN/1.73SQ M
GLUCOSE SERPL-MCNC: 81 MG/DL (ref 65–140)
GLUCOSE UR STRIP-MCNC: NEGATIVE MG/DL
HCT VFR BLD AUTO: 35.4 % (ref 36.5–49.3)
HGB BLD-MCNC: 12.1 G/DL (ref 12–17)
HGB UR QL STRIP.AUTO: NEGATIVE
IMM GRANULOCYTES # BLD AUTO: 0.01 THOUSAND/UL (ref 0–0.2)
IMM GRANULOCYTES NFR BLD AUTO: 0 % (ref 0–2)
KETONES UR STRIP-MCNC: NEGATIVE MG/DL
LEUKOCYTE ESTERASE UR QL STRIP: NEGATIVE
LYMPHOCYTES # BLD AUTO: 0.72 THOUSANDS/ΜL (ref 0.6–4.47)
LYMPHOCYTES NFR BLD AUTO: 14 % (ref 14–44)
MCH RBC QN AUTO: 32 PG (ref 26.8–34.3)
MCHC RBC AUTO-ENTMCNC: 34.2 G/DL (ref 31.4–37.4)
MCV RBC AUTO: 94 FL (ref 82–98)
MONOCYTES # BLD AUTO: 0.99 THOUSAND/ΜL (ref 0.17–1.22)
MONOCYTES NFR BLD AUTO: 20 % (ref 4–12)
NEUTROPHILS # BLD AUTO: 3.32 THOUSANDS/ΜL (ref 1.85–7.62)
NEUTS SEG NFR BLD AUTO: 66 % (ref 43–75)
NITRITE UR QL STRIP: NEGATIVE
NRBC BLD AUTO-RTO: 0 /100 WBCS
PH UR STRIP.AUTO: 7.5 [PH] (ref 4.5–8)
PLATELET # BLD AUTO: 166 THOUSANDS/UL (ref 149–390)
PMV BLD AUTO: 11.1 FL (ref 8.9–12.7)
POTASSIUM SERPL-SCNC: 4.2 MMOL/L (ref 3.5–5.3)
PROT UR STRIP-MCNC: NEGATIVE MG/DL
QRS AXIS: 54 DEGREES
QRSD INTERVAL: 98 MS
QT INTERVAL: 414 MS
QTC INTERVAL: 427 MS
RBC # BLD AUTO: 3.78 MILLION/UL (ref 3.88–5.62)
SODIUM SERPL-SCNC: 131 MMOL/L (ref 136–145)
SP GR UR STRIP.AUTO: 1.01 (ref 1–1.03)
T WAVE AXIS: 223 DEGREES
UROBILINOGEN UR QL STRIP.AUTO: 0.2 E.U./DL
VENTRICULAR RATE: 64 BPM
WBC # BLD AUTO: 5.06 THOUSAND/UL (ref 4.31–10.16)

## 2020-01-30 PROCEDURE — 36415 COLL VENOUS BLD VENIPUNCTURE: CPT | Performed by: PHYSICIAN ASSISTANT

## 2020-01-30 PROCEDURE — 85025 COMPLETE CBC W/AUTO DIFF WBC: CPT | Performed by: PHYSICIAN ASSISTANT

## 2020-01-30 PROCEDURE — 70450 CT HEAD/BRAIN W/O DYE: CPT

## 2020-01-30 PROCEDURE — 12011 RPR F/E/E/N/L/M 2.5 CM/<: CPT | Performed by: PHYSICIAN ASSISTANT

## 2020-01-30 PROCEDURE — 81003 URINALYSIS AUTO W/O SCOPE: CPT

## 2020-01-30 PROCEDURE — 99284 EMERGENCY DEPT VISIT MOD MDM: CPT

## 2020-01-30 PROCEDURE — 90471 IMMUNIZATION ADMIN: CPT

## 2020-01-30 PROCEDURE — 72125 CT NECK SPINE W/O DYE: CPT

## 2020-01-30 PROCEDURE — 93005 ELECTROCARDIOGRAM TRACING: CPT

## 2020-01-30 PROCEDURE — 90715 TDAP VACCINE 7 YRS/> IM: CPT | Performed by: PHYSICIAN ASSISTANT

## 2020-01-30 PROCEDURE — 80048 BASIC METABOLIC PNL TOTAL CA: CPT | Performed by: PHYSICIAN ASSISTANT

## 2020-01-30 PROCEDURE — 93010 ELECTROCARDIOGRAM REPORT: CPT | Performed by: INTERNAL MEDICINE

## 2020-01-30 PROCEDURE — 99283 EMERGENCY DEPT VISIT LOW MDM: CPT | Performed by: PHYSICIAN ASSISTANT

## 2020-01-30 RX ORDER — LIDOCAINE 40 MG/G
CREAM TOPICAL ONCE
Status: COMPLETED | OUTPATIENT
Start: 2020-01-30 | End: 2020-01-30

## 2020-01-30 RX ORDER — ACETAMINOPHEN 325 MG/1
650 TABLET ORAL ONCE
Status: COMPLETED | OUTPATIENT
Start: 2020-01-30 | End: 2020-01-30

## 2020-01-30 RX ORDER — GINSENG 100 MG
1 CAPSULE ORAL ONCE
Status: COMPLETED | OUTPATIENT
Start: 2020-01-30 | End: 2020-01-30

## 2020-01-30 RX ORDER — LIDOCAINE HYDROCHLORIDE 10 MG/ML
4 INJECTION, SOLUTION EPIDURAL; INFILTRATION; INTRACAUDAL; PERINEURAL ONCE
Status: COMPLETED | OUTPATIENT
Start: 2020-01-30 | End: 2020-01-30

## 2020-01-30 RX ADMIN — ACETAMINOPHEN 650 MG: 325 TABLET, FILM COATED ORAL at 01:46

## 2020-01-30 RX ADMIN — LIDOCAINE HYDROCHLORIDE 4 ML: 10 INJECTION, SOLUTION EPIDURAL; INFILTRATION; INTRACAUDAL; PERINEURAL at 01:46

## 2020-01-30 RX ADMIN — LIDOCAINE: 40 CREAM TOPICAL at 01:47

## 2020-01-30 RX ADMIN — BACITRACIN ZINC 1 LARGE APPLICATION: 500 OINTMENT TOPICAL at 04:00

## 2020-01-30 RX ADMIN — TETANUS TOXOID, REDUCED DIPHTHERIA TOXOID AND ACELLULAR PERTUSSIS VACCINE, ADSORBED 0.5 ML: 5; 2.5; 8; 8; 2.5 SUSPENSION INTRAMUSCULAR at 01:46

## 2020-01-30 NOTE — ED PROVIDER NOTES
Pt Name: Kiya Hernandes  MRN: [id number]  [place]: 1945  Age/Sex: 76 y o  male  Date of evaluation: 1/30/2020  PCP: Marek Rasmussen, [address] [place] [address]    Chief Complaint   Patient presents with    Fall     head injury, arm and hand injury  No (-) thinners No Loc         HPI    Mark Jain presents to the Emergency Department complaining of fall                History provided by:  Patient   used: No    Fall   Mechanism of injury: fall    Injury location:  Face and shoulder/arm  Shoulder/arm injury location:  L arm and R arm  Incident location:  Home  Fall:     Fall occurred:  Standing    Impact surface:  Hard floor    Point of impact:  Head and outstretched arms    Entrapped after fall: no    Suspicion of alcohol use: no    Suspicion of drug use: no    Tetanus status:  Unknown  Associated symptoms: no abdominal pain, no back pain, no blindness, no chest pain, no difficulty breathing, no headaches, no hearing loss, no loss of consciousness, no nausea, no neck pain, no seizures and no vomiting    Risk factors: no anticoagulation therapy          Past Medical and Surgical History    Past Medical History:   Diagnosis Date    Anxiety     Chronic low back pain     Depression     Hypercholesterolemia     Hypertension     OCD (obsessive compulsive disorder)        Past Surgical History:   Procedure Laterality Date    CERVICAL FUSION Bilateral 11/19/2018    Procedure: Posterior cervical decompressive laminectomy and instrumented posterior lateral fusion fixation C3-6;  Surgeon: Abel Biggs MD;  Location:  MAIN OR;  Service: Neurosurgery    HERNIA REPAIR         Family History   Problem Relation Age of Onset    Diabetes type II Father     Glaucoma Son     Panic disorder Son        Social History     Tobacco Use    Smoking status: Never Smoker    Smokeless tobacco: Never Used   Substance Use Topics    Alcohol use: No    Drug use: No       Allergies    No Known Allergies    Home Medications:    Prior to Admission medications    Medication Sig Start Date End Date Taking? Authorizing Provider   busPIRone (BUSPAR) 10 mg tablet Take 1 tablet (10 mg total) by mouth 2 (two) times a day 12/6/18   Clarence Quesada MD   docusate sodium (COLACE) 100 mg capsule Take 1 capsule (100 mg total) by mouth 2 (two) times a day Hold for loose bowel movements  Patient taking differently: Take 100 mg by mouth daily Hold for loose bowel movements  12/6/18   Clarence Quesada MD   midodrine (PROAMATINE) 5 mg tablet TAKE 1 AND 1/2 TABS (7 5 MG) TWICE A DAY FOR 5 DAYS 1/7/20   Nika Zamudio DO   mirtazapine (REMERON) 15 mg tablet  1/22/19   Historical Provider, MD   Multiple Vitamins-Minerals (MULTIVITAMIN MEN 50+) TABS Take by mouth    Historical Provider, MD   OLANZapine (ZyPREXA) 10 mg tablet Take 1 tablet (10 mg total) by mouth daily at bedtime 12/6/18   Clarence Quesada MD   sertraline (ZOLOFT) 100 mg tablet Take 100 mg by mouth daily      Historical Provider, MD           Review of Systems    Review of Systems   Constitutional: Negative for activity change, appetite change, chills, diaphoresis, fatigue and fever  HENT: Negative for congestion, ear discharge, ear pain, hearing loss and sinus pressure  Eyes: Negative for blindness, photophobia, pain and visual disturbance  Respiratory: Negative for cough and shortness of breath  Cardiovascular: Negative for chest pain and palpitations  Gastrointestinal: Negative for abdominal pain, nausea and vomiting  Musculoskeletal: Positive for gait problem (chronic, on medication for postural hypotension)  Negative for back pain, neck pain and neck stiffness  Skin: Positive for wound  Negative for rash  Neurological: Positive for light-headedness (intermittent)  Negative for dizziness, tremors, seizures, loss of consciousness, syncope, facial asymmetry, speech difficulty, weakness, numbness and headaches     Psychiatric/Behavioral: The patient is not nervous/anxious  All other systems reviewed and are negative  All other systems reviewed and negative  Physical Exam      ED Triage Vitals [01/30/20 0125]   Temp Pulse Respirations Blood Pressure SpO2   -- 65 18 105/59 99 %      Temp src Heart Rate Source Patient Position - Orthostatic VS BP Location FiO2 (%)   -- Monitor Sitting Left arm --      Pain Score       No Pain               Physical Exam   Constitutional: He is oriented to person, place, and time  He appears well-developed and well-nourished  No distress  HENT:   Head: Normocephalic and atraumatic  Mouth/Throat: Oropharynx is clear and moist    Eyes: Pupils are equal, round, and reactive to light  Conjunctivae and EOM are normal    Neck: Normal range of motion  Neck supple  Cardiovascular: Normal rate, regular rhythm, normal heart sounds and intact distal pulses  Exam reveals no gallop and no friction rub  No murmur heard  Pulmonary/Chest: Effort normal and breath sounds normal  No stridor  No respiratory distress  He has no wheezes  He has no rales  He exhibits no tenderness  Musculoskeletal: Normal range of motion  Right shoulder: Normal         Left shoulder: Normal         Right elbow: Normal        Left elbow: Normal         Right wrist: Normal         Left wrist: Normal         Right hip: Normal         Left hip: Normal         Right knee: Normal         Left knee: Normal         Right ankle: Normal         Left ankle: Normal         Cervical back: Normal         Thoracic back: Normal         Lumbar back: Normal         Right upper arm: Normal         Left upper arm: Normal         Right forearm: Normal         Left forearm: Normal         Right hand: Normal         Left hand: Normal         Right lower leg: Normal         Left lower leg: Normal         Right foot: Normal         Left foot: Normal    Neurological: He is alert and oriented to person, place, and time  Skin: Skin is warm   Capillary refill takes less than 2 seconds  Abrasion noted  He is not diaphoretic  Nursing note and vitals reviewed            Diagnostic Results      Labs:    Results for orders placed or performed during the hospital encounter of 01/30/20   CBC and differential   Result Value Ref Range    WBC 5 06 4 31 - 10 16 Thousand/uL    RBC 3 78 (L) 3 88 - 5 62 Million/uL    Hemoglobin 12 1 12 0 - 17 0 g/dL    Hematocrit 35 4 (L) 36 5 - 49 3 %    MCV 94 82 - 98 fL    MCH 32 0 26 8 - 34 3 pg    MCHC 34 2 31 4 - 37 4 g/dL    RDW 13 4 11 6 - 15 1 %    MPV 11 1 8 9 - 12 7 fL    Platelets 713 855 - 336 Thousands/uL    nRBC 0 /100 WBCs    Neutrophils Relative 66 43 - 75 %    Immat GRANS % 0 0 - 2 %    Lymphocytes Relative 14 14 - 44 %    Monocytes Relative 20 (H) 4 - 12 %    Eosinophils Relative 0 0 - 6 %    Basophils Relative 0 0 - 1 %    Neutrophils Absolute 3 32 1 85 - 7 62 Thousands/µL    Immature Grans Absolute 0 01 0 00 - 0 20 Thousand/uL    Lymphocytes Absolute 0 72 0 60 - 4 47 Thousands/µL    Monocytes Absolute 0 99 0 17 - 1 22 Thousand/µL    Eosinophils Absolute 0 01 0 00 - 0 61 Thousand/µL    Basophils Absolute 0 01 0 00 - 0 10 Thousands/µL   Basic metabolic panel   Result Value Ref Range    Sodium 131 (L) 136 - 145 mmol/L    Potassium 4 2 3 5 - 5 3 mmol/L    Chloride 95 (L) 100 - 108 mmol/L    CO2 27 21 - 32 mmol/L    ANION GAP 9 4 - 13 mmol/L    BUN 12 5 - 25 mg/dL    Creatinine 0 91 0 60 - 1 30 mg/dL    Glucose 81 65 - 140 mg/dL    Calcium 8 7 8 3 - 10 1 mg/dL    eGFR 83 ml/min/1 73sq m   ECG 12 lead   Result Value Ref Range    Ventricular Rate 64 BPM    Atrial Rate 65 BPM    WV Interval  ms    QRSD Interval 98 ms    QT Interval 414 ms    QTC Interval 427 ms    P Axis  degrees    QRS Axis 54 degrees    T Wave Axis 223 degrees   Urine Macroscopic, POC   Result Value Ref Range    Color, UA Yellow     Clarity, UA Clear     pH, UA 7 5 4 5 - 8 0    Leukocytes, UA Negative Negative    Nitrite, UA Negative Negative    Protein, UA Negative Negative mg/dl    Glucose, UA Negative Negative mg/dl    Ketones, UA Negative Negative mg/dl    Urobilinogen, UA 0 2 0 2, 1 0 E U /dl E U /dl    Bilirubin, UA Negative Negative    Blood, UA Negative Negative    Specific Gravity, UA 1 015 1 003 - 1 030       All labs reviewed and utilized in the medical decision making process    Radiology:    CT head without contrast   Final Result      Small left frontal scalp hematoma/laceration but no calvarial fracture or acute intracranial process is seen  CT CERVICAL SPINE - WITHOUT CONTRAST      INDICATION:   fall, unwitnessed  COMPARISON:  None  TECHNIQUE:  CT examination of the cervical spine was performed without intravenous contrast   Contiguous axial images were obtained  Sagittal and coronal reconstructions were performed  Radiation dose length product (DLP) for this visit:  970 mGy-cm  (accession [id number]), 361 mGy-cm  (accession [id number])  This examination, like all CT scans performed in the [facility], was performed utilizing techniques to minimize    radiation dose exposure, including the use of iterative reconstruction and automated exposure control  IMAGE QUALITY:  Diagnostic  FINDINGS:      ALIGNMENT:  Normal alignment of the cervical spine  No subluxation  VERTEBRAL BODIES:  No acute fracture  Status post laminectomy and posterior yue and screw fixation from the C3 through the C6 levels  The hardware appears intact  DEGENERATIVE CHANGES:  Intervertebral disc space narrowing at C3/C4, C4/C5, C5/C6, and C6/C7  Anterior, marginal, and uncovertebral osteophytosis noted at these levels as well  Prominent facet joint arthropathy at C2/C3 on the right  PREVERTEBRAL AND PARASPINAL SOFT TISSUES:  Unremarkable  THORACIC INLET:  Grossly unremarkable  IMPRESSION:      Postsurgical and degenerative changes as described but no evidence of acute cervical spine injury        Other findings as above  Workstation performed: [id number]         CT spine cervical without contrast   Final Result      Small left frontal scalp hematoma/laceration but no calvarial fracture or acute intracranial process is seen  CT CERVICAL SPINE - WITHOUT CONTRAST      INDICATION:   fall, unwitnessed  COMPARISON:  None  TECHNIQUE:  CT examination of the cervical spine was performed without intravenous contrast   Contiguous axial images were obtained  Sagittal and coronal reconstructions were performed  Radiation dose length product (DLP) for this visit:  970 mGy-cm  (accession [id number]), 361 mGy-cm  (accession [id number])  This examination, like all CT scans performed in the [facility], was performed utilizing techniques to minimize    radiation dose exposure, including the use of iterative reconstruction and automated exposure control  IMAGE QUALITY:  Diagnostic  FINDINGS:      ALIGNMENT:  Normal alignment of the cervical spine  No subluxation  VERTEBRAL BODIES:  No acute fracture  Status post laminectomy and posterior yue and screw fixation from the C3 through the C6 levels  The hardware appears intact  DEGENERATIVE CHANGES:  Intervertebral disc space narrowing at C3/C4, C4/C5, C5/C6, and C6/C7  Anterior, marginal, and uncovertebral osteophytosis noted at these levels as well  Prominent facet joint arthropathy at C2/C3 on the right  PREVERTEBRAL AND PARASPINAL SOFT TISSUES:  Unremarkable  THORACIC INLET:  Grossly unremarkable  IMPRESSION:      Postsurgical and degenerative changes as described but no evidence of acute cervical spine injury  Other findings as above  Workstation performed: [id number]             All radiology studies independently viewed by me and interpreted by the radiologist     Procedure    Laceration repair  Date/Time: 1/30/2020 1:29 AM  Performed by:  Mike Mckinley Ghassan Sharif PA-C  Authorized by: Ar Corley PA-C   Consent: Verbal consent obtained  Risks and benefits: risks, benefits and alternatives were discussed  Consent given by: patient  Patient understanding: patient states understanding of the procedure being performed  Patient identity confirmed: verbally with patient, arm band and hospital-assigned identification number  Body area: head/neck  Location details: forehead  Laceration length: 2 5 cm  Foreign bodies: no foreign bodies  Tendon involvement: none  Nerve involvement: none  Vascular damage: no  Anesthesia: local infiltration    Anesthesia:  Local Anesthetic: lidocaine 1% without epinephrine  Anesthetic total: 2 5 mL      Procedure Details:  Preparation: Patient was prepped and draped in the usual sterile fashion  Irrigation solution: saline  Irrigation method: jet lavage  Amount of cleaning: extensive  Debridement: none  Degree of undermining: none  Wound skin closure material used: 5-0 fast absorbing gut  Number of sutures: 7  Technique: simple  Approximation: close  Dressing: antibiotic ointment  Patient tolerance: Patient tolerated the procedure well with no immediate complications    ECG 12 Lead Documentation Only  Date/Time: 1/30/2020 1:29 AM  Performed by: Ar Corley PA-C  Authorized by:  Ar Corley PA-C     Indications / Diagnosis:  Fall, lightheadedness  ECG reviewed by me, the ED Provider: yes    Patient location:  ED  Previous ECG:     Previous ECG:  Compared to current    Comparison ECG info:  Nonspecific st-t    Similarity:  Changes noted    Comparison to cardiac monitor: Yes    Interpretation:     Interpretation: non-specific    Rate:     ECG rate:  64    ECG rate assessment: normal    Rhythm:     Rhythm: sinus rhythm    Ectopy:     Ectopy: none    QRS:     QRS axis:  Normal    QRS intervals:  Normal  Conduction:     Conduction: normal    ST segments:     ST segments:  Non-specific  T waves:     T waves: non-specific Assessment and Plan    MDM  Number of Diagnoses or Management Options  Abrasions of multiple sites: new, needed workup  Facial laceration, initial encounter: new, needed workup  Fall, initial encounter: new, needed workup     Amount and/or Complexity of Data Reviewed  Clinical lab tests: ordered and reviewed  Tests in the radiology section of CPT®: ordered and reviewed  Tests in the medicine section of CPT®: reviewed and ordered  Review and summarize past medical records: yes  Independent visualization of images, tracings, or specimens: yes    Risk of Complications, Morbidity, and/or Mortality  Presenting problems: moderate  Diagnostic procedures: moderate  Management options: moderate    Patient Progress  Patient progress: improved      Initial ED assessment:  Brodie Levine is a 76 y o  male with significant PMH for HTN, HLD, OCD, Postural Hypotension who brought to ED via EMS for Fall  Vitals signs reviewed and WNL  Physical examination remarkable for facial laceration multiple superficial abrasions bilateral upper extremities  Initial Ddx  includes but is not limited to:   laceration, deep structure involvement, foreign body, fracture, wound infection  and  intracranial injury, concussion, scalp laceration, cervical injury; doubt intrathoracic injury, intraabdominal injury, extremity injury or child abuse  Initial ED plan:   Plan will be to perform diagnostic testing of CBC, BMP, EKG, CT head, CT cervical spine and treat symptomatically with laceration repair  Final ED summary/disposition: Pt ambulated with walker  Discussed results of diagnostic testing with pt in detail  Home care recommendations given with discharge paperwork  Return to ED instructions given if new/worsening sxs      MDM  Reviewed: previous chart, nursing note and vitals  Reviewed previous: labs  Interpretation: labs and CT scan        ED Course of Care and Re-Assessments    ED Course as of Feb 01 1715   Thu Jan 30, 2020 0435 7 5-0 fast absorbing gut sutures placed in good approximation                              Medications   tetanus-diphtheria-acellular pertussis (BOOSTRIX) IM injection 0 5 mL (0 5 mL Intramuscular Given 1/30/20 0146)   lidocaine (LMX) 4 % cream ( Topical Given 1/30/20 0147)   lidocaine (PF) (XYLOCAINE-MPF) 1 % injection 4 mL (4 mL Infiltration Given 1/30/20 0146)   acetaminophen (TYLENOL) tablet 650 mg (650 mg Oral Given 1/30/20 0146)   bacitracin topical ointment 1 large application (1 large application Topical Given 1/30/20 0400)         FINAL IMPRESSION    Final diagnoses:   Fall, initial encounter   Abrasions of multiple sites   Facial laceration, initial encounter         DISPOSITION/PLAN  Time reflects when diagnosis was documented in both MDM as applicable and the Disposition within this note     Time User Action Codes Description Comment    1/30/2020  3:50 AM Larayne Gang Add [O39  NVKE] Fall, initial encounter     1/30/2020  3:50 AM Larayne Gang Add [T07  XXXA] Abrasions of multiple sites     1/30/2020  3:50 AM Larayne Gang Add [S01 81XA] Facial laceration, initial encounter       ED Disposition     ED Disposition Condition Date/Time Comment    Discharge Stable Thu Jan 30, 2020  3:50 AM Grey Macedo discharge to home/self care  Follow-up Information     Follow up With Specialties Details Why Contact Info Additional [address] [facility], DO Family Medicine Go to  For follow up [address] [facility] [address] [facility] Emergency Department Emergency Medicine Go to  If symptoms worsen [address] [address]  [phone] AN ED, [address], [place], [place], [address]              PATIENT REFERRED TO:    Rebecca Amado, [address] [facility] [place]    [address]  [phone]    Go to   For follow up    [facility] Emergency Department  [address]  [phone]  Go to   If symptoms worsen      DISCHARGE MEDICATIONS:    Discharge Medication List as of 1/30/2020  3:53 AM      CONTINUE these medications which have NOT CHANGED    Details   busPIRone (BUSPAR) 10 mg tablet Take 1 tablet (10 mg total) by mouth 2 (two) times a day, Starting Thu 12/6/2018, Print      docusate sodium (COLACE) 100 mg capsule Take 1 capsule (100 mg total) by mouth 2 (two) times a day Hold for loose bowel movements  , Starting Thu 12/6/2018, Print      mirtazapine (REMERON) 15 mg tablet Starting Tue 1/22/2019, Historical Med      Multiple Vitamins-Minerals (MULTIVITAMIN MEN 50+) TABS Take by mouth, Historical Med      OLANZapine (ZyPREXA) 10 mg tablet Take 1 tablet (10 mg total) by mouth daily at bedtime, Starting Thu 12/6/2018, Print      sertraline (ZOLOFT) 100 mg tablet Take 100 mg by mouth daily  , Historical Med      midodrine (PROAMATINE) 5 mg tablet TAKE 1 AND 1/2 TABS (7 5 MG) TWICE A DAY FOR 5 DAYS, Normal             No discharge procedures on file           SHANTA Rajput PA-C  02/01/20 4690

## 2020-01-30 NOTE — ED NOTES
Call made to son, sent to voice mail  Left voice mail to call back to  his father        Emily Pearson RN  01/30/20 6711

## 2020-01-30 NOTE — ED NOTES
Per pt approval, RN called patient's son, Debora Macdonald ([phone]) and left message for transportation home  Will call again  Pt updated       Piyush Sutton RN  01/30/20 1793

## 2020-01-30 NOTE — ED NOTES
Provider at bedside suturing laceration        Madelaine Alaniz, [address] [place] [address]  01/30/20 2453

## 2020-01-30 NOTE — ED NOTES
Pt requesting to urinate, offered to walk him to restroom and stated that he doesn't walk well and wants to just use the urinal       Milena De La Garza RN  01/30/20 2926

## 2020-02-04 ENCOUNTER — OFFICE VISIT (OUTPATIENT)
Dept: FAMILY MEDICINE CLINIC | Facility: CLINIC | Age: 75
End: 2020-02-04
Payer: MEDICARE

## 2020-02-04 VITALS
WEIGHT: 141 LBS | TEMPERATURE: 98.4 F | HEIGHT: 67 IN | SYSTOLIC BLOOD PRESSURE: 122 MMHG | HEART RATE: 58 BPM | BODY MASS INDEX: 22.13 KG/M2 | OXYGEN SATURATION: 97 % | DIASTOLIC BLOOD PRESSURE: 70 MMHG

## 2020-02-04 DIAGNOSIS — M79.641 PAIN IN BOTH HANDS: Primary | ICD-10-CM

## 2020-02-04 DIAGNOSIS — M79.642 PAIN IN BOTH HANDS: Primary | ICD-10-CM

## 2020-02-04 DIAGNOSIS — T14.8XXA MULTIPLE SKIN TEARS: ICD-10-CM

## 2020-02-04 DIAGNOSIS — R26.2 AMBULATORY DYSFUNCTION: ICD-10-CM

## 2020-02-04 PROCEDURE — 3078F DIAST BP <80 MM HG: CPT | Performed by: FAMILY MEDICINE

## 2020-02-04 PROCEDURE — 3074F SYST BP LT 130 MM HG: CPT | Performed by: FAMILY MEDICINE

## 2020-02-04 PROCEDURE — 99214 OFFICE O/P EST MOD 30 MIN: CPT | Performed by: FAMILY MEDICINE

## 2020-02-04 PROCEDURE — 1036F TOBACCO NON-USER: CPT | Performed by: FAMILY MEDICINE

## 2020-02-04 PROCEDURE — 1160F RVW MEDS BY RX/DR IN RCRD: CPT | Performed by: FAMILY MEDICINE

## 2020-02-05 NOTE — PROGRESS NOTES
Patient ID: Elvira Cruz is a 76 y o  male  HPI: 76 y o male presents for evaluation after an ER visit for a fall when pt slipped while putting the milk back into the refrigeratorand landed on his hands outstretched, struck the middle of his forehead and suffered a skin tear on the left upper arm  He had a negative CT of head, negative CT of cervical spine but complains of his left 5th finger not resting against his other fingers  His right second tip of his finger does not lie flat and he cannot remember this looking this way before his fall  I spoke with both patient and his son about going to physical therapy for gait instability and reconditioning of his lower extremities and they are in agreement        SUBJECTIVE    Family History   Problem Relation Age of Onset    Diabetes type II Father     Glaucoma Son     Panic disorder Son      Social History     Socioeconomic History    Marital status:      Spouse name: Not on file    Number of children: Not on file    Years of education: Not on file    Highest education level: Not on file   Occupational History    Not on file   Social Needs    Financial resource strain: Not on file    Food insecurity:     Worry: Not on file     Inability: Not on file    Transportation needs:     Medical: Not on file     Non-medical: Not on file   Tobacco Use    Smoking status: Never Smoker    Smokeless tobacco: Never Used   Substance and Sexual Activity    Alcohol use: No    Drug use: No    Sexual activity: Never   Lifestyle    Physical activity:     Days per week: Not on file     Minutes per session: Not on file    Stress: Not on file   Relationships    Social connections:     Talks on phone: Not on file     Gets together: Not on file     Attends [employer] service: Not on file     Active member of club or organization: Not on file     Attends meetings of clubs or organizations: Not on file     Relationship status: Not on file    Intimate partner violence:     Fear of current or ex partner: Not on file     Emotionally abused: Not on file     Physically abused: Not on file     Forced sexual activity: Not on file   Other Topics Concern    Not on file   Social History Narrative    Not on file     Past Medical History:   Diagnosis Date    Anxiety     Chronic low back pain     Depression     Hypercholesterolemia     Hypertension     OCD (obsessive compulsive disorder)      Past Surgical History:   Procedure Laterality Date    CERVICAL FUSION Bilateral 11/19/2018    Procedure: Posterior cervical decompressive laminectomy and instrumented posterior lateral fusion fixation C3-6;  Surgeon: Darius Hampton MD;  Location: [facility];  Service: Neurosurgery    HERNIA REPAIR       No Known Allergies    Current Outpatient Medications:     busPIRone (BUSPAR) 10 mg tablet, Take 1 tablet (10 mg total) by mouth 2 (two) times a day, Disp: 60 tablet, Rfl: 0    docusate sodium (COLACE) 100 mg capsule, Take 1 capsule (100 mg total) by mouth 2 (two) times a day Hold for loose bowel movements   (Patient taking differently: Take 100 mg by mouth daily Hold for loose bowel movements  ), Disp: 60 capsule, Rfl: 0    midodrine (PROAMATINE) 5 mg tablet, TAKE 1 AND 1/2 TABS (7 5 MG) TWICE A DAY FOR 5 DAYS, Disp: 258 tablet, Rfl: 0    mirtazapine (REMERON) 15 mg tablet, , Disp: , Rfl:     Multiple Vitamins-Minerals (MULTIVITAMIN MEN 50+) TABS, Take by mouth, Disp: , Rfl:     OLANZapine (ZyPREXA) 10 mg tablet, Take 1 tablet (10 mg total) by mouth daily at bedtime, Disp: 30 tablet, Rfl: 0    sertraline (ZOLOFT) 100 mg tablet, Take 100 mg by mouth daily  , Disp: , Rfl:     Review of Systems  Constitutional:     Denies fever, chills ,fatigue ,weakness ,weight loss, weight gain     ENT: Denies earache ,loss of hearing ,nosebleed, nasal discharge,nasal congestion ,sore throat ,hoarseness  Pulmonary: Denies shortness of breath ,cough  ,dyspnea on exertion, orthopnea  ,PND Cardiovascular:  Denies bradycardia , tachycardia  ,palpations, lower extremity edema leg, claudication  Breast:  Denies new or changing breast lumps ,nipple discharge ,nipple changes  Abdomen:  Denies abdominal pain , anorexia , indigestion, nausea, vomiting, constipation, diarrhea  Musculoskeletal: Denies myalgias, arthralgias, joint swelling, joint stiffness , limb pain, limb swelling; left 5th finger extended away from other fingers and right 2nd finger tip does not lie flat  Gu: denies dysuria, polyuria  Skin: Denies skin rash, skin lesion, skin wound, itching, dry skin+laceration mid-forehead, skin tear left upper arm and tops of hands bilaterally  Neuro: Denies headache, numbness, tingling, confusion, loss of consciousness, dizziness, vertigo + gait instability  Psychiatric: Denies feelings of depression, suicidal ideation, anxiety, sleep disturbances    OBJECTIVE  /70   Pulse 58   Temp 98 4 °F (36 9 °C)   Ht 5' 7" (1 702 m)   Wt 64 kg (141 lb)   SpO2 97%   BMI 22 08 kg/m²   Constitutional:   NAD, well appearing and well nourished      ENT:   Conjunctiva and lids: no injection, edema, or discharge     Pupils and iris: TOOTIE bilaterally    External inspection of ears and nose: normal without deformities or discharge  Otoscopic exam: Canals patent without erythema  Nasal mucosa, septum and turbinates: Normal or edema or discharge         Oropharynx:  Moist mucosa, normal tongue and tonsils without lesions  No erythema        Pulmonary:Respiratory effort normal rate and rhythm, no increased work of breathing   Auscultation of lungs:  Clear bilaterally with no adventitious breath sounds       Cardiovascular: regular rate and rhythm, S1 and S2, no murmur, no edema and/or varicosities of LE      Abdomen: Soft and non-distended     Positive bowel sounds      No heptomegaly or splenomegaly      Gu: no suprapubic tenderness or CVA tenderness, no urethral discharge  Lymphatic:  No anterior or posterior cervical lymphadenopathy         Musculoskeletal:  Gait and station: Normal gait      Digits and nails normal without clubbing or cyanosis     Inspection/palpation of joints, bones, and muscles:  + deformity of tip of right 2nd finger and left 5th finger     Skin: Normal skin turgor and no rashes; small superficial skin tears on dorsum of both hands bilaterally and healing laceration mid forehead area  Small superficial skin tear of left upper arm area  Neuro:   + gait instability   Normal reflexes     Psych:   alert and oriented to person, place and time     normal mood and affect       Assessment/Plan:Diagnoses and all orders for this visit:    Pain in both hands  -     XR hand 3+ vw left; Future  -     XR hand 3+ vw right; Future    Ambulatory dysfunction  -     Ambulatory referral to Physical Therapy; Future    Multiple skin tears  Comments:  Continue with neosporin and bandages to these sites  Reviewed with patient plan to treat with above plan      Patient instructed to call in 72 hours if not feeling better or if symptoms worsen

## 2020-02-10 ENCOUNTER — HOSPITAL ENCOUNTER (OUTPATIENT)
Dept: RADIOLOGY | Facility: HOSPITAL | Age: 75
Discharge: HOME | End: 2020-02-10
Payer: MEDICARE

## 2020-02-10 DIAGNOSIS — M79.642 PAIN IN BOTH HANDS: ICD-10-CM

## 2020-02-10 DIAGNOSIS — M79.641 PAIN IN BOTH HANDS: ICD-10-CM

## 2020-02-10 PROCEDURE — 73130 X-RAY EXAM OF HAND: CPT

## 2020-02-25 ENCOUNTER — HOSPITAL ENCOUNTER (INPATIENT)
Facility: HOSPITAL | Age: 75
LOS: 5 days | Discharge: NURSING, REHAB OR LONG-TERM CARE | DRG: 641 | End: 2020-03-02
Attending: EMERGENCY MEDICINE | Admitting: INTERNAL MEDICINE
Payer: MEDICARE

## 2020-02-25 DIAGNOSIS — R77.8 ELEVATED TROPONIN: ICD-10-CM

## 2020-02-25 DIAGNOSIS — R33.9 URINARY RETENTION: ICD-10-CM

## 2020-02-25 DIAGNOSIS — F42.9 OBSESSIVE-COMPULSIVE DISORDER, UNSPECIFIED TYPE: ICD-10-CM

## 2020-02-25 DIAGNOSIS — E87.1 HYPONATREMIA: Primary | ICD-10-CM

## 2020-02-25 DIAGNOSIS — F32.A DEPRESSION: ICD-10-CM

## 2020-02-25 DIAGNOSIS — R53.1 WEAKNESS: ICD-10-CM

## 2020-02-25 PROCEDURE — 99285 EMERGENCY DEPT VISIT HI MDM: CPT

## 2020-02-25 PROCEDURE — 99284 EMERGENCY DEPT VISIT MOD MDM: CPT | Performed by: EMERGENCY MEDICINE

## 2020-02-26 ENCOUNTER — APPOINTMENT (EMERGENCY)
Dept: RADIOLOGY | Facility: HOSPITAL | Age: 75
DRG: 641 | End: 2020-02-26
Payer: MEDICARE

## 2020-02-26 PROBLEM — R79.89 ELEVATED TROPONIN: Status: ACTIVE | Noted: 2020-02-26

## 2020-02-26 PROBLEM — R77.8 ELEVATED TROPONIN: Status: ACTIVE | Noted: 2020-02-26

## 2020-02-26 PROBLEM — R33.9 URINARY RETENTION: Status: ACTIVE | Noted: 2020-02-26

## 2020-02-26 LAB
ALBUMIN SERPL BCP-MCNC: 3.5 G/DL (ref 3.5–5)
ALP SERPL-CCNC: 58 U/L (ref 46–116)
ALT SERPL W P-5'-P-CCNC: 14 U/L (ref 12–78)
ANION GAP SERPL CALCULATED.3IONS-SCNC: 5 MMOL/L (ref 4–13)
ANION GAP SERPL CALCULATED.3IONS-SCNC: 8 MMOL/L (ref 4–13)
APTT PPP: 92 SECONDS (ref 23–37)
AST SERPL W P-5'-P-CCNC: 24 U/L (ref 5–45)
ATRIAL RATE: 192 BPM
BACTERIA UR QL AUTO: ABNORMAL /HPF
BASOPHILS # BLD AUTO: 0 THOUSANDS/ΜL (ref 0–0.1)
BASOPHILS # BLD AUTO: 0 THOUSANDS/ΜL (ref 0–0.1)
BASOPHILS NFR BLD AUTO: 0 % (ref 0–1)
BASOPHILS NFR BLD AUTO: 0 % (ref 0–1)
BILIRUB SERPL-MCNC: 0.43 MG/DL (ref 0.2–1)
BILIRUB UR QL STRIP: NEGATIVE
BUN SERPL-MCNC: 8 MG/DL (ref 5–25)
BUN SERPL-MCNC: 8 MG/DL (ref 5–25)
CALCIUM SERPL-MCNC: 8.1 MG/DL (ref 8.3–10.1)
CALCIUM SERPL-MCNC: 8.5 MG/DL (ref 8.3–10.1)
CHLORIDE SERPL-SCNC: 95 MMOL/L (ref 100–108)
CHLORIDE SERPL-SCNC: 96 MMOL/L (ref 100–108)
CLARITY UR: CLEAR
CO2 SERPL-SCNC: 25 MMOL/L (ref 21–32)
CO2 SERPL-SCNC: 28 MMOL/L (ref 21–32)
COLOR UR: YELLOW
CREAT SERPL-MCNC: 0.74 MG/DL (ref 0.6–1.3)
CREAT SERPL-MCNC: 0.79 MG/DL (ref 0.6–1.3)
EOSINOPHIL # BLD AUTO: 0.08 THOUSAND/ΜL (ref 0–0.61)
EOSINOPHIL # BLD AUTO: 0.12 THOUSAND/ΜL (ref 0–0.61)
EOSINOPHIL NFR BLD AUTO: 1 % (ref 0–6)
EOSINOPHIL NFR BLD AUTO: 2 % (ref 0–6)
ERYTHROCYTE [DISTWIDTH] IN BLOOD BY AUTOMATED COUNT: 13.2 % (ref 11.6–15.1)
ERYTHROCYTE [DISTWIDTH] IN BLOOD BY AUTOMATED COUNT: 13.2 % (ref 11.6–15.1)
GFR SERPL CREATININE-BSD FRML MDRD: 88 ML/MIN/1.73SQ M
GFR SERPL CREATININE-BSD FRML MDRD: 90 ML/MIN/1.73SQ M
GLUCOSE SERPL-MCNC: 88 MG/DL (ref 65–140)
GLUCOSE SERPL-MCNC: 94 MG/DL (ref 65–140)
GLUCOSE UR STRIP-MCNC: NEGATIVE MG/DL
HCT VFR BLD AUTO: 36.3 % (ref 36.5–49.3)
HCT VFR BLD AUTO: 37.8 % (ref 36.5–49.3)
HGB BLD-MCNC: 12.4 G/DL (ref 12–17)
HGB BLD-MCNC: 13.1 G/DL (ref 12–17)
HGB UR QL STRIP.AUTO: ABNORMAL
IMM GRANULOCYTES # BLD AUTO: 0.02 THOUSAND/UL (ref 0–0.2)
IMM GRANULOCYTES # BLD AUTO: 0.03 THOUSAND/UL (ref 0–0.2)
IMM GRANULOCYTES NFR BLD AUTO: 0 % (ref 0–2)
IMM GRANULOCYTES NFR BLD AUTO: 0 % (ref 0–2)
INR PPP: 1.02 (ref 0.84–1.19)
KETONES UR STRIP-MCNC: NEGATIVE MG/DL
LEUKOCYTE ESTERASE UR QL STRIP: NEGATIVE
LYMPHOCYTES # BLD AUTO: 1.17 THOUSANDS/ΜL (ref 0.6–4.47)
LYMPHOCYTES # BLD AUTO: 1.45 THOUSANDS/ΜL (ref 0.6–4.47)
LYMPHOCYTES NFR BLD AUTO: 11 % (ref 14–44)
LYMPHOCYTES NFR BLD AUTO: 20 % (ref 14–44)
MCH RBC QN AUTO: 31.5 PG (ref 26.8–34.3)
MCH RBC QN AUTO: 32 PG (ref 26.8–34.3)
MCHC RBC AUTO-ENTMCNC: 34.2 G/DL (ref 31.4–37.4)
MCHC RBC AUTO-ENTMCNC: 34.7 G/DL (ref 31.4–37.4)
MCV RBC AUTO: 92 FL (ref 82–98)
MCV RBC AUTO: 92 FL (ref 82–98)
MONOCYTES # BLD AUTO: 0.83 THOUSAND/ΜL (ref 0.17–1.22)
MONOCYTES # BLD AUTO: 0.95 THOUSAND/ΜL (ref 0.17–1.22)
MONOCYTES NFR BLD AUTO: 11 % (ref 4–12)
MONOCYTES NFR BLD AUTO: 9 % (ref 4–12)
NEUTROPHILS # BLD AUTO: 5.02 THOUSANDS/ΜL (ref 1.85–7.62)
NEUTROPHILS # BLD AUTO: 8.11 THOUSANDS/ΜL (ref 1.85–7.62)
NEUTS SEG NFR BLD AUTO: 67 % (ref 43–75)
NEUTS SEG NFR BLD AUTO: 79 % (ref 43–75)
NITRITE UR QL STRIP: NEGATIVE
NON-SQ EPI CELLS URNS QL MICRO: ABNORMAL /HPF
NRBC BLD AUTO-RTO: 0 /100 WBCS
NRBC BLD AUTO-RTO: 0 /100 WBCS
OSMOLALITY UR/SERPL-RTO: 269 MMOL/KG (ref 282–298)
OSMOLALITY UR/SERPL-RTO: 275 MMOL/KG (ref 282–298)
OSMOLALITY UR: 406 MMOL/KG
OSMOLALITY UR: 442 MMOL/KG
PH UR STRIP.AUTO: 6.5 [PH]
PLATELET # BLD AUTO: 164 THOUSANDS/UL (ref 149–390)
PLATELET # BLD AUTO: 165 THOUSANDS/UL (ref 149–390)
PMV BLD AUTO: 11.6 FL (ref 8.9–12.7)
PMV BLD AUTO: 11.7 FL (ref 8.9–12.7)
POTASSIUM SERPL-SCNC: 4.1 MMOL/L (ref 3.5–5.3)
POTASSIUM SERPL-SCNC: 4.8 MMOL/L (ref 3.5–5.3)
PROT SERPL-MCNC: 6.8 G/DL (ref 6.4–8.2)
PROT UR STRIP-MCNC: NEGATIVE MG/DL
PROTHROMBIN TIME: 12.8 SECONDS (ref 11.6–14.5)
QRS AXIS: 47 DEGREES
QRSD INTERVAL: 84 MS
QT INTERVAL: 408 MS
QTC INTERVAL: 410 MS
RBC # BLD AUTO: 3.94 MILLION/UL (ref 3.88–5.62)
RBC # BLD AUTO: 4.1 MILLION/UL (ref 3.88–5.62)
RBC #/AREA URNS AUTO: ABNORMAL /HPF
SODIUM 24H UR-SCNC: 111 MOL/L
SODIUM 24H UR-SCNC: 82 MOL/L
SODIUM SERPL-SCNC: 128 MMOL/L (ref 136–145)
SODIUM SERPL-SCNC: 129 MMOL/L (ref 136–145)
SP GR UR STRIP.AUTO: 1.01 (ref 1–1.03)
T WAVE AXIS: 1 DEGREES
TROPONIN I SERPL-MCNC: 0.05 NG/ML
TSH SERPL DL<=0.05 MIU/L-ACNC: 1.69 UIU/ML (ref 0.36–3.74)
URATE SERPL-MCNC: 2.1 MG/DL (ref 4.2–8)
UROBILINOGEN UR QL STRIP.AUTO: 0.2 E.U./DL
VENTRICULAR RATE: 61 BPM
VIT B12 SERPL-MCNC: 642 PG/ML (ref 100–900)
WBC # BLD AUTO: 10.34 THOUSAND/UL (ref 4.31–10.16)
WBC # BLD AUTO: 7.44 THOUSAND/UL (ref 4.31–10.16)
WBC #/AREA URNS AUTO: ABNORMAL /HPF

## 2020-02-26 PROCEDURE — 93005 ELECTROCARDIOGRAM TRACING: CPT

## 2020-02-26 PROCEDURE — 99223 1ST HOSP IP/OBS HIGH 75: CPT | Performed by: INTERNAL MEDICINE

## 2020-02-26 PROCEDURE — 84484 ASSAY OF TROPONIN QUANT: CPT | Performed by: EMERGENCY MEDICINE

## 2020-02-26 PROCEDURE — 85730 THROMBOPLASTIN TIME PARTIAL: CPT | Performed by: EMERGENCY MEDICINE

## 2020-02-26 PROCEDURE — 84300 ASSAY OF URINE SODIUM: CPT | Performed by: INTERNAL MEDICINE

## 2020-02-26 PROCEDURE — 80048 BASIC METABOLIC PNL TOTAL CA: CPT | Performed by: NURSE PRACTITIONER

## 2020-02-26 PROCEDURE — 83935 ASSAY OF URINE OSMOLALITY: CPT | Performed by: NURSE PRACTITIONER

## 2020-02-26 PROCEDURE — 85610 PROTHROMBIN TIME: CPT | Performed by: EMERGENCY MEDICINE

## 2020-02-26 PROCEDURE — 83930 ASSAY OF BLOOD OSMOLALITY: CPT | Performed by: NURSE PRACTITIONER

## 2020-02-26 PROCEDURE — 83935 ASSAY OF URINE OSMOLALITY: CPT | Performed by: INTERNAL MEDICINE

## 2020-02-26 PROCEDURE — 84300 ASSAY OF URINE SODIUM: CPT | Performed by: NURSE PRACTITIONER

## 2020-02-26 PROCEDURE — 83930 ASSAY OF BLOOD OSMOLALITY: CPT | Performed by: INTERNAL MEDICINE

## 2020-02-26 PROCEDURE — 81001 URINALYSIS AUTO W/SCOPE: CPT | Performed by: EMERGENCY MEDICINE

## 2020-02-26 PROCEDURE — 84443 ASSAY THYROID STIM HORMONE: CPT | Performed by: NURSE PRACTITIONER

## 2020-02-26 PROCEDURE — 85025 COMPLETE CBC W/AUTO DIFF WBC: CPT | Performed by: NURSE PRACTITIONER

## 2020-02-26 PROCEDURE — 84550 ASSAY OF BLOOD/URIC ACID: CPT | Performed by: NURSE PRACTITIONER

## 2020-02-26 PROCEDURE — 36415 COLL VENOUS BLD VENIPUNCTURE: CPT | Performed by: EMERGENCY MEDICINE

## 2020-02-26 PROCEDURE — 82607 VITAMIN B-12: CPT | Performed by: NURSE PRACTITIONER

## 2020-02-26 PROCEDURE — 71046 X-RAY EXAM CHEST 2 VIEWS: CPT

## 2020-02-26 PROCEDURE — 93010 ELECTROCARDIOGRAM REPORT: CPT | Performed by: INTERNAL MEDICINE

## 2020-02-26 PROCEDURE — 85025 COMPLETE CBC W/AUTO DIFF WBC: CPT | Performed by: EMERGENCY MEDICINE

## 2020-02-26 PROCEDURE — 80053 COMPREHEN METABOLIC PANEL: CPT | Performed by: EMERGENCY MEDICINE

## 2020-02-26 RX ORDER — LANOLIN ALCOHOL/MO/W.PET/CERES
3 CREAM (GRAM) TOPICAL
Status: DISCONTINUED | OUTPATIENT
Start: 2020-02-26 | End: 2020-03-02 | Stop reason: HOSPADM

## 2020-02-26 RX ORDER — POLYETHYLENE GLYCOL 3350 17 G/17G
17 POWDER, FOR SOLUTION ORAL DAILY PRN
Status: DISCONTINUED | OUTPATIENT
Start: 2020-02-26 | End: 2020-02-28

## 2020-02-26 RX ORDER — OLANZAPINE 10 MG/1
10 TABLET ORAL
Status: DISCONTINUED | OUTPATIENT
Start: 2020-02-26 | End: 2020-03-02 | Stop reason: HOSPADM

## 2020-02-26 RX ORDER — MAGNESIUM HYDROXIDE/ALUMINUM HYDROXICE/SIMETHICONE 120; 1200; 1200 MG/30ML; MG/30ML; MG/30ML
30 SUSPENSION ORAL EVERY 6 HOURS PRN
Status: DISCONTINUED | OUTPATIENT
Start: 2020-02-26 | End: 2020-03-02 | Stop reason: HOSPADM

## 2020-02-26 RX ORDER — TAMSULOSIN HYDROCHLORIDE 0.4 MG/1
0.4 CAPSULE ORAL
Status: DISCONTINUED | OUTPATIENT
Start: 2020-02-26 | End: 2020-03-02 | Stop reason: HOSPADM

## 2020-02-26 RX ORDER — LIDOCAINE 50 MG/G
2 PATCH TOPICAL DAILY
Status: DISCONTINUED | OUTPATIENT
Start: 2020-02-26 | End: 2020-03-02 | Stop reason: HOSPADM

## 2020-02-26 RX ORDER — SERTRALINE HYDROCHLORIDE 100 MG/1
100 TABLET, FILM COATED ORAL DAILY
Status: DISCONTINUED | OUTPATIENT
Start: 2020-02-26 | End: 2020-03-02 | Stop reason: HOSPADM

## 2020-02-26 RX ORDER — ONDANSETRON 2 MG/ML
4 INJECTION INTRAMUSCULAR; INTRAVENOUS EVERY 6 HOURS PRN
Status: DISCONTINUED | OUTPATIENT
Start: 2020-02-26 | End: 2020-03-02 | Stop reason: HOSPADM

## 2020-02-26 RX ORDER — SODIUM CHLORIDE 9 MG/ML
75 INJECTION, SOLUTION INTRAVENOUS CONTINUOUS
Status: DISCONTINUED | OUTPATIENT
Start: 2020-02-26 | End: 2020-02-28

## 2020-02-26 RX ORDER — ACETAMINOPHEN 325 MG/1
650 TABLET ORAL EVERY 6 HOURS PRN
Status: DISCONTINUED | OUTPATIENT
Start: 2020-02-26 | End: 2020-02-26

## 2020-02-26 RX ORDER — DOCUSATE SODIUM 100 MG/1
100 CAPSULE, LIQUID FILLED ORAL 2 TIMES DAILY PRN
Status: DISCONTINUED | OUTPATIENT
Start: 2020-02-26 | End: 2020-02-27

## 2020-02-26 RX ORDER — MIRTAZAPINE 15 MG/1
15 TABLET, FILM COATED ORAL
Status: DISCONTINUED | OUTPATIENT
Start: 2020-02-26 | End: 2020-03-02 | Stop reason: HOSPADM

## 2020-02-26 RX ORDER — BUSPIRONE HYDROCHLORIDE 5 MG/1
10 TABLET ORAL 2 TIMES DAILY
Status: DISCONTINUED | OUTPATIENT
Start: 2020-02-26 | End: 2020-03-02 | Stop reason: HOSPADM

## 2020-02-26 RX ORDER — ACETAMINOPHEN 325 MG/1
975 TABLET ORAL EVERY 8 HOURS SCHEDULED
Status: DISCONTINUED | OUTPATIENT
Start: 2020-02-26 | End: 2020-03-02 | Stop reason: HOSPADM

## 2020-02-26 RX ADMIN — BUSPIRONE HYDROCHLORIDE 10 MG: 5 TABLET ORAL at 17:15

## 2020-02-26 RX ADMIN — MIRTAZAPINE 15 MG: 15 TABLET, FILM COATED ORAL at 21:34

## 2020-02-26 RX ADMIN — Medication 1 TABLET: at 09:55

## 2020-02-26 RX ADMIN — SODIUM CHLORIDE 250 ML: 0.9 INJECTION, SOLUTION INTRAVENOUS at 01:30

## 2020-02-26 RX ADMIN — OLANZAPINE 10 MG: 10 TABLET, FILM COATED ORAL at 21:34

## 2020-02-26 RX ADMIN — SERTRALINE HYDROCHLORIDE 100 MG: 100 TABLET ORAL at 09:55

## 2020-02-26 RX ADMIN — ENOXAPARIN SODIUM 40 MG: 40 INJECTION SUBCUTANEOUS at 09:56

## 2020-02-26 RX ADMIN — ACETAMINOPHEN 975 MG: 325 TABLET, FILM COATED ORAL at 05:11

## 2020-02-26 RX ADMIN — LIDOCAINE 2 PATCH: 50 PATCH TOPICAL at 09:55

## 2020-02-26 RX ADMIN — TAMSULOSIN HYDROCHLORIDE 0.4 MG: 0.4 CAPSULE ORAL at 17:15

## 2020-02-26 RX ADMIN — ACETAMINOPHEN 975 MG: 325 TABLET, FILM COATED ORAL at 14:17

## 2020-02-26 RX ADMIN — SODIUM CHLORIDE 75 ML/HR: 0.9 INJECTION, SOLUTION INTRAVENOUS at 14:18

## 2020-02-26 RX ADMIN — BUSPIRONE HYDROCHLORIDE 10 MG: 5 TABLET ORAL at 09:55

## 2020-02-26 RX ADMIN — ACETAMINOPHEN 975 MG: 325 TABLET, FILM COATED ORAL at 21:33

## 2020-02-26 NOTE — SOCIAL WORK
Patient is not fully oriented, CM called patient's son and left voicemail to explain role and discuss DC planning  CM will follow up

## 2020-02-26 NOTE — ASSESSMENT & PLAN NOTE
· Start Flomax  · Patient was recently on midodrine in January, however, only for 5 days  · Vizcaino catheter placed in the emergency department  · Currently draining clear yellow urine with minimal hematuria noted-most likely due to trauma

## 2020-02-26 NOTE — H&P
H&P- Nori North 1945, 76 y o  male MRN: [id number]    Unit/Bed#: S -01 Encounter: [id number]    Primary Care Provider: Gela Fuentes DO   Date and time admitted to hospital: 2/25/2020 10:45 PM        * Hyponatremia  Assessment & Plan  · 129 on admission  · Received 250 mL bolus in emergency department  · Recheck BMP  · Patient reports to decreased appetite    Weakness  Assessment & Plan  · Chronic and ongoing  · Patient lives at home with son who reports that he is unable to provide care  · PT and OT consulted  · Patient reports chronic back pain and injuries and has had difficulty with ADLs  · Start Tylenol scheduled  · Start lidocaine patches    Urinary retention  Assessment & Plan  · Start Flomax  · Patient was recently on midodrine in January, however, only for 5 days  · Vizcaino catheter placed in the emergency department  · Currently draining clear yellow urine with minimal hematuria noted-most likely due to trauma  Elevated troponin  Assessment & Plan  Chronically elevated  EKG at baseline, normal sinus heart rate 61  Inverted T-waves V3, V4, V5  Inverted T-waves are noted on prior  Denies chest pain or shortness of breath    Orthostatic hypotension  Assessment & Plan  · Appears patient was started on midodrine and January for a total of 5 days  · This has since been completed  · Check orthostatics      VTE Prophylaxis: Enoxaparin (Lovenox)  / reason for no mechanical VTE prophylaxis Low risk   Code Status:  Level 1  POLST: POLST is not applicable to this patient  Discussion with family:  Patient    Anticipated Length of Stay:  Patient will be admitted on an Inpatient basis with an anticipated length of stay of  greater than 2 midnights  Justification for Hospital Stay:  Monitor BMP, discharge planning, PT OT pending    Total Time for Visit, including Counseling / Coordination of Care: 45 minutes    Greater than 50% of this total time spent on direct patient counseling and coordination of care  Chief Complaint:   Urinary retention Generalized weakness    History of Present Illness:    Meryle Ort is a 76 y o  male who presents with urinary retention and generalized weakness  Patient reports he has been having urinary retention ongoing for the last month or more  He denies any dysuria, hematuria, nocturia  He has no known prostate issues  He also reports chronic and ongoing generalized weakness  He tells me has a history of back injuries, and ambulating has been difficult for quite some time  He denies any recent fevers, chills, chest pain, shortness of breath, abdominal pain  He had previously tried to use a cane in the past however, he reports it was too uncomfortable an awkward  Past medical history significant for orthostatic hypotension, anxiety, hyperlipidemia, insomnia, lumbar stenosis, OCD, TBI    On arrival to emergency department patient is hemodynamically stable  For urinary retention, Vizcaino catheters placed in the emergency department  Is currently draining clear yellow urine  There are a few small pink clots noted  With the exception of Vizcaino catheter discomfort, patient has no other complaints  Of note, chart review reveals patient had briefly taking midodrine for about 5 days in January  However, it appears he no longer takes this medication  Will start Flomax  Patient will be admitted as inpatient  A consult PT and OT are pending  Review of Systems:    Review of Systems   Constitutional: Negative for activity change, chills and fever  HENT: Negative  Eyes: Negative  Respiratory: Negative  Cardiovascular: Negative  Gastrointestinal: Negative  Genitourinary: Positive for difficulty urinating, frequency and urgency  Negative for flank pain and hematuria  Musculoskeletal: Positive for back pain and gait problem  Neurological: Positive for weakness   Negative for dizziness, tremors, seizures, syncope, facial asymmetry, speech difficulty, light-headedness, numbness and headaches  Hematological: Negative  Psychiatric/Behavioral: Negative  Past Medical and Surgical History:     Past Medical History:   Diagnosis Date    Anxiety     Chronic low back pain     Depression     Hypercholesterolemia     Hypertension     OCD (obsessive compulsive disorder)        Past Surgical History:   Procedure Laterality Date    CERVICAL FUSION Bilateral 11/19/2018    Procedure: Posterior cervical decompressive laminectomy and instrumented posterior lateral fusion fixation C3-6;  Surgeon: Susanna Barajas MD;  Location: [facility];  Service: Neurosurgery    HERNIA REPAIR         Meds/Allergies:    Prior to Admission medications    Medication Sig Start Date End Date Taking? Authorizing Provider   busPIRone (BUSPAR) 10 mg tablet Take 1 tablet (10 mg total) by mouth 2 (two) times a day 12/6/18   Toni Acuña MD   docusate sodium (COLACE) 100 mg capsule Take 1 capsule (100 mg total) by mouth 2 (two) times a day Hold for loose bowel movements  Patient taking differently: Take 100 mg by mouth daily Hold for loose bowel movements  12/6/18   Toni Acuña MD   midodrine (PROAMATINE) 5 mg tablet TAKE 1 AND 1/2 TABS (7 5 MG) TWICE A DAY FOR 5 DAYS  Patient not taking: Reported on 2/26/2020 1/7/20   Tray Zamudio DO   mirtazapine (REMERON) 15 mg tablet  1/22/19   Historical Provider, MD   Multiple Vitamins-Minerals (MULTIVITAMIN MEN 50+) TABS Take by mouth    Historical Provider, MD   OLANZapine (ZyPREXA) 10 mg tablet Take 1 tablet (10 mg total) by mouth daily at bedtime 12/6/18   Toni Acuña MD   sertraline (ZOLOFT) 100 mg tablet Take 100 mg by mouth daily      Historical Provider, MD     I have reviewed home medications with a medical source (PCP, Pharmacy, other)      Allergies: No Known Allergies    Social History:     Marital Status:      Substance Use History:   Social History     Substance and Sexual Activity   Alcohol Use No     Social History     Tobacco Use   Smoking Status Never Smoker   Smokeless Tobacco Never Used     Social History     Substance and Sexual Activity   Drug Use No       Family History:    non-contributory    Physical Exam:     Vitals:   Blood Pressure: 121/58 (02/26/20 0336)  Pulse: 66 (02/26/20 0336)  Temperature: 97 9 °F (36 6 °C) (02/25/20 2246)  Temp Source: Oral (02/25/20 2246)  Respirations: 16 (02/26/20 0336)  SpO2: 98 % (02/26/20 0336)    Physical Exam   Constitutional: He is oriented to person, place, and time  Disheveled, elderly   HENT:   Head: Normocephalic and atraumatic  Eyes: Pupils are equal, round, and reactive to light  EOM are normal  No scleral icterus  Neck: Normal range of motion  Neck supple  Cardiovascular: Normal rate, regular rhythm and intact distal pulses  Exam reveals no friction rub  Murmur heard  Pulmonary/Chest: Effort normal and breath sounds normal  No respiratory distress  He has no wheezes  He has no rales  On room air   Abdominal: Soft  Bowel sounds are normal  He exhibits no distension and no mass  There is no tenderness  There is no rebound and no guarding  No hernia  Genitourinary:   Genitourinary Comments: Vizcaino catheter   Musculoskeletal: Normal range of motion  He exhibits no edema or tenderness  Neurological: He is alert and oriented to person, place, and time  Skin: Skin is warm  Capillary refill takes less than 2 seconds  Psychiatric: He has a normal mood and affect  Additional Data:     Lab Results: I have personally reviewed pertinent reports        Results from last 7 days   Lab Units 02/26/20  0116   WBC Thousand/uL 7 44   HEMOGLOBIN g/dL 13 1   HEMATOCRIT % 37 8   PLATELETS Thousands/uL 164   NEUTROS PCT % 67   LYMPHS PCT % 20   MONOS PCT % 11   EOS PCT % 2     Results from last 7 days   Lab Units 02/26/20  0116   SODIUM mmol/L 129*   POTASSIUM mmol/L 4 8   CHLORIDE mmol/L 96*   CO2 mmol/L 28   BUN mg/dL 8   CREATININE mg/dL 0 74 ANION GAP mmol/L 5   CALCIUM mg/dL 8 5   ALBUMIN g/dL 3 5   TOTAL BILIRUBIN mg/dL 0 43   ALK PHOS U/L 58   ALT U/L 14   AST U/L 24   GLUCOSE RANDOM mg/dL 88     Results from last 7 days   Lab Units 02/26/20  0116   INR  1 02                   Imaging: I have personally reviewed pertinent reports  XR chest 2 views    (Results Pending)       EKG, Pathology, and Other Studies Reviewed on Admission:   · EKG:  Normal sinus rhythm, inverted T-waves V3 V4 V5  Heart rate 61    Allscripts / Epic Records Reviewed: Yes     ** Please Note: This note has been constructed using a voice recognition system   **

## 2020-02-26 NOTE — ED PROVIDER NOTES
History  Chief Complaint   Patient presents with    Urinary Retention     Pt reports difficulty urinating  Pt reports feeling strong urges to urinate sometimes gets a little out and other times nothing  HPI    THE PATIENT IS A PLEASANT 76YEAR-OLD MALE THAT REPORTS TO THE EMERGENCY DEPARTMENT WITH URINARY URGENCY AS WELL AS THE INABILITY TO URINATE MORE THAN SEVERAL DROPS AT A TIME  NO VOMITING OR DIARRHEA  NO LIGHTHEADEDNESS OR DIZZINESS  NO CHEST PAIN OR SHORTNESS OF BREATH  NO FOCAL NEUROLOGICAL DEFECTS  NO CVA TENDERNESS  NO FEVERS, CHILLS, SWEATS TO SUGGEST PYELONEPHRITIS  NO LOWER EXTREMITY EDEMA  NO PENILE PAIN OR TENDERNESS  NO TESTICULAR PAIN OR TENDERNESS  MEDICAL DECISION MAKIN-YEAR-OLD MALE, POSSIBLE UTI GIVEN HIS REPORTS OF FREQUENCY AND URGENCY  WILL PERFORM EVALUATION FOR THIS AS WELL AS URINARY RETENTION  Of note patients son takes care of his father at home but reports that he is having increasing difficulty doing so  Unable to care for him, patient with worsening weakness, possible 2/2/ hyponatremia vs deconditioning, will admit for hyponatremia, elevated trop and placement  Prior to Admission Medications   Prescriptions Last Dose Informant Patient Reported? Taking? Multiple Vitamins-Minerals (MULTIVITAMIN MEN 50+) TABS  Child Yes No   Sig: Take by mouth   OLANZapine (ZyPREXA) 10 mg tablet  Child No No   Sig: Take 1 tablet (10 mg total) by mouth daily at bedtime   busPIRone (BUSPAR) 10 mg tablet  Child No No   Sig: Take 1 tablet (10 mg total) by mouth 2 (two) times a day   docusate sodium (COLACE) 100 mg capsule  Child No No   Sig: Take 1 capsule (100 mg total) by mouth 2 (two) times a day Hold for loose bowel movements  Patient taking differently: Take 100 mg by mouth daily Hold for loose bowel movements      midodrine (PROAMATINE) 5 mg tablet  Child No No   Sig: TAKE 1 AND 1/2 TABS (7 5 MG) TWICE A DAY FOR 5 DAYS   mirtazapine (REMERON) 15 mg tablet  Child Yes No   sertraline (ZOLOFT) 100 mg tablet  Child Yes No   Sig: Take 100 mg by mouth daily        Facility-Administered Medications: None       Past Medical History:   Diagnosis Date    Anxiety     Chronic low back pain     Depression     Hypercholesterolemia     Hypertension     OCD (obsessive compulsive disorder)        Past Surgical History:   Procedure Laterality Date    CERVICAL FUSION Bilateral 11/19/2018    Procedure: Posterior cervical decompressive laminectomy and instrumented posterior lateral fusion fixation C3-6;  Surgeon: Jose Odell MD;  Location: [facility];  Service: Neurosurgery    HERNIA REPAIR         Family History   Problem Relation Age of Onset    Diabetes type II Father     Glaucoma Son     Panic disorder Son      I have reviewed and agree with the history as documented  E-Cigarette/Vaping    E-Cigarette Use Never User      E-Cigarette/Vaping Substances     Social History     Tobacco Use    Smoking status: Never Smoker    Smokeless tobacco: Never Used   Substance Use Topics    Alcohol use: No    Drug use: No       Review of Systems   Genitourinary: Positive for frequency and urgency  All other systems reviewed and are negative  Physical Exam  Physical Exam   Constitutional: He is oriented to person, place, and time  He appears well-developed and well-nourished  dishevled   HENT:   Head: Normocephalic and atraumatic  Eyes: Pupils are equal, round, and reactive to light  EOM are normal    Neck: Normal range of motion  Neck supple  Cardiovascular: Normal rate, regular rhythm and normal heart sounds  No murmur heard  Pulmonary/Chest: Effort normal and breath sounds normal  No respiratory distress  He has no wheezes  Abdominal: Soft  Bowel sounds are normal  He exhibits no distension  There is no tenderness  Musculoskeletal: Normal range of motion  He exhibits no edema or tenderness  Neurological: He is alert and oriented to person, place, and time   No cranial nerve deficit  Coordination normal    Skin: Skin is warm and dry  He is not diaphoretic  No erythema  Psychiatric: He has a normal mood and affect  His behavior is normal    Nursing note and vitals reviewed        Vital Signs  ED Triage Vitals [02/25/20 2246]   Temperature Pulse Respirations Blood Pressure SpO2   97 9 °F (36 6 °C) 61 18 137/63 98 %      Temp Source Heart Rate Source Patient Position - Orthostatic VS BP Location FiO2 (%)   Oral Monitor Sitting Left arm --      Pain Score       --           Vitals:    02/25/20 2246   BP: 137/63   Pulse: 61   Patient Position - Orthostatic VS: Sitting         Visual Acuity      ED Medications  Medications   sodium chloride 0 9 % bolus 250 mL (250 mL Intravenous New Bag 2/26/20 0130)       Diagnostic Studies  Results Reviewed     Procedure Component Value Units Date/Time    Troponin I [[id number]]  (Abnormal) Collected:  02/26/20 0116    Lab Status:  Final result Specimen:  Blood from Arm, Right Updated:  02/26/20 0153     Troponin I 0 05 ng/mL     Comprehensive metabolic panel [[id number]]  (Abnormal) Collected:  02/26/20 0116    Lab Status:  Final result Specimen:  Blood from Arm, Right Updated:  02/26/20 0151     Sodium 129 mmol/L      Potassium 4 8 mmol/L      Chloride 96 mmol/L      CO2 28 mmol/L      ANION GAP 5 mmol/L      BUN 8 mg/dL      Creatinine 0 74 mg/dL      Glucose 88 mg/dL      Calcium 8 5 mg/dL      AST 24 U/L      ALT 14 U/L      Alkaline Phosphatase 58 U/L      Total Protein 6 8 g/dL      Albumin 3 5 g/dL      Total Bilirubin 0 43 mg/dL      eGFR 90 ml/min/1 73sq m     Narrative:       David guidelines for Chronic Kidney Disease (CKD):     Stage 1 with normal or high GFR (GFR > 90 mL/min/1 73 square meters)    Stage 2 Mild CKD (GFR = 60-89 mL/min/1 73 square meters)    Stage 3A Moderate CKD (GFR = 45-59 mL/min/1 73 square meters)    Stage 3B Moderate CKD (GFR = 30-44 mL/min/1 73 square meters)    Stage 4 Severe CKD (GFR = 15-29 mL/min/1 73 square meters)    Stage 5 End Stage CKD (GFR <15 mL/min/1 73 square meters)  Note: GFR calculation is accurate only with a steady state creatinine    APTT [[id number]]  (Abnormal) Collected:  02/26/20 0116    Lab Status:  Final result Specimen:  Blood from Arm, Right Updated:  02/26/20 0148     PTT 92 seconds     Protime-INR [[id number]]  (Normal) Collected:  02/26/20 0116    Lab Status:  Final result Specimen:  Blood from Arm, Right Updated:  02/26/20 0148     Protime 12 8 seconds      INR 1 02    CBC and differential [[id number]] Collected:  02/26/20 0116    Lab Status:  Final result Specimen:  Blood from Arm, Right Updated:  02/26/20 0137     WBC 7 44 Thousand/uL      RBC 4 10 Million/uL      Hemoglobin 13 1 g/dL      Hematocrit 37 8 %      MCV 92 fL      MCH 32 0 pg      MCHC 34 7 g/dL      RDW 13 2 %      MPV 11 7 fL      Platelets 784 Thousands/uL      nRBC 0 /100 WBCs      Neutrophils Relative 67 %      Immat GRANS % 0 %      Lymphocytes Relative 20 %      Monocytes Relative 11 %      Eosinophils Relative 2 %      Basophils Relative 0 %      Neutrophils Absolute 5 02 Thousands/µL      Immature Grans Absolute 0 02 Thousand/uL      Lymphocytes Absolute 1 45 Thousands/µL      Monocytes Absolute 0 83 Thousand/µL      Eosinophils Absolute 0 12 Thousand/µL      Basophils Absolute 0 00 Thousands/µL     Urine Microscopic [[id number]]  (Abnormal) Collected:  02/26/20 0003    Lab Status:  Final result Specimen:  Urine, Indwelling Vizcaino Catheter Updated:  02/26/20 0025     RBC, UA 2-4 /hpf      WBC, UA None Seen /hpf      Epithelial Cells None Seen /hpf      Bacteria, UA None Seen /hpf     UA w Reflex to Microscopic w Reflex to Culture [[id number]]  (Abnormal) Collected:  02/26/20 0003    Lab Status:  Final result Specimen:  Urine, Indwelling Vizcaino Catheter Updated:  02/26/20 0009     Color, UA Yellow     Clarity, UA Clear     Specific Gravity, UA 1 010     pH, UA 6 5     Leukocytes, UA Negative     Nitrite, UA Negative     Protein, UA Negative mg/dl      Glucose, UA Negative mg/dl      Ketones, UA Negative mg/dl      Urobilinogen, UA 0 2 E U /dl      Bilirubin, UA Negative     Blood, UA Trace-Intact                 XR chest 2 views    (Results Pending)              Procedures  Procedures         ED Course  ED Course as of Feb 26 0302 Wed Feb 26, 2020   0128 Ekg rate 61, sinus, no stemi, he does have twi in several leads however absolutely no chest pain or pressure, furthermore, twi seen on prior leads,                                   MDM      Disposition  Final diagnoses:   Hyponatremia   Weakness   Elevated troponin     Time reflects when diagnosis was documented in both MDM as applicable and the Disposition within this note     Time User Action Codes Description Comment    2/26/2020  3:01 AM Evelio Jha, [address] [E87 1] Hyponatremia     2/26/2020  3:02 AM Evelio Jha [place] [R53 1] Weakness     2/26/2020  3:02 AM Evelio Yudelka [place] T [place] [R79 89] Elevated troponin       ED Disposition     ED Disposition Condition Date/Time Comment    Admit Stable Wed Feb 26, 2020  3:01 AM Case was discussed with lamonte durand and the patient's admission status was agreed to be Admission Status: inpatient status to the service of Dr Oleksandr Minor   Follow-up Information    None         Patient's Medications   Discharge Prescriptions    No medications on file     No discharge procedures on file      PDMP Review     None          ED Provider  Electronically Signed by           Adryan Mason MD  02/26/20 5232

## 2020-02-26 NOTE — PLAN OF CARE
Problem: Potential for Falls  Goal: Patient will remain free of falls  Description  INTERVENTIONS:  - Assess patient frequently for physical needs  -  Identify cognitive and physical deficits and behaviors that affect risk of falls    -  [place] fall precautions as indicated by assessment   - Educate patient/family on patient safety including physical limitations  - Instruct patient to call for assistance with activity based on assessment  - Modify environment to reduce risk of injury  - Consider OT/PT consult to assist with strengthening/mobility  Outcome: Progressing     Problem: PAIN - ADULT  Goal: Verbalizes/displays adequate comfort level or baseline comfort level  Description  Interventions:  - Encourage patient to monitor pain and request assistance  - Assess pain using appropriate pain scale  - Administer analgesics based on type and severity of pain and evaluate response  - Implement non-pharmacological measures as appropriate and evaluate response  - Consider cultural and social influences on pain and pain management  - Notify physician/advanced practitioner if interventions unsuccessful or patient reports new pain  Outcome: Progressing     Problem: Prexisting or High Potential for Compromised Skin Integrity  Goal: Skin integrity is maintained or improved  Description  INTERVENTIONS:  - Identify patients at risk for skin breakdown  - Assess and monitor skin integrity  - Assess and monitor nutrition and hydration status  - Monitor labs   - Assess for incontinence   - Turn and reposition patient  - Assist with mobility/ambulation  - Relieve pressure over bony prominences  - Avoid friction and shearing  - Provide appropriate hygiene as needed including keeping skin clean and dry  - Evaluate need for skin moisturizer/barrier cream  - Collaborate with interdisciplinary team   - Patient/family teaching  - Consider wound care consult   Outcome: Progressing     Problem: INFECTION - ADULT  Goal: Absence or prevention of progression during hospitalization  Description  INTERVENTIONS:  - Assess and monitor for signs and symptoms of infection  - Monitor lab/diagnostic results  - Monitor all insertion sites, i e  indwelling lines, tubes, and drains  - Monitor endotracheal if appropriate and nasal secretions for changes in amount and color  - [place] appropriate cooling/warming therapies per order  - Administer medications as ordered  - Instruct and encourage patient and family to use good hand hygiene technique  - Identify and instruct in appropriate isolation precautions for identified infection/condition  Outcome: Progressing     Problem: SAFETY ADULT  Goal: Patient will remain free of falls  Description  INTERVENTIONS:  - Assess patient frequently for physical needs  -  Identify cognitive and physical deficits and behaviors that affect risk of falls    -  [place] fall precautions as indicated by assessment   - Educate patient/family on patient safety including physical limitations  - Instruct patient to call for assistance with activity based on assessment  - Modify environment to reduce risk of injury  - Consider OT/PT consult to assist with strengthening/mobility  Outcome: Progressing  Goal: Maintain or return to baseline ADL function  Description  INTERVENTIONS:  -  Assess patient's ability to carry out ADLs; assess patient's baseline for ADL function and identify physical deficits which impact ability to perform ADLs (bathing, care of mouth/teeth, toileting, grooming, dressing, etc )  - Assess/evaluate cause of self-care deficits   - Assess range of motion  - Assess patient's mobility; develop plan if impaired  - Assess patient's need for assistive devices and provide as appropriate  - Encourage maximum independence but intervene and supervise when necessary  - Involve family in performance of ADLs  - Assess for home care needs following discharge   - Consider OT consult to assist with ADL evaluation and planning for discharge  - Provide patient education as appropriate  Outcome: Progressing  Goal: Maintain or return mobility status to optimal level  Description  INTERVENTIONS:  - Assess patient's baseline mobility status (ambulation, transfers, stairs, etc )    - Identify cognitive and physical deficits and behaviors that affect mobility  - Identify mobility aids required to assist with transfers and/or ambulation (gait belt, sit-to-stand, lift, walker, cane, etc )  - [place] fall precautions as indicated by assessment  - Record patient progress and toleration of activity level on Mobility SBAR; progress patient to next Phase/Stage  - Instruct patient to call for assistance with activity based on assessment  - Consider rehabilitation consult to assist with strengthening/weightbearing, etc   Outcome: Progressing     Problem: SAFETY ADULT  Goal: Patient will remain free of falls  Description  INTERVENTIONS:  - Assess patient frequently for physical needs  -  Identify cognitive and physical deficits and behaviors that affect risk of falls    -  [place] fall precautions as indicated by assessment   - Educate patient/family on patient safety including physical limitations  - Instruct patient to call for assistance with activity based on assessment  - Modify environment to reduce risk of injury  - Consider OT/PT consult to assist with strengthening/mobility  Outcome: Progressing  Goal: Maintain or return to baseline ADL function  Description  INTERVENTIONS:  -  Assess patient's ability to carry out ADLs; assess patient's baseline for ADL function and identify physical deficits which impact ability to perform ADLs (bathing, care of mouth/teeth, toileting, grooming, dressing, etc )  - Assess/evaluate cause of self-care deficits   - Assess range of motion  - Assess patient's mobility; develop plan if impaired  - Assess patient's need for assistive devices and provide as appropriate  - Encourage maximum independence but intervene and supervise when necessary  - Involve family in performance of ADLs  - Assess for home care needs following discharge   - Consider OT consult to assist with ADL evaluation and planning for discharge  - Provide patient education as appropriate  Outcome: Progressing  Goal: Maintain or return mobility status to optimal level  Description  INTERVENTIONS:  - Assess patient's baseline mobility status (ambulation, transfers, stairs, etc )    - Identify cognitive and physical deficits and behaviors that affect mobility  - Identify mobility aids required to assist with transfers and/or ambulation (gait belt, sit-to-stand, lift, walker, cane, etc )  - [place] fall precautions as indicated by assessment  - Record patient progress and toleration of activity level on Mobility SBAR; progress patient to next Phase/Stage  - Instruct patient to call for assistance with activity based on assessment  - Consider rehabilitation consult to assist with strengthening/weightbearing, etc   Outcome: Progressing     Problem: DISCHARGE PLANNING  Goal: Discharge to home or other facility with appropriate resources  Description  INTERVENTIONS:  - Identify barriers to discharge w/patient and caregiver  - Arrange for needed discharge resources and transportation as appropriate  - Identify discharge learning needs (meds, wound care, etc )  - Arrange for interpretive services to assist at discharge as needed  - Refer to Case Management Department for coordinating discharge planning if the patient needs post-hospital services based on physician/advanced practitioner order or complex needs related to functional status, cognitive ability, or social support system  Outcome: Progressing     Problem: GENITOURINARY - ADULT  Goal: Maintains or returns to baseline urinary function  Description  INTERVENTIONS:  - Assess urinary function  - Encourage oral fluids to ensure adequate hydration if ordered  - Administer IV fluids as ordered to ensure adequate hydration  - Administer ordered medications as needed  - Offer frequent toileting  - Follow urinary retention protocol if ordered  Outcome: Progressing  Goal: Absence of urinary retention  Description  INTERVENTIONS:  - Assess patients ability to void and empty bladder  - Monitor I/O  - Bladder scan as needed  - Discuss with physician/AP medications to alleviate retention as needed  - Discuss catheterization for long term situations as appropriate  Outcome: Progressing  Goal: Urinary catheter remains patent  Description  INTERVENTIONS:  - Assess patency of urinary catheter  - If patient has a chronic cristina, consider changing catheter if non-functioning  - Follow guidelines for intermittent irrigation of non-functioning urinary catheter  Outcome: Progressing

## 2020-02-26 NOTE — ASSESSMENT & PLAN NOTE
Chronically elevated  EKG at baseline, normal sinus heart rate 61    Inverted T-waves V3, V4, V5  Inverted T-waves are noted on prior  Denies chest pain or shortness of breath

## 2020-02-26 NOTE — ASSESSMENT & PLAN NOTE
· 129 on admission  · Received 250 mL bolus in emergency department  · Recheck BMP  · Patient reports to decreased appetite

## 2020-02-26 NOTE — PLAN OF CARE
Problem: Potential for Falls  Goal: Patient will remain free of falls  Description  INTERVENTIONS:  - Assess patient frequently for physical needs  -  Identify cognitive and physical deficits and behaviors that affect risk of falls    -  [place] fall precautions as indicated by assessment   - Educate patient/family on patient safety including physical limitations  - Instruct patient to call for assistance with activity based on assessment  - Modify environment to reduce risk of injury  - Consider OT/PT consult to assist with strengthening/mobility  Outcome: Progressing     Problem: Prexisting or High Potential for Compromised Skin Integrity  Goal: Skin integrity is maintained or improved  Description  INTERVENTIONS:  - Identify patients at risk for skin breakdown  - Assess and monitor skin integrity  - Assess and monitor nutrition and hydration status  - Monitor labs   - Assess for incontinence   - Turn and reposition patient  - Assist with mobility/ambulation  - Relieve pressure over bony prominences  - Avoid friction and shearing  - Provide appropriate hygiene as needed including keeping skin clean and dry  - Evaluate need for skin moisturizer/barrier cream  - Collaborate with interdisciplinary team   - Patient/family teaching  - Consider wound care consult   Outcome: Progressing     Problem: PAIN - ADULT  Goal: Verbalizes/displays adequate comfort level or baseline comfort level  Description  Interventions:  - Encourage patient to monitor pain and request assistance  - Assess pain using appropriate pain scale  - Administer analgesics based on type and severity of pain and evaluate response  - Implement non-pharmacological measures as appropriate and evaluate response  - Consider cultural and social influences on pain and pain management  - Notify physician/advanced practitioner if interventions unsuccessful or patient reports new pain  Outcome: Progressing     Problem: INFECTION - ADULT  Goal: Absence or prevention of progression during hospitalization  Description  INTERVENTIONS:  - Assess and monitor for signs and symptoms of infection  - Monitor lab/diagnostic results  - Monitor all insertion sites, i e  indwelling lines, tubes, and drains  - Monitor endotracheal if appropriate and nasal secretions for changes in amount and color  - [place] appropriate cooling/warming therapies per order  - Administer medications as ordered  - Instruct and encourage patient and family to use good hand hygiene technique  - Identify and instruct in appropriate isolation precautions for identified infection/condition  Outcome: Progressing     Problem: SAFETY ADULT  Goal: Patient will remain free of falls  Description  INTERVENTIONS:  - Assess patient frequently for physical needs  -  Identify cognitive and physical deficits and behaviors that affect risk of falls    -  [place] fall precautions as indicated by assessment   - Educate patient/family on patient safety including physical limitations  - Instruct patient to call for assistance with activity based on assessment  - Modify environment to reduce risk of injury  - Consider OT/PT consult to assist with strengthening/mobility  Outcome: Progressing  Goal: Maintain or return to baseline ADL function  Description  INTERVENTIONS:  -  Assess patient's ability to carry out ADLs; assess patient's baseline for ADL function and identify physical deficits which impact ability to perform ADLs (bathing, care of mouth/teeth, toileting, grooming, dressing, etc )  - Assess/evaluate cause of self-care deficits   - Assess range of motion  - Assess patient's mobility; develop plan if impaired  - Assess patient's need for assistive devices and provide as appropriate  - Encourage maximum independence but intervene and supervise when necessary  - Involve family in performance of ADLs  - Assess for home care needs following discharge   - Consider OT consult to assist with ADL evaluation and planning for discharge  - Provide patient education as appropriate  Outcome: Progressing  Goal: Maintain or return mobility status to optimal level  Description  INTERVENTIONS:  - Assess patient's baseline mobility status (ambulation, transfers, stairs, etc )    - Identify cognitive and physical deficits and behaviors that affect mobility  - Identify mobility aids required to assist with transfers and/or ambulation (gait belt, sit-to-stand, lift, walker, cane, etc )  - [place] fall precautions as indicated by assessment  - Record patient progress and toleration of activity level on Mobility SBAR; progress patient to next Phase/Stage  - Instruct patient to call for assistance with activity based on assessment  - Consider rehabilitation consult to assist with strengthening/weightbearing, etc   Outcome: Progressing     Problem: DISCHARGE PLANNING  Goal: Discharge to home or other facility with appropriate resources  Description  INTERVENTIONS:  - Identify barriers to discharge w/patient and caregiver  - Arrange for needed discharge resources and transportation as appropriate  - Identify discharge learning needs (meds, wound care, etc )  - Arrange for interpretive services to assist at discharge as needed  - Refer to Case Management Department for coordinating discharge planning if the patient needs post-hospital services based on physician/advanced practitioner order or complex needs related to functional status, cognitive ability, or social support system  Outcome: Progressing     Problem: GENITOURINARY - ADULT  Goal: Maintains or returns to baseline urinary function  Description  INTERVENTIONS:  - Assess urinary function  - Encourage oral fluids to ensure adequate hydration if ordered  - Administer IV fluids as ordered to ensure adequate hydration  - Administer ordered medications as needed  - Offer frequent toileting  - Follow urinary retention protocol if ordered  Outcome: Progressing  Goal: Absence of urinary retention  Description  INTERVENTIONS:  - Assess patients ability to void and empty bladder  - Monitor I/O  - Bladder scan as needed  - Discuss with physician/AP medications to alleviate retention as needed  - Discuss catheterization for long term situations as appropriate  Outcome: Progressing  Goal: Urinary catheter remains patent  Description  INTERVENTIONS:  - Assess patency of urinary catheter  - If patient has a chronic cristina, consider changing catheter if non-functioning  - Follow guidelines for intermittent irrigation of non-functioning urinary catheter  Outcome: Progressing

## 2020-02-26 NOTE — ASSESSMENT & PLAN NOTE
· Appears patient was started on midodrine and January for a total of 5 days  · This has since been completed  · Check orthostatics

## 2020-02-26 NOTE — ASSESSMENT & PLAN NOTE
· Chronic and ongoing  · Patient lives at home with son who reports that he is unable to provide care  · PT and OT consulted  · Patient reports chronic back pain and injuries and has had difficulty with ADLs  · Start Tylenol scheduled  · Start lidocaine patches

## 2020-02-27 LAB
ANION GAP SERPL CALCULATED.3IONS-SCNC: 7 MMOL/L (ref 4–13)
BASOPHILS # BLD AUTO: 0 THOUSANDS/ΜL (ref 0–0.1)
BASOPHILS NFR BLD AUTO: 0 % (ref 0–1)
BUN SERPL-MCNC: 6 MG/DL (ref 5–25)
CALCIUM SERPL-MCNC: 8.1 MG/DL (ref 8.3–10.1)
CHLORIDE SERPL-SCNC: 98 MMOL/L (ref 100–108)
CO2 SERPL-SCNC: 25 MMOL/L (ref 21–32)
CREAT SERPL-MCNC: 0.71 MG/DL (ref 0.6–1.3)
EOSINOPHIL # BLD AUTO: 0.13 THOUSAND/ΜL (ref 0–0.61)
EOSINOPHIL NFR BLD AUTO: 2 % (ref 0–6)
ERYTHROCYTE [DISTWIDTH] IN BLOOD BY AUTOMATED COUNT: 13.2 % (ref 11.6–15.1)
GFR SERPL CREATININE-BSD FRML MDRD: 92 ML/MIN/1.73SQ M
GLUCOSE SERPL-MCNC: 74 MG/DL (ref 65–140)
HCT VFR BLD AUTO: 34.3 % (ref 36.5–49.3)
HGB BLD-MCNC: 11.7 G/DL (ref 12–17)
IMM GRANULOCYTES # BLD AUTO: 0.02 THOUSAND/UL (ref 0–0.2)
IMM GRANULOCYTES NFR BLD AUTO: 0 % (ref 0–2)
LYMPHOCYTES # BLD AUTO: 1.52 THOUSANDS/ΜL (ref 0.6–4.47)
LYMPHOCYTES NFR BLD AUTO: 26 % (ref 14–44)
MCH RBC QN AUTO: 31.5 PG (ref 26.8–34.3)
MCHC RBC AUTO-ENTMCNC: 34.1 G/DL (ref 31.4–37.4)
MCV RBC AUTO: 92 FL (ref 82–98)
MONOCYTES # BLD AUTO: 0.91 THOUSAND/ΜL (ref 0.17–1.22)
MONOCYTES NFR BLD AUTO: 15 % (ref 4–12)
NEUTROPHILS # BLD AUTO: 3.39 THOUSANDS/ΜL (ref 1.85–7.62)
NEUTS SEG NFR BLD AUTO: 57 % (ref 43–75)
NRBC BLD AUTO-RTO: 0 /100 WBCS
PLATELET # BLD AUTO: 152 THOUSANDS/UL (ref 149–390)
PMV BLD AUTO: 12.2 FL (ref 8.9–12.7)
POTASSIUM SERPL-SCNC: 3.8 MMOL/L (ref 3.5–5.3)
RBC # BLD AUTO: 3.72 MILLION/UL (ref 3.88–5.62)
SODIUM SERPL-SCNC: 130 MMOL/L (ref 136–145)
WBC # BLD AUTO: 5.97 THOUSAND/UL (ref 4.31–10.16)

## 2020-02-27 PROCEDURE — 97163 PT EVAL HIGH COMPLEX 45 MIN: CPT

## 2020-02-27 PROCEDURE — 99221 1ST HOSP IP/OBS SF/LOW 40: CPT | Performed by: PSYCHIATRY & NEUROLOGY

## 2020-02-27 PROCEDURE — 85025 COMPLETE CBC W/AUTO DIFF WBC: CPT | Performed by: INTERNAL MEDICINE

## 2020-02-27 PROCEDURE — 80048 BASIC METABOLIC PNL TOTAL CA: CPT | Performed by: INTERNAL MEDICINE

## 2020-02-27 PROCEDURE — 99232 SBSQ HOSP IP/OBS MODERATE 35: CPT | Performed by: PHYSICIAN ASSISTANT

## 2020-02-27 RX ORDER — DOCUSATE SODIUM 100 MG/1
100 CAPSULE, LIQUID FILLED ORAL 2 TIMES DAILY
Status: DISCONTINUED | OUTPATIENT
Start: 2020-02-27 | End: 2020-02-29

## 2020-02-27 RX ADMIN — DOCUSATE SODIUM 100 MG: 100 CAPSULE, LIQUID FILLED ORAL at 17:13

## 2020-02-27 RX ADMIN — OLANZAPINE 10 MG: 10 TABLET, FILM COATED ORAL at 21:07

## 2020-02-27 RX ADMIN — ACETAMINOPHEN 975 MG: 325 TABLET, FILM COATED ORAL at 21:07

## 2020-02-27 RX ADMIN — MIRTAZAPINE 15 MG: 15 TABLET, FILM COATED ORAL at 21:07

## 2020-02-27 RX ADMIN — Medication 1 TABLET: at 08:23

## 2020-02-27 RX ADMIN — DOCUSATE SODIUM 100 MG: 100 CAPSULE, LIQUID FILLED ORAL at 09:26

## 2020-02-27 RX ADMIN — BUSPIRONE HYDROCHLORIDE 10 MG: 5 TABLET ORAL at 08:23

## 2020-02-27 RX ADMIN — ACETAMINOPHEN 975 MG: 325 TABLET, FILM COATED ORAL at 06:15

## 2020-02-27 RX ADMIN — ENOXAPARIN SODIUM 40 MG: 40 INJECTION SUBCUTANEOUS at 08:24

## 2020-02-27 RX ADMIN — MIDODRINE HYDROCHLORIDE 7.5 MG: 5 TABLET ORAL at 17:13

## 2020-02-27 RX ADMIN — BUSPIRONE HYDROCHLORIDE 10 MG: 5 TABLET ORAL at 17:13

## 2020-02-27 RX ADMIN — TAMSULOSIN HYDROCHLORIDE 0.4 MG: 0.4 CAPSULE ORAL at 17:13

## 2020-02-27 RX ADMIN — SODIUM CHLORIDE 75 ML/HR: 0.9 INJECTION, SOLUTION INTRAVENOUS at 06:16

## 2020-02-27 RX ADMIN — SERTRALINE HYDROCHLORIDE 100 MG: 100 TABLET ORAL at 08:23

## 2020-02-27 NOTE — ASSESSMENT & PLAN NOTE
· Patient exhibits significant symptoms of hopelessness; highly suspect depression is contributing more to his generalized weakness then any actual significant medical condition  · Will ask for Psychiatry input    Patient is already noted to be on Zoloft, Zyprexa, Remeron, and BuSpar prior to arrival

## 2020-02-27 NOTE — ASSESSMENT & PLAN NOTE
· Chronic and ongoing, suspect this is related to depression  · Patient lives at home with son who reports that he is unable to provide care  · PT and OT consulted  · Patient reports chronic back pain and injuries and has had difficulty with ADLs  · Tylenol scheduled lidocaine patches  · Patient denies any pain this morning

## 2020-02-27 NOTE — PROGRESS NOTES
Progress Note - Grey Macedo 1945, 76 y o  male MRN: [id number]    Unit/Bed#: S -01 Encounter: [id number]    Primary Care Provider: Natalie Treviño DO   Date and time admitted to hospital: 2/25/2020 10:45 PM    * Hyponatremia  Assessment & Plan  · 129 on admission, only minimally improved to 130 today; low serum osm at 269  · Consider offending medications particularly high dose of SSRI  Will ask for psych input regarding possibly changing medications  · Continue IV normal saline for now and start fluid restriction      Elevated troponin  Assessment & Plan  Chronically elevated, suspect non MI troponin elevation; minimal elevation here at 0 08  Can DC tele  EKG at baseline, normal sinus heart rate 61  Inverted T-waves V3, V4, V5  Denies chest pain, dizziness or shortness of breath  Murmur noted on exam   Last echocardiogram was from 2018  Recommend outpatient repeat echocardiogram    Depression  Assessment & Plan  · Patient exhibits significant symptoms of hopelessness; highly suspect depression is contributing more to his generalized weakness then any actual significant medical condition  · Will ask for Psychiatry input    Patient is already noted to be on Zoloft, Zyprexa, Remeron, and BuSpar prior to arrival    Urinary retention  Assessment & Plan  · Started Flomax *cautiously given reports of previous orthostasis  · Vizcaino catheter placed in the emergency department based on reports of inability to urinate; will do voiding trial tomorrow  · Will need outpatient urology follow-up  · Last PSA on file was from 2018 but cannot be checked at this time due to trauma from Vizcaino insertion as this will create a false elevation    Weakness  Assessment & Plan  · Chronic and ongoing, suspect this is related to depression  · Patient lives at home with son who reports that he is unable to provide care  · PT and OT consulted  · Patient reports chronic back pain and injuries and has had difficulty with ADLs  · Tylenol scheduled lidocaine patches  · Patient denies any pain this morning    VTE Pharmacologic Prophylaxis:   Pharmacologic: Enoxaparin (Lovenox)  Mechanical VTE Prophylaxis in Place: No    Patient Centered Rounds: I have performed bedside rounds with nursing staff today  Discussions with Specialists or Other Care Team Provider: psych, case mgmt    Education and Discussions with Family / Patient: 2:50 pm called son with update  He reports that the patient's OCD is severe and he obsesses about having every other day bowel movements or he believes he will die  He keeps refusing psychotherapy  Also son reports he has been on midodrine 7 5 po BID for 2 years and asked me to make sure that he does not miss any doses of that  Reviewed chart and noted that patient has not been actively receiving that but I will resume it now    Time Spent for Care: 25 min  More than 50% of total time spent on counseling and coordination of care as described above  Current Length of Stay: 1 day(s)    Current Patient Status: Inpatient   Certification Statement: The patient will continue to require additional inpatient hospital stay due to Awaiting PT and OT as well as Psychiatry input    Discharge Plan:  Await PT and OT input,? Need for short-term rehab; also await psych input? [facility]AB admission; from a medical standpoint he could be discharged at any time with the Vizcaino catheter in place for a voiding trial after discharge if he has a place to go    Code Status: Level 1 - Full Code    Subjective:   Patient repeatedly making statements to the effect of not knowing how he will make it through this" despite being encouraged that he has very treatable medical conditions  He is not reporting any pain, shortness of breath, chest pain, dizziness  He seems to states that he is eating and drinking okay    He has never had a Vizcaino catheter before and states he does not understand what he has to do when he needs to have a bowel movement    Objective:     Vitals:   Temp (24hrs), Av 8 °F (36 6 °C), Min:97 5 °F (36 4 °C), Max:98 2 °F (36 8 °C)    Temp:  [97 5 °F (36 4 °C)-98 2 °F (36 8 °C)] 97 7 °F (36 5 °C)  HR:  [60-67] 60  Resp:  [18-20] 20  BP: (115-128)/(55-63) 124/61  SpO2:  [96 %-98 %] 98 %  Body mass index is 21 17 kg/m²  Input and Output Summary (last 24 hours): Intake/Output Summary (Last 24 hours) at 2020 0844  Last data filed at 2020 0814  Gross per 24 hour   Intake 120 ml   Output 1675 ml   Net -1555 ml       Physical Exam:     Physical Exam   Constitutional: No distress  Thin gentleman no distress   HENT:   Head: Normocephalic and atraumatic  Eyes: Conjunctivae are normal  Right eye exhibits no discharge  Left eye exhibits no discharge  No scleral icterus  Cardiovascular: Normal rate and regular rhythm  Murmur (soft murmur noted) heard  Pulmonary/Chest: Effort normal and breath sounds normal  No stridor  No respiratory distress  He has no wheezes  Abdominal: Soft  He exhibits no distension  There is no guarding  Musculoskeletal: He exhibits no edema  Neurological: He is alert  Awake alert interactive   Skin: Skin is warm and dry  No rash noted  He is not diaphoretic  No erythema  No pallor  Psychiatric:   Appears to be very depressed, hopeless   Vitals reviewed      Additional Data:     Labs:    Results from last 7 days   Lab Units 20  0514   WBC Thousand/uL 5 97   HEMOGLOBIN g/dL 11 7*   HEMATOCRIT % 34 3*   PLATELETS Thousands/uL 152   NEUTROS PCT % 57   LYMPHS PCT % 26   MONOS PCT % 15*   EOS PCT % 2     Results from last 7 days   Lab Units 20  0514  20  0116   SODIUM mmol/L 130*   < > 129*   POTASSIUM mmol/L 3 8   < > 4 8   CHLORIDE mmol/L 98*   < > 96*   CO2 mmol/L 25   < > 28   BUN mg/dL 6   < > 8   CREATININE mg/dL 0 71   < > 0 74   ANION GAP mmol/L 7   < > 5   CALCIUM mg/dL 8 1*   < > 8 5   ALBUMIN g/dL  --   --  3 5   TOTAL BILIRUBIN mg/dL  --   --  0 43   ALK PHOS U/L  --   --  58   ALT U/L  --   --  14   AST U/L  --   --  24   GLUCOSE RANDOM mg/dL 74   < > 88    < > = values in this interval not displayed  Results from last 7 days   Lab Units 02/26/20  0116   INR  1 02                   * I Have Reviewed All Lab Data Listed Above  * Additional Pertinent Lab Tests Reviewed: Epifanio 66 Admission Reviewed    Imaging:    Imaging Reports Reviewed Today Include:   Imaging Personally Reviewed by Myself Includes:      Recent Cultures (last 7 days):           Last 24 Hours Medication List:     Current Facility-Administered Medications:  acetaminophen 975 mg Oral Pending sale to [facility] Orien Colt, MAYNORNP    aluminum-magnesium hydroxide-simethicone 30 mL Oral Q6H PRN Orien Colt, CRNP    busPIRone 10 mg Oral BID Orien Colt, TAZ    docusate sodium 100 mg Oral BID Aura Hoskins PA-C    enoxaparin 40 mg Subcutaneous Daily Orien Colt, TAZ    lidocaine 2 patch Topical Daily Orien Colt, MAYNORNP    melatonin 3 mg Oral HS PRN Orien Colt, CRNP    mirtazapine 15 mg Oral HS Orien Colt, CRNP    multivitamin-minerals 1 tablet Oral Daily Orien Colt, CRNP    OLANZapine 10 mg Oral HS Orien Colt, CRNP    ondansetron 4 mg Intravenous Q6H PRN Orien Colt, CRNP    polyethylene glycol 17 g Oral Daily PRN Orien Colt, CRNP    sertraline 100 mg Oral Daily Orien Colt, CRNP    sodium chloride 75 mL/hr Intravenous Continuous Danae Jacinto DO Last Rate: 75 mL/hr (02/27/20 3234)   tamsulosin 0 4 mg Oral Daily With Dinner TAZ Bueno         Today, Patient Was Seen By: Linda Zelaya PA-C    ** Please Note: Dictation voice to text software may have been used in the creation of this document   **

## 2020-02-27 NOTE — PLAN OF CARE
Problem: Potential for Falls  Goal: Patient will remain free of falls  Description  INTERVENTIONS:  - Assess patient frequently for physical needs  -  Identify cognitive and physical deficits and behaviors that affect risk of falls    -  [place] fall precautions as indicated by assessment   - Educate patient/family on patient safety including physical limitations  - Instruct patient to call for assistance with activity based on assessment  - Modify environment to reduce risk of injury  - Consider OT/PT consult to assist with strengthening/mobility  Outcome: Progressing     Problem: Prexisting or High Potential for Compromised Skin Integrity  Goal: Skin integrity is maintained or improved  Description  INTERVENTIONS:  - Identify patients at risk for skin breakdown  - Assess and monitor skin integrity  - Assess and monitor nutrition and hydration status  - Monitor labs   - Assess for incontinence   - Turn and reposition patient  - Assist with mobility/ambulation  - Relieve pressure over bony prominences  - Avoid friction and shearing  - Provide appropriate hygiene as needed including keeping skin clean and dry  - Evaluate need for skin moisturizer/barrier cream  - Collaborate with interdisciplinary team   - Patient/family teaching  - Consider wound care consult   Outcome: Progressing     Problem: PAIN - ADULT  Goal: Verbalizes/displays adequate comfort level or baseline comfort level  Description  Interventions:  - Encourage patient to monitor pain and request assistance  - Assess pain using appropriate pain scale  - Administer analgesics based on type and severity of pain and evaluate response  - Implement non-pharmacological measures as appropriate and evaluate response  - Consider cultural and social influences on pain and pain management  - Notify physician/advanced practitioner if interventions unsuccessful or patient reports new pain  Outcome: Progressing     Problem: INFECTION - ADULT  Goal: Absence or prevention of progression during hospitalization  Description  INTERVENTIONS:  - Assess and monitor for signs and symptoms of infection  - Monitor lab/diagnostic results  - Monitor all insertion sites, i e  indwelling lines, tubes, and drains  - Monitor endotracheal if appropriate and nasal secretions for changes in amount and color  - [place] appropriate cooling/warming therapies per order  - Administer medications as ordered  - Instruct and encourage patient and family to use good hand hygiene technique  - Identify and instruct in appropriate isolation precautions for identified infection/condition  Outcome: Progressing     Problem: SAFETY ADULT  Goal: Patient will remain free of falls  Description  INTERVENTIONS:  - Assess patient frequently for physical needs  -  Identify cognitive and physical deficits and behaviors that affect risk of falls    -  [place] fall precautions as indicated by assessment   - Educate patient/family on patient safety including physical limitations  - Instruct patient to call for assistance with activity based on assessment  - Modify environment to reduce risk of injury  - Consider OT/PT consult to assist with strengthening/mobility  Outcome: Progressing  Goal: Maintain or return to baseline ADL function  Description  INTERVENTIONS:  -  Assess patient's ability to carry out ADLs; assess patient's baseline for ADL function and identify physical deficits which impact ability to perform ADLs (bathing, care of mouth/teeth, toileting, grooming, dressing, etc )  - Assess/evaluate cause of self-care deficits   - Assess range of motion  - Assess patient's mobility; develop plan if impaired  - Assess patient's need for assistive devices and provide as appropriate  - Encourage maximum independence but intervene and supervise when necessary  - Involve family in performance of ADLs  - Assess for home care needs following discharge   - Consider OT consult to assist with ADL evaluation and planning for discharge  - Provide patient education as appropriate  Outcome: Progressing  Goal: Maintain or return mobility status to optimal level  Description  INTERVENTIONS:  - Assess patient's baseline mobility status (ambulation, transfers, stairs, etc )    - Identify cognitive and physical deficits and behaviors that affect mobility  - Identify mobility aids required to assist with transfers and/or ambulation (gait belt, sit-to-stand, lift, walker, cane, etc )  - [place] fall precautions as indicated by assessment  - Record patient progress and toleration of activity level on Mobility SBAR; progress patient to next Phase/Stage  - Instruct patient to call for assistance with activity based on assessment  - Consider rehabilitation consult to assist with strengthening/weightbearing, etc   Outcome: Progressing     Problem: DISCHARGE PLANNING  Goal: Discharge to home or other facility with appropriate resources  Description  INTERVENTIONS:  - Identify barriers to discharge w/patient and caregiver  - Arrange for needed discharge resources and transportation as appropriate  - Identify discharge learning needs (meds, wound care, etc )  - Arrange for interpretive services to assist at discharge as needed  - Refer to Case Management Department for coordinating discharge planning if the patient needs post-hospital services based on physician/advanced practitioner order or complex needs related to functional status, cognitive ability, or social support system  Outcome: Progressing     Problem: GENITOURINARY - ADULT  Goal: Maintains or returns to baseline urinary function  Description  INTERVENTIONS:  - Assess urinary function  - Encourage oral fluids to ensure adequate hydration if ordered  - Administer IV fluids as ordered to ensure adequate hydration  - Administer ordered medications as needed  - Offer frequent toileting  - Follow urinary retention protocol if ordered  Outcome: Progressing  Goal: Absence of urinary retention  Description  INTERVENTIONS:  - Assess patients ability to void and empty bladder  - Monitor I/O  - Bladder scan as needed  - Discuss with physician/AP medications to alleviate retention as needed  - Discuss catheterization for long term situations as appropriate  Outcome: Progressing  Goal: Urinary catheter remains patent  Description  INTERVENTIONS:  - Assess patency of urinary catheter  - If patient has a chronic cristina, consider changing catheter if non-functioning  - Follow guidelines for intermittent irrigation of non-functioning urinary catheter  Outcome: Progressing     Problem: METABOLIC, FLUID AND ELECTROLYTES - ADULT  Goal: Electrolytes maintained within normal limits  Description  INTERVENTIONS:  - Monitor labs and assess patient for signs and symptoms of electrolyte imbalances  - Administer electrolyte replacement as ordered  - Monitor response to electrolyte replacements, including repeat lab results as appropriate  - Instruct patient on fluid and nutrition as appropriate  Outcome: Progressing  Goal: Fluid balance maintained  Description  INTERVENTIONS:  - Monitor labs   - Monitor I/O and WT  - Instruct patient on fluid and nutrition as appropriate  - Assess for signs & symptoms of volume excess or deficit  Outcome: Progressing

## 2020-02-27 NOTE — PLAN OF CARE
Problem: PHYSICAL THERAPY ADULT  Goal: Performs mobility at highest level of function for planned discharge setting  See evaluation for individualized goals  Description  Treatment/Interventions: Functional transfer training, LE strengthening/ROM, Elevations, Therapeutic exercise, Endurance training, Patient/family training, Equipment eval/education, Bed mobility, Gait training  Equipment Recommended: [place]       See flowsheet documentation for full assessment, interventions and recommendations  Note:   Prognosis: Fair  Problem List: Decreased strength, Decreased endurance, Impaired balance, Decreased mobility  Assessment: Renee Charles is a 75 y/o Male who presents to [facility] on 2/26/2020 from home w/ c/o urinary urgency/retention, with a diagnosis of hyponatremia  Received orders for PT eval and treat, with activity order(s) of up w/ A and fall precautions  This patient presents w/ comorbidities of weakness, chronic constipation, cervical spinal stenosis, chronic LBP, hx of TBI, HTN, HLD and has personal factors of inaccessible home environment, mobilizing w/ assistive device, stair(s) to enter home, positive fall history, anxiety, decreased initiation and engagement, depression, inability to perform IADLs and inability to perform ADLs  Patient presents with the following impairments at time of evaluation including: weakness, decreased endurance, impaired balance, gait deviations and fall risk  These impairments are evident in findings from the physical examination weakness, mobility assessment (need for supervision to CGA, min  assist w/ all phases of mobility when usually mobilizing independently and tolerance to only 80 feet of ambulation), and objective measure(s) Barthel Index: 40/100  During session, pt needed input for task input  Due to physical deficits, patient is at an increased risk for falls   This patient's clinical presentation is unstable/unpredictable, which is evident in need for assist w/ all phases of mobility when usually mobilizing independently, tolerance to only 80 feet of ambulation and need for input for task focus and mobility technique  At this time patient would benefit from skilled inpatient PT in order to address abovementioned deficits in order to improve overall function and mobility  Discharge recommendation is for inpatient rehab vs home w/ fam support and HHPT pending mobility progress including ability to perform stairs in order to reduce fall risk and maximize level of functional independence  Recommendation: Short-term skilled PT vs Home PT, Home with family support(pending mobility progress, ability to perform stairs)          See flowsheet documentation for full assessment

## 2020-02-27 NOTE — PHYSICAL THERAPY NOTE
PHYSICAL THERAPY EVALUATION NOTE      Patient Name: Arabella GONZALEZ Date: 2/27/2020     AGE:   76 y o  Mrn:   [id number]  ADMIT DX:  Hyponatremia [E87 1]  Weakness [R53 1]  Difficulty urinating [R39 198]  Elevated troponin [R79 89]    Past Medical History:   Diagnosis Date    Anxiety     Chronic low back pain     Depression     Hypercholesterolemia     Hypertension     OCD (obsessive compulsive disorder)      Length Of Stay: 1  PHYSICAL THERAPY EVALUATION :        02/27/20 1533   Pain Assessment   Pain Assessment No/denies pain   Pain Score No Pain   Home Living   Type of Home Apartment   Home Layout Stairs to enter with rails   Bathroom Shower/Tub Tub/shower unit   Bathroom Equipment Shower chair   [address] Walker   Additional Comments Pt lives in a 2nd floor apartment  No elevator access  Pt states he has a RW but doesnt use it at baseline  Prior Function   Level of [place] Needs assistance with ADLs and functional mobility; Needs assistance with IADLs   Lives With Son   Receives Help From Family   ADL Assistance Needs assistance   IADLs Needs assistance   Falls in the last 6 months 1 to 4  ("3 to 4")   Vocational Retired   Comments Pt lives w/ son, needs A w/ ADLs and IADLs  He states the son works nights, but is home with the patient during the day  Pt reports 3-4 falls in the last 6 months   Restrictions/Precautions   Weight Bearing Precautions Per Order No   Other Precautions Chair Alarm; Bed Alarm;Multiple lines; Fall Risk  (Cristina )   General   Additional Pertinent History Pt is very flat affect, depressed mood  Very concerned/anxious over cristina ("have you ever seen another patient with one of these?"), pt also begins to make same comments towards using a RW ("i usually don't need one  do other people need to use one when they're here or just me")   PT spends time during session reassuring patient that he is not alone and all health care providers will work with him as a team to improve his quality of life  Family/Caregiver Present No   Cognition   Arousal/Participation Cooperative   Orientation Level Oriented X4  (Pt identified by full name and )   Following Commands Follows one step commands with increased time or repetition   Comments Pt supine in bed upon arrival  He requires some encouragement to participate in PT evaluation  RLE Assessment   RLE Assessment WFL   Strength RLE   RLE Overall Strength 4-/5   LLE Assessment   LLE Assessment WFL   Strength LLE   LLE Overall Strength 4-/5   Coordination   Sensation WFL   Light Touch   RLE Light Touch Grossly intact   LLE Light Touch Grossly intact   Bed Mobility   Supine to Sit 5  Supervision   Additional items Assist x 1;HOB elevated   Sit to Supine 5  Supervision   Additional items Assist x 1;HOB elevated   Additional Comments Pt sat EOB x 3 min w/ Fair   Transfers   Sit to Stand 5  Supervision   Additional items Assist x 1; Increased time required;Verbal cues  (for hand placement)   Stand to Sit 5  Supervision   Additional items Assist x 1; Increased time required;Verbal cues   Stand pivot 3  Moderate assistance   Additional items Assist x 1   Additional Comments Pt is able to perform standing marches w/ BUE support x 4 w/ CGA  Pt stated he wanted to ambulate w/o AD  Required mod A to stand-pivot transfer to bed  Ambulation/Elevation   Gait pattern Retropulsion;Decreased foot clearance; Short stride; Excessively slow; Step to   Gait Assistance   (CGA)   Additional items Assist x 1   Assistive Device Rolling walker   Distance 80 ft  (pt declines further)   Stair Management Assistance   (see additional comments above)   Balance   Static Sitting Fair +   Static Standing Fair -   Ambulatory Poor +   Activity Tolerance   Activity Tolerance Patient limited by fatigue   Medical Staff Made Aware Spoke to psych, spoke to [facility] Sybil Lesch   Nurse Made Aware Spoke to RN [place] who stated pt is appropriate for PT intervention   Assessment   Prognosis Fair   Problem List Decreased strength;Decreased endurance; Impaired balance;Decreased mobility   Assessment Brodie Levine is a 77 y/o Male who presents to [facility] on 2/26/2020 from home w/ c/o urinary urgency/retention, with a diagnosis of hyponatremia  Received orders for PT eval and treat, with activity order(s) of up w/ A and fall precautions  This patient presents w/ comorbidities of weakness, chronic constipation, cervical spinal stenosis, chronic LBP, hx of TBI, HTN, HLD and has personal factors of inaccessible home environment, mobilizing w/ assistive device, stair(s) to enter home, positive fall history, anxiety, decreased initiation and engagement, depression, inability to perform IADLs and inability to perform ADLs  Patient presents with the following impairments at time of evaluation including: weakness, decreased endurance, impaired balance, gait deviations and fall risk  These impairments are evident in findings from the physical examination weakness, mobility assessment (need for supervision to CGA, min  assist w/ all phases of mobility when usually mobilizing independently and tolerance to only 80 feet of ambulation), and objective measure(s) Barthel Index: 40/100  During session, pt needed input for task input  Due to physical deficits, patient is at an increased risk for falls  This patient's clinical presentation is unstable/unpredictable, which is evident in need for assist w/ all phases of mobility when usually mobilizing independently, tolerance to only 80 feet of ambulation and need for input for task focus and mobility technique  At this time patient would benefit from skilled inpatient PT in order to address abovementioned deficits in order to improve overall function and mobility   Discharge recommendation is for inpatient rehab vs home w/ fam support and HHPT pending mobility progress including ability to perform stairs in order to reduce fall risk and maximize level of functional independence  Goals   Patient Goals none expressed  (will continue to assess)   STG Expiration Date 03/08/20   Short Term Goal #1 Patient will: Increase bilateral LE strength 1/2 grade to facilitate independent mobility, Perform all bed mobility tasks independently to decrease fall risk factors, Perform all transfers independently to improve independence, Ambulate at least 200 ft  with least restrictive assistive device independently w/o LOB, Navigate 1 full flight of stairs modified independent with unilateral handrail to facilitate return to previous living environment, Increase all balance 1/2 grade to decrease risk for falls, Complete exercise program independently, Tolerate 3 hr OOB to faciliate upright tolerance and Improve Barthel Index score to 65 or greater to facilitate independence   PT Treatment Day 0   Plan   Treatment/Interventions Functional transfer training;LE strengthening/ROM; Elevations; Therapeutic exercise; Endurance training;Patient/family training;Equipment eval/education; Bed mobility;Gait training   PT Frequency Other (Comment)  (3-5x/wk)   Recommendation   Recommendation Short-term skilled PT; Home PT; Home with family support  (pending mobility progress, ability to perform stairs)   Equipment Recommended Walker   Additional Comments STR vs HHPT pending mobility progress (ability to perform stairs)   Barthel Index   Feeding 10   Bathing 0   Grooming Score 0   Dressing Score 5   Bladder Score 0   Bowels Score 10   Toilet Use Score 5   Transfers (Bed/Chair) Score 10   Mobility (Level Surface) Score 0   Stairs Score 0   Barthel Index Score 40     Skilled PT recommended while in hospital and upon DC to progress pt toward treatment goals       Vivi Weeks, PT not homebound

## 2020-02-27 NOTE — ASSESSMENT & PLAN NOTE
· Started Flomax *cautiously given reports of previous orthostasis  · Vizcaino catheter placed in the emergency department based on reports of inability to urinate; will do voiding trial tomorrow  · Will need outpatient urology follow-up  · Last PSA on file was from 2018 but cannot be checked at this time due to trauma from Vizcaino insertion as this will create a false elevation

## 2020-02-27 NOTE — CONSULTS
Consultation - Cheko Pradip [age] y o  male MRN: [id number]  Unit/Bed#: S -01 Encounter: [id number]      Chief Complaint:  Anxiety and depression    History of Present Illness   Physician Requesting Consult: Melene Eisenmenger, MD  Reason for Consult / Principal Problem:  "Significant depression/hopelessness  Hyponatremia on SSRIs"    Elvira Cruz is a 76 y o  male with past medical history of hypertension, hyperlipidemia, cervical spine stenosis, OCD, and anxiety who presents to the ED with acute urinary retention  As per patient, he has never had any prior similar episodes of urinary retention and became distressed with this symptom  During his hospital stay Vizcaino was placed  Patient describes he feels overwhelmed with anxiety, perseverating thoughts of his urinary retention being a sign of poor prognosis on his health and fear that he might "never come out of this one"  Uop to this medical admission, patient reports his mood has been "positive" and that he had been in good spirits  He states he has had no change in sleep, appetite, concentration or attention abilities, energy levels, or pleasure in activities that usually are enjoyable  Patient denies any signs consistent with anxiety prior to admission  Patient denies any suicidal ideation, plan or intent  Patient denies any homicidal ideation  Patient denies any visual or auditory hallucinations  Psychiatric Review Of Systems:  Problems with sleep: no  Appetite changes: no  Weight changes: no  Low energy/anergy: no  Low interest/pleasure/anhedonia: no  Somatic symptoms: no  Anxiety/panic: no  Elaine: no  Guilt/hopeless: no  Self injurious behavior/risky behavior: no    Historical Information   Psychiatric medication trial: Zoloft, Zyprexa, Remeron, Buspar    Inpatient hospitalizations: Denies  Suicide attempts: Denies  Violent behavior: Denies  Outpatient treatment: Denies    Substance Abuse History:    Social History     Tobacco Use    Smoking status: Never Smoker    Smokeless tobacco: Never Used   Substance Use Topics    Alcohol use: No    Drug use: No      Patient denies current or previous substance use  I have assessed this patient for substance use within the past 12 months  History of IP/OP rehabilitation program: Denies    Family Psychiatric History:   Patient denies any known family history of psychiatric illness, suicide attempt, or substance abuse    Social History  Education: post college graduate work or degree  Learning Disabilities: denies  Living arrangement: Lives in a home with his son  Occupational History: retired  Functioning Relationships: good relationship with children  Other Pertinent History: None      Traumatic History:   Abuse: denies  Other Traumatic Events: denies    Past Medical History:   Diagnosis Date    Anxiety     Chronic low back pain     Depression     Hypercholesterolemia     Hypertension     OCD (obsessive compulsive disorder)        Medical Review Of Systems:  Review of Systems - Negative except anxiety over current hospitalization and anxiety over the possibility that he might "not be able to pee again"  Patient also complians of chronic constipation  All other systems reviewed and are negative      Meds/Allergies   current meds:   Current Facility-Administered Medications   Medication Dose Route Frequency    acetaminophen (TYLENOL) tablet 975 mg  975 mg Oral Q8H [address]    aluminum-magnesium hydroxide-simethicone (MYLANTA) 200-200-20 mg/5 mL oral suspension 30 mL  30 mL Oral Q6H PRN    busPIRone (BUSPAR) tablet 10 mg  10 mg Oral BID    docusate sodium (COLACE) capsule 100 mg  100 mg Oral BID    enoxaparin (LOVENOX) subcutaneous injection 40 mg  40 mg Subcutaneous Daily    lidocaine (LIDODERM) 5 % patch 2 patch  2 patch Topical Daily    melatonin tablet 3 mg  3 mg Oral HS PRN    midodrine (PROAMATINE) tablet 7 5 mg  7 5 mg Oral BID AC    mirtazapine (REMERON) tablet 15 mg  15 mg Oral HS    multivitamin-minerals (CENTRUM) tablet 1 tablet  1 tablet Oral Daily    OLANZapine (ZyPREXA) tablet 10 mg  10 mg Oral HS    ondansetron (ZOFRAN) injection 4 mg  4 mg Intravenous Q6H PRN    polyethylene glycol (MIRALAX) packet 17 g  17 g Oral Daily PRN    sertraline (ZOLOFT) tablet 100 mg  100 mg Oral Daily    sodium chloride 0 9 % infusion  75 mL/hr Intravenous Continuous    tamsulosin (FLOMAX) capsule 0 4 mg  0 4 mg Oral Daily With Dinner     No Known Allergies    Objective   Vital signs in last 24 hours:  Temp:  [97 5 °F (36 4 °C)-97 7 °F (36 5 °C)] 97 7 °F (36 5 °C)  HR:  [60-67] 61  Resp:  [18-20] 18  BP: (115-140)/(55-63) 140/63    Mental Status Evaluation:  Appearance:  alert, casually dressed, appears stated age and appropriate grooming and hygiene   Behavior:  pleasant, cooperative, laying in bed, good eye contact and no abnormal movements   Speech:  spontaneous, clear, increased rate, normal volume, highly verbal and coherent   Mood:  anxious   Affect:  mood-congruent and anxious   Thought Process:  coherent, tangential, normal rate of thoughts   Thought Content: no verbalized delusions, no overt paranoia, no obsessive thinking   Perceptual disturbances: no reported auditory hallucinations, no reported visual hallucinations and does not appear to be responding to internal stimuli at this time   Risk Potential: No active or passive suicidal or homicidal ideation, Low potential for aggression   Cognition: oriented to person, place, time, and situation, appears to be of average intelligence and age-appropriate attention span and concentration   Insight:  Fair   Judgment: [address]     Laboratory results:  I have personally reviewed all pertinent laboratory/tests results        Assessment/Plan     Arabella Saeed is a 76 y o  male with history of hypertension, hyperlipidemia, spinal canal stenosis, Anxiety and Obsessive compulsive disorder, who is admitted presenting new onset acute urinary retention  Patient presents anxiety over possibility of his urinary retention being of poor prognosis and being unsure the cause for his urinary retention  Patient not presenting signs or symptoms of panic attack  Diagnosis:   1  Anxiety not otherwise specified related to new medical presentation/ urinary retention  2  History of obsessive-compulsive disorder   3  History of anxiety, will control an outpatient medications which include:  Zoloft, Zyprexa, Remeron, BuSpar  Plan:   - We do not recommend any change in his current outpatient medication regimen  - current medication's side effects cannot explain urinary retention; no clear anticholinergic effects on current treatment  - will sign off  Thank you for your consult            Kaity Fish MD

## 2020-02-27 NOTE — ASSESSMENT & PLAN NOTE
· 129 on admission, only minimally improved to 130 today; low serum osm at 269  · Consider offending medications particularly high dose of SSRI    Will ask for psych input regarding possibly changing medications  · Continue IV normal saline for now and start fluid restriction

## 2020-02-27 NOTE — SOCIAL WORK
LOS 1 day, not a bundle or readmission  CM met with patient to explain role and discuss DC planning  Patient lives with his son in a 2nd floor apt and patient is not certain home many EMMA reporting at least 10 or more  Patient has the following equipment at home; walker, cane, shower chair, raised toilet seat, and grab bars in bathroom  Patient reports he does not always use the cane or walker because he wants to be able to do it on his own  Patient reports with ADLs he is a max assist by his son  CM asked pateint if he cooks and pateint reports he eats cereal 2-3 times a day  Hx HHC unsure of what agency  No Hx STR  No LW or POA, declined CM offer for information, reports son as point of contact  Pateint denies MH diagnoses; however, per chart review patient has diagnoses of OCD, anxiety, and depression  Patient reports he does not see a therapist or psychiatrist  No substance use Hx  PCP is Dr Pedro Alfredo, patient has prescription cvoerage, and prefers using [facility] in [place]  CM discussed care team's recommendations for HHC vs  STR at [place]  CM offered a list of facilities and agencies filtered by zip code to patient  Patient declined list  CM spoke at length with patient regarding the differences between [facility] and [facility] and the pros and cons with each  Patient reports he does not want to go to STR at [place] and would prefer to go [facility] with [facility]  Patient would prefer a referral to [facility]  [facility] referral made per preference  CM encouraged patient to be using his walker all the time after speaking with therapy team, patient expressed understanding  CM will continue to follow

## 2020-02-27 NOTE — ASSESSMENT & PLAN NOTE
Chronically elevated, suspect non MI troponin elevation; minimal elevation here at 0 08  Can DC tele  EKG at baseline, normal sinus heart rate 61  Inverted T-waves V3, V4, V5  Denies chest pain, dizziness or shortness of breath  Murmur noted on exam   Last echocardiogram was from 2018   Recommend outpatient repeat echocardiogram

## 2020-02-28 LAB
ANION GAP SERPL CALCULATED.3IONS-SCNC: 9 MMOL/L (ref 4–13)
BUN SERPL-MCNC: 6 MG/DL (ref 5–25)
CALCIUM SERPL-MCNC: 8 MG/DL (ref 8.3–10.1)
CHLORIDE SERPL-SCNC: 101 MMOL/L (ref 100–108)
CO2 SERPL-SCNC: 23 MMOL/L (ref 21–32)
CREAT SERPL-MCNC: 0.63 MG/DL (ref 0.6–1.3)
GFR SERPL CREATININE-BSD FRML MDRD: 96 ML/MIN/1.73SQ M
GLUCOSE SERPL-MCNC: 82 MG/DL (ref 65–140)
POTASSIUM SERPL-SCNC: 3.8 MMOL/L (ref 3.5–5.3)
SODIUM SERPL-SCNC: 133 MMOL/L (ref 136–145)

## 2020-02-28 PROCEDURE — 80048 BASIC METABOLIC PNL TOTAL CA: CPT | Performed by: PHYSICIAN ASSISTANT

## 2020-02-28 PROCEDURE — 99232 SBSQ HOSP IP/OBS MODERATE 35: CPT | Performed by: PHYSICIAN ASSISTANT

## 2020-02-28 RX ORDER — POLYETHYLENE GLYCOL 3350 17 G/17G
17 POWDER, FOR SOLUTION ORAL DAILY
Status: DISCONTINUED | OUTPATIENT
Start: 2020-02-28 | End: 2020-02-29

## 2020-02-28 RX ORDER — SENNOSIDES 8.6 MG
1 TABLET ORAL 2 TIMES DAILY PRN
Status: DISCONTINUED | OUTPATIENT
Start: 2020-02-28 | End: 2020-03-02 | Stop reason: HOSPADM

## 2020-02-28 RX ADMIN — SERTRALINE HYDROCHLORIDE 100 MG: 100 TABLET ORAL at 09:30

## 2020-02-28 RX ADMIN — MIDODRINE HYDROCHLORIDE 7.5 MG: 5 TABLET ORAL at 06:05

## 2020-02-28 RX ADMIN — DOCUSATE SODIUM 100 MG: 100 CAPSULE, LIQUID FILLED ORAL at 09:13

## 2020-02-28 RX ADMIN — MIDODRINE HYDROCHLORIDE 7.5 MG: 5 TABLET ORAL at 17:46

## 2020-02-28 RX ADMIN — ACETAMINOPHEN 975 MG: 325 TABLET, FILM COATED ORAL at 21:53

## 2020-02-28 RX ADMIN — BUSPIRONE HYDROCHLORIDE 10 MG: 5 TABLET ORAL at 17:46

## 2020-02-28 RX ADMIN — BUSPIRONE HYDROCHLORIDE 10 MG: 5 TABLET ORAL at 09:13

## 2020-02-28 RX ADMIN — POLYETHYLENE GLYCOL 3350 17 G: 17 POWDER, FOR SOLUTION ORAL at 17:46

## 2020-02-28 RX ADMIN — ENOXAPARIN SODIUM 40 MG: 40 INJECTION SUBCUTANEOUS at 09:13

## 2020-02-28 RX ADMIN — Medication 1 TABLET: at 09:13

## 2020-02-28 RX ADMIN — MIRTAZAPINE 15 MG: 15 TABLET, FILM COATED ORAL at 21:53

## 2020-02-28 RX ADMIN — OLANZAPINE 10 MG: 10 TABLET, FILM COATED ORAL at 21:54

## 2020-02-28 RX ADMIN — ACETAMINOPHEN 975 MG: 325 TABLET, FILM COATED ORAL at 13:47

## 2020-02-28 RX ADMIN — TAMSULOSIN HYDROCHLORIDE 0.4 MG: 0.4 CAPSULE ORAL at 17:46

## 2020-02-28 RX ADMIN — ACETAMINOPHEN 975 MG: 325 TABLET, FILM COATED ORAL at 05:43

## 2020-02-28 RX ADMIN — DOCUSATE SODIUM 100 MG: 100 CAPSULE, LIQUID FILLED ORAL at 17:46

## 2020-02-28 NOTE — SOCIAL WORK
CM informed via [employer] that [facility] and [facility] are not able to accept patient  CM called and spoke to patient's son who reviewed list CM emailed and reports he would like referrals to 1  [facility] 2  [facility] 3 [facility] 4 [facility]  [facility] referrals made per preference  CM will continue to follow

## 2020-02-28 NOTE — ASSESSMENT & PLAN NOTE
· suspect non MI troponin elevation; minimal elevation here at 0 08  No cardiac symptoms  · Murmur noted on exam   Last echocardiogram was from 2018   Recommend outpatient repeat echocardiogram

## 2020-02-28 NOTE — ASSESSMENT & PLAN NOTE
· As per conversation with Psychiatry and patient's son, patient has severe OCD rather than depression regarding bowel habits which completely controls his life    He has a very negative outlook on his medical conditions which further worsens the situation  · Per Psychiatry consult, nothing else to offer except continuation of the medications he is on including Zoloft, Zyprexa, Remeron, buspar  · Recommend psychotherapy as an outpatient but he refused to go in the past per his son

## 2020-02-28 NOTE — ASSESSMENT & PLAN NOTE
· Started Flomax *cautiously given reports of previous orthostasis (midodrine was resumed based on son's request)  · Vizcaino catheter placed in the emergency department based on reports of inability to urinate; will do voiding trial today  · Will need outpatient urology follow-up  · Last PSA on file was from 2018 but cannot be checked at this time due to trauma from Vizcaino insertion as this will create a false elevation

## 2020-02-28 NOTE — ASSESSMENT & PLAN NOTE
· Chronic and ongoing, suspect this is related to psychiatric issues more so than actual debility  · Patient lives at home with son who reports that he is unable to provide care  · PT and OT consulted, short-term rehab has been suggested--case management involved  · Patient reports chronic back pain and injuries and has had difficulty with ADLs  · Tylenol scheduled lidocaine patches  · Patient denies any pain this morning

## 2020-02-28 NOTE — PROGRESS NOTES
Progress Note - Heather Jensen 1945, 76 y o  male MRN: [id number]    Unit/Bed#: S -01 Encounter: [id number]    Primary Care Provider: Ralph Medrano DO   Date and time admitted to hospital: 2/25/2020 10:45 PM  * Hyponatremia  Assessment & Plan  · 129 on admission, improved to 133 after implementing fluid restriction; rest of labs normal except low serum osm at 269  · Psych tells me he reports drinking excessive amounts of free water due to obsession with bowel movements  · Considered offending medications particularly high dose of SSRI  Psych reports he has been stable on this for years and does not want to change it   · D/C IVF    Urinary retention  Assessment & Plan  · Started Flomax *cautiously given reports of previous orthostasis (midodrine was resumed based on son's request)  · Vizcaino catheter placed in the emergency department based on reports of inability to urinate; will do voiding trial today  · Will need outpatient urology follow-up  · Last PSA on file was from 2018 but cannot be checked at this time due to trauma from Vizcaino insertion as this will create a false elevation    OCD (obsessive compulsive disorder)  Assessment & Plan  · As per conversation with Psychiatry and patient's son, patient has severe OCD rather than depression regarding bowel habits which completely controls his life  He has a very negative outlook on his medical conditions which further worsens the situation  · Per Psychiatry consult, nothing else to offer except continuation of the medications he is on including Zoloft, Zyprexa, Remeron, buspar  · Recommend psychotherapy as an outpatient but he refused to go in the past per his son    Elevated troponin  Assessment & Plan  · suspect non MI troponin elevation; minimal elevation here at 0 08  No cardiac symptoms  · Murmur noted on exam   Last echocardiogram was from 2018   Recommend outpatient repeat echocardiogram    Weakness  Assessment & Plan  · Chronic and ongoing, suspect this is related to psychiatric issues more so than actual debility  · Patient lives at home with son who reports that he is unable to provide care  · PT and OT consulted, short-term rehab has been suggested--case management involved  · Patient reports chronic back pain and injuries and has had difficulty with ADLs  · Tylenol scheduled lidocaine patches  · Patient denies any pain this morning    VTE Pharmacologic Prophylaxis:   Pharmacologic: Enoxaparin (Lovenox)  Mechanical VTE Prophylaxis in Place: No    Patient Centered Rounds: I have performed bedside rounds with nursing staff today  Discussions with Specialists or Other Care Team Provider: 2:28 pm spoke with case mgmt    Education and Discussions with Family / Patient: spoke with son    Time Spent for Care: 25 min  More than 50% of total time spent on counseling and coordination of care as described above  Current Length of Stay: 2 day(s)    Current Patient Status: Inpatient   Certification Statement: The patient will continue to require additional inpatient hospital stay due to Awaiting rehab    Discharge Plan:  Patient is expected to be ready for discharge tomorrow to rehab    Code Status: Level 1 - Full Code    Subjective:   Patient continues to report that he feels weak and when on I advised him that we will be removing the Vizcaino catheter this morning he says that he is [employer] does not think it will work " He continues to obsess about how he will get to the bathroom and that he will be able to go  Nursing reports no bowel movements noted since admission  Patient has been    Objective:     Vitals:   Temp (24hrs), Av 7 °F (36 5 °C), Min:97 5 °F (36 4 °C), Max:97 9 °F (36 6 °C)    Temp:  [97 5 °F (36 4 °C)-97 9 °F (36 6 °C)] 97 5 °F (36 4 °C)  HR:  [56-61] 60  Resp:  [18] 18  BP: (116-140)/(56-63) 133/61  SpO2:  [97 %-98 %] 97 %  Body mass index is 21 06 kg/m²       Input and Output Summary (last 24 hours): Intake/Output Summary (Last 24 hours) at 2/28/2020 0811  Last data filed at 2/28/2020 0544  Gross per 24 hour   Intake 600 ml   Output 4050 ml   Net -3450 ml       Physical Exam:     Physical Exam   Constitutional: He appears well-developed  No distress  Thin gentleman seen laying in bed   Eyes: Conjunctivae are normal  Right eye exhibits no discharge  Left eye exhibits no discharge  No scleral icterus  Cardiovascular: Normal rate and regular rhythm  Murmur (GRAZYNA) heard  Pulmonary/Chest: Effort normal  No stridor  No respiratory distress  He has no wheezes  He has no rales  Abdominal: Soft  Bowel sounds are normal  He exhibits no distension  There is no tenderness  There is no guarding  Musculoskeletal: He exhibits no edema  Neurological: He is alert  Awake alert interactive   Skin: Skin is warm and dry  No rash noted  He is not diaphoretic  No erythema  No pallor  Psychiatric:   Very flat affect   Vitals reviewed  Additional Data:     Labs:    Results from last 7 days   Lab Units 02/27/20  0514   WBC Thousand/uL 5 97   HEMOGLOBIN g/dL 11 7*   HEMATOCRIT % 34 3*   PLATELETS Thousands/uL 152   NEUTROS PCT % 57   LYMPHS PCT % 26   MONOS PCT % 15*   EOS PCT % 2     Results from last 7 days   Lab Units 02/28/20  0541  02/26/20  0116   SODIUM mmol/L 133*   < > 129*   POTASSIUM mmol/L 3 8   < > 4 8   CHLORIDE mmol/L 101   < > 96*   CO2 mmol/L 23   < > 28   BUN mg/dL 6   < > 8   CREATININE mg/dL 0 63   < > 0 74   ANION GAP mmol/L 9   < > 5   CALCIUM mg/dL 8 0*   < > 8 5   ALBUMIN g/dL  --   --  3 5   TOTAL BILIRUBIN mg/dL  --   --  0 43   ALK PHOS U/L  --   --  58   ALT U/L  --   --  14   AST U/L  --   --  24   GLUCOSE RANDOM mg/dL 82   < > 88    < > = values in this interval not displayed  Results from last 7 days   Lab Units 02/26/20  0116   INR  1 02                   * I Have Reviewed All Lab Data Listed Above  * Additional Pertinent Lab Tests Reviewed:  Epifanio Nunn Admission Reviewed    Imaging:    Imaging Reports Reviewed Today Include:   Imaging Personally Reviewed by Myself Includes:      Recent Cultures (last 7 days):           Last 24 Hours Medication List:     Current Facility-Administered Medications:  acetaminophen 975 mg Oral [facility] Jordan Michael, CRNP   aluminum-magnesium hydroxide-simethicone 30 mL Oral Q6H PRN Jordan Michael, CRNP   busPIRone 10 mg Oral BID Jordan Michael, CRNP   docusate sodium 100 mg Oral BID Aura Hoskins PA-C   enoxaparin 40 mg Subcutaneous Daily Jordan Michael, CRNP   lidocaine 2 patch Topical Daily Jordan Michael, CRNP   melatonin 3 mg Oral HS PRN Jordan Michael, CRNP   midodrine 7 5 mg Oral BID AC Aura Hoskins PA-C   mirtazapine 15 mg Oral HS Jordan Michael, CRGRETCHEN   multivitamin-minerals 1 tablet Oral Daily Jordan Michael, CRNP   OLANZapine 10 mg Oral HS Columba Michael, CRNP   ondansetron 4 mg Intravenous Q6H PRN Columba Michael, CRNP   polyethylene glycol 17 g Oral Daily PRN Jordan Michael, CRNP   sertraline 100 mg Oral Daily Jordan Michael, CRNP   tamsulosin 0 4 mg Oral Daily With 95 TAZ Shaver        Today, Patient Was Seen By: Tramaine Young PA-C    ** Please Note: Dictation voice to text software may have been used in the creation of this document   **

## 2020-02-28 NOTE — ASSESSMENT & PLAN NOTE
· 129 on admission, improved to 133 after implementing fluid restriction; rest of labs normal except low serum osm at 269  · Psych tells me he reports drinking excessive amounts of free water due to obsession with bowel movements  · Considered offending medications particularly high dose of SSRI    Psych reports he has been stable on this for years and does not want to change it   · D/C IVF

## 2020-02-29 PROBLEM — K59.00 CONSTIPATION: Status: ACTIVE | Noted: 2020-02-29

## 2020-02-29 PROCEDURE — 97166 OT EVAL MOD COMPLEX 45 MIN: CPT

## 2020-02-29 PROCEDURE — 97110 THERAPEUTIC EXERCISES: CPT

## 2020-02-29 PROCEDURE — 99232 SBSQ HOSP IP/OBS MODERATE 35: CPT | Performed by: NURSE PRACTITIONER

## 2020-02-29 RX ORDER — AMOXICILLIN 250 MG
2 CAPSULE ORAL 2 TIMES DAILY
Status: DISCONTINUED | OUTPATIENT
Start: 2020-02-29 | End: 2020-03-02 | Stop reason: HOSPADM

## 2020-02-29 RX ORDER — POLYETHYLENE GLYCOL 3350 17 G/17G
17 POWDER, FOR SOLUTION ORAL 2 TIMES DAILY
Status: DISCONTINUED | OUTPATIENT
Start: 2020-02-29 | End: 2020-03-02 | Stop reason: HOSPADM

## 2020-02-29 RX ADMIN — SENNOSIDES AND DOCUSATE SODIUM 2 TABLET: 8.6; 5 TABLET ORAL at 17:34

## 2020-02-29 RX ADMIN — BUSPIRONE HYDROCHLORIDE 10 MG: 5 TABLET ORAL at 17:33

## 2020-02-29 RX ADMIN — OLANZAPINE 10 MG: 10 TABLET, FILM COATED ORAL at 21:28

## 2020-02-29 RX ADMIN — Medication 1 TABLET: at 10:41

## 2020-02-29 RX ADMIN — ACETAMINOPHEN 975 MG: 325 TABLET, FILM COATED ORAL at 05:03

## 2020-02-29 RX ADMIN — ENOXAPARIN SODIUM 40 MG: 40 INJECTION SUBCUTANEOUS at 10:39

## 2020-02-29 RX ADMIN — MIDODRINE HYDROCHLORIDE 7.5 MG: 5 TABLET ORAL at 06:08

## 2020-02-29 RX ADMIN — ACETAMINOPHEN 975 MG: 325 TABLET, FILM COATED ORAL at 15:10

## 2020-02-29 RX ADMIN — DOCUSATE SODIUM 100 MG: 100 CAPSULE, LIQUID FILLED ORAL at 10:40

## 2020-02-29 RX ADMIN — ACETAMINOPHEN 975 MG: 325 TABLET, FILM COATED ORAL at 21:27

## 2020-02-29 RX ADMIN — POLYETHYLENE GLYCOL 3350 17 G: 17 POWDER, FOR SOLUTION ORAL at 17:33

## 2020-02-29 RX ADMIN — TAMSULOSIN HYDROCHLORIDE 0.4 MG: 0.4 CAPSULE ORAL at 17:33

## 2020-02-29 RX ADMIN — SERTRALINE HYDROCHLORIDE 100 MG: 100 TABLET ORAL at 10:41

## 2020-02-29 RX ADMIN — POLYETHYLENE GLYCOL 3350 17 G: 17 POWDER, FOR SOLUTION ORAL at 10:39

## 2020-02-29 RX ADMIN — BUSPIRONE HYDROCHLORIDE 10 MG: 5 TABLET ORAL at 10:41

## 2020-02-29 RX ADMIN — MIRTAZAPINE 15 MG: 15 TABLET, FILM COATED ORAL at 21:27

## 2020-02-29 RX ADMIN — MIDODRINE HYDROCHLORIDE 7.5 MG: 5 TABLET ORAL at 17:34

## 2020-02-29 NOTE — ASSESSMENT & PLAN NOTE
· Patient reports that this is the reason why he came to the hospital   · Bowel regimen aggressive as feel if he is regular he will feel less need to drink excessive fluids    · Strict intake and output

## 2020-02-29 NOTE — PLAN OF CARE
Problem: Potential for Falls  Goal: Patient will remain free of falls  Description  INTERVENTIONS:  - Assess patient frequently for physical needs  -  Identify cognitive and physical deficits and behaviors that affect risk of falls    -  [place] fall precautions as indicated by assessment   - Educate patient/family on patient safety including physical limitations  - Instruct patient to call for assistance with activity based on assessment  - Modify environment to reduce risk of injury  - Consider OT/PT consult to assist with strengthening/mobility  Outcome: Progressing     Problem: Prexisting or High Potential for Compromised Skin Integrity  Goal: Skin integrity is maintained or improved  Description  INTERVENTIONS:  - Identify patients at risk for skin breakdown  - Assess and monitor skin integrity  - Assess and monitor nutrition and hydration status  - Monitor labs   - Assess for incontinence   - Turn and reposition patient  - Assist with mobility/ambulation  - Relieve pressure over bony prominences  - Avoid friction and shearing  - Provide appropriate hygiene as needed including keeping skin clean and dry  - Evaluate need for skin moisturizer/barrier cream  - Collaborate with interdisciplinary team   - Patient/family teaching  - Consider wound care consult   Outcome: Progressing     Problem: PAIN - ADULT  Goal: Verbalizes/displays adequate comfort level or baseline comfort level  Description  Interventions:  - Encourage patient to monitor pain and request assistance  - Assess pain using appropriate pain scale  - Administer analgesics based on type and severity of pain and evaluate response  - Implement non-pharmacological measures as appropriate and evaluate response  - Consider cultural and social influences on pain and pain management  - Notify physician/advanced practitioner if interventions unsuccessful or patient reports new pain  Outcome: Progressing     Problem: INFECTION - ADULT  Goal: Absence or prevention of progression during hospitalization  Description  INTERVENTIONS:  - Assess and monitor for signs and symptoms of infection  - Monitor lab/diagnostic results  - Monitor all insertion sites, i e  indwelling lines, tubes, and drains  - Monitor endotracheal if appropriate and nasal secretions for changes in amount and color  - [place] appropriate cooling/warming therapies per order  - Administer medications as ordered  - Instruct and encourage patient and family to use good hand hygiene technique  - Identify and instruct in appropriate isolation precautions for identified infection/condition  Outcome: Progressing     Problem: SAFETY ADULT  Goal: Patient will remain free of falls  Description  INTERVENTIONS:  - Assess patient frequently for physical needs  -  Identify cognitive and physical deficits and behaviors that affect risk of falls    -  [place] fall precautions as indicated by assessment   - Educate patient/family on patient safety including physical limitations  - Instruct patient to call for assistance with activity based on assessment  - Modify environment to reduce risk of injury  - Consider OT/PT consult to assist with strengthening/mobility  Outcome: Progressing  Goal: Maintain or return to baseline ADL function  Description  INTERVENTIONS:  -  Assess patient's ability to carry out ADLs; assess patient's baseline for ADL function and identify physical deficits which impact ability to perform ADLs (bathing, care of mouth/teeth, toileting, grooming, dressing, etc )  - Assess/evaluate cause of self-care deficits   - Assess range of motion  - Assess patient's mobility; develop plan if impaired  - Assess patient's need for assistive devices and provide as appropriate  - Encourage maximum independence but intervene and supervise when necessary  - Involve family in performance of ADLs  - Assess for home care needs following discharge   - Consider OT consult to assist with ADL evaluation and planning for discharge  - Provide patient education as appropriate  Outcome: Progressing  Goal: Maintain or return mobility status to optimal level  Description  INTERVENTIONS:  - Assess patient's baseline mobility status (ambulation, transfers, stairs, etc )    - Identify cognitive and physical deficits and behaviors that affect mobility  - Identify mobility aids required to assist with transfers and/or ambulation (gait belt, sit-to-stand, lift, walker, cane, etc )  - [place] fall precautions as indicated by assessment  - Record patient progress and toleration of activity level on Mobility SBAR; progress patient to next Phase/Stage  - Instruct patient to call for assistance with activity based on assessment  - Consider rehabilitation consult to assist with strengthening/weightbearing, etc   Outcome: Progressing     Problem: DISCHARGE PLANNING  Goal: Discharge to home or other facility with appropriate resources  Description  INTERVENTIONS:  - Identify barriers to discharge w/patient and caregiver  - Arrange for needed discharge resources and transportation as appropriate  - Identify discharge learning needs (meds, wound care, etc )  - Arrange for interpretive services to assist at discharge as needed  - Refer to Case Management Department for coordinating discharge planning if the patient needs post-hospital services based on physician/advanced practitioner order or complex needs related to functional status, cognitive ability, or social support system  Outcome: Progressing     Problem: GENITOURINARY - ADULT  Goal: Maintains or returns to baseline urinary function  Description  INTERVENTIONS:  - Assess urinary function  - Encourage oral fluids to ensure adequate hydration if ordered  - Administer IV fluids as ordered to ensure adequate hydration  - Administer ordered medications as needed  - Offer frequent toileting  - Follow urinary retention protocol if ordered  Outcome: Progressing  Goal: Absence of urinary retention  Description  INTERVENTIONS:  - Assess patients ability to void and empty bladder  - Monitor I/O  - Bladder scan as needed  - Discuss with physician/AP medications to alleviate retention as needed  - Discuss catheterization for long term situations as appropriate  Outcome: Progressing  Goal: Urinary catheter remains patent  Description  INTERVENTIONS:  - Assess patency of urinary catheter  - If patient has a chronic cristina, consider changing catheter if non-functioning  - Follow guidelines for intermittent irrigation of non-functioning urinary catheter  Outcome: Progressing     Problem: METABOLIC, FLUID AND ELECTROLYTES - ADULT  Goal: Electrolytes maintained within normal limits  Description  INTERVENTIONS:  - Monitor labs and assess patient for signs and symptoms of electrolyte imbalances  - Administer electrolyte replacement as ordered  - Monitor response to electrolyte replacements, including repeat lab results as appropriate  - Instruct patient on fluid and nutrition as appropriate  Outcome: Progressing  Goal: Fluid balance maintained  Description  INTERVENTIONS:  - Monitor labs   - Monitor I/O and WT  - Instruct patient on fluid and nutrition as appropriate  - Assess for signs & symptoms of volume excess or deficit  Outcome: Progressing

## 2020-02-29 NOTE — PLAN OF CARE
Problem: OCCUPATIONAL THERAPY ADULT  Goal: Performs self-care activities at highest level of function for planned discharge setting  See evaluation for individualized goals  Description  Treatment Interventions: ADL retraining, Functional transfer training, UE strengthening/ROM, Endurance training, Patient/family training, Neuromuscular reeducation, Continued evaluation, Activityengagement          See flowsheet documentation for full assessment, interventions and recommendations  Note:   Limitation: Decreased ADL status, Decreased UE strength, Decreased Safe judgement during ADL, Decreased endurance, Decreased self-care trans, Decreased high-level ADLs  Prognosis: Fair  Assessment: Patient evaluated by Occupational Therapy  Patient admitted with Hyponatremia  The patients occupational profile, medical and therapy history includes a expanded review of medical and/or therapy records and additional review of physical, cognitive, or psychosocial history related to current functional performance  Comorbidities affecting functional mobility and ADLS include: depression and hypertension  Prior to admission, patient was independent with functional mobility without assistive device, requiring assist for ADLS, requiring assist for IADLS and living with son in a 1 level home with 0 steps to enter  Patient performed transfers , bed mobility and functional ambulation at Sup  Patient performed LB dressing and toileting at [address] [place] [address] and Mod I for hygiene and UB bathing  The evaluation identifies the following performance deficits: weakness, decreased ROM, impaired balance, decreased endurance, decreased coordination, increased fall risk, new onset of impairment of functional mobility, decreased ADLS, decreased IADLS and decreased activity tolerance, that result in activity limitations and/or participation restrictions   This evaluation requires clinical decision making of moderate complexity, because the patient may present with comorbidities that affect occupational performance and required minimal or moderate modification of tasks or assistance with the consideration of several treatment options  The Barthel Index was used as a functional outcome tool presenting with a score of 50  Patient will benefit from skilled Occupational Therapy services to address above deficits and facilitate a safe return to prior level of function       OT Discharge Recommendation: Short Term Rehab

## 2020-02-29 NOTE — ASSESSMENT & PLAN NOTE
· Initiated Flomax *cautiously given reports of previous orthostasis (midodrine was resumed based on son's request)  · Vizcaino catheter placed in the emergency department based on reports of inability to urinate; currently without Vizcaino catheter and was removed overnight on 02/28- 2/29  Patient reports he is voiding appropriately but says that he is having a little bit of urethral irritation status post Vizcaino catheter  If patient continues to have frequency/urgency in the next couple days with consider UA for evaluation of urinary tract infection    · Outpatient urology follow-up  · Last PSA on file was from 2018 but cannot be checked at this time due to trauma from Vizcaino insertion as this will create a false elevation

## 2020-02-29 NOTE — OCCUPATIONAL THERAPY NOTE
Occupational Therapy Evaluation     Patient Name: Sharif Parra  OAYRH'G Date: 2/29/2020  Problem List  Principal Problem:    Hyponatremia  Active Problems:    OCD (obsessive compulsive disorder)    Weakness    Urinary retention    Elevated troponin    Past Medical History  Past Medical History:   Diagnosis Date    Anxiety     Chronic low back pain     Depression     Hypercholesterolemia     Hypertension     OCD (obsessive compulsive disorder)      Past Surgical History  Past Surgical History:   Procedure Laterality Date    CERVICAL FUSION Bilateral 11/19/2018    Procedure: Posterior cervical decompressive laminectomy and instrumented posterior lateral fusion fixation C3-6;  Surgeon: Sukh Guerrero MD;  [facility]: [facility];  Service: Neurosurgery    HERNIA REPAIR             02/29/20 1011   Note Type   Note type Eval/Treat   Restrictions/Precautions   Weight Bearing Precautions Per Order No   Other Precautions Chair Alarm; Bed Alarm; Fall Risk   Pain Assessment   Pain Assessment No/denies pain   Pain Score No Pain   Home Living   Type of Home Apartment  (2nd floor with steps to ambulate to his apartment)   Home Layout One level   Bathroom Shower/Tub Tub/shower unit   Bathroom Toilet Standard   Bathroom Equipment Commode; Shower chair   Home Equipment Other (Comment)  (RW but does not use it)   Prior Function   Level of [place] Needs assistance with IADLs; Needs assistance with ADLs and functional mobility   Lives With Son   Receives Help From Family   ADL Assistance Needs assistance   IADLs Needs assistance   Falls in the last 6 months 1 to 4   Comments Patient stated son performed IADL and assistes with LB dress and bath   Psychosocial   Patient Behaviors/Mood Anxious   Subjective   Subjective Pt stated felt anxious  Patient stated if other people get weak like this  Therapsit assured him that the health care team wiill help to improve quality of life   Patient stated that  son works in evening and Pt is alone in when son is at work   ADL   Eating Assistance 7  Independent   Grooming Assistance 6  [address]; Wash/dry hands; Wash/dry face;Standing with assistive device; Increased time to complete   UB Bathing Assistance 6  Modified Independent   UB Bathing Deficit Increased time to complete;Right arm;Left arm   LB Bathing Assistance Unable to assess   UB Dressing Assistance 5  Supervision/Setup   UB Dressing Deficit Setup;Pull over head;Pull around back; Increased time to complete   LB Dressing Assistance 4  Minimal Assistance   LB Dressing Deficit Don/doff R sock; Don/doff L sock; Thread LLE into pants; Thread RLE into pants;Pull up over hips; Increased time to complete;Supervision/safety;Setup;Verbal cueing   Toileting Assistance  4  Minimal Assistance   Toileting Deficit Increased time to complete;Steadying   Bed Mobility   Rolling L 5  Supervision   Supine to Sit 5  Supervision   Additional items Increased time required;Verbal cues;HOB elevated   Transfers   Sit to Stand 5  Supervision   Additional items Increased time required;Verbal cues;Armrests   Stand to Sit 5  Supervision   Additional items Increased time required;Verbal cues;Armrests   Functional Mobility   Functional Mobility 5  Supervision   Additional Comments   (Patient ambulated 20 feet in room with RW)   Balance   Static Sitting Fair +   Dynamic Sitting Fair +   Static Standing Fair -   Dynamic Standing Fair -   Activity Tolerance   Activity Tolerance Patient limited by fatigue;Patient tolerated treatment well   Nurse Made Aware RN Akira David   RUE Assessment   RUE Assessment X   RUE Overall AROM   R Shoulder ABduction   (limited 90)   R Shoulder Flexion   (limited 90 )   RUE Strength   RUE Overall Strength   (3/5)   LUE Assessment   LUE Assessment WFL   LUE Strength   LUE Overall Strength   (3/5)   Cognition   Orientation Level Oriented X4   Following Commands Follows one step commands with increased time or repetition   Comments Pt ID by Cassandra Line name and    Assessment   Limitation Decreased ADL status; Decreased UE strength;Decreased Safe judgement during ADL;Decreased endurance;Decreased self-care trans;Decreased high-level ADLs   Prognosis Fair   Assessment Patient evaluated by Occupational Therapy  Patient admitted with Hyponatremia  The patients occupational profile, medical and therapy history includes a expanded review of medical and/or therapy records and additional review of physical, cognitive, or psychosocial history related to current functional performance  Comorbidities affecting functional mobility and ADLS include: depression and hypertension  Prior to admission, patient was independent with functional mobility without assistive device, requiring assist for ADLS, requiring assist for IADLS and living with son in a 1 level home with 0 steps to enter  Patient performed transfers , bed mobility and functional ambulation at Sup  Patient performed LB dressing and toileting at [address] [place] [address] and Mod I for hygiene and UB bathing  The evaluation identifies the following performance deficits: weakness, decreased ROM, impaired balance, decreased endurance, decreased coordination, increased fall risk, new onset of impairment of functional mobility, decreased ADLS, decreased IADLS and decreased activity tolerance, that result in activity limitations and/or participation restrictions  This evaluation requires clinical decision making of moderate complexity, because the patient may present with comorbidities that affect occupational performance and required minimal or moderate modification of tasks or assistance with the consideration of several treatment options  The Barthel Index was used as a functional outcome tool presenting with a score of 50  Patient will benefit from skilled Occupational Therapy services to address above deficits and facilitate a safe return to prior level of function     Goals   Patient Goals ("be independent")   [facility] Time Frame   (1-7)   Short Term Goal #1 Patient will increase standing tolerance to 5 minutes during ADL task to decrease assistance level and decrease fall risk; Patient will increase bed mobility to mod I in preparation for ADLS and transfers; Patient will increase functional mobility to and from bathroom with rolling walker mod I to increase performance with ADLS and to use a toilet; Patient will tolerate 10 minutes of UE ROM/strengthening to increase general activity tolerance and performance in ADLS/IADLS; Patient will improve functional activity tolerance to 10 minutes of sustained functional tasks to increase participation in basic self-care and decrease assistance level; Patient will increase dynamic standing balance to fair to improve postural stability and decrease fall risk during standing ADLS and transfers  LTG Time Frame   (8-14)   Long Term Goal #1 Patient will increase standing tolerance to 10 minutes during ADL task to decrease assistance level and decrease fall risk; Patient will increase bed mobility to independent in preparation for ADLS and transfers;  Patient will increase functional mobility to and from bathroom with rolling walker independently to increase performance with ADLS and to use a toilet; Patient will tolerate 12 minutes of UE ROM/strengthening to increase general activity tolerance and performance in ADLS/IADLS; Patient will improve functional activity tolerance to 12 minutes of sustained functional tasks to increase participation in basic self-care and decrease assistance level; Patient will increase dynamic standing balance to fair+ to improve postural stability and decrease fall risk during standing ADLS and transfers   Functional Transfer Goals   Pt Will Transfer To Bedside Commode With mod indep   Pt Will Transfer To Toilet With mod indep   Pt Will Transfer To Shower With mod indep   ADL Goals   Pt Will Perform Eating Independently   Pt Will Perform Grooming With mod indep   Pt Will Perform Bathing With mod indep   Pt Will Perform UE Dressing With mod indep   Pt Will Perform LE Dressing With mod indep   Pt Will Perform Toileting With mod indep   Plan   Treatment Interventions ADL retraining;Functional transfer training;UE strengthening/ROM; Endurance training;Patient/family training;Neuromuscular reeducation;Continued evaluation; Activityengagement   Goal Expiration Date 03/14/20   OT Frequency 2-3x/wk   Additional Treatment Session   Treatment Assessment Patient was agreeable to therapy  Patient performed There ex to BUE at 3X10 in multiple planes with rest breaks  Patient performed STS transfers at Sup and functional ambulation at Sup  Patient was returned to recliner with all needs met and call bell with in reach     Recommendation   OT Discharge Recommendation Short Term Rehab   Barthel Index   Feeding 10   Bathing 0   Grooming Score 0   Dressing Score 5   Bladder Score 10   Bowels Score 10   Toilet Use Score 5   Transfers (Bed/Chair) Score 10   Mobility (Level Surface) Score 0   Stairs Score 0   Barthel Index Score 50   Judith Stade, OT

## 2020-02-29 NOTE — PLAN OF CARE
Problem: Potential for Falls  Goal: Patient will remain free of falls  Description  INTERVENTIONS:  - Assess patient frequently for physical needs  -  Identify cognitive and physical deficits and behaviors that affect risk of falls    -  [place] fall precautions as indicated by assessment   - Educate patient/family on patient safety including physical limitations  - Instruct patient to call for assistance with activity based on assessment  - Modify environment to reduce risk of injury  - Consider OT/PT consult to assist with strengthening/mobility  Outcome: Progressing     Problem: Prexisting or High Potential for Compromised Skin Integrity  Goal: Skin integrity is maintained or improved  Description  INTERVENTIONS:  - Identify patients at risk for skin breakdown  - Assess and monitor skin integrity  - Assess and monitor nutrition and hydration status  - Monitor labs   - Assess for incontinence   - Turn and reposition patient  - Assist with mobility/ambulation  - Relieve pressure over bony prominences  - Avoid friction and shearing  - Provide appropriate hygiene as needed including keeping skin clean and dry  - Evaluate need for skin moisturizer/barrier cream  - Collaborate with interdisciplinary team   - Patient/family teaching  - Consider wound care consult   Outcome: Progressing     Problem: PAIN - ADULT  Goal: Verbalizes/displays adequate comfort level or baseline comfort level  Description  Interventions:  - Encourage patient to monitor pain and request assistance  - Assess pain using appropriate pain scale  - Administer analgesics based on type and severity of pain and evaluate response  - Implement non-pharmacological measures as appropriate and evaluate response  - Consider cultural and social influences on pain and pain management  - Notify physician/advanced practitioner if interventions unsuccessful or patient reports new pain  Outcome: Progressing     Problem: INFECTION - ADULT  Goal: Absence or prevention of progression during hospitalization  Description  INTERVENTIONS:  - Assess and monitor for signs and symptoms of infection  - Monitor lab/diagnostic results  - Monitor all insertion sites, i e  indwelling lines, tubes, and drains  - Monitor endotracheal if appropriate and nasal secretions for changes in amount and color  - [place] appropriate cooling/warming therapies per order  - Administer medications as ordered  - Instruct and encourage patient and family to use good hand hygiene technique  - Identify and instruct in appropriate isolation precautions for identified infection/condition  Outcome: Progressing     Problem: SAFETY ADULT  Goal: Patient will remain free of falls  Description  INTERVENTIONS:  - Assess patient frequently for physical needs  -  Identify cognitive and physical deficits and behaviors that affect risk of falls    -  [place] fall precautions as indicated by assessment   - Educate patient/family on patient safety including physical limitations  - Instruct patient to call for assistance with activity based on assessment  - Modify environment to reduce risk of injury  - Consider OT/PT consult to assist with strengthening/mobility  Outcome: Progressing  Goal: Maintain or return to baseline ADL function  Description  INTERVENTIONS:  -  Assess patient's ability to carry out ADLs; assess patient's baseline for ADL function and identify physical deficits which impact ability to perform ADLs (bathing, care of mouth/teeth, toileting, grooming, dressing, etc )  - Assess/evaluate cause of self-care deficits   - Assess range of motion  - Assess patient's mobility; develop plan if impaired  - Assess patient's need for assistive devices and provide as appropriate  - Encourage maximum independence but intervene and supervise when necessary  - Involve family in performance of ADLs  - Assess for home care needs following discharge   - Consider OT consult to assist with ADL evaluation and planning for discharge  - Provide patient education as appropriate  Outcome: Progressing  Goal: Maintain or return mobility status to optimal level  Description  INTERVENTIONS:  - Assess patient's baseline mobility status (ambulation, transfers, stairs, etc )    - Identify cognitive and physical deficits and behaviors that affect mobility  - Identify mobility aids required to assist with transfers and/or ambulation (gait belt, sit-to-stand, lift, walker, cane, etc )  - [place] fall precautions as indicated by assessment  - Record patient progress and toleration of activity level on Mobility SBAR; progress patient to next Phase/Stage  - Instruct patient to call for assistance with activity based on assessment  - Consider rehabilitation consult to assist with strengthening/weightbearing, etc   Outcome: Progressing     Problem: DISCHARGE PLANNING  Goal: Discharge to home or other facility with appropriate resources  Description  INTERVENTIONS:  - Identify barriers to discharge w/patient and caregiver  - Arrange for needed discharge resources and transportation as appropriate  - Identify discharge learning needs (meds, wound care, etc )  - Arrange for interpretive services to assist at discharge as needed  - Refer to Case Management Department for coordinating discharge planning if the patient needs post-hospital services based on physician/advanced practitioner order or complex needs related to functional status, cognitive ability, or social support system  Outcome: Progressing     Problem: GENITOURINARY - ADULT  Goal: Maintains or returns to baseline urinary function  Description  INTERVENTIONS:  - Assess urinary function  - Encourage oral fluids to ensure adequate hydration if ordered  - Administer IV fluids as ordered to ensure adequate hydration  - Administer ordered medications as needed  - Offer frequent toileting  - Follow urinary retention protocol if ordered  Outcome: Progressing  Goal: Absence of urinary retention  Description  INTERVENTIONS:  - Assess patients ability to void and empty bladder  - Monitor I/O  - Bladder scan as needed  - Discuss with physician/AP medications to alleviate retention as needed  - Discuss catheterization for long term situations as appropriate  Outcome: Progressing  Goal: Urinary catheter remains patent  Description  INTERVENTIONS:  - Assess patency of urinary catheter  - If patient has a chronic cristina, consider changing catheter if non-functioning  - Follow guidelines for intermittent irrigation of non-functioning urinary catheter  Outcome: Progressing     Problem: METABOLIC, FLUID AND ELECTROLYTES - ADULT  Goal: Electrolytes maintained within normal limits  Description  INTERVENTIONS:  - Monitor labs and assess patient for signs and symptoms of electrolyte imbalances  - Administer electrolyte replacement as ordered  - Monitor response to electrolyte replacements, including repeat lab results as appropriate  - Instruct patient on fluid and nutrition as appropriate  Outcome: Progressing  Goal: Fluid balance maintained  Description  INTERVENTIONS:  - Monitor labs   - Monitor I/O and WT  - Instruct patient on fluid and nutrition as appropriate  - Assess for signs & symptoms of volume excess or deficit  Outcome: Progressing

## 2020-02-29 NOTE — SOCIAL WORK
CM was notified that patient would be medically stable for discharge if a STR was in place  CM followed up with all rehab facilities that referrals were sent to  [facility] is the only facility that has accepted patient and upon speaking to the admissions team a bed wont' be available for him until tomorrow  CM provided update to [facility] provider  CM department will continue to follow to assist with discharge coordination

## 2020-02-29 NOTE — ASSESSMENT & PLAN NOTE
· Improving; 129 on admission; rest of labs normal except low serum osm at 269  · Psych reported he reports drinking excessive amounts of free water due to obsession with bowel movements  · Considered offending medications particularly high dose of SSRI    Psych reports he has been stable on this for years and does not want to change it   · D/C IVF    Lab Results   Component Value Date    SODIUM 133 (L) 02/28/2020    SODIUM 130 (L) 02/27/2020    SODIUM 128 (L) 02/26/2020

## 2020-02-29 NOTE — ASSESSMENT & PLAN NOTE
· Suspect non MI troponin elevation; minimal elevation here at 0 08  No cardiac symptoms  · Murmur noted on exam     · Last echocardiogram was from 2018   Recommend outpatient repeat echocardiogram

## 2020-02-29 NOTE — PROGRESS NOTES
Progress Note - Cheryl Bryan 1945, 76 y o  male MRN: [id number]    Unit/Bed#: S -01 Encounter: [id number]    Primary Care Provider: Erna Osei DO   Date and time admitted to hospital: 2/25/2020 10:45 PM        * Hyponatremia  Assessment & Plan  · Improving; 129 on admission; rest of labs normal except low serum osm at 269  · Psych reported he reports drinking excessive amounts of free water due to obsession with bowel movements  · Considered offending medications particularly high dose of SSRI  Psych reports he has been stable on this for years and does not want to change it   · D/C IVF    Lab Results   Component Value Date    SODIUM 133 (L) 02/28/2020    SODIUM 130 (L) 02/27/2020    SODIUM 128 (L) 02/26/2020         OCD (obsessive compulsive disorder)  Assessment & Plan  · As per conversation with Psychiatry and patient's son, patient has severe OCD rather than depression regarding bowel habits which completely controls his life  He has a very negative outlook on his medical conditions which further worsens the situation  · Per Psychiatry consult, nothing else to offer except continuation of the medications he is on including Zoloft, Zyprexa, Remeron, buspar  · Recommend psychotherapy as an outpatient but he refused to go in the past per his son    Constipation  Assessment & Plan  · Patient reports that this is the reason why he came to the hospital   · Bowel regimen aggressive as feel if he is regular he will feel less need to drink excessive fluids  · Strict intake and output    Urinary retention  Assessment & Plan  · Initiated Flomax *cautiously given reports of previous orthostasis (midodrine was resumed based on son's request)  · Vizcaino catheter placed in the emergency department based on reports of inability to urinate; currently without Vizcaino catheter and was removed overnight on 02/28- 2/29    Patient reports he is voiding appropriately but says that he is having a little bit of urethral irritation status post Vizcaino catheter  If patient continues to have frequency/urgency in the next couple days with consider UA for evaluation of urinary tract infection  · Outpatient urology follow-up  · Last PSA on file was from 2018 but cannot be checked at this time due to trauma from Vizcaino insertion as this will create a false elevation    Weakness  Assessment & Plan  · Chronic and ongoing, suspect this is related to psychiatric issues more so than actual debility  · Patient lives at home with son who reports that he is unable to provide care  · PT and OT consulted, short-term rehab has been suggested--case management involved  · Patient reports chronic back pain and injuries and has had difficulty with ADLs  · Tylenol scheduled lidocaine patches  · Patient denies any pain this morning    Elevated troponin  Assessment & Plan  · Suspect non MI troponin elevation; minimal elevation here at 0 08  No cardiac symptoms  · Murmur noted on exam     · Last echocardiogram was from 2018  Recommend outpatient repeat echocardiogram    VTE Pharmacologic Prophylaxis:   Pharmacologic: Enoxaparin (Lovenox)  Mechanical VTE Prophylaxis in Place: Yes    Patient Centered Rounds: I have performed bedside rounds with nursing staff today  Discussions with Specialists or Other Care Team Provider:  Nursing staff, and case management    Education and Discussions with Family / Patient:  Education provided the bedside regarding plan of care as noted above  Answer excessive amount of questions regarding his bowel movement and lack there of  Time Spent for Care: 30 minutes  More than 50% of total time spent on counseling and coordination of care as described above      Current Length of Stay: 3 day(s)    Current Patient Status: Inpatient   Certification Statement: The patient will continue to require additional inpatient hospital stay due to Regular bowel movement and placement to rehabilitation facility  Discharge Plan:  Pending placement to rehabilitation facility  Code Status: Level 1 - Full Code      Subjective:   Patient reports he is uncomfortable as has not had a bowel movement in the last few days  Objective:     Vitals:   Temp (24hrs), Av 6 °F (36 4 °C), Min:97 4 °F (36 3 °C), Max:97 8 °F (36 6 °C)    Temp:  [97 4 °F (36 3 °C)-97 8 °F (36 6 °C)] 97 4 °F (36 3 °C)  HR:  [60-69] 60  Resp:  [18] 18  BP: (138-146)/(62-68) 138/62  SpO2:  [97 %] 97 %  Body mass index is 20 72 kg/m²  Input and Output Summary (last 24 hours): Intake/Output Summary (Last 24 hours) at 2020 1504  Last data filed at 2020 0700  Gross per 24 hour   Intake 240 ml   Output 1300 ml   Net -1060 ml       Physical Exam:     Physical Exam   Constitutional: He appears well-nourished  He is cooperative  No distress  Cardiovascular: Normal rate, regular rhythm and intact distal pulses  Murmur heard  Pulses:       Radial pulses are 2+ on the right side, and 2+ on the left side  No peripheral edema noted  Pulmonary/Chest: Effort normal and breath sounds normal  No respiratory distress  He has no wheezes  Abdominal: Soft  Bowel sounds are normal  He exhibits no distension  There is no tenderness  Musculoskeletal: He exhibits no edema  Neurological: He is alert  Skin: Skin is warm and dry  Capillary refill takes less than 2 seconds  He is not diaphoretic  Psychiatric: His speech is normal and behavior is normal  Judgment normal  His mood appears anxious  Patient does overall appear to fetid as he reports that this constipation is somewhat of a new thing for him  Vitals reviewed        Additional Data:     Labs:    Results from last 7 days   Lab Units 20  0514   WBC Thousand/uL 5 97   HEMOGLOBIN g/dL 11 7*   HEMATOCRIT % 34 3*   PLATELETS Thousands/uL 152   NEUTROS PCT % 57   LYMPHS PCT % 26   MONOS PCT % 15*   EOS PCT % 2     Results from last 7 days   Lab Units 20  0541 02/26/20  0116   SODIUM mmol/L 133*   < > 129*   POTASSIUM mmol/L 3 8   < > 4 8   CHLORIDE mmol/L 101   < > 96*   CO2 mmol/L 23   < > 28   BUN mg/dL 6   < > 8   CREATININE mg/dL 0 63   < > 0 74   ANION GAP mmol/L 9   < > 5   CALCIUM mg/dL 8 0*   < > 8 5   ALBUMIN g/dL  --   --  3 5   TOTAL BILIRUBIN mg/dL  --   --  0 43   ALK PHOS U/L  --   --  58   ALT U/L  --   --  14   AST U/L  --   --  24   GLUCOSE RANDOM mg/dL 82   < > 88    < > = values in this interval not displayed  Results from last 7 days   Lab Units 02/26/20 0116   INR  1 02                       * I Have Reviewed All Lab Data Listed Above  * Additional Pertinent Lab Tests Reviewed:  Epifanio 66 Admission Reviewed    Imaging:    Imaging Reports Reviewed Today Include:  Chest x-ray  Imaging Personally Reviewed by Myself Includes:  None    Recent Cultures (last 7 days):           Last 24 Hours Medication List:     Current Facility-Administered Medications:  acetaminophen 975 mg Oral [facility] Madelyn Crew, TAZ   aluminum-magnesium hydroxide-simethicone 30 mL Oral Q6H PRN Madelyn Crew, TAZ   busPIRone 10 mg Oral BID Madelyn Jose Miguelw, TAZ   docusate sodium 100 mg Oral BID Aura Hoskins PA-C   enoxaparin 40 mg Subcutaneous Daily Madelyn Crew, TAZ   lidocaine 2 patch Topical Daily Madelyn Crew, CRGRETCHEN   melatonin 3 mg Oral HS PRN Madelynmahnaz Gillespiew, TAZ   midodrine 7 5 mg Oral BID AC Aura Hoskins PA-C   mirtazapine 15 mg Oral HS Madelyn Crew, TAZ   multivitamin-minerals 1 tablet Oral Daily Madelyn Crew, CRGRETCHEN   OLANZapine 10 mg Oral HS Madelyn Crew, CRGRETCHEN   ondansetron 4 mg Intravenous Q6H PRN Madelyn Gillespiew, TAZ   polyethylene glycol 17 g Oral Daily Aura Hoskins PA-C   senna 1 tablet Oral BID PRN Aura Hoskins PA-C   sertraline 100 mg Oral Daily Madelyn Crew, TAZ   tamsulosin 0 4 mg Oral Daily With 95 TAZ Shaver        Today, Patient Was Seen By: Chrystie To, CRNP    ** Please Note: Dictation voice to text software may have been used in the creation of this document   **

## 2020-03-01 PROBLEM — R79.89 ELEVATED TROPONIN: Status: RESOLVED | Noted: 2020-02-26 | Resolved: 2020-03-01

## 2020-03-01 PROBLEM — R33.9 URINARY RETENTION: Status: RESOLVED | Noted: 2020-02-26 | Resolved: 2020-03-01

## 2020-03-01 PROBLEM — K59.00 CONSTIPATION: Status: RESOLVED | Noted: 2020-02-29 | Resolved: 2020-03-01

## 2020-03-01 PROBLEM — R77.8 ELEVATED TROPONIN: Status: RESOLVED | Noted: 2020-02-26 | Resolved: 2020-03-01

## 2020-03-01 PROBLEM — E87.1 HYPONATREMIA: Status: RESOLVED | Noted: 2018-11-16 | Resolved: 2020-03-01

## 2020-03-01 LAB
ANION GAP SERPL CALCULATED.3IONS-SCNC: 8 MMOL/L (ref 4–13)
BASOPHILS # BLD AUTO: 0 THOUSANDS/ΜL (ref 0–0.1)
BASOPHILS NFR BLD AUTO: 0 % (ref 0–1)
BUN SERPL-MCNC: 6 MG/DL (ref 5–25)
CALCIUM SERPL-MCNC: 8.7 MG/DL (ref 8.3–10.1)
CHLORIDE SERPL-SCNC: 101 MMOL/L (ref 100–108)
CO2 SERPL-SCNC: 26 MMOL/L (ref 21–32)
CREAT SERPL-MCNC: 0.66 MG/DL (ref 0.6–1.3)
EOSINOPHIL # BLD AUTO: 0.2 THOUSAND/ΜL (ref 0–0.61)
EOSINOPHIL NFR BLD AUTO: 3 % (ref 0–6)
ERYTHROCYTE [DISTWIDTH] IN BLOOD BY AUTOMATED COUNT: 13.5 % (ref 11.6–15.1)
GFR SERPL CREATININE-BSD FRML MDRD: 95 ML/MIN/1.73SQ M
GLUCOSE SERPL-MCNC: 87 MG/DL (ref 65–140)
HCT VFR BLD AUTO: 37.2 % (ref 36.5–49.3)
HGB BLD-MCNC: 12.7 G/DL (ref 12–17)
IMM GRANULOCYTES # BLD AUTO: 0.02 THOUSAND/UL (ref 0–0.2)
IMM GRANULOCYTES NFR BLD AUTO: 0 % (ref 0–2)
LYMPHOCYTES # BLD AUTO: 1.58 THOUSANDS/ΜL (ref 0.6–4.47)
LYMPHOCYTES NFR BLD AUTO: 26 % (ref 14–44)
MCH RBC QN AUTO: 31.7 PG (ref 26.8–34.3)
MCHC RBC AUTO-ENTMCNC: 34.1 G/DL (ref 31.4–37.4)
MCV RBC AUTO: 93 FL (ref 82–98)
MONOCYTES # BLD AUTO: 0.77 THOUSAND/ΜL (ref 0.17–1.22)
MONOCYTES NFR BLD AUTO: 13 % (ref 4–12)
NEUTROPHILS # BLD AUTO: 3.61 THOUSANDS/ΜL (ref 1.85–7.62)
NEUTS SEG NFR BLD AUTO: 58 % (ref 43–75)
NRBC BLD AUTO-RTO: 0 /100 WBCS
PLATELET # BLD AUTO: 165 THOUSANDS/UL (ref 149–390)
PMV BLD AUTO: 11.7 FL (ref 8.9–12.7)
POTASSIUM SERPL-SCNC: 3.8 MMOL/L (ref 3.5–5.3)
RBC # BLD AUTO: 4.01 MILLION/UL (ref 3.88–5.62)
SODIUM SERPL-SCNC: 135 MMOL/L (ref 136–145)
WBC # BLD AUTO: 6.18 THOUSAND/UL (ref 4.31–10.16)

## 2020-03-01 PROCEDURE — 80048 BASIC METABOLIC PNL TOTAL CA: CPT | Performed by: NURSE PRACTITIONER

## 2020-03-01 PROCEDURE — RECHECK: Performed by: PHYSICIAN ASSISTANT

## 2020-03-01 PROCEDURE — 99232 SBSQ HOSP IP/OBS MODERATE 35: CPT | Performed by: PHYSICIAN ASSISTANT

## 2020-03-01 PROCEDURE — 97110 THERAPEUTIC EXERCISES: CPT

## 2020-03-01 PROCEDURE — 97116 GAIT TRAINING THERAPY: CPT

## 2020-03-01 PROCEDURE — 85025 COMPLETE CBC W/AUTO DIFF WBC: CPT | Performed by: NURSE PRACTITIONER

## 2020-03-01 RX ORDER — TAMSULOSIN HYDROCHLORIDE 0.4 MG/1
0.4 CAPSULE ORAL
Qty: 30 CAPSULE | Refills: 0 | Status: SHIPPED | OUTPATIENT
Start: 2020-03-01

## 2020-03-01 RX ORDER — LACTULOSE 20 G/30ML
20 SOLUTION ORAL ONCE
Status: DISCONTINUED | OUTPATIENT
Start: 2020-03-01 | End: 2020-03-01

## 2020-03-01 RX ORDER — MIRTAZAPINE 15 MG/1
15 TABLET, FILM COATED ORAL
Qty: 30 TABLET | Refills: 0 | Status: SHIPPED | OUTPATIENT
Start: 2020-03-01

## 2020-03-01 RX ORDER — LACTULOSE 20 G/30ML
20 SOLUTION ORAL ONCE
Status: COMPLETED | OUTPATIENT
Start: 2020-03-01 | End: 2020-03-01

## 2020-03-01 RX ADMIN — OLANZAPINE 10 MG: 10 TABLET, FILM COATED ORAL at 21:50

## 2020-03-01 RX ADMIN — BUSPIRONE HYDROCHLORIDE 10 MG: 5 TABLET ORAL at 08:19

## 2020-03-01 RX ADMIN — SENNOSIDES AND DOCUSATE SODIUM 2 TABLET: 8.6; 5 TABLET ORAL at 08:19

## 2020-03-01 RX ADMIN — MIDODRINE HYDROCHLORIDE 7.5 MG: 5 TABLET ORAL at 05:29

## 2020-03-01 RX ADMIN — ENOXAPARIN SODIUM 40 MG: 40 INJECTION SUBCUTANEOUS at 08:19

## 2020-03-01 RX ADMIN — BUSPIRONE HYDROCHLORIDE 10 MG: 5 TABLET ORAL at 18:06

## 2020-03-01 RX ADMIN — SERTRALINE HYDROCHLORIDE 100 MG: 100 TABLET ORAL at 08:19

## 2020-03-01 RX ADMIN — Medication 1 TABLET: at 08:19

## 2020-03-01 RX ADMIN — POLYETHYLENE GLYCOL 3350 17 G: 17 POWDER, FOR SOLUTION ORAL at 08:19

## 2020-03-01 RX ADMIN — POLYETHYLENE GLYCOL 3350 17 G: 17 POWDER, FOR SOLUTION ORAL at 18:06

## 2020-03-01 RX ADMIN — SENNOSIDES AND DOCUSATE SODIUM 2 TABLET: 8.6; 5 TABLET ORAL at 18:05

## 2020-03-01 RX ADMIN — LACTULOSE 20 G: 10 SOLUTION ORAL at 14:13

## 2020-03-01 RX ADMIN — ACETAMINOPHEN 975 MG: 325 TABLET, FILM COATED ORAL at 14:13

## 2020-03-01 RX ADMIN — MIDODRINE HYDROCHLORIDE 7.5 MG: 5 TABLET ORAL at 18:05

## 2020-03-01 RX ADMIN — MIRTAZAPINE 15 MG: 15 TABLET, FILM COATED ORAL at 21:50

## 2020-03-01 RX ADMIN — ACETAMINOPHEN 975 MG: 325 TABLET, FILM COATED ORAL at 21:50

## 2020-03-01 RX ADMIN — TAMSULOSIN HYDROCHLORIDE 0.4 MG: 0.4 CAPSULE ORAL at 18:06

## 2020-03-01 RX ADMIN — ACETAMINOPHEN 975 MG: 325 TABLET, FILM COATED ORAL at 05:29

## 2020-03-01 NOTE — ASSESSMENT & PLAN NOTE
· Improving; 129 on admission  · Psych reported he reports drinking excessive amounts of free water due to obsession with bowel movements  · Considered offending medications particularly high dose of SSRI    Psych reports he has been stable on this for years and does not want to change it   · D/C IVF  · Discharge to [facility] once placement arranged, spoke with CM     Lab Results   Component Value Date    SODIUM 135 (L) 03/01/2020    SODIUM 133 (L) 02/28/2020    SODIUM 130 (L) 02/27/2020

## 2020-03-01 NOTE — PLAN OF CARE
Problem: Potential for Falls  Goal: Patient will remain free of falls  Description  INTERVENTIONS:  - Assess patient frequently for physical needs  -  Identify cognitive and physical deficits and behaviors that affect risk of falls    -  [place] fall precautions as indicated by assessment   - Educate patient/family on patient safety including physical limitations  - Instruct patient to call for assistance with activity based on assessment  - Modify environment to reduce risk of injury  - Consider OT/PT consult to assist with strengthening/mobility  Outcome: Progressing     Problem: Prexisting or High Potential for Compromised Skin Integrity  Goal: Skin integrity is maintained or improved  Description  INTERVENTIONS:  - Identify patients at risk for skin breakdown  - Assess and monitor skin integrity  - Assess and monitor nutrition and hydration status  - Monitor labs   - Assess for incontinence   - Turn and reposition patient  - Assist with mobility/ambulation  - Relieve pressure over bony prominences  - Avoid friction and shearing  - Provide appropriate hygiene as needed including keeping skin clean and dry  - Evaluate need for skin moisturizer/barrier cream  - Collaborate with interdisciplinary team   - Patient/family teaching  - Consider wound care consult   Outcome: Progressing     Problem: PAIN - ADULT  Goal: Verbalizes/displays adequate comfort level or baseline comfort level  Description  Interventions:  - Encourage patient to monitor pain and request assistance  - Assess pain using appropriate pain scale  - Administer analgesics based on type and severity of pain and evaluate response  - Implement non-pharmacological measures as appropriate and evaluate response  - Consider cultural and social influences on pain and pain management  - Notify physician/advanced practitioner if interventions unsuccessful or patient reports new pain  Outcome: Progressing     Problem: INFECTION - ADULT  Goal: Absence or prevention of progression during hospitalization  Description  INTERVENTIONS:  - Assess and monitor for signs and symptoms of infection  - Monitor lab/diagnostic results  - Monitor all insertion sites, i e  indwelling lines, tubes, and drains  - Monitor endotracheal if appropriate and nasal secretions for changes in amount and color  - [place] appropriate cooling/warming therapies per order  - Administer medications as ordered  - Instruct and encourage patient and family to use good hand hygiene technique  - Identify and instruct in appropriate isolation precautions for identified infection/condition  Outcome: Progressing     Problem: SAFETY ADULT  Goal: Patient will remain free of falls  Description  INTERVENTIONS:  - Assess patient frequently for physical needs  -  Identify cognitive and physical deficits and behaviors that affect risk of falls    -  [place] fall precautions as indicated by assessment   - Educate patient/family on patient safety including physical limitations  - Instruct patient to call for assistance with activity based on assessment  - Modify environment to reduce risk of injury  - Consider OT/PT consult to assist with strengthening/mobility  Outcome: Progressing  Goal: Maintain or return to baseline ADL function  Description  INTERVENTIONS:  -  Assess patient's ability to carry out ADLs; assess patient's baseline for ADL function and identify physical deficits which impact ability to perform ADLs (bathing, care of mouth/teeth, toileting, grooming, dressing, etc )  - Assess/evaluate cause of self-care deficits   - Assess range of motion  - Assess patient's mobility; develop plan if impaired  - Assess patient's need for assistive devices and provide as appropriate  - Encourage maximum independence but intervene and supervise when necessary  - Involve family in performance of ADLs  - Assess for home care needs following discharge   - Consider OT consult to assist with ADL evaluation and planning for discharge  - Provide patient education as appropriate  Outcome: Progressing  Goal: Maintain or return mobility status to optimal level  Description  INTERVENTIONS:  - Assess patient's baseline mobility status (ambulation, transfers, stairs, etc )    - Identify cognitive and physical deficits and behaviors that affect mobility  - Identify mobility aids required to assist with transfers and/or ambulation (gait belt, sit-to-stand, lift, walker, cane, etc )  - [place] fall precautions as indicated by assessment  - Record patient progress and toleration of activity level on Mobility SBAR; progress patient to next Phase/Stage  - Instruct patient to call for assistance with activity based on assessment  - Consider rehabilitation consult to assist with strengthening/weightbearing, etc   Outcome: Progressing     Problem: DISCHARGE PLANNING  Goal: Discharge to home or other facility with appropriate resources  Description  INTERVENTIONS:  - Identify barriers to discharge w/patient and caregiver  - Arrange for needed discharge resources and transportation as appropriate  - Identify discharge learning needs (meds, wound care, etc )  - Arrange for interpretive services to assist at discharge as needed  - Refer to Case Management Department for coordinating discharge planning if the patient needs post-hospital services based on physician/advanced practitioner order or complex needs related to functional status, cognitive ability, or social support system  Outcome: Progressing     Problem: GENITOURINARY - ADULT  Goal: Maintains or returns to baseline urinary function  Description  INTERVENTIONS:  - Assess urinary function  - Encourage oral fluids to ensure adequate hydration if ordered  - Administer IV fluids as ordered to ensure adequate hydration  - Administer ordered medications as needed  - Offer frequent toileting  - Follow urinary retention protocol if ordered  Outcome: Progressing  Goal: Absence of urinary retention  Description  INTERVENTIONS:  - Assess patients ability to void and empty bladder  - Monitor I/O  - Bladder scan as needed  - Discuss with physician/AP medications to alleviate retention as needed  - Discuss catheterization for long term situations as appropriate  Outcome: Progressing  Goal: Urinary catheter remains patent  Description  INTERVENTIONS:  - Assess patency of urinary catheter  - If patient has a chronic cristina, consider changing catheter if non-functioning  - Follow guidelines for intermittent irrigation of non-functioning urinary catheter  Outcome: Progressing     Problem: METABOLIC, FLUID AND ELECTROLYTES - ADULT  Goal: Electrolytes maintained within normal limits  Description  INTERVENTIONS:  - Monitor labs and assess patient for signs and symptoms of electrolyte imbalances  - Administer electrolyte replacement as ordered  - Monitor response to electrolyte replacements, including repeat lab results as appropriate  - Instruct patient on fluid and nutrition as appropriate  Outcome: Progressing  Goal: Fluid balance maintained  Description  INTERVENTIONS:  - Monitor labs   - Monitor I/O and WT  - Instruct patient on fluid and nutrition as appropriate  - Assess for signs & symptoms of volume excess or deficit  Outcome: Progressing

## 2020-03-01 NOTE — ASSESSMENT & PLAN NOTE
· Improving; 129 on admission  · Psych reported he reports drinking excessive amounts of free water due to obsession with bowel movements  · Considered offending medications particularly high dose of SSRI    Psych reports he has been stable on this for years and does not want to change it   · Continue outpatient follow up, repeat BMP in 1 week   · Discharge to [facility]   · Spoke with son prior to discharge   · CM arranging transportation   · Do not expect more than 30 days of rehab   · Mood stable     Lab Results   Component Value Date    SODIUM 135 (L) 03/01/2020    SODIUM 133 (L) 02/28/2020    SODIUM 130 (L) 02/27/2020

## 2020-03-01 NOTE — ASSESSMENT & PLAN NOTE
· Patient reports that this is the reason why he came to the hospital   · Continue bowel regimen upon discharge

## 2020-03-01 NOTE — SOCIAL WORK
CM was notified that patient is medically stable for discharge today  CM attempted to get in touch with patient's son to review STR option but was only able to leave a voicemail requesting a call back  LM then messaged SLIM PA-C about the above who was able to get in touch with son  While talking to him he agreed to patient going to [address] since this was the only accepting facility  Patient's son was unable to provide transportation for him today and was agreeable to CM setting up transport  CM will contact him once this is arranged  CM was able to arrange a WCV with [facility] at 6:30 PM     CM updated [address] with transport time at this time CM was asked for a PASSR which CM completed and then sent to facility  In the mean time CM was updated by SHANTA that patient was now not going to discharge today  CM cancelled the discharge with [facility] and [facility]  CM department will continue to follow to assist with discharge coordination

## 2020-03-01 NOTE — PHYSICAL THERAPY NOTE
PHYSICAL THERAPY NOTE          Patient Name: Heather SUTTON'X Date: 3/1/2020   2 patient identifiers; Name and      20 0941   Pain Assessment   Pain Assessment 0-10   Pain Score No Pain   Restrictions/Precautions   Weight Bearing Precautions Per Order No   Other Precautions Chair Alarm; Bed Alarm; Fall Risk   General   Family/Caregiver Present No   Cognition   Orientation Level Oriented X4   Following Commands Follows one step commands with increased time or repetition   Comments   (Pt identified by name and )   Subjective   Subjective agreeable to therapy   Bed Mobility   Rolling R 5  Supervision   Additional items Increased time required   Supine to Sit 5  Supervision   Additional items HOB elevated   Transfers   Sit to Stand 5  Supervision   Additional items Assist x 1; Increased time required;Verbal cues   Stand to Sit 5  Supervision   Additional items Assist x 1; Increased time required   Ambulation/Elevation   Gait pattern Retropulsion;Decreased foot clearance; Short stride; Excessively slow; Step to   Gait Assistance   (CGA)   Additional items Assist x 1   Assistive Device Rolling walker   Distance 150'x2   Balance   Static Sitting Good   Dynamic Sitting Good   Static Standing Fair +   Dynamic Standing Fair +   Ambulatory Fair +   Endurance Deficit   Endurance Deficit Yes   Endurance Deficit Description limited by fatigue   Activity Tolerance   Activity Tolerance Patient limited by fatigue;Patient tolerated treatment well   Exercises   Hip Abduction Sitting;20 reps;AROM; Bilateral   Hip Adduction Sitting;20 reps;AROM; Bilateral   Knee AROM Long Arc Quad Sitting;20 reps;AROM; Bilateral   Ankle Pumps Sitting;20 reps;AROM; Bilateral   Marching Sitting;20 reps;AROM; Bilateral   Assessment   Problem List Decreased strength;Decreased endurance; Impaired balance;Decreased mobility   Assessment Pt completes therapeutic exercises to increase Bilateral LE strength needed for an increase in functional mobilty  Pt able to participate in bed mobility to increase functional task performance and work towards goals  Pt currently requires CGA x1 when treating and verbal cues for safety with hand placement and technique to reduce the risk of injury and maximize the benefit of treatment  Pt shows improvement from previous session noted by an increase in distance of ambulation  Recommend next session to trial stair training  Pt is in need of continued activity in PT to improve strength balance endurance mobility transfers and ambulation with return to maximize LOF  From PT/mobility standpoint, recommendation at time of d/c would be STR vs HHPT in order to promote return to PLOF and independence  Goals   [facility] Expiration Date 03/08/20   PT Treatment Day 1   Plan   Treatment/Interventions Functional transfer training;LE strengthening/ROM; Elevations; Therapeutic exercise; Endurance training;Patient/family training;Equipment eval/education; Bed mobility;Gait training   Progress Progressing toward goals   PT Frequency   (3-5x/wk)   Recommendation   Recommendation Short-term skilled PT; Home PT; Home with family support  (pending mobility progress,ability to perform stairs)   Equipment Recommended Walker     Pt seated OOB in chair with call bell in reach

## 2020-03-01 NOTE — ASSESSMENT & PLAN NOTE
· Chronic and ongoing, suspect this is related to psychiatric issues more so than actual debility  · Patient lives at home with son who reports that he is unable to provide care  · PT and OT consulted, short-term rehab has been suggested  · Discharge to [address] [place] [address]

## 2020-03-01 NOTE — ASSESSMENT & PLAN NOTE
· Patient reports that this is the reason why he came to the hospital   · Continue bowel regimen   · Passing gas  ·  no abdominal pain   · Strict intake and output

## 2020-03-01 NOTE — PROGRESS NOTES
Progress Note - Susie Benton 1945, 76 y o  male MRN: [id number]  Unit/Bed#: S -01 Encounter: [id number]  Primary Care Provider: Patito Almendarez DO   Date and time admitted to hospital: 2/25/2020 10:45 PM    * Hyponatremia  Assessment & Plan  · Improving; 129 on admission  · Psych reported he reports drinking excessive amounts of free water due to obsession with bowel movements  · Considered offending medications particularly high dose of SSRI  Psych reports he has been stable on this for years and does not want to change it   · D/C IVF  · Discharge to [facility] once placement arranged, spoke with CM     Lab Results   Component Value Date    SODIUM 135 (L) 03/01/2020    SODIUM 133 (L) 02/28/2020    SODIUM 130 (L) 02/27/2020         Constipation  Assessment & Plan  · Patient reports that this is the reason why he came to the hospital   · Continue bowel regimen   · Passing gas  ·  no abdominal pain   · Strict intake and output      OCD (obsessive compulsive disorder)  Assessment & Plan  · patient has severe OCD rather than depression regarding bowel habits which completely controls his life    He has a very negative outlook on his medical conditions which further worsens the situation  · Per Psychiatry consult, nothing else to offer except continuation of the medications he is on including Zoloft, Zyprexa, Remeron, buspar  · Recommend psychotherapy as an outpatient but he refused to go in the past per his son    Weakness  Assessment & Plan  · Chronic and ongoing, suspect this is related to psychiatric issues more so than actual debility  · Patient lives at home with son who reports that he is unable to provide care  · PT and OT consulted, short-term rehab has been suggested--case management involved  · Discharge to [facility] pending bed availability         VTE Pharmacologic Prophylaxis:   Pharmacologic: Enoxaparin (Lovenox)  Mechanical VTE Prophylaxis in Place: Yes    Patient Centered Rounds: I have performed bedside rounds with nursing staff today  Discussions with Specialists or Other Care Team Provider: CM    Education and Discussions with Family / Patient: patient     Time Spent for Care: 20 minutes  More than 50% of total time spent on counseling and coordination of care as described above  Current Length of Stay: 4 day(s)    Current Patient Status: Inpatient   Certification Statement: The patient will continue to require additional inpatient hospital stay due to pending rehab placement     Discharge Plan: pending bed available at [address]     Code Status: Level 1 - Full Code      Subjective:   Patient continues to be fixated on bowel movements  Had small BM yesterday per nursing  No abdominal pain  No N/V  He is passing gas  No chest pain or SOB  No urinary complaints  Objective:     Vitals:   Temp (24hrs), Av 9 °F (36 6 °C), Min:97 7 °F (36 5 °C), Max:98 °F (36 7 °C)    Temp:  [97 7 °F (36 5 °C)-98 °F (36 7 °C)] 97 9 °F (36 6 °C)  HR:  [62-63] 62  Resp:  [18] 18  BP: (140-149)/(63-68) 140/63  SpO2:  [98 %-99 %] 98 %  Body mass index is 21 41 kg/m²  Input and Output Summary (last 24 hours): Intake/Output Summary (Last 24 hours) at 3/1/2020 1139  Last data filed at 3/1/2020 0910  Gross per 24 hour   Intake 238 ml   Output 1720 ml   Net -1482 ml       Physical Exam:     Physical Exam   Constitutional: No distress  HENT:   Head: Normocephalic  Eyes: Conjunctivae are normal    Neck: Neck supple  Cardiovascular: Normal rate and regular rhythm  Pulmonary/Chest: Effort normal and breath sounds normal    Abdominal: Soft  Bowel sounds are normal  He exhibits no distension  There is no tenderness  There is no rebound and no guarding  Neurological: He is alert  Skin: Skin is warm  Psychiatric: He has a normal mood and affect  Nursing note and vitals reviewed          Additional Data:     Labs:    Results from last 7 days   Lab Units 20  0430   WBC Thousand/uL 6 18   HEMOGLOBIN g/dL 12 7   HEMATOCRIT % 37 2   PLATELETS Thousands/uL 165   NEUTROS PCT % 58   LYMPHS PCT % 26   MONOS PCT % 13*   EOS PCT % 3     Results from last 7 days   Lab Units 03/01/20  0430  02/26/20  0116   SODIUM mmol/L 135*   < > 129*   POTASSIUM mmol/L 3 8   < > 4 8   CHLORIDE mmol/L 101   < > 96*   CO2 mmol/L 26   < > 28   BUN mg/dL 6   < > 8   CREATININE mg/dL 0 66   < > 0 74   ANION GAP mmol/L 8   < > 5   CALCIUM mg/dL 8 7   < > 8 5   ALBUMIN g/dL  --   --  3 5   TOTAL BILIRUBIN mg/dL  --   --  0 43   ALK PHOS U/L  --   --  58   ALT U/L  --   --  14   AST U/L  --   --  24   GLUCOSE RANDOM mg/dL 87   < > 88    < > = values in this interval not displayed  Results from last 7 days   Lab Units 02/26/20  0116   INR  1 02                       * I Have Reviewed All Lab Data Listed Above  * Additional Pertinent Lab Tests Reviewed:  All Labs Within Last 24 Hours Reviewed    Imaging:    Imaging Reports Reviewed Today Include:   Imaging Personally Reviewed by Myself Includes:  reviewed    Recent Cultures (last 7 days):           Last 24 Hours Medication List:     Current Facility-Administered Medications:  acetaminophen 975 mg Oral [facility] Madelyn Crew, CRGRETCHEN   aluminum-magnesium hydroxide-simethicone 30 mL Oral Q6H PRN Madelyn Crew, CRNP   busPIRone 10 mg Oral BID Madelyn Crew, CRGRETCHEN   enoxaparin 40 mg Subcutaneous Daily Madelyn Crew, CRGRETCHEN   lidocaine 2 patch Topical Daily Madelyn Crew, CRNP   melatonin 3 mg Oral HS PRN Madelyn Crew, CRNP   midodrine 7 5 mg Oral BID AC Aura Hoskins PA-C   mirtazapine 15 mg Oral HS Madelyn Crew, CRGRETCHEN   multivitamin-minerals 1 tablet Oral Daily Madelyn Crew, ATZ   OLANZapine 10 mg Oral HS Madelyn Crew, TAZ   ondansetron 4 mg Intravenous Q6H PRN Madelyn Crew, CRGRETCHEN   polyethylene glycol 17 g Oral BID [facility], TAZ   senna 1 tablet Oral BID PRJOSEPH Hoskins PA-C   senna-docusate sodium 2 tablet Oral BID [facility], TAZ   sertraline 100 mg Oral Daily Yobani Webb TAZ Liu   tamsulosin 0 4 mg Oral Daily With 95 TAZ Shaver        Today, Patient Was Seen By: Mala Newsome PA-C    ** Please Note: Dictation voice to text software may have been used in the creation of this document   **

## 2020-03-01 NOTE — PLAN OF CARE
Problem: PHYSICAL THERAPY ADULT  Goal: Performs mobility at highest level of function for planned discharge setting  See evaluation for individualized goals  Description  Treatment/Interventions: Functional transfer training, LE strengthening/ROM, Elevations, Therapeutic exercise, Endurance training, Patient/family training, Equipment eval/education, Bed mobility, Gait training  Equipment Recommended: Ligia Flores       See flowsheet documentation for full assessment, interventions and recommendations  Outcome: Progressing  Note:   Prognosis: Fair  Problem List: Decreased strength, Decreased endurance, Impaired balance, Decreased mobility  Assessment: Pt completes therapeutic exercises to increase Bilateral LE strength needed for an increase in functional mobilty  Pt able to participate in bed mobility to increase functional task performance and work towards goals  Pt currently requires CGA x1 when treating and verbal cues for safety with hand placement and technique to reduce the risk of injury and maximize the benefit of treatment  Pt shows improvement from previous session noted by an increase in distance of ambulation  Recommend next session to trial stair training  Pt is in need of continued activity in PT to improve strength balance endurance mobility transfers and ambulation with return to maximize LOF  From PT/mobility standpoint, recommendation at time of d/c would be STR vs HHPT in order to promote return to PLOF and independence  Recommendation: Short-term skilled PT, Home PT, Home with family support(pending mobility progress,ability to perform stairs)          See flowsheet documentation for full assessment

## 2020-03-01 NOTE — ASSESSMENT & PLAN NOTE
· Chronic and ongoing, suspect this is related to psychiatric issues more so than actual debility  · Patient lives at home with son who reports that he is unable to provide care  · PT and OT consulted, short-term rehab has been suggested--case management involved  · Discharge to [facility] [address] pending bed availability

## 2020-03-01 NOTE — ASSESSMENT & PLAN NOTE
· patient has severe OCD rather than depression regarding bowel habits which completely controls his life    He has a very negative outlook on his medical conditions which further worsens the situation  · Per Psychiatry consult, nothing else to offer except continuation of the medications he is on including Zoloft, Zyprexa, Remeron, buspar  · Recommend psychotherapy as an outpatient but he refused to go in the past per his son  · Mood stable, no SI or HI upon discharge

## 2020-03-01 NOTE — DISCHARGE SUMMARY
Discharge- Lucien Coulter 1945, 76 y o  male MRN: [id number]  Unit/Bed#: S -01 Encounter: [id number]  Primary Care Provider: Bill Doss DO   Date and time admitted to hospital: 2/25/2020 10:45 PM    Patient now adamantly refusing to be discharged, spoke with patient, CM and son at length  Will cancel discharge today with plan to hopefully discharge tomorrow, CM following  * Hyponatremiaresolved as of 3/1/2020  Assessment & Plan  · Improving; 129 on admission  · Psych reported he reports drinking excessive amounts of free water due to obsession with bowel movements  · Considered offending medications particularly high dose of SSRI  Psych reports he has been stable on this for years and does not want to change it   · Continue outpatient follow up, repeat BMP in 1 week   · Discharge to [address] [place] [address]   · Spoke with son prior to discharge   · CM arranging transportation   · Do not expect more than 30 days of rehab   · Mood stable     Lab Results   Component Value Date    SODIUM 135 (L) 03/01/2020    SODIUM 133 (L) 02/28/2020    SODIUM 130 (L) 02/27/2020         Constipationresolved as of 3/1/2020  Assessment & Plan  · Patient reports that this is the reason why he came to the hospital   · Continue bowel regimen upon discharge         OCD (obsessive compulsive disorder)  Assessment & Plan  · patient has severe OCD rather than depression regarding bowel habits which completely controls his life    He has a very negative outlook on his medical conditions which further worsens the situation  · Per Psychiatry consult, nothing else to offer except continuation of the medications he is on including Zoloft, Zyprexa, Remeron, buspar  · Recommend psychotherapy as an outpatient but he refused to go in the past per his son  · Mood stable, no SI or HI upon discharge     Weakness  Assessment & Plan  · Chronic and ongoing, suspect this is related to psychiatric issues more so than actual debility  · Patient lives at home with son who reports that he is unable to provide care  · PT and OT consulted, short-term rehab has been suggested  · Discharge to [address] [place] [address] [address]     Urinary retentionresolved as of 3/1/2020  Assessment & Plan  · Initiated Flomax *cautiously given reports of previous orthostasis (midodrine was resumed based on son's request)  · Vizcaino catheter placed in the emergency department based on reports of inability to urinate; currently without Vizcaino catheter and was removed overnight on 02/28- 2/29  Voiding without difficulty   · Outpatient Urology follow up    · Last PSA on file was from 2018 but cannot be checked at this time due to trauma from Vizcaino insertion as this will create a false elevation      Discharging Physician / Practitioner: Alexander Jj PA-C  PCP: Amie Friend DO  Admission Date:   Admission Orders (From admission, onward)     Ordered        02/26/20 0301  Inpatient Admission  Once                   Discharge Date: 03/01/20    Resolved Problems  Date Reviewed: 3/1/2020          Resolved    * (Principal) Hyponatremia 3/1/2020     Resolved by  Alexander Jj PA-C    Urinary retention 3/1/2020     Resolved by  Alexander Jj PA-C    Elevated troponin 3/1/2020     Resolved by  Alexander Jj PA-C    Constipation 3/1/2020     Resolved by  Alexander Jj PA-C          Consultations During Hospital Stay:  · Psychiatry     Procedures Performed:   · none    Significant Findings / Test Results:   · Chest x-ray:  No acute cardiopulmonary disease    Incidental Findings:   · none    Test Results Pending at Discharge (will require follow up):    · Repeat BMP in 1 week   · Outpatient PSA      Outpatient Tests Requested:  · Urology   · PCP   · Psychiatric services     Complications:  OCD, urinary retention - resolved    Reason for Admission: hyponatremia     Hospital Course:     Meryle Ort is a 76 y o  male patient who originally presented to the hospital on 2/25/2020 due to generalized weakness, constipation and urinary retention  He was found to be hyponatremic on admission  Patient was seen in consultation with psychiatry and hyponatremia was thought to be secondary to drinking excessive amounts of water due to obsession with bowel movements  Patient's sodium did improve and were stable prior discharge  He will need continue BMP outpatient his PCP with repeat BMP 5-7 days  Per Psychiatry, patient's mood was stable prior to discharge and he will continue same psychotropic medication that patient has been years  Patient was strongly recommended outpatient psychotherapy  In regards to urinary retention, Vizcaino catheter was placed in the emergency admission and subsequently overnight 2/28-2/29 and has been voiding without difficulty  Flomax was initiated and will be continued upon discharge  He will need close follow up with urology outpatient and for repeat PSA outpatient  Patient will be discharged to [facility] for continued rehabilitation  Please see above list of diagnoses and related plan for additional information  Condition at Discharge: stable     Discharge Day Visit / Exam:     Subjective:  Patient continues to be fixated on bowel movements  Had small BM yesterday per nursing  No abdominal pain  No N/V  He is passing gas  No chest pain or SOB  No urinary complaints  Vitals: Blood Pressure: 140/63 (03/01/20 0700)  Pulse: 62 (03/01/20 0700)  Temperature: 97 9 °F (36 6 °C) (03/01/20 0700)  Temp Source: Oral (03/01/20 0700)  Respirations: 18 (03/01/20 0700)  Height: 5' 7" (170 2 cm) (02/26/20 0438)  Weight - Scale: 62 kg (136 lb 11 oz) (03/01/20 0600)  SpO2: 98 % (03/01/20 0700)  Exam:   Physical Exam  Constitutional: No distress  HENT:   Head: Normocephalic  Eyes: Conjunctivae are normal    Neck: Neck supple  Cardiovascular: Normal rate and regular rhythm     Pulmonary/Chest: Effort normal and breath sounds normal    Abdominal: Soft  Bowel sounds are normal  He exhibits no distension  There is no tenderness  There is no rebound and no guarding  Neurological: He is alert  Skin: Skin is warm  Psychiatric: He has a normal mood and affect  Nursing note and vitals reviewed  Discussion with Family: spoke to son on the phone prior to discharge     Discharge instructions/Information to patient and family:   See after visit summary for information provided to patient and family  Provisions for Follow-Up Care:  See after visit summary for information related to follow-up care and any pertinent home health orders  Disposition:     [facility] (see below)    For Discharges to   [facility]:   · [facility] [facility] - Not Applicable to this Patient    Planned Readmission: none     Discharge Statement:  I spent 40 minutes discharging the patient  This time was spent on the day of discharge  I had direct contact with the patient on the day of discharge  Greater than 50% of the total time was spent examining patient, answering all patient questions, arranging and discussing plan of care with patient as well as directly providing post-discharge instructions  Additional time then spent on discharge activities  Discharge Medications:  See after visit summary for reconciled discharge medications provided to patient and family        ** Please Note: This note has been constructed using a voice recognition system **

## 2020-03-01 NOTE — ASSESSMENT & PLAN NOTE
· patient has severe OCD rather than depression regarding bowel habits which completely controls his life    He has a very negative outlook on his medical conditions which further worsens the situation  · Per Psychiatry consult, nothing else to offer except continuation of the medications he is on including Zoloft, Zyprexa, Remeron, buspar  · Recommend psychotherapy as an outpatient but he refused to go in the past per his son

## 2020-03-01 NOTE — ASSESSMENT & PLAN NOTE
· Initiated Flomax *cautiously given reports of previous orthostasis (midodrine was resumed based on son's request)  · Vizcaino catheter placed in the emergency department based on reports of inability to urinate; currently without Vizcaino catheter and was removed overnight on 02/28- 2/29   Voiding without difficulty   · Outpatient Urology follow up    · Last PSA on file was from 2018 but cannot be checked at this time due to trauma from Vizcaino insertion as this will create a false elevation

## 2020-03-02 VITALS
HEIGHT: 67 IN | TEMPERATURE: 98.6 F | SYSTOLIC BLOOD PRESSURE: 128 MMHG | BODY MASS INDEX: 21.18 KG/M2 | WEIGHT: 134.92 LBS | DIASTOLIC BLOOD PRESSURE: 62 MMHG | OXYGEN SATURATION: 100 % | HEART RATE: 62 BPM | RESPIRATION RATE: 18 BRPM

## 2020-03-02 LAB
ANION GAP SERPL CALCULATED.3IONS-SCNC: 10 MMOL/L (ref 4–13)
BUN SERPL-MCNC: 6 MG/DL (ref 5–25)
CALCIUM SERPL-MCNC: 8.6 MG/DL (ref 8.3–10.1)
CHLORIDE SERPL-SCNC: 98 MMOL/L (ref 100–108)
CO2 SERPL-SCNC: 24 MMOL/L (ref 21–32)
CREAT SERPL-MCNC: 0.7 MG/DL (ref 0.6–1.3)
GFR SERPL CREATININE-BSD FRML MDRD: 92 ML/MIN/1.73SQ M
GLUCOSE SERPL-MCNC: 75 MG/DL (ref 65–140)
POTASSIUM SERPL-SCNC: 3.5 MMOL/L (ref 3.5–5.3)
SODIUM SERPL-SCNC: 132 MMOL/L (ref 136–145)

## 2020-03-02 PROCEDURE — 99239 HOSP IP/OBS DSCHRG MGMT >30: CPT | Performed by: PHYSICIAN ASSISTANT

## 2020-03-02 PROCEDURE — 80048 BASIC METABOLIC PNL TOTAL CA: CPT | Performed by: PHYSICIAN ASSISTANT

## 2020-03-02 RX ADMIN — SERTRALINE HYDROCHLORIDE 100 MG: 100 TABLET ORAL at 10:00

## 2020-03-02 RX ADMIN — ACETAMINOPHEN 975 MG: 325 TABLET, FILM COATED ORAL at 06:26

## 2020-03-02 RX ADMIN — BUSPIRONE HYDROCHLORIDE 10 MG: 5 TABLET ORAL at 10:00

## 2020-03-02 RX ADMIN — Medication 1 TABLET: at 10:00

## 2020-03-02 RX ADMIN — MIDODRINE HYDROCHLORIDE 7.5 MG: 5 TABLET ORAL at 06:27

## 2020-03-02 RX ADMIN — ENOXAPARIN SODIUM 40 MG: 40 INJECTION SUBCUTANEOUS at 10:00

## 2020-03-02 NOTE — PLAN OF CARE
Problem: Potential for Falls  Goal: Patient will remain free of falls  Description  INTERVENTIONS:  - Assess patient frequently for physical needs  -  Identify cognitive and physical deficits and behaviors that affect risk of falls    -  [place] fall precautions as indicated by assessment   - Educate patient/family on patient safety including physical limitations  - Instruct patient to call for assistance with activity based on assessment  - Modify environment to reduce risk of injury  - Consider OT/PT consult to assist with strengthening/mobility  Outcome: Adequate for Discharge     Problem: Prexisting or High Potential for Compromised Skin Integrity  Goal: Skin integrity is maintained or improved  Description  INTERVENTIONS:  - Identify patients at risk for skin breakdown  - Assess and monitor skin integrity  - Assess and monitor nutrition and hydration status  - Monitor labs   - Assess for incontinence   - Turn and reposition patient  - Assist with mobility/ambulation  - Relieve pressure over bony prominences  - Avoid friction and shearing  - Provide appropriate hygiene as needed including keeping skin clean and dry  - Evaluate need for skin moisturizer/barrier cream  - Collaborate with interdisciplinary team   - Patient/family teaching  - Consider wound care consult   Outcome: Adequate for Discharge     Problem: PAIN - ADULT  Goal: Verbalizes/displays adequate comfort level or baseline comfort level  Description  Interventions:  - Encourage patient to monitor pain and request assistance  - Assess pain using appropriate pain scale  - Administer analgesics based on type and severity of pain and evaluate response  - Implement non-pharmacological measures as appropriate and evaluate response  - Consider cultural and social influences on pain and pain management  - Notify physician/advanced practitioner if interventions unsuccessful or patient reports new pain  Outcome: Adequate for Discharge     Problem: INFECTION - ADULT  Goal: Absence or prevention of progression during hospitalization  Description  INTERVENTIONS:  - Assess and monitor for signs and symptoms of infection  - Monitor lab/diagnostic results  - Monitor all insertion sites, i e  indwelling lines, tubes, and drains  - Monitor endotracheal if appropriate and nasal secretions for changes in amount and color  - [place] appropriate cooling/warming therapies per order  - Administer medications as ordered  - Instruct and encourage patient and family to use good hand hygiene technique  - Identify and instruct in appropriate isolation precautions for identified infection/condition  Outcome: Adequate for Discharge     Problem: DISCHARGE PLANNING  Goal: Discharge to home or other facility with appropriate resources  Description  INTERVENTIONS:  - Identify barriers to discharge w/patient and caregiver  - Arrange for needed discharge resources and transportation as appropriate  - Identify discharge learning needs (meds, wound care, etc )  - Arrange for interpretive services to assist at discharge as needed  - Refer to Case Management Department for coordinating discharge planning if the patient needs post-hospital services based on physician/advanced practitioner order or complex needs related to functional status, cognitive ability, or social support system  Outcome: Adequate for Discharge     Problem: GENITOURINARY - ADULT  Goal: Maintains or returns to baseline urinary function  Description  INTERVENTIONS:  - Assess urinary function  - Encourage oral fluids to ensure adequate hydration if ordered  - Administer IV fluids as ordered to ensure adequate hydration  - Administer ordered medications as needed  - Offer frequent toileting  - Follow urinary retention protocol if ordered  Outcome: Adequate for Discharge  Goal: Absence of urinary retention  Description  INTERVENTIONS:  - Assess patients ability to void and empty bladder  - Monitor I/O  - Bladder scan as needed  - Discuss with physician/AP medications to alleviate retention as needed  - Discuss catheterization for long term situations as appropriate  Outcome: Adequate for Discharge  Goal: Urinary catheter remains patent  Description  INTERVENTIONS:  - Assess patency of urinary catheter  - If patient has a chronic cristina, consider changing catheter if non-functioning  - Follow guidelines for intermittent irrigation of non-functioning urinary catheter  Outcome: Adequate for Discharge     Problem: METABOLIC, FLUID AND ELECTROLYTES - ADULT  Goal: Electrolytes maintained within normal limits  Description  INTERVENTIONS:  - Monitor labs and assess patient for signs and symptoms of electrolyte imbalances  - Administer electrolyte replacement as ordered  - Monitor response to electrolyte replacements, including repeat lab results as appropriate  - Instruct patient on fluid and nutrition as appropriate  Outcome: Adequate for Discharge  Goal: Fluid balance maintained  Description  INTERVENTIONS:  - Monitor labs   - Monitor I/O and WT  - Instruct patient on fluid and nutrition as appropriate  - Assess for signs & symptoms of volume excess or deficit  Outcome: Adequate for Discharge

## 2020-03-02 NOTE — DISCHARGE SUMMARY
Discharge- Kiya Hernandes 1945, 76 y o  male MRN: [id number]  Unit/Bed#: S -01 Encounter: [id number]  Primary Care Provider: [address] [facility], DO   Date and time admitted to hospital: 2/25/2020 10:45 PM      * Hyponatremia  Assessment & Plan  · Improving to 132-135 range; 129 on admission  · Psych reported he was drinking excessive amounts of free water due to obsession with bowel movements  ? Considered offending medications particularly high dose of SSRI  Psych reports he has been stable on this for years and does not want to change it   · Continue outpatient follow up, repeat BMP in 1 week            Lab Results   Component Value Date     SODIUM 135 (L) 03/01/2020     SODIUM 133 (L) 02/28/2020     SODIUM 130 (L) 02/27/2020         Constipationresolved as of 3/1/2020  Assessment & Plan  · Patient reports that this is the reason why he came to the hospital   · Continue bowel regimen upon discharge   · Note that patient's OCD revolves around this       OCD (obsessive compulsive disorder)  Assessment & Plan  · patient has severe OCD rather than depression regarding bowel habits which completely controls his life  He has a very negative outlook on his medical conditions which further worsens the situation  · Per Psychiatry consult, nothing else to offer except continuation of the medications he is on including Zoloft, Zyprexa, Remeron, buspar  · Recommend psychotherapy as an outpatient but he refused to go in the past per his son  · Mood stable, no SI or HI upon discharge; expect <30 days in rehab      Weakness  Assessment & Plan  · Chronic and ongoing, suspect this is related to psychiatric issues more so than actual debility  · Patient lives at home with son who reports that he is unable to provide care  · PT and OT consulted, short-term rehab has been suggested  ?  Discharge to [place]      Urinary retentionresolved as of 3/1/2020  Assessment & Plan  · Initiated Flomax *cautiously given reports of previous orthostasis (midodrine was resumed based on son's request)  · Vizcaino catheter placed in the emergency department based on reports of inability to urinate; currently without Vizcaino catheter and was removed overnight on 02/28- 2/29  Voiding without difficulty   · Outpatient Urology follow up    · Last PSA on file was from 2018 but cannot be checked at this time due to trauma from Vizcaino insertion as this will create a false elevation        Discharging Physician / Practitioner: Hellen Donald PA-C  PCP: Buck Leiva DO  Admission Date:       Admission Orders (From admission, onward)              Ordered          02/26/20 0301   Inpatient Admission  Once                       Discharge Date: 03/02/20              Resolved Problems  Date Reviewed: 3/1/2020           Resolved     * (Principal) Hyponatremia 3/1/2020       Resolved by  Charles Marie PA-C     Urinary retention 3/1/2020       Resolved by  Charles Marie PA-C     Elevated troponin 3/1/2020       Resolved by  Charles Marie PA-C     Constipation 3/1/2020       Resolved by  Charles Marie PA-C             Consultations During Hospital Stay:  · Psychiatry      Procedures Performed:   · none     Significant Findings / Test Results:   · Chest x-ray:  No acute cardiopulmonary disease     Incidental Findings:   · none     Test Results Pending at Discharge (will require follow up): · Repeat BMP in 1 week   · Outpatient PSA      Outpatient Tests Requested:  · Urology   · PCP   · Psychiatric services      Complications:  OCD, urinary retention - resolved     Reason for Admission: hyponatremia      Hospital Course:      Justin Rocha is a 76 y o  male patient who originally presented to the hospital on 2/25/2020 due to generalized weakness, constipation and urinary retention  He was found to be hyponatremic on admission   Patient was seen in consultation with psychiatry and hyponatremia was thought to be secondary to drinking excessive amounts of water due to obsession with bowel movements  Patient's sodium did improve and were stable prior discharge  He will need continued FR, BMP outpatient his PCP with repeat BMP 5-7 days  Per Psychiatry, patient's mood was stable prior to discharge and he will continue same psychotropic medication that patient has been years  Patient was strongly recommended outpatient psychotherapy  In regards to urinary retention, Vizcaino catheter was placed in the emergency admission and subsequently overnight 2/28-2/29 and has been voiding without difficulty  Flomax was initiated and will be continued upon discharge  He will need close follow up with urology outpatient and for repeat PSA outpatient       Patient was medically stable for multiple days and was set up for discharge on March 1st but refused to leave  Despite being counseled multiple times that he is medically stable, he is extremely negative outlook on life continues to pre occupy him and he is unable to understand that he is medically stable  He will be discharged to [facility] for continued rehabilitation         Please see above list of diagnoses and related plan for additional information       Condition at Discharge: stable      Discharge Day Visit / Exam:      Subjective: "I'm baffled " Patient continues to state he doesn't believe we can help him  He is passing urine and had a BM     Vitals:reviewed  Exam:   Physical Exam  Constitutional: No distress  Thin elderly man seen laying in bed  HENT:   Head: Normocephalic  Eyes: Conjunctivae are normal    Neck: Neck supple  Cardiovascular: Normal rate and regular rhythm  Soft GRAZYNA noted  Pulmonary/Chest: Effort normal and breath sounds normal  No cough, dyspnea or distress  Abdominal: Soft  Bowel sounds are normal  He exhibits no distension  There is no tenderness  There is no rebound and no guarding  Neurological: He is alert  Skin: Skin is warm     Psychiatric: flat affect, seems very depressed, extremely negative outlook  Poor eye contact  Nursing note and vitals reviewed      Discussion with Family:      Discharge instructions/Information to patient and family:   See after visit summary for information provided to patient and family        Provisions for Follow-Up Care:  See after visit summary for information related to follow-up care and any pertinent home health orders        Disposition:      Ad[address] (see below)     For Discharges to [address]:   · Ann Marie Tineo / Kong Garcia at [facility] - Not Applicable to this Patient     Planned Readmission: none     Discharge Statement:  I spent 35 minutes discharging the patient  This time was spent on the day of discharge  I had direct contact with the patient on the day of discharge  Greater than 50% of the total time was spent examining patient, answering all patient questions, arranging and discussing plan of care with patient as well as directly providing post-discharge instructions  Additional time then spent on discharge activities  Bedside nursing rounds performed   Discharge Medications:  See after visit summary for reconciled discharge medications provided to patient and family        ** Please Note: This note has been constructed using a voice recognition system **

## 2020-03-02 NOTE — SOCIAL WORK
CM made aware patient is medically stable to DC to [address]  CM called and spoke to son to verify transportation at DC  Son requesting CM set up [facility] and understands the cost associated  CM spoke to [place] at [facility] to arrange [facility] [employer] transport with [employer] for [address] contacts completed  CM made patient, patient's son, [facility], bedside RN, and facility aware of transport time  IMM reviewed with son via phone

## 2020-03-03 ENCOUNTER — TELEPHONE (OUTPATIENT)
Dept: UROLOGY | Facility: AMBULATORY SURGERY CENTER | Age: 75
End: 2020-03-03

## 2020-03-03 NOTE — TELEPHONE ENCOUNTER
Spoke to Ryan White at [facility] and confirmed with her that we schedule Jayne Brittny with our Physician Assistant, Tristian Melo PA-C in our [address] [place] [address] location on 3/11/2020 @ 8:15 a m  Office address and appointment time given

## 2020-03-03 NOTE — TELEPHONE ENCOUNTER
Reason for appointment/Complaint/Diagnosis :  Hospital follow up urinary retention  Joceline Joseph from [address] [place] [address] Drive [phone]    Insurance: Medicare and Lu pope    History of Cancer? no  If yes, what kind? n/a  Previous urologist? no                Records requested/where?  no    Outside testing/where? no    Location Preference for office visit?  [facility]

## 2020-03-05 ENCOUNTER — NURSING HOME VISIT (OUTPATIENT)
Dept: GERIATRICS | Facility: OTHER | Age: 75
End: 2020-03-05
Payer: MEDICARE

## 2020-03-05 DIAGNOSIS — F42.9 OBSESSIVE-COMPULSIVE DISORDER, UNSPECIFIED TYPE: ICD-10-CM

## 2020-03-05 DIAGNOSIS — I10 ESSENTIAL HYPERTENSION: ICD-10-CM

## 2020-03-05 DIAGNOSIS — K59.04 CHRONIC IDIOPATHIC CONSTIPATION: ICD-10-CM

## 2020-03-05 DIAGNOSIS — I95.1 ORTHOSTATIC HYPOTENSION: ICD-10-CM

## 2020-03-05 DIAGNOSIS — R53.81 PHYSICAL DECONDITIONING: ICD-10-CM

## 2020-03-05 DIAGNOSIS — E87.1 HYPONATREMIA: Primary | ICD-10-CM

## 2020-03-05 PROBLEM — R29.898 MUSCULAR DECONDITIONING: Status: ACTIVE | Noted: 2020-03-05

## 2020-03-05 PROBLEM — R39.198 DIFFICULTY VOIDING: Status: ACTIVE | Noted: 2020-03-05

## 2020-03-05 PROCEDURE — 99306 1ST NF CARE HIGH MDM 50: CPT | Performed by: FAMILY MEDICINE

## 2020-03-05 NOTE — ASSESSMENT & PLAN NOTE
· Patient presented on admission with low sodium levels down to 129  · It was felt hyponatremia was related to patient drinking excessive amounts of water due to his obsession with bowel movements  · Medications were reviewed and he was felt to have been stable on SSRIs for many years, less likely that SSRIs are the cause of his hyponatremia  · His zoloft was continued at current dose  · Sodium level slowly improving  Last checked on 3/1/2020 borderline low at 135  · Check BMP to monitor  · May require fluid restrictions if sodium trending down  · Monitor fluid intake

## 2020-03-05 NOTE — ASSESSMENT & PLAN NOTE
· Evaluate by Psychiatry in the hospital  Recommended to continue current regimen with zoloft, zyprexa, remeron and buspar  · Will consult psychology for evaluation

## 2020-03-05 NOTE — ASSESSMENT & PLAN NOTE
· Patient with severe OCD and focused on his bowel movements as well as his medical issues  · Evaluated by Psychiatry during this hospitalization, no intervention recommended  · He is to continue current regimen with zoloft, zyprexa, Remeron and buspirone  · Recommendation made for outpatient psychotherapy, will consult [employer] services to evaluate and treat as appropriate

## 2020-03-05 NOTE — ASSESSMENT & PLAN NOTE
· PT/OT to evaluate and treat as appropriate  · Patient lives at home with son, and as per records, son has expressed concern since he is not able to provide care at home anymore  ·  to assist with safe discharge planning

## 2020-03-05 NOTE — ASSESSMENT & PLAN NOTE
· Unclear etiology  · Hx of orthostasis treated with midodrine for short period of time back in January 2020  · Monitor BPs  · Not currently on medications  · However, patient recently started on tamsulosin for urinary retention, monitor BP closely

## 2020-03-05 NOTE — ASSESSMENT & PLAN NOTE
· Patient presented to the ED complaining of urinary retention, initially treated with cristina catheter in the hospital, [facility]

## 2020-03-06 ENCOUNTER — NURSING HOME VISIT (OUTPATIENT)
Dept: GERIATRICS | Facility: OTHER | Age: 75
End: 2020-03-06
Payer: MEDICARE

## 2020-03-06 DIAGNOSIS — E87.1 HYPONATREMIA: Primary | ICD-10-CM

## 2020-03-06 DIAGNOSIS — I95.1 ORTHOSTATIC HYPOTENSION: ICD-10-CM

## 2020-03-06 DIAGNOSIS — F42.9 OBSESSIVE-COMPULSIVE DISORDER, UNSPECIFIED TYPE: ICD-10-CM

## 2020-03-06 DIAGNOSIS — F32.A DEPRESSION, UNSPECIFIED DEPRESSION TYPE: ICD-10-CM

## 2020-03-06 DIAGNOSIS — R39.198 DIFFICULTY VOIDING: ICD-10-CM

## 2020-03-06 DIAGNOSIS — F41.9 ANXIETY: ICD-10-CM

## 2020-03-06 DIAGNOSIS — R53.81 PHYSICAL DECONDITIONING: ICD-10-CM

## 2020-03-06 PROCEDURE — 99309 SBSQ NF CARE MODERATE MDM 30: CPT | Performed by: PHYSICIAN ASSISTANT

## 2020-03-08 NOTE — ASSESSMENT & PLAN NOTE
- Continue PT/OT to improve functional status  - Continue fall precautions  - Continue to assist with ADLs at facility

## 2020-03-08 NOTE — ASSESSMENT & PLAN NOTE
- Continue olanzapine, buspar, sertraline, and mirtazapine  - Pt focused on his medical condition and the etiology of his weakness  - psychiatry evaluated as inpatient, no new recommendations  - psychotherapy outpatient

## 2020-03-08 NOTE — PROGRESS NOTES
[facility]  [address]  [phone]  [place]   Code 31         NAME: Cheryl Adams  AGE: 76 y o  SEX: male    DATE OF ENCOUNTER: 3/6/2020    Assessment and Plan     Orthostatic hypotension  - Continue midodrine 7 5 mg PO BID  - Continue to monitor BP at facility     OCD (obsessive compulsive disorder)  - Continue olanzapine, buspar, sertraline, and mirtazapine  - Pt focused on his medical condition and the etiology of his weakness  - psychiatry evaluated as inpatient, no new recommendations  - psychotherapy outpatient     Depression  - as above in OCD  - Pt denies SI/HI    Anxiety  - As above in OCD    Difficulty voiding  - Continue tamsulosin 0 4 mg PO QD  - Continue monitoring BP closely due to orthostatic hypotension    Physical deconditioning  - Continue PT/OT to improve functional status  - Continue fall precautions  - Continue to assist with ADLs at facility     Hyponatremia  - Likely due to combination of excessive fluid intake and SSRIs   - Continue to monitor fluid intake   - Continue to monitor sodium at facility       All medications and routine orders were reviewed and updated as needed  Chief Complaint     SNF follow-up     History of Present Illness     FRANK  is a 75 yo CM with multiple medical comorbidities including but not limited to hyponatremia, OCD, anxiety, depression, orthostatic hypotension, and difficulty urinating interviewed and examined in collaboration with nursing for SNF follow-up  Pt very fixated on his physical decondition and demanding to know etiology  Pt very concerned that he will not be able to gain strength back despite working with therapies  Pt notes he is eating at his normal level and is trying to stay hydrated within reason  Pt fixated on his BMs and is requesting a suppository  Pt denies urinary symptoms at this time            The patient's allergies, past medical, surgical, social and family history were reviewed and unchanged  Review of Systems     Review of Systems   Constitutional: Positive for activity change  Negative for chills and fever  HENT: Negative for sore throat  Respiratory: Negative for cough and shortness of breath  Cardiovascular: Negative for chest pain and palpitations  Gastrointestinal: Positive for constipation  Negative for abdominal distention, abdominal pain, diarrhea, nausea and vomiting  Genitourinary: Negative for difficulty urinating and dysuria  Musculoskeletal: Positive for arthralgias and gait problem  Neurological: Positive for weakness  Negative for dizziness, light-headedness and headaches  Psychiatric/Behavioral: Negative for suicidal ideas  Objective     Vitals: 133/53- 97 8F- 64 bpm- 135 7 lb- 18- 97% on RA     Physical Exam   Constitutional: No distress  Well-appearing male with flat affect seated comfortably in chair    HENT:   Head: Normocephalic and atraumatic  Nose: Nose normal    Mouth/Throat: Oropharynx is clear and moist  No oropharyngeal exudate  Eyes: Conjunctivae are normal  Right eye exhibits no discharge  Left eye exhibits no discharge  No scleral icterus  Cardiovascular: Normal rate and regular rhythm  Exam reveals no gallop and no friction rub  Murmur heard  Pulmonary/Chest: Effort normal and breath sounds normal  No stridor  No respiratory distress  He has no wheezes  He has no rales  Abdominal: Soft  Bowel sounds are normal  He exhibits no distension  There is no tenderness  There is no rebound and no guarding  Musculoskeletal: He exhibits no edema  5/5 strength BUE, BLE   Neurological: He is alert  Skin: He is not diaphoretic  Psychiatric: His mood appears anxious  His affect is labile  He is not actively hallucinating  Thought content is paranoid  He expresses inappropriate judgment  He exhibits a depressed mood  He is attentive  Nursing note and vitals reviewed        Pertinent Laboratory/Diagnostic Studies:  Reviewed in facility chart     Current Medications   Medications reviewed and updated see facility chart for details  Current Outpatient Medications:     busPIRone (BUSPAR) 10 mg tablet, Take 1 tablet (10 mg total) by mouth 2 (two) times a day, Disp: 60 tablet, Rfl: 0    docusate sodium (COLACE) 100 mg capsule, Take 1 capsule (100 mg total) by mouth 2 (two) times a day Hold for loose bowel movements   (Patient taking differently: Take 100 mg by mouth daily Hold for loose bowel movements  ), Disp: 60 capsule, Rfl: 0    midodrine (PROAMATINE) 5 mg tablet, TAKE 1 AND 1/2 TABS (7 5 MG) TWICE A DAY FOR 5 DAYS (Patient not taking: Reported on 2/26/2020), Disp: 258 tablet, Rfl: 0    mirtazapine (REMERON) 15 mg tablet, Take 1 tablet (15 mg total) by mouth daily at bedtime, Disp: 30 tablet, Rfl: 0    Multiple Vitamins-Minerals (MULTIVITAMIN MEN 50+) TABS, Take by mouth, Disp: , Rfl:     OLANZapine (ZyPREXA) 10 mg tablet, Take 1 tablet (10 mg total) by mouth daily at bedtime, Disp: 30 tablet, Rfl: 0    sertraline (ZOLOFT) 100 mg tablet, Take 100 mg by mouth daily  , Disp: , Rfl:     tamsulosin (FLOMAX) 0 4 mg, Take 1 capsule (0 4 mg total) by mouth daily with dinner, Disp: 30 capsule, Rfl: 0    Halley Telles PA-C  3/6/2020

## 2020-03-08 NOTE — ASSESSMENT & PLAN NOTE
- Likely due to combination of excessive fluid intake and SSRIs   - Continue to monitor fluid intake   - Continue to monitor sodium at facility

## 2020-03-11 ENCOUNTER — TELEPHONE (OUTPATIENT)
Dept: UROLOGY | Facility: CLINIC | Age: 75
End: 2020-03-11

## 2020-03-11 ENCOUNTER — NURSING HOME VISIT (OUTPATIENT)
Dept: GERIATRICS | Facility: OTHER | Age: 75
End: 2020-03-11
Payer: MEDICARE

## 2020-03-11 DIAGNOSIS — R53.81 PHYSICAL DECONDITIONING: ICD-10-CM

## 2020-03-11 DIAGNOSIS — R39.198 DIFFICULTY VOIDING: ICD-10-CM

## 2020-03-11 DIAGNOSIS — F32.A DEPRESSION, UNSPECIFIED DEPRESSION TYPE: ICD-10-CM

## 2020-03-11 DIAGNOSIS — I10 ESSENTIAL HYPERTENSION: ICD-10-CM

## 2020-03-11 DIAGNOSIS — F41.9 ANXIETY: ICD-10-CM

## 2020-03-11 DIAGNOSIS — K59.04 CHRONIC IDIOPATHIC CONSTIPATION: ICD-10-CM

## 2020-03-11 DIAGNOSIS — F42.9 OBSESSIVE-COMPULSIVE DISORDER, UNSPECIFIED TYPE: ICD-10-CM

## 2020-03-11 DIAGNOSIS — I95.1 ORTHOSTATIC HYPOTENSION: ICD-10-CM

## 2020-03-11 DIAGNOSIS — E87.1 HYPONATREMIA: Primary | ICD-10-CM

## 2020-03-11 PROCEDURE — 99316 NF DSCHRG MGMT 30 MIN+: CPT | Performed by: PHYSICIAN ASSISTANT

## 2020-03-11 NOTE — ASSESSMENT & PLAN NOTE
- Continue PT/OT to improve functional status  - Continue fall precautions at home  - Continue to receive assistance with ADLs at home

## 2020-03-11 NOTE — PROGRESS NOTES
[facility]  [address]  [phone] [address]: [place]    NAME: Shree Montanez  AGE: 76 y o  SEX: male  DATE OF ADMISSION: 3/2/2020  DATE OF DISCHARGE:3/13/2020  DISCHARGE DISPOSITION: Home   Reason for admission: Patient was admitted from [facility] for rehabilitation after hospitalization for urinary retention and hyponatremia  Course of stay: Patient was admitted to [facility] for rehabilitation due to weakness and urinary retention  There were no significant events during his stay  The patient participated in PT/OT  HPI:  Soy Orellana is a 77 yo CM with multiple medical comorbidities including but not limited to OCD, hyponatremia, urinary retention, depression, orthostatic hypotension, and hypertension, interviewed and examined in collaboration with nursing for SNF discharge  Pt denies pain at this time  Pt feels that he has made gains in therapies but is worried about going home due to his overall deconditioning  Pt notes that he is eating at his normal level  Pt is hydrating within his fluid restriction  Pt denies GI and urinary symptoms  Pt was seen by Urology on 3/11/2020  No concerns from nursing  ROS:  Review of Systems   Constitutional: Positive for activity change  Negative for chills and fever  HENT: Negative for sore throat  Respiratory: Negative for cough and shortness of breath  Cardiovascular: Negative for chest pain and palpitations  Gastrointestinal: Negative for abdominal distention, abdominal pain, diarrhea, nausea and vomiting  Genitourinary: Negative for difficulty urinating and dysuria  Musculoskeletal: Positive for arthralgias and gait problem  Neurological: Positive for weakness  Negative for dizziness, light-headedness and headaches  Psychiatric/Behavioral: Negative for suicidal ideas  PHYSICAL EXAM:  Vitals:  132/70- 78 bpm- 95% on RA- 18   Physical Exam   Constitutional: No distress     Thin male seated comfortably in chair    HENT:   Head: Normocephalic and atraumatic  Nose: Nose normal    Mouth/Throat: Oropharynx is clear and moist  No oropharyngeal exudate  Eyes: Conjunctivae are normal  Right eye exhibits no discharge  Left eye exhibits no discharge  No scleral icterus  Cardiovascular: Normal rate and regular rhythm  Exam reveals no gallop and no friction rub  Murmur heard  Pulmonary/Chest: Effort normal and breath sounds normal  No stridor  No respiratory distress  He has no wheezes  He has no rales  Abdominal: Soft  Bowel sounds are normal  He exhibits no distension  There is no tenderness  There is no rebound and no guarding  Musculoskeletal: He exhibits no edema or tenderness  5/5 strength BUE and BLE  Able to ambulate with RW    Neurological: He is alert  Skin: He is not diaphoretic  Psychiatric:   Pleasant and cooperative during exam but becomes very anxious when talking about going home and paranoid that he will feel weak again at home   Nursing note and vitals reviewed  Admission Diagnoses:  Hyponatremia, chronic idiopathic constipation, OCD, orthostatic hypotension, hypertension, depression, physical deconditioning, and difficulty voiding       Discharge Diagnoses: Chronic idiopathic constipation  - Continue colace 100mg PO BID   - Continue senna 8 6 mg 2 tab PO QHS  - Continue to encourage physical activity at home     Hypertension  - Well controlled with SBP in 130s without medications at this time  - Continue to monitor BP with PCP     Orthostatic hypotension  - Pt not currently requiring medications for orthostatic hypotension at this time   - Continue to change positions gingerly and give body time to adjust to new position before movements  - Continue to monitor BP with PCP     OCD (obsessive compulsive disorder)  - Continue mirtazapine 15 mg PO QHS   - Continue sertraline 100mg PO QHS  - Continue olanzapine 10 mg PO QHS  - Continue outpatient follow-up with counselor and psychiatry     Depression  - Continue remeron, zoloft and zyprexa as above  - Continue buspar 10 mg PO BID  - Pt denies SI/HI at this time     Anxiety  - As above in depression     Hyponatremia  - Concern for overhydration, so recommendation to continue fluid restriction of 1500mL at home   - Additionally, multiple SSRI can exacerbate/cause hyponatremia   - Continue to monitor sodium levels with PCP       Physical deconditioning  - Continue PT/OT to improve functional status  - Continue fall precautions at home  - Continue to receive assistance with ADLs at home    Difficulty voiding  - Pt following with urology outpatient  - Continue tamsulosin 0 4mg PO QD         Follow-up Recommendations: PCP, urology, psychiatry, psychotherapist     Labs and testing performed during stay:    9/2/0525  BASIC METABOLIC PNL  GLUCOSE 79 mg/dL 65-99 Final  BUN 10 mg/dL 7-28 Final  CREATININE 0 59 mg/dL 0 53-1 30 Final  SODIUM 137 mmol/L 135-145 Final  POTASSIUM 4 6 mmol/L 3 5-5 2 Final  CHLORIDE 103 mmol/L 100-109 Final  CARBON DIOXIDE 27 mmol/L 23-31 Final  CALCIUM 8 5 mg/dL 8 5-10 1 Final  ANION GAP 7 3-11 Final  GFR, CALCULATED 99 >60 Final    PSA 0 16      Discharge Medications: See discharge medication list which was reviewed and signed  Current Outpatient Medications:     busPIRone (BUSPAR) 10 mg tablet, Take 1 tablet (10 mg total) by mouth 2 (two) times a day, Disp: 60 tablet, Rfl: 0    docusate sodium (COLACE) 100 mg capsule, Take 1 capsule (100 mg total) by mouth 2 (two) times a day Hold for loose bowel movements   (Patient taking differently: Take 100 mg by mouth daily Hold for loose bowel movements  ), Disp: 60 capsule, Rfl: 0    mirtazapine (REMERON) 15 mg tablet, Take 1 tablet (15 mg total) by mouth daily at bedtime, Disp: 30 tablet, Rfl: 0    Multiple Vitamins-Minerals (MULTIVITAMIN MEN 50+) TABS, Take by mouth, Disp: , Rfl:     OLANZapine (ZyPREXA) 10 mg tablet, Take 1 tablet (10 mg total) by mouth daily at bedtime, Disp: 30 tablet, Rfl: 0    sertraline (ZOLOFT) 100 mg tablet, Take 100 mg by mouth daily  , Disp: , Rfl:     tamsulosin (FLOMAX) 0 4 mg, Take 1 capsule (0 4 mg total) by mouth daily with dinner, Disp: 30 capsule, Rfl: 0      Discussion with patient/family and further instructions:  -Fall precautions  -Aspiration precautions  -Bleeding precautions  -Monitor for signs/symptoms of infection  -Medication list was reviewed and signed  -DME form was completed    Status at time of discharge: Stable    Billing based on time  Time spent on unit, 40 minutes  Time spent counseling pt on debility/condition, 30 minutes        Ignacio uGillaume PA-C  3/46/97135:58 PM

## 2020-03-11 NOTE — ASSESSMENT & PLAN NOTE
- Pt not currently requiring medications for orthostatic hypotension at this time   - Continue to change positions gingerly and give body time to adjust to new position before movements  - Continue to monitor BP with PCP

## 2020-03-11 NOTE — TELEPHONE ENCOUNTER
Called pt and lm to call us back to r/s his appt that was missed today(3/11/20)  He was at [facility] but was discharged today(3/11/20)  He needs to follow up from hospital stay

## 2020-03-11 NOTE — ASSESSMENT & PLAN NOTE
- Continue colace 100mg PO BID   - Continue senna 8 6 mg 2 tab PO QHS  - Continue to encourage physical activity at home

## 2020-03-11 NOTE — ASSESSMENT & PLAN NOTE
- Concern for overhydration, so recommendation to continue fluid restriction of 1500mL at home   - Additionally, multiple SSRI can exacerbate/cause hyponatremia   - Continue to monitor sodium levels with PCP

## 2020-03-11 NOTE — ASSESSMENT & PLAN NOTE
- Continue remeron, zoloft and zyprexa as above  - Continue buspar 10 mg PO BID  - Pt denies SI/HI at this time

## 2020-03-11 NOTE — ASSESSMENT & PLAN NOTE
- Well controlled with SBP in 130s without medications at this time  - Continue to monitor BP with PCP

## 2020-03-11 NOTE — ASSESSMENT & PLAN NOTE
- Continue mirtazapine 15 mg PO QHS   - Continue sertraline 100mg PO QHS  - Continue olanzapine 10 mg PO QHS  - Continue outpatient follow-up with counselor and psychiatry

## 2020-04-15 DIAGNOSIS — R42 POSITIONAL LIGHTHEADEDNESS: Primary | ICD-10-CM

## 2020-04-15 RX ORDER — MIDODRINE HYDROCHLORIDE 5 MG/1
TABLET ORAL
Qty: 258 TABLET | Refills: 0 | Status: SHIPPED | OUTPATIENT
Start: 2020-04-15 | End: 2020-04-28 | Stop reason: SDUPTHER

## 2020-04-28 DIAGNOSIS — R42 POSITIONAL LIGHTHEADEDNESS: ICD-10-CM

## 2020-04-28 RX ORDER — MIDODRINE HYDROCHLORIDE 5 MG/1
7.5 TABLET ORAL 2 TIMES DAILY
Qty: 270 TABLET | Refills: 3 | Status: SHIPPED | OUTPATIENT
Start: 2020-04-28 | End: 2021-05-05

## 2021-01-29 ENCOUNTER — VBI (OUTPATIENT)
Dept: ADMINISTRATIVE | Facility: OTHER | Age: 76
End: 2021-01-29

## 2021-02-12 DIAGNOSIS — Z23 ENCOUNTER FOR IMMUNIZATION: ICD-10-CM

## 2021-04-05 ENCOUNTER — TELEPHONE (OUTPATIENT)
Dept: FAMILY MEDICINE CLINIC | Facility: CLINIC | Age: 76
End: 2021-04-05

## 2021-05-05 DIAGNOSIS — R42 POSITIONAL LIGHTHEADEDNESS: ICD-10-CM

## 2021-05-05 RX ORDER — MIDODRINE HYDROCHLORIDE 5 MG/1
7.5 TABLET ORAL 2 TIMES DAILY
Qty: 270 TABLET | Refills: 3 | Status: SHIPPED | OUTPATIENT
Start: 2021-05-05 | End: 2022-04-06

## 2022-03-29 NOTE — ASSESSMENT & PLAN NOTE
Stroke status post tPA  Continue aspirin statin  Neurology following Oral Minoxidil Counseling- I discussed with the patient the risks of oral minoxidil including but not limited to shortness of breath, swelling of the feet or ankles, dizziness, lightheadedness, unwanted hair growth and allergic reaction.  The patient verbalized understanding of the proper use and possible adverse effects of oral minoxidil.  All of the patient's questions and concerns were addressed.

## 2022-06-15 ENCOUNTER — TELEPHONE (OUTPATIENT)
Dept: OTHER | Facility: OTHER | Age: 77
End: 2022-06-15

## 2022-07-15 ENCOUNTER — TELEPHONE (OUTPATIENT)
Dept: FAMILY MEDICINE CLINIC | Facility: CLINIC | Age: 77
End: 2022-07-15

## 2022-07-15 NOTE — TELEPHONE ENCOUNTER
Patient son called asking for AWV appointment  He thinks it is important for him to be seen sooner rather than later  Where may we put him?

## 2022-07-20 ENCOUNTER — RA CDI HCC (OUTPATIENT)
Dept: OTHER | Facility: HOSPITAL | Age: 77
End: 2022-07-20

## 2022-07-20 NOTE — PROGRESS NOTES
[address]  coding opportunities       Chart reviewed, no opportunity found: CHART REVIEWED, NO OPPORTUNITY FOUND        Patients Insurance     Medicare Insurance: Medicare

## 2022-07-26 ENCOUNTER — TELEPHONE (OUTPATIENT)
Dept: FAMILY MEDICINE CLINIC | Facility: CLINIC | Age: 77
End: 2022-07-26

## 2022-07-26 NOTE — TELEPHONE ENCOUNTER
Patient's son called to reschedule Addy's AWV appointment with you   He states they can't make it in today because patient has "the runs"    Your next available AWV is in January but patient's son stated it's been a while since you saw him and you wanted him to come in sooner so he can continue to get his medication refills     Please advise if we should reschedule for January or if you want to bring him in sooner     Patient's son also mentioned that he would be fine with doing a virtual visit instead of bringing him into the office

## 2022-08-10 ENCOUNTER — OFFICE VISIT (OUTPATIENT)
Dept: FAMILY MEDICINE CLINIC | Facility: CLINIC | Age: 77
End: 2022-08-10
Payer: MEDICARE

## 2022-08-10 VITALS
TEMPERATURE: 97.4 F | BODY MASS INDEX: 36.22 KG/M2 | WEIGHT: 239 LBS | HEART RATE: 64 BPM | SYSTOLIC BLOOD PRESSURE: 126 MMHG | HEIGHT: 68 IN | DIASTOLIC BLOOD PRESSURE: 70 MMHG | OXYGEN SATURATION: 98 %

## 2022-08-10 DIAGNOSIS — R26.81 GAIT INSTABILITY: ICD-10-CM

## 2022-08-10 DIAGNOSIS — R53.81 PHYSICAL DECONDITIONING: ICD-10-CM

## 2022-08-10 DIAGNOSIS — Z00.00 MEDICARE ANNUAL WELLNESS VISIT, SUBSEQUENT: Primary | ICD-10-CM

## 2022-08-10 DIAGNOSIS — I95.1 ORTHOSTATIC HYPOTENSION: ICD-10-CM

## 2022-08-10 DIAGNOSIS — R35.1 BPH ASSOCIATED WITH NOCTURIA: ICD-10-CM

## 2022-08-10 DIAGNOSIS — N40.1 BPH ASSOCIATED WITH NOCTURIA: ICD-10-CM

## 2022-08-10 DIAGNOSIS — Z23 NEED FOR VACCINATION: ICD-10-CM

## 2022-08-10 DIAGNOSIS — F42.9 OBSESSIVE-COMPULSIVE DISORDER, UNSPECIFIED TYPE: ICD-10-CM

## 2022-08-10 PROCEDURE — G0438 PPPS, INITIAL VISIT: HCPCS | Performed by: FAMILY MEDICINE

## 2022-08-10 PROCEDURE — 90677 PCV20 VACCINE IM: CPT | Performed by: FAMILY MEDICINE

## 2022-08-10 PROCEDURE — G0009 ADMIN PNEUMOCOCCAL VACCINE: HCPCS | Performed by: FAMILY MEDICINE

## 2022-08-10 PROCEDURE — 99214 OFFICE O/P EST MOD 30 MIN: CPT | Performed by: FAMILY MEDICINE

## 2022-08-10 NOTE — PROGRESS NOTES
Assessment and Plan:     Problem List Items Addressed This Visit    None         BMI Counseling: Body mass index is 36 88 kg/m²  The BMI is above normal  Nutrition recommendations include reducing portion sizes, decreasing overall calorie intake and increasing intake of lean protein  Preventive health issues were discussed with patient, and age appropriate screening tests were ordered as noted in patient's After Visit Summary  Personalized health advice and appropriate referrals for health education or preventive services given if needed, as noted in patient's After Visit Summary  History of Present Illness:     Patient presents for a Medicare Wellness Visit  He has chronic orthostatic hypothension that is stable on midodrine  He has gait instability and chronic deconditioning and could benefit from PT and some services in home  HPI   Patient Care Team:  Meño Granado DO as PCP - General (Family Medicine)     Review of Systems:     Review of Systems   Constitutional: Negative for appetite change, chills and fever  HENT: Negative for ear pain, facial swelling, rhinorrhea, sinus pain, sore throat and trouble swallowing  Eyes: Negative for discharge and redness  Respiratory: Negative for chest tightness, shortness of breath and wheezing  Cardiovascular: Negative for chest pain and palpitations  Gastrointestinal: Negative for abdominal pain, diarrhea, nausea and vomiting  Endocrine: Negative for polyuria  Genitourinary: Positive for difficulty urinating  Negative for dysuria and urgency  Musculoskeletal: Negative for arthralgias and back pain  Skin: Negative for rash         + flaking scalp   Neurological: Positive for syncope  Negative for dizziness, weakness and headaches         + gait instability   Hematological: Negative for adenopathy  Psychiatric/Behavioral: Positive for confusion  Negative for behavioral problems and sleep disturbance          + chronic intermittent confusion   All other systems reviewed and are negative         Problem List:     Patient Active Problem List   Diagnosis    Chronic idiopathic constipation    OCD (obsessive compulsive disorder)    Weakness    Depression    Anxiety    Cervical spinal stenosis    Cervical stenosis of spine    Hyponatremia    Cervical myelopathy (HCC)    S/P cervical spinal fusion    Lumbar radiculopathy    Chronic low back pain    Pain    Traumatic brain injury with loss of consciousness ([facility] [address] )    Hypertension    History of hyperlipidemia    Insomnia    Lumbar stenosis    Orthostatic hypotension    Physical deconditioning    Difficulty voiding      Past Medical and Surgical History:     Past Medical History:   Diagnosis Date    Anxiety     Chronic low back pain     Depression     Hypercholesterolemia     Hypertension     OCD (obsessive compulsive disorder)      Past Surgical History:   Procedure Laterality Date    CERVICAL FUSION Bilateral 11/19/2018    Procedure: Posterior cervical decompressive laminectomy and instrumented posterior lateral fusion fixation C3-6;  Surgeon: Becka Marte MD;  Location: [facility];  Service: Neurosurgery    HERNIA REPAIR        Family History:     Family History   Problem Relation Age of Onset    Diabetes type II Father     Glaucoma Son     Panic disorder Son       Social History:     Social History     Socioeconomic History    Marital status:      Spouse name: None    Number of children: None    Years of education: None    Highest education level: None   Occupational History    None   Tobacco Use    Smoking status: Never Smoker    Smokeless tobacco: Never Used   Vaping Use    Vaping Use: Never used   Substance and Sexual Activity    Alcohol use: No    Drug use: No    Sexual activity: Never   Other Topics Concern    None   Social History Narrative    None     Social Determinants of Health     Financial Resource Strain: Not on file   Food Insecurity: Not on file   Transportation Needs: Not on file   Physical Activity: Not on file   Stress: Not on file   Social Connections: Not on file   Intimate Partner Violence: Not on file   Housing Stability: Not on file      Medications and Allergies:     Current Outpatient Medications   Medication Sig Dispense Refill    busPIRone (BUSPAR) 10 mg tablet Take 1 tablet (10 mg total) by mouth 2 (two) times a day 60 tablet 0    docusate sodium (COLACE) 100 mg capsule Take 1 capsule (100 mg total) by mouth 2 (two) times a day Hold for loose bowel movements  (Patient not taking: Reported on 8/10/2022) 60 capsule 0    midodrine (PROAMATINE) 5 mg tablet TAKE 1 AND 1/2 TABLETS BY MOUTH 2 TIMES A  tablet 3    mirtazapine (REMERON) 15 mg tablet Take 1 tablet (15 mg total) by mouth daily at bedtime 30 tablet 0    Multiple Vitamins-Minerals (MULTIVITAMIN MEN 50+) TABS Take by mouth      OLANZapine (ZyPREXA) 10 mg tablet Take 1 tablet (10 mg total) by mouth daily at bedtime (Patient not taking: Reported on 8/10/2022) 30 tablet 0    sertraline (ZOLOFT) 100 mg tablet Take 100 mg by mouth daily        tamsulosin (FLOMAX) 0 4 mg Take 1 capsule (0 4 mg total) by mouth daily with dinner (Patient not taking: No sig reported) 30 capsule 0     No current facility-administered medications for this visit  No Known Allergies   Immunizations:     Immunization History   Administered Date(s) Administered    INFLUENZA 01/17/2017    Influenza, high dose seasonal 0 7 mL 10/02/2018    Tdap 01/30/2020      Health Maintenance:         Topic Date Due    Hepatitis C Screening  Never done         Topic Date Due    COVID-19 Vaccine (1) Never done    Pneumococcal Vaccine: 65+ Years (1 - PCV) Never done    Influenza Vaccine (1) 09/01/2022      Medicare Screening Tests and Risk Assessments:     Sury Morton is here for his Subsequent Wellness visit   Last Medicare Wellness visit information reviewed, patient interviewed, no change since last AWV  Health Risk Assessment:   Patient rates overall health as good  Patient feels that their physical health rating is slightly better  Patient is satisfied with their life  Eyesight was rated as same  Hearing was rated as same  Patient feels that their emotional and mental health rating is slightly better  Patients states they are never, rarely angry  Patient states they are never, rarely unusually tired/fatigued  Pain experienced in the last 7 days has been none  Patient states that he has experienced weight loss or gain in last 6 months  Fall Risk Screening: In the past year, patient has experienced: no history of falling in past year      Home Safety:  Patient has trouble with stairs inside or outside of their home  Patient has working smoke alarms and has no working carbon monoxide detector  Home safety hazards include: none  Nutrition:   Current diet is Other (please comment)  Medications:   Patient is currently taking over-the-counter supplements  OTC medications include: see medication list  Patient is not able to manage medications  Activities of Daily Living (ADLs)/Instrumental Activities of Daily Living (IADLs):   Walk and transfer into and out of bed and chair?: Yes  Dress and groom yourself?: No    Bathe or shower yourself?: No    Feed yourself? Yes  Do your laundry/housekeeping?: No  Manage your money, pay your bills and track your expenses?: No  Make your own meals?: Yes    Do your own shopping?: No    Previous Hospitalizations:   Any hospitalizations or ED visits within the last 12 months?: No      Advance Care Planning:   Living will: No    Durable POA for healthcare:  Yes    Advanced directive: Yes    Advanced directive counseling given: Yes    Five wishes given: Yes    Patient declined ACP directive: No    End of Life Decisions reviewed with patient: Yes    Provider agrees with end of life decisions: Yes      Cognitive Screening:   Provider or family/friend/caregiver concerned regarding cognition?: No    PREVENTIVE SCREENINGS      Cardiovascular Screening:    General: Screening Current      Diabetes Screening:     General: Screening Current      Colorectal Cancer Screening:     General: Screening Current      Prostate Cancer Screening:    General: Screening Not Indicated      Osteoporosis Screening:    General: Screening Current      Abdominal Aortic Aneurysm (AAA) Screening:        General: Screening Current and Screening Not Indicated      Lung Cancer Screening:     General: Screening Not Indicated      Hepatitis C Screening:    General: Screening Current    No exam data present     Physical Exam:     /70   Pulse 64   Temp (!) 97 4 °F (36 3 °C)   Ht 5' 7 5" (1 715 m)   Wt 108 kg (239 lb)   SpO2 98%   BMI 36 88 kg/m²     Physical Exam  Vitals and nursing note reviewed  Constitutional:       General: He is not in acute distress  Appearance: Normal appearance  He is not ill-appearing or diaphoretic  HENT:      Head: Normocephalic and atraumatic  Right Ear: Tympanic membrane, ear canal and external ear normal       Left Ear: Tympanic membrane, ear canal and external ear normal       Nose: Nose normal  No congestion or rhinorrhea  Mouth/Throat:      Mouth: Mucous membranes are moist       Pharynx: Oropharynx is clear  No posterior oropharyngeal erythema  Eyes:      General:         Right eye: No discharge  Left eye: No discharge  Extraocular Movements: Extraocular movements intact  Conjunctiva/sclera: Conjunctivae normal       Pupils: Pupils are equal, round, and reactive to light  Neck:      Vascular: No carotid bruit  Cardiovascular:      Rate and Rhythm: Normal rate and regular rhythm  Pulses: Normal pulses  Heart sounds: Normal heart sounds  No murmur heard  Pulmonary:      Effort: Pulmonary effort is normal  No respiratory distress  Breath sounds: Normal breath sounds  No wheezing or rhonchi     Abdominal: General: Abdomen is flat  Bowel sounds are normal  There is no distension  Palpations: There is no mass  Tenderness: There is no abdominal tenderness  Musculoskeletal:         General: No swelling or deformity  Normal range of motion  Cervical back: Normal range of motion and neck supple  No rigidity  Right lower leg: No edema  Left lower leg: No edema  Lymphadenopathy:      Cervical: No cervical adenopathy  Skin:     General: Skin is warm and dry  Capillary Refill: Capillary refill takes less than 2 seconds  Coloration: Skin is not jaundiced  Findings: No bruising, erythema or rash  Comments: + scaling scalp   Neurological:      General: No focal deficit present  Mental Status: He is alert and oriented to person, place, and time  Mental status is at baseline  Cranial Nerves: No cranial nerve deficit  Sensory: No sensory deficit  Motor: No weakness  Coordination: Coordination normal       Gait: Gait abnormal       Deep Tendon Reflexes: Reflexes normal       Comments: + intermittent confusion    Psychiatric:         Mood and Affect: Mood normal          Behavior: Behavior normal          Thought Content:  Thought content normal          Judgment: Judgment normal           Felicie Job, DO

## 2022-08-10 NOTE — PATIENT INSTRUCTIONS
Medicare Preventive Visit Patient Instructions  Thank you for completing your Welcome to Medicare Visit or Medicare Annual Wellness Visit today  Your next wellness visit will be due in one year (8/11/2023)  The screening/preventive services that you may require over the next 5-10 years are detailed below  Some tests may not apply to you based off risk factors and/or age  Screening tests ordered at today's visit but not completed yet may show as past due  Also, please note that scanned in results may not display below  Preventive Screenings:  Service Recommendations Previous Testing/Comments   Colorectal Cancer Screening  · Colonoscopy    · Fecal Occult Blood Test (FOBT)/Fecal Immunochemical Test (FIT)  · Fecal DNA/Cologuard Test  · Flexible Sigmoidoscopy Age: 39-70 years old   Colonoscopy: every 10 years (May be performed more frequently if at higher risk)  OR  FOBT/FIT: every 1 year  OR  Cologuard: every 3 years  OR  Sigmoidoscopy: every 5 years  Screening may be recommended earlier than age 39 if at higher risk for colorectal cancer  Also, an individualized decision between you and your healthcare provider will decide whether screening between the ages of 74-80 would be appropriate   Colonoscopy: Not on file  FOBT/FIT: Not on file  Cologuard: Not on file  Sigmoidoscopy: Not on file          Prostate Cancer Screening Individualized decision between patient and health care provider in men between ages of 53-78   Medicare will cover every 12 months beginning on the day after your 50th birthday PSA: 0 2 ng/mL     Screening Not Indicated     Hepatitis C Screening Once for adults born between 1945 and 1965  More frequently in patients at high risk for Hepatitis C Hep C Antibody: Not on file        Diabetes Screening 1-2 times per year if you're at risk for diabetes or have pre-diabetes Fasting glucose: 90 mg/dL (5/17/2018)  A1C: 4 7 % (11/14/2018)      Cholesterol Screening Once every 5 years if you don't have a lipid disorder  May order more often based on risk factors  Lipid panel: 11/14/2018  Screening Current      Other Preventive Screenings Covered by Medicare:  1  Abdominal Aortic Aneurysm (AAA) Screening: covered once if your at risk  You're considered to be at risk if you have a family history of AAA or a male between the age of 73-68 who smoking at least 100 cigarettes in your lifetime  2  Lung Cancer Screening: covers low dose CT scan once per year if you meet all of the following conditions: (1) Age 50-69; (2) No signs or symptoms of lung cancer; (3) Current smoker or have quit smoking within the last 15 years; (4) You have a tobacco smoking history of at least 20 pack years (packs per day x number of years you smoked); (5) You get a written order from a healthcare provider  3  Glaucoma Screening: covered annually if you're considered high risk: (1) You have diabetes OR (2) Family history of glaucoma OR (3)  aged 48 and older OR (3)  American aged 72 and older  3  Osteoporosis Screening: covered every 2 years if you meet one of the following conditions: (1) Have a vertebral abnormality; (2) On glucocorticoid therapy for more than 3 months; (3) Have primary hyperparathyroidism; (4) On osteoporosis medications and need to assess response to drug therapy  5  HIV Screening: covered annually if you're between the age of 12-76  Also covered annually if you are younger than 13 and older than 72 with risk factors for HIV infection  For pregnant patients, it is covered up to 3 times per pregnancy      Immunizations:  Immunization Recommendations   Influenza Vaccine Annual influenza vaccination during flu season is recommended for all persons aged >= 6 months who do not have contraindications   Pneumococcal Vaccine   * Pneumococcal conjugate vaccine = PCV13 (Prevnar 13), PCV15 (Vaxneuvance), PCV20 (Prevnar 20)  * Pneumococcal polysaccharide vaccine = PPSV23 (Pneumovax) Adults 25-60 years old: 1-3 doses may be recommended based on certain risk factors  Adults 72 years old: 1-2 doses may be recommended based off what pneumonia vaccine you previously received   Hepatitis B Vaccine 3 dose series if at intermediate or high risk (ex: diabetes, end stage renal disease, liver disease)   Tetanus (Td) Vaccine - COST NOT COVERED BY MEDICARE PART B Following completion of primary series, a booster dose should be given every 10 years to maintain immunity against tetanus  Td may also be given as tetanus wound prophylaxis  Tdap Vaccine - COST NOT COVERED BY MEDICARE PART B Recommended at least once for all adults  For pregnant patients, recommended with each pregnancy  Shingles Vaccine (Shingrix) - COST NOT COVERED BY MEDICARE PART B  2 shot series recommended in those aged 48 and above     Health Maintenance Due:      Topic Date Due    Hepatitis C Screening  Never done     Immunizations Due:      Topic Date Due    COVID-19 Vaccine (1) Never done    Pneumococcal Vaccine: 65+ Years (1 - PCV) Never done    Influenza Vaccine (1) 09/01/2022     Advance Directives   What are advance directives? Advance directives are legal documents that state your wishes and plans for medical care  These plans are made ahead of time in case you lose your ability to make decisions for yourself  Advance directives can apply to any medical decision, such as the treatments you want, and if you want to donate organs  What are the types of advance directives? There are many types of advance directives, and each state has rules about how to use them  You may choose a combination of any of the following:  · Living will: This is a written record of the treatment you want  You can also choose which treatments you do not want, which to limit, and which to stop at a certain time  This includes surgery, medicine, IV fluid, and tube feedings  · Durable power of  for healthcare [facility]):   This is a written record that states who you want to make healthcare choices for you when you are unable to make them for yourself  This person, called a proxy, is usually a family member or a friend  You may choose more than 1 proxy  · Do not resuscitate (DNR) order:  A DNR order is used in case your heart stops beating or you stop breathing  It is a request not to have certain forms of treatment, such as CPR  A DNR order may be included in other types of advance directives  · Medical directive: This covers the care that you want if you are in a coma, near death, or unable to make decisions for yourself  You can list the treatments you want for each condition  Treatment may include pain medicine, surgery, blood transfusions, dialysis, IV or tube feedings, and a ventilator (breathing machine)  · Values history: This document has questions about your views, beliefs, and how you feel and think about life  This information can help others choose the care that you would choose  Why are advance directives important? An advance directive helps you control your care  Although spoken wishes may be used, it is better to have your wishes written down  Spoken wishes can be misunderstood, or not followed  Treatments may be given even if you do not want them  An advance directive may make it easier for your family to make difficult choices about your care  Weight Management   Why it is important to manage your weight:  Being overweight increases your risk of health conditions such as heart disease, high blood pressure, type 2 diabetes, and certain types of cancer  It can also increase your risk for osteoarthritis, sleep apnea, and other respiratory problems  Aim for a slow, steady weight loss  Even a small amount of weight loss can lower your risk of health problems  How to lose weight safely:  A safe and healthy way to lose weight is to eat fewer calories and get regular exercise   You can lose up about 1 pound a week by decreasing the number of calories you eat by 500 calories each day  Healthy meal plan for weight management:  A healthy meal plan includes a variety of foods, contains fewer calories, and helps you stay healthy  A healthy meal plan includes the following:  · Eat whole-grain foods more often  A healthy meal plan should contain fiber  Fiber is the part of grains, fruits, and vegetables that is not broken down by your body  Whole-grain foods are healthy and provide extra fiber in your diet  Some examples of whole-grain foods are whole-wheat breads and pastas, oatmeal, brown rice, and bulgur  · Eat a variety of vegetables every day  Include dark, leafy greens such as spinach, kale, toni greens, and mustard greens  Eat yellow and orange vegetables such as carrots, sweet potatoes, and winter squash  · Eat a variety of fruits every day  Choose fresh or canned fruit (canned in its own juice or light syrup) instead of juice  Fruit juice has very little or no fiber  · Eat low-fat dairy foods  Drink fat-free (skim) milk or 1% milk  Eat fat-free yogurt and low-fat cottage cheese  Try low-fat cheeses such as mozzarella and other reduced-fat cheeses  · Choose meat and other protein foods that are low in fat  Choose beans or other legumes such as split peas or lentils  Choose fish, skinless poultry (chicken or turkey), or lean cuts of red meat (beef or pork)  Before you cook meat or poultry, cut off any visible fat  · Use less fat and oil  Try baking foods instead of frying them  Add less fat, such as margarine, sour cream, regular salad dressing and mayonnaise to foods  Eat fewer high-fat foods  Some examples of high-fat foods include french fries, doughnuts, ice cream, and cakes  · Eat fewer sweets  Limit foods and drinks that are high in sugar  This includes candy, cookies, regular soda, and sweetened drinks  Exercise:  Exercise at least 30 minutes per day on most days of the week   Some examples of exercise include walking, biking, dancing, and swimming  You can also fit in more physical activity by taking the stairs instead of the elevator or parking farther away from stores  Ask your healthcare provider about the best exercise plan for you  © Copyright [employer] 2018 Information is for End User's use only and may not be sold, redistributed or otherwise used for commercial purposes   All illustrations and images included in CareNotes® are the copyrighted property of [employer]  or [address] [place] [address] [employer]

## 2022-08-11 ENCOUNTER — TELEPHONE (OUTPATIENT)
Dept: FAMILY MEDICINE CLINIC | Facility: CLINIC | Age: 77
End: 2022-08-11

## 2022-08-11 ENCOUNTER — PATIENT OUTREACH (OUTPATIENT)
Dept: FAMILY MEDICINE CLINIC | Facility: CLINIC | Age: 77
End: 2022-08-11

## 2022-08-11 DIAGNOSIS — R26.81 GAIT INSTABILITY: Primary | ICD-10-CM

## 2022-08-11 DIAGNOSIS — Z71.89 COMPLEX CARE COORDINATION: Primary | ICD-10-CM

## 2022-08-11 PROBLEM — I10 HYPERTENSION: Status: RESOLVED | Noted: 2018-11-22 | Resolved: 2022-08-11

## 2022-08-11 NOTE — PROGRESS NOTES
Michell Rendon, do I just put in an order for home health  PT or specifically designate Lenore Maizes?

## 2022-08-11 NOTE — PROGRESS NOTES
Outpatient Care Management Note:    [employer] message received from Dr Jaelyn Diez  This care manager is covering for [facility]  I spoke with their , Michell Rendon  Patient may need mobile lab, in home aids, transportation, and [employer]  Michell Rendon will call son to address needs  Felyramona Patel does not have any outstanding lab orders  She will give son the contact information for mobile lab  She is in Dr RAGSDALE office today and will request a therapy order and then place order for Lenore Maizes through [address]  Michell Justice will call CM if she needs any assistance  Dr Jaelyn Diez noted that patient could use assistance to check blood pressure   will discuss waiver and private caregivers  Note forwarded to Dr Jaelyn Diez

## 2022-08-12 ENCOUNTER — PATIENT OUTREACH (OUTPATIENT)
Dept: FAMILY MEDICINE CLINIC | Facility: CLINIC | Age: 77
End: 2022-08-12

## 2022-08-12 NOTE — PROGRESS NOTES
OPCM SW received referral to assist patient with resources  Per son, help is needed in the home  It is difficult to get patient out of the home for appointments so mobile lab is an option  Transportation is a concern  Waiver services and PT/OT needed  Phone call placed to son and received voicemail  Same left with this SW contact information  Awaiting call back

## 2022-08-18 ENCOUNTER — PATIENT OUTREACH (OUTPATIENT)
Dept: FAMILY MEDICINE CLINIC | Facility: CLINIC | Age: 77
End: 2022-08-18

## 2022-08-22 NOTE — PROGRESS NOTES
OPCM SW placed phone call to son who reports he is on his way for cataract surgery and will call this SW when he is more able  Son did report that patient has $4500 a month in income and has savings as well  Based on this, patient will not be eligible for waiver services and will need to privately pay for help in the home  Awaiting call back from son to discuss home needs

## 2022-08-29 ENCOUNTER — PATIENT OUTREACH (OUTPATIENT)
Dept: FAMILY MEDICINE CLINIC | Facility: CLINIC | Age: 77
End: 2022-08-29

## 2022-08-29 ENCOUNTER — TELEPHONE (OUTPATIENT)
Dept: LAB | Facility: HOSPITAL | Age: 77
End: 2022-08-29

## 2022-08-29 DIAGNOSIS — E78.00 HYPERCHOLESTEROLEMIA: Primary | ICD-10-CM

## 2022-08-29 DIAGNOSIS — N40.0 BENIGN PROSTATIC HYPERPLASIA WITHOUT LOWER URINARY TRACT SYMPTOMS: ICD-10-CM

## 2022-08-29 DIAGNOSIS — R53.83 FATIGUE, UNSPECIFIED TYPE: ICD-10-CM

## 2022-08-29 NOTE — PROGRESS NOTES
OPCM SW placed call to son  Son reports that patient requires assistance with bathing and dressing  Patient has income of $4500 a month, not eligible for waiver, so in aware  SW provided in home resource, private duty agency list, [employer] solutions, care patrol, and [employer] guide by email as requested  Son was also provided phone number for [facility] [phone]  Aparna charles to Dr Devi Phillip to place lab orders and call [facility] for home service

## 2022-08-29 NOTE — PROGRESS NOTES
OPCM SW received message from Dr Yaw Trivedi, lab as are ordered  Phone call placed to [facility] to schedule lab work  Lab will outreach son to scheduled

## 2022-09-14 ENCOUNTER — TELEPHONE (OUTPATIENT)
Dept: LAB | Facility: HOSPITAL | Age: 77
End: 2022-09-14

## 2023-05-04 DIAGNOSIS — R42 POSITIONAL LIGHTHEADEDNESS: ICD-10-CM

## 2023-05-04 RX ORDER — MIDODRINE HYDROCHLORIDE 5 MG/1
TABLET ORAL
Qty: 270 TABLET | Refills: 3 | Status: SHIPPED | OUTPATIENT
Start: 2023-05-04

## 2024-01-23 NOTE — RESTORATIVE TECHNICIAN NOTE
EPA requested   Restorative Specialist Mobility Note       Activity: Ambulate in cisneros, Ambulate in room, Bathroom privileges, Chair, Stand at bedside (Educated/encouraged pt to ambulate with assistance 3-4 x's/day  Chair alarm on   Pt callbell, phone/tray within reach )     Assistive Device: Front wheel walker          [employer] BS, Restorative Technician, [employer]

## 2024-05-09 NOTE — ASSESSMENT & PLAN NOTE
· Currently stable  · Continue bowel regimen and PRN medications  · Adequate hydration  · Encourage physical activity  acetaminophen 500 mg oral tablet: 2 tab(s) orally every 8 hours  aspirin 81 mg oral delayed release tablet: 1 tab(s) orally 2 times a day  pantoprazole 40 mg oral delayed release tablet: 1 tab(s) orally once a day (before a meal) Continue while taking Aspirin  polyethylene glycol 3350 oral powder for reconstitution: 17 gram(s) orally once a day (at bedtime) as needed for  constipation  traMADol 50 mg oral tablet: 1 tab(s) orally every 4 to 6 hours as needed for Severe Pain (7 - 10) MDD: 5

## 2024-05-29 NOTE — SOCIAL WORK
[facility] is unable to accept patient  CM sent referral to [facility] per patient's preference for an accepting agency with over so many stars in terms of rating and nearby  [facility] able to accept patient  CM met with patient to follow up on DC planning  CM made patient aware [facility] is able to [facility] patient [facility]  CM did readdress the recommendation of HHC vs  STR  CM spent at least 30 minutes with patient discussing again what both HHC and STR mean  CM also provided support and encouragement to patient as he reports he is frustrated with being in the hospital and has been feeling more down  CM explained to patient to be positive as part of healing is also that mentality that you are able to get better and being strong  Patient exhibited a slight change in attitude and was grateful to CM for speaking with him  Patient reports he would be open to STR as he wants to get better  Patient deferred the decision of what STR to go to to his son  CM called and spoke to patient's son who is pleased to hear patient is open to getting stronger and going to rehab  Son would like referrals to 1  [facility] and 2  [facility] as patient was at [facility] before and did well  [facility] referrals sent per preference  CM advised patient's son that it would be recommended to review a subacute list as well for choices in case acute is not able to accept patient, son expressed understanding  CM emailed subacute list to son and CM will follow up  182.9

## 2025-07-02 ENCOUNTER — TELEPHONE (OUTPATIENT)
Dept: FAMILY MEDICINE CLINIC | Facility: CLINIC | Age: 80
End: 2025-07-02

## 2025-07-02 NOTE — TELEPHONE ENCOUNTER
Pt's son called requesting an appointment with Dr. Zamudio. Son states that he just discovered pt's feet and ankles are blue. Son states that he does not know how long they have been like that, because pt has severe OCD and insists on keeping his socks on all the time     Please advise

## 2025-07-02 NOTE — TELEPHONE ENCOUNTER
Spoke with TAZ Hernandez. Concern for circulation issue. Needs to be evaluated in ED. Called patient's son and advised

## 2025-08-22 ENCOUNTER — TELEPHONE (OUTPATIENT)
Age: 80
End: 2025-08-22

## (undated) DEVICE — LIGHT HANDLE COVER SLEEVE DISP BLUE STELLAR

## (undated) DEVICE — SNAP KOVER: Brand: UNBRANDED

## (undated) DEVICE — SURGICEL 2 X 3

## (undated) DEVICE — PLUMEPEN PRO 10FT

## (undated) DEVICE — BETADINE OINTMENT FOIL PACK

## (undated) DEVICE — NEURO SURGICAL CLIPPER BLADE

## (undated) DEVICE — GLOVE INDICATOR PI UNDERGLOVE SZ 8.5 BLUE

## (undated) DEVICE — INTENDED FOR TISSUE SEPARATION, AND OTHER PROCEDURES THAT REQUIRE A SHARP SURGICAL BLADE TO PUNCTURE OR CUT.: Brand: BARD-PARKER ® CARBON RIB-BACK BLADES

## (undated) DEVICE — SUT PROLENE 3-0 V-7 36 IN 8976H

## (undated) DEVICE — BLADE ELECTRODE: Brand: EDGE

## (undated) DEVICE — DRILL BIT G3606010 2.4MM

## (undated) DEVICE — SPONGE PVP SCRUB WING STERILE

## (undated) DEVICE — TIBURON SPLIT SHEET: Brand: CONVERTORS

## (undated) DEVICE — TRAY FOLEY 16FR URIMETER SURESTEP

## (undated) DEVICE — GLOVE SRG BIOGEL 8

## (undated) DEVICE — BETHLEHEM UNIVERSAL SPINE, KIT: Brand: CARDINAL HEALTH

## (undated) DEVICE — BOWL ASSY BM210 DUAL BLADE DISPOSABLE: Brand: MIDAS REX™

## (undated) DEVICE — BIPOLAR SEALER 23-113-1 AQM 2.3: Brand: AQUAMANTYS™

## (undated) DEVICE — NEURO PATTIES 1/2 X 3

## (undated) DEVICE — MAYFIELD® DISPOSABLE ADULT SKULL PIN (PLASTIC BASE): Brand: MAYFIELD®

## (undated) DEVICE — INTENDED FOR TISSUE SEPARATION, AND OTHER PROCEDURES THAT REQUIRE A SHARP SURGICAL BLADE TO PUNCTURE OR CUT.: Brand: BARD-PARKER SAFETY BLADES SIZE 15, STERILE

## (undated) DEVICE — DRAPE SHEET X-LG

## (undated) DEVICE — DRAPE SURGIKIT SADDLE BAG

## (undated) DEVICE — PREP SURGICAL PURPREP 26ML

## (undated) DEVICE — TOOL 9MH30 LEGEND 9CM 3MM MH: Brand: MIDAS REX

## (undated) DEVICE — FLOSEAL HEMOSTATIC MATRIX, 5 ML: Brand: FLOSEAL

## (undated) DEVICE — ANTIBACTERIAL VIOLET BRAIDED (POLYGLACTIN 910), SYNTHETIC ABSORBABLE SUTURE: Brand: COATED VICRYL

## (undated) DEVICE — SPECIMEN CONTAINER STERILE PEEL PACK

## (undated) DEVICE — DRAPE EQUIPMENT RF WAND

## (undated) DEVICE — DRESSING MEPILEX AG BORDER 4 X 8 IN